# Patient Record
Sex: FEMALE | Race: WHITE | NOT HISPANIC OR LATINO | Employment: OTHER | URBAN - METROPOLITAN AREA
[De-identification: names, ages, dates, MRNs, and addresses within clinical notes are randomized per-mention and may not be internally consistent; named-entity substitution may affect disease eponyms.]

---

## 2017-01-16 ENCOUNTER — OFFICE VISIT (OUTPATIENT)
Dept: PODIATRY | Facility: CLINIC | Age: 62
End: 2017-01-16
Payer: COMMERCIAL

## 2017-01-16 VITALS
DIASTOLIC BLOOD PRESSURE: 76 MMHG | HEART RATE: 78 BPM | BODY MASS INDEX: 38.86 KG/M2 | WEIGHT: 211.19 LBS | HEIGHT: 62 IN | SYSTOLIC BLOOD PRESSURE: 177 MMHG

## 2017-01-16 DIAGNOSIS — M77.30 RETROCALCANEAL BONE SPUR: ICD-10-CM

## 2017-01-16 DIAGNOSIS — M72.2 PLANTAR FASCIITIS: ICD-10-CM

## 2017-01-16 DIAGNOSIS — M76.61 TENDONITIS, ACHILLES, RIGHT: Primary | ICD-10-CM

## 2017-01-16 PROCEDURE — 3078F DIAST BP <80 MM HG: CPT | Mod: S$GLB,,, | Performed by: PODIATRIST

## 2017-01-16 PROCEDURE — 99213 OFFICE O/P EST LOW 20 MIN: CPT | Mod: S$GLB,,, | Performed by: PODIATRIST

## 2017-01-16 PROCEDURE — 1159F MED LIST DOCD IN RCRD: CPT | Mod: S$GLB,,, | Performed by: PODIATRIST

## 2017-01-16 PROCEDURE — 3077F SYST BP >= 140 MM HG: CPT | Mod: S$GLB,,, | Performed by: PODIATRIST

## 2017-01-16 PROCEDURE — 99999 PR PBB SHADOW E&M-EST. PATIENT-LVL III: CPT | Mod: PBBFAC,,, | Performed by: PODIATRIST

## 2017-01-16 RX ORDER — METHYLPREDNISOLONE 4 MG/1
TABLET ORAL
Qty: 1 PACKAGE | Refills: 0 | Status: SHIPPED | OUTPATIENT
Start: 2017-01-16 | End: 2017-02-06

## 2017-01-16 RX ORDER — DEXAMETHASONE SODIUM PHOSPHATE 4 MG/ML
INJECTION, SOLUTION INTRA-ARTICULAR; INTRALESIONAL; INTRAMUSCULAR; INTRAVENOUS; SOFT TISSUE
Refills: 0 | COMMUNITY
Start: 2016-12-07 | End: 2017-11-06

## 2017-01-16 NOTE — PROGRESS NOTES
"Podiatry Note    CHIEF COMPLAINT   Chief Complaint   Patient presents with    Follow-up     Right achilles tendonitis. Continues with pain to site rated 6/10. Has been doing therapy and wearing heel lifts         HPI  Alysha Rosales is a 62 y.o. female w/ PMH of DM2, CAD, gout, HTN, and other medical problems, who presents today complaining of painful achilles /  posterior aspect of the right heel.  Pt states pain is still present but much improved with use of ionotophoresis which is being performed at [facility] physical therapy. She has only had one session.    Hemoglobin A1C   Date Value Ref Range Status   11/08/2016 6.4 (H) 4.5 - 6.2 % Final     Comment:     According to ADA guidelines, hemoglobin A1C <7.0% represents  optimal control in non-pregnant diabetic patients.  Different  metrics may apply to specific populations.   Standards of Medical Care in Diabetes - 2016.  For the purpose of screening for the presence of diabetes:  <5.7%     Consistent with the absence of diabetes  5.7-6.4%  Consistent with increasing risk for diabetes   (prediabetes)  >or=6.5%  Consistent with diabetes  Currently no consensus exists for use of hemoglobin A1C  for diagnosis of diabetes for children.     07/05/2016 6.2 4.5 - 6.2 % Final   04/05/2016 6.0 4.5 - 6.2 % Final       REVIEW OF SYSTEMS  General: Denies any fever or chills  Chest: Denies shortness of breath, wheezing, coughing, or sputum production  Heart: Denies chest pain, cold extremities, orthopenia, or reduced exercise tolerance  As noted above and per history of current illness above, otherwise negative in the remainder of the 14 systems.     PHYSICAL EXAM  Vitals:    01/16/17 0842   BP: (!) 177/76   Pulse: 78   Weight: 95.8 kg (211 lb 3.2 oz)   Height: 5' 2" (1.575 m)   PainSc:   6   PainLoc: Foot       General: This patient is well-developed, well-nourished and appears stated age, well-oriented to person, place and time, and cooperative and pleasant on today's " visit      LOWER EXTREMITY  Vascular exam:   · Dorsalis pedis and posterior tibial pulses palpable 2/4 bilaterally.   · Capillary refill time immediate to the toes.   · Feet are warm to the touch. Skin temperature warm to warm from proximally to distally   · There are no varicosities, telangiectasias noted to bilateral foot and ankle regions.   · There are no ecchymoses noted to bilateral foot and ankle regions.   · There is no gross lower extremity edema.    Dermatologic exam:   · Skin moist with healthy texture and turgor.  · There are no open ulcerations, lacerations, or fissures to bilateral foot and ankle regions. There are no signs of infection as there is no erythema, no proximal-extending lymphangiitis, no fluctuance, or crepitus noted on palpation to bilateral foot and ankle regions.   · There is no interdigital maceration.   · There are no hyperkeratotic lesions noted to feet. Nails are well-trimmed.   · There are diffuse scales on plantar foot     Neurologic exam:  · Epicritic sensation is intact as the patient is able to sense light touch to bilateral foot and ankle regions.   · Achilles and patellar deep tendon reflexes intact  · Babinski reflex absent    Musculoskeletal/Orthopedic exam:   · TTP posterior aspect heel at insertion of achilles RIGHT and TTP of achilles tendon proper.  Neg prominence noted at insertion, neg defect palpated, Gastroc equinus noted. neg edema, Neg ecchy/eryth.     · Mild TTP plantar calcaneus medial tubercle, RIGHT  · Muscle strength AT/EHL/EDL/PT: 5/5; Achilles/Gastroc/Soleus: 5/5; PB/PL: 5/5 Muscle tone is normal.  · Ankle joint ROM  B/L supple DF/PF, non-crepitus  · STJ ROM supple inv/ev, non crepitus b/l.  · On stance/Gait: able to stand, heel to toe gait. Able to weightbear, ambulate without assistance      IMAGING   Reviewed by me and I agree with radiologist findings, 3 views of foot/ankle, reveal: Right posterior calcaneal enthesophyte    ASSESSMENT  1. Achilles  tendonitis, insertional, right  2. Retrocalcaneal heel spur  3. Plantar fasciitis, right       PLAN  1. Patient was educated about clinical and imaging findings, and verbalizes understanding of above.  2. Treatment plan:   -continue with heel lift and tennis shoe  -rehab exercises provided for heel pain, Rx medrol dosepak  -consider injection if pain fails to resolve  3. RTC in 4-6 wks if no improvement noted    Future Appointments  Date Time Provider Department Center   2/2/2017 9:30 AM MD CAILIN [facility] [address]         Report Electronically Signed By:  Lianne Chew DPM   Podiatric Medicine & Surgery  [facility]  1/16/2017

## 2017-01-19 ENCOUNTER — PATIENT OUTREACH (OUTPATIENT)
Dept: ADMINISTRATIVE | Facility: HOSPITAL | Age: 62
End: 2017-01-19

## 2017-01-19 NOTE — PROGRESS NOTES
[facility] is committed to your overall health.  To help you get the most out of each of your visits, we will review your information to make sure you are up to date on all of your recommended tests and/or procedures.      Margie Bird MD has found that you may be due for   Health Maintenance Due   Topic    Colonoscopy         If you have had any of the above done at another facility, please bring the records or information with you so that your record at [facility] will be complete.    If you are currently taking medication, please bring it with you to your appointment for review.    We will be happy to assist you with scheduling any necessary appointments or you may contact the [facility] appointment desk at [phone] to schedule at your convenience.     Thank you for choosing [facility] for your healthcare needs,      Lacie RICHARDSON LPN Care Coordinator  Care Coordination Department  [facility]

## 2017-01-22 RX ORDER — FUROSEMIDE 20 MG/1
TABLET ORAL
Qty: 30 TABLET | Refills: 5 | Status: SHIPPED | OUTPATIENT
Start: 2017-01-22 | End: 2017-08-16 | Stop reason: SDUPTHER

## 2017-03-21 ENCOUNTER — OFFICE VISIT (OUTPATIENT)
Dept: FAMILY MEDICINE | Facility: CLINIC | Age: 62
End: 2017-03-21
Payer: COMMERCIAL

## 2017-03-21 ENCOUNTER — LAB VISIT (OUTPATIENT)
Dept: LAB | Facility: HOSPITAL | Age: 62
End: 2017-03-21
Attending: FAMILY MEDICINE
Payer: COMMERCIAL

## 2017-03-21 VITALS
DIASTOLIC BLOOD PRESSURE: 72 MMHG | SYSTOLIC BLOOD PRESSURE: 122 MMHG | TEMPERATURE: 97 F | HEART RATE: 74 BPM | HEIGHT: 62 IN | RESPIRATION RATE: 18 BRPM | BODY MASS INDEX: 37.93 KG/M2 | WEIGHT: 206.13 LBS | OXYGEN SATURATION: 98 %

## 2017-03-21 DIAGNOSIS — L30.9 ECZEMA, UNSPECIFIED TYPE: ICD-10-CM

## 2017-03-21 DIAGNOSIS — E11.21 DIABETIC NEPHROPATHY ASSOCIATED WITH TYPE 2 DIABETES MELLITUS: Primary | ICD-10-CM

## 2017-03-21 DIAGNOSIS — E78.5 HYPERLIPIDEMIA, UNSPECIFIED HYPERLIPIDEMIA TYPE: ICD-10-CM

## 2017-03-21 DIAGNOSIS — R80.9 PERSISTENT PROTEINURIA ASSOCIATED WITH TYPE 2 DIABETES MELLITUS: ICD-10-CM

## 2017-03-21 DIAGNOSIS — E11.29 PERSISTENT PROTEINURIA ASSOCIATED WITH TYPE 2 DIABETES MELLITUS: ICD-10-CM

## 2017-03-21 DIAGNOSIS — I25.10 CORONARY ARTERY DISEASE INVOLVING NATIVE CORONARY ARTERY OF NATIVE HEART WITHOUT ANGINA PECTORIS: ICD-10-CM

## 2017-03-21 DIAGNOSIS — E11.21 DIABETIC NEPHROPATHY ASSOCIATED WITH TYPE 2 DIABETES MELLITUS: ICD-10-CM

## 2017-03-21 DIAGNOSIS — I10 ESSENTIAL HYPERTENSION: ICD-10-CM

## 2017-03-21 PROCEDURE — 36415 COLL VENOUS BLD VENIPUNCTURE: CPT | Mod: PO

## 2017-03-21 PROCEDURE — 83036 HEMOGLOBIN GLYCOSYLATED A1C: CPT

## 2017-03-21 PROCEDURE — 3074F SYST BP LT 130 MM HG: CPT | Mod: S$GLB,,, | Performed by: FAMILY MEDICINE

## 2017-03-21 PROCEDURE — 3044F HG A1C LEVEL LT 7.0%: CPT | Mod: S$GLB,,, | Performed by: FAMILY MEDICINE

## 2017-03-21 PROCEDURE — 3066F NEPHROPATHY DOC TX: CPT | Mod: S$GLB,,, | Performed by: FAMILY MEDICINE

## 2017-03-21 PROCEDURE — 3078F DIAST BP <80 MM HG: CPT | Mod: S$GLB,,, | Performed by: FAMILY MEDICINE

## 2017-03-21 PROCEDURE — 1160F RVW MEDS BY RX/DR IN RCRD: CPT | Mod: S$GLB,,, | Performed by: FAMILY MEDICINE

## 2017-03-21 PROCEDURE — 99214 OFFICE O/P EST MOD 30 MIN: CPT | Mod: S$GLB,,, | Performed by: FAMILY MEDICINE

## 2017-03-21 PROCEDURE — 99999 PR PBB SHADOW E&M-EST. PATIENT-LVL III: CPT | Mod: PBBFAC,,, | Performed by: FAMILY MEDICINE

## 2017-03-21 NOTE — MR AVS SNAPSHOT
[facility]  [address] [place] [address]  Phone: [phone]                  Alysha Rosales   3/21/2017 11:30 AM   Office Visit    Description:  Female : 1955   Provider:  Margie Bird MD   Department:  [facility]           Reason for Visit     Follow-up           Diagnoses this Visit        Comments    Diabetic nephropathy associated with type 2 diabetes mellitus    -  Primary            To Do List           Future Appointments        Provider Department Dept Phone    3/21/2017 1:40 PM LABORATORY, [address] [facility] [phone]    2017 7:45 AM Margie Bird MD [facility] [phone]      Goals (5 Years of Data)     None      Follow-Up and Disposition     Return in about 4 months (around 2017) for physical.      [facility]     [facility] On Call Nurse Care Line -  Assistance  Registered nurses in the [facility] Center provide clinical advisement, health education, appointment booking, and other advisory services.  Call for this free service at [phone].             Medications           Message regarding Medications     Verify the changes and/or additions to your medication regime listed below are the same as discussed with your clinician today.  If any of these changes or additions are incorrect, please notify your healthcare provider.             Verify that the below list of medications is an accurate representation of the medications you are currently taking.  If none reported, the list may be blank. If incorrect, please contact your healthcare provider. Carry this list with you in case of emergency.           Current Medications     allopurinol (ZYLOPRIM) 100 MG tablet TAKE ONE-HALF TABLET BY MOUTH ONCE DAILY    aspirin 81 mg Tab Take 1 tablet by mouth.    BLOOD SUGAR DIAGNOSTIC (ONE TOUCH ULTRA TEST MISC)     blood sugar diagnostic (ONE TOUCH ULTRA  "TEST) Strp Use daily as directed    clopidogrel (PLAVIX) 75 mg tablet Take 1 tablet by mouth.    CRESTOR 20 mg tablet TAKE ONE TABLET BY MOUTH ONCE NIGHTLY    desoximetasone (TOPICORT) 0.25 % cream Apply 1 application topically 2 (two) times daily as needed.    dexamethasone (DECADRON) 4 mg/mL injection USE AS DIRECTED INTRAMUSCULARLY BY PHYSICAL THERAPIST    ERGOCALCIFEROL, VITAMIN D2, (VITAMIN D ORAL) Take 1 tablet by mouth once daily. 1000 units daily    furosemide (LASIX) 20 MG tablet TAKE ONE TABLET BY MOUTH ONCE DAILY    glyBURIDE (DIABETA) 5 MG tablet TAKE ONE TABLET BY MOUTH TWICE DAILY    hydrALAZINE (APRESOLINE) 50 MG tablet Take 1 tablet (50 mg total) by mouth 4 (four) times daily.    ibandronate (BONIVA) 150 mg tablet Take 1 tablet (150 mg total) by mouth every 30 days.    JANUVIA 100 mg Tab TAKE ONE TABLET BY MOUTH ONCE DAILY    lisinopril (PRINIVIL,ZESTRIL) 20 MG tablet Take 1 tablet (20 mg total) by mouth once daily.    metformin (GLUCOPHAGE) 500 MG tablet TAKE ONE TABLET BY MOUTH IN THE MORNING AND ONE TABLET AT NOON AND TWO TABLETS IN THE EVENING WITH MEALS    nebivolol (BYSTOLIC) 10 MG Tab Take 1 tablet by mouth.    ONE TOUCH ULTRASOFT LANCETS [facility]            Clinical Reference Information           Your Vitals Were     BP Pulse Temp Resp    122/72 (BP Location: Left arm, Patient Position: Sitting, BP Method: Manual) 74 96.6 °F (35.9 °C) (Tympanic) 18    Height Weight SpO2 BMI    5' 2" (1.575 m) 93.5 kg (206 lb 2.1 oz) 98% 37.7 kg/m2      Blood Pressure          Most Recent Value    BP  122/72      Allergies as of 3/21/2017     Codeine      Immunizations Administered on Date of Encounter - 3/21/2017     None      Orders Placed During Today's Visit     Future Labs/Procedures Expected by Expires    Hemoglobin A1c  3/21/2017 3/21/2018      Instructions    Please correspond with Dr. Bird via My Chart for your results.     Thanks.       Language Assistance Services     ATTENTION: Language assistance " services are available, free of charge. Please call [phone].      ATENCIÓN: Si habla shabbir, tiene a ocampo disposición servicios gratuitos de asistencia lingüística. Llame al [phone].     CHÚ Ý: N?u b?n nói Ti?ng Vi?t, có các d?ch v? h? tr? ngôn ng? mi?n phí dành cho b?n. G?i s? [phone].         [facility] complies with applicable Federal civil rights laws and does not discriminate on the basis of race, color, national origin, age, disability, or sex.

## 2017-03-21 NOTE — PROGRESS NOTES
Subjective:       Patient ID: Alysha Rosales is a 62 y.o. female.    Chief Complaint: Diabetes; Hypertension; and Follow-up    HPI Comments: Ms. Rosales comes in today for 3-month diabetes and hypertension follow-up.  She is not fasting but has taken medication today.  She states she walks and monitors her diet.  She states she performs random glucose checks with levels ranging <140.      She states she feels good today without fever, chills, fatigue, appetite change; shortness of breath, cough, wheezing; chest pain, palpitations, leg swelling; abdominal pain, nausea, vomiting, diarrhea, constipation; polydipsia, polyuria, polyphagia; unusual urinary symptoms; back pain; headaches; anxiety or depression except reports having occasional right foot pain due to Achilles tendinitis.  She states she completed physical therapy which was helpful and states she applies ice, also helpful.  She saw podiatrist Dr. Chew on January 16, 2017 for right Achilles tendinitis, plantar fasciitis with 4-6 week follow-up advised.    She states she is scheduled to see Dr. Kaba, cardiologist, on April 11, 2017 for surveillance of CAD with the following labs (CMP, lipid panel) scheduled for this Thursday.    She saw Dr. Bryant, nephrologist, on November 17, 2016 for proteinuria surveillance with 6-month follow-up advised.      Current Outpatient Prescriptions:  allopurinol (ZYLOPRIM) 100 MG tablet, TAKE ONE-HALF TABLET BY MOUTH ONCE DAILY  aspirin 81 mg Tab, Take 1 tablet by mouth.  BLOOD SUGAR DIAGNOSTIC (ONE TOUCH ULTRA TEST MISC),   blood sugar diagnostic (ONE TOUCH ULTRA TEST) Strp, Use daily as directed  clopidogrel (PLAVIX) 75 mg tablet, Take 1 tablet by mouth.  CRESTOR 20 mg tablet, TAKE ONE TABLET BY MOUTH ONCE NIGHTLY  desoximetasone (TOPICORT) 0.25 % cream, Apply 1 application topically 2 (two) times daily as needed.  dexamethasone (DECADRON) 4 mg/mL injection, USE AS DIRECTED INTRAMUSCULARLY BY PHYSICAL  THERAPIST  ERGOCALCIFEROL, VITAMIN D2, (VITAMIN D ORAL), Take 1 tablet by mouth once daily. 1000 units daily  furosemide (LASIX) 20 MG tablet, TAKE ONE TABLET BY MOUTH ONCE DAILY  glyBURIDE (DIABETA) 5 MG tablet, TAKE ONE TABLET BY MOUTH TWICE DAILY  hydrALAZINE (APRESOLINE) 50 MG tablet, Take 1 tablet (50 mg total) by mouth 4 (four) times daily.  ibandronate (BONIVA) 150 mg tablet, Take 1 tablet (150 mg total) by mouth every 30 days.  JANUVIA 100 mg Tab, TAKE ONE TABLET BY MOUTH ONCE DAILY  lisinopril (PRINIVIL,ZESTRIL) 20 MG tablet, Take 1 tablet (20 mg total) by mouth once daily.  metformin (GLUCOPHAGE) 500 MG tablet, TAKE ONE TABLET BY MOUTH IN THE MORNING AND ONE TABLET AT NOON AND TWO TABLETS IN THE EVENING WITH MEALS  nebivolol (BYSTOLIC) 10 MG Tab, Take 1 tablet by mouth.  ONE TOUCH ULTRASOFT LANCETS [facility],     Labs:                     WBC                      7.11                11/08/2016                 HGB                      12.3                11/08/2016                 HCT                      38.9                11/08/2016                 PLT                      212                 11/08/2016               LDLCALC                  43.4 (L)            04/05/2016                          CHOL                     119 (L)             04/05/2016                 TRIG                     133                 04/05/2016                 HDL                      49                  04/05/2016                 ALT                      17                  04/05/2016                 AST                      23                  04/05/2016                 NA                       144                 11/08/2016                 K                        4.4                 11/08/2016                 CL                       107                 11/08/2016                 CREATININE               0.9                 11/08/2016                 BUN                      23                  11/08/2016                 CO2                       24                  11/08/2016                 TSH                      2.398               07/05/2016                 HGBA1C                   6.4 (H)             11/08/2016                  Review of Systems   Constitutional: Negative for activity change, appetite change, chills, fatigue and fever.        Weight  95.9 kg (211 lb 6.7 oz) at November 8, 2016 visit.   Respiratory: Negative for cough, shortness of breath and wheezing.    Cardiovascular: Negative for chest pain, palpitations and leg swelling.        See history of present illness.   Gastrointestinal: Negative for abdominal pain, constipation, diarrhea, nausea and vomiting.   Endocrine: Negative for polydipsia, polyphagia and polyuria.        See history of present illness.   Genitourinary: Negative for difficulty urinating.        See history of present illness.   Musculoskeletal: Positive for myalgias. Negative for back pain and joint swelling.        See history of present illness.   Neurological: Negative for seizures and headaches.   Hematological:        See history of present illness.       Objective:      Physical Exam   Constitutional: She is oriented to person, place, and time. She appears well-developed and well-nourished. No distress.   Pleasant and obese.   Neck: Normal range of motion. Neck supple. No thyromegaly present.   Cardiovascular: Normal rate, regular rhythm and intact distal pulses.    Murmur heard.  Pulses:       Dorsalis pedis pulses are 3+ on the right side, and 3+ on the left side.        Posterior tibial pulses are 3+ on the right side, and 3+ on the left side.   Chronic and soft.   Pulmonary/Chest: Effort normal and breath sounds normal. No respiratory distress. She has no wheezes.   Abdominal: Soft. Bowel sounds are normal. She exhibits no distension and no mass. There is no tenderness. There is no rebound and no guarding.   Musculoskeletal: Normal range of motion. She exhibits no edema or tenderness.    She is ambulatory without problems.     Feet:   Right Foot:   Protective Sensation: 5 sites tested. 5 sites sensed.   Skin Integrity: Negative for ulcer or skin breakdown.   Left Foot:   Protective Sensation: 5 sites tested. 5 sites sensed.   Skin Integrity: Negative for ulcer or skin breakdown.   Lymphadenopathy:     She has no cervical adenopathy.   Neurological: She is alert and oriented to person, place, and time.   Skin: She is not diaphoretic.   Eczematous changes at sides of both feet noted but much improved.   Psychiatric: She has a normal mood and affect. Her behavior is normal. Judgment and thought content normal.   Vitals reviewed.      Assessment:       1. Diabetic nephropathy associated with type 2 diabetes mellitus    2. Essential hypertension    3. Hyperlipidemia, unspecified hyperlipidemia type    4. Persistent proteinuria associated with type 2 diabetes mellitus    5. Coronary artery disease involving native coronary artery of native heart without angina pectoris    6. Eczema, unspecified type    7. Obesity, Class II, BMI 35-39.9, with comorbidity        Plan:       1.  Labs: A1c.  Patient advised to correspond via My Chart for results.  2.  Continue current medications, follow low sodium, low cholesterol, low carb diet, daily walks.  3.  Keep follow up with specialists.  4  See me in 4 months for fasting annual wellness examination.

## 2017-03-22 LAB
ESTIMATED AVG GLUCOSE: 137 MG/DL
HBA1C MFR BLD HPLC: 6.4 %

## 2017-03-31 RX ORDER — ROSUVASTATIN CALCIUM 20 MG/1
TABLET, FILM COATED ORAL
Qty: 30 TABLET | Refills: 5 | Status: SHIPPED | OUTPATIENT
Start: 2017-03-31 | End: 2018-06-20 | Stop reason: SDUPTHER

## 2017-04-07 DIAGNOSIS — E11.9 TYPE 2 DIABETES MELLITUS WITHOUT COMPLICATION: ICD-10-CM

## 2017-04-13 DIAGNOSIS — E11.9 TYPE 2 DIABETES MELLITUS WITHOUT COMPLICATION: ICD-10-CM

## 2017-06-09 DIAGNOSIS — E11.9 TYPE 2 DIABETES MELLITUS WITHOUT COMPLICATION: ICD-10-CM

## 2017-06-13 RX ORDER — METFORMIN HYDROCHLORIDE 500 MG/1
TABLET ORAL
Qty: 120 TABLET | Refills: 5 | Status: SHIPPED | OUTPATIENT
Start: 2017-06-13 | End: 2018-03-13 | Stop reason: SDUPTHER

## 2017-06-26 ENCOUNTER — PATIENT OUTREACH (OUTPATIENT)
Dept: ADMINISTRATIVE | Facility: HOSPITAL | Age: 62
End: 2017-06-26

## 2017-07-11 ENCOUNTER — OFFICE VISIT (OUTPATIENT)
Dept: FAMILY MEDICINE | Facility: CLINIC | Age: 62
End: 2017-07-11
Payer: COMMERCIAL

## 2017-07-11 ENCOUNTER — LAB VISIT (OUTPATIENT)
Dept: LAB | Facility: HOSPITAL | Age: 62
End: 2017-07-11
Payer: COMMERCIAL

## 2017-07-11 VITALS
BODY MASS INDEX: 38.5 KG/M2 | TEMPERATURE: 97 F | HEART RATE: 78 BPM | RESPIRATION RATE: 18 BRPM | SYSTOLIC BLOOD PRESSURE: 128 MMHG | WEIGHT: 209.19 LBS | OXYGEN SATURATION: 97 % | DIASTOLIC BLOOD PRESSURE: 84 MMHG | HEIGHT: 62 IN

## 2017-07-11 DIAGNOSIS — E11.40 TYPE 2 DIABETES MELLITUS WITH DIABETIC NEUROPATHY, WITHOUT LONG-TERM CURRENT USE OF INSULIN: ICD-10-CM

## 2017-07-11 DIAGNOSIS — M81.0 OSTEOPOROSIS, UNSPECIFIED OSTEOPOROSIS TYPE, UNSPECIFIED PATHOLOGICAL FRACTURE PRESENCE: ICD-10-CM

## 2017-07-11 DIAGNOSIS — I10 ESSENTIAL HYPERTENSION: ICD-10-CM

## 2017-07-11 DIAGNOSIS — Z78.0 POSTMENOPAUSAL: ICD-10-CM

## 2017-07-11 DIAGNOSIS — Z00.00 ANNUAL PHYSICAL EXAM: Primary | ICD-10-CM

## 2017-07-11 DIAGNOSIS — Z12.31 OTHER SCREENING MAMMOGRAM: ICD-10-CM

## 2017-07-11 DIAGNOSIS — M17.12 ARTHRITIS OF KNEE, LEFT: ICD-10-CM

## 2017-07-11 DIAGNOSIS — E55.9 VITAMIN D DEFICIENCY: ICD-10-CM

## 2017-07-11 DIAGNOSIS — I25.10 CORONARY ARTERY DISEASE INVOLVING NATIVE CORONARY ARTERY OF NATIVE HEART WITHOUT ANGINA PECTORIS: ICD-10-CM

## 2017-07-11 DIAGNOSIS — Z00.00 ANNUAL PHYSICAL EXAM: ICD-10-CM

## 2017-07-11 DIAGNOSIS — E11.21 DIABETIC NEPHROPATHY ASSOCIATED WITH TYPE 2 DIABETES MELLITUS: ICD-10-CM

## 2017-07-11 DIAGNOSIS — E79.0 ELEVATED URIC ACID IN BLOOD: ICD-10-CM

## 2017-07-11 DIAGNOSIS — E04.2 MULTIPLE THYROID NODULES: ICD-10-CM

## 2017-07-11 DIAGNOSIS — Z12.11 SCREENING FOR COLON CANCER: ICD-10-CM

## 2017-07-11 DIAGNOSIS — E78.5 HYPERLIPIDEMIA, UNSPECIFIED HYPERLIPIDEMIA TYPE: ICD-10-CM

## 2017-07-11 DIAGNOSIS — L30.9 ECZEMA, UNSPECIFIED TYPE: ICD-10-CM

## 2017-07-11 LAB
25(OH)D3+25(OH)D2 SERPL-MCNC: 44 NG/ML
ALBUMIN SERPL BCP-MCNC: 3.5 G/DL
ALP SERPL-CCNC: 85 U/L
ALT SERPL W/O P-5'-P-CCNC: 18 U/L
ANION GAP SERPL CALC-SCNC: 10 MMOL/L
AST SERPL-CCNC: 18 U/L
BASOPHILS # BLD AUTO: 0.05 K/UL
BASOPHILS NFR BLD: 0.7 %
BILIRUB SERPL-MCNC: 0.2 MG/DL
BUN SERPL-MCNC: 27 MG/DL
CALCIUM SERPL-MCNC: 9.7 MG/DL
CHLORIDE SERPL-SCNC: 108 MMOL/L
CO2 SERPL-SCNC: 23 MMOL/L
CREAT SERPL-MCNC: 0.9 MG/DL
CREAT UR-MCNC: 196 MG/DL
DIFFERENTIAL METHOD: NORMAL
EOSINOPHIL # BLD AUTO: 0.2 K/UL
EOSINOPHIL NFR BLD: 2.2 %
ERYTHROCYTE [DISTWIDTH] IN BLOOD BY AUTOMATED COUNT: 13.9 %
EST. GFR  (AFRICAN AMERICAN): >60 ML/MIN/1.73 M^2
EST. GFR  (NON AFRICAN AMERICAN): >60 ML/MIN/1.73 M^2
GLUCOSE SERPL-MCNC: 173 MG/DL
HCT VFR BLD AUTO: 38.7 %
HGB BLD-MCNC: 12.6 G/DL
LYMPHOCYTES # BLD AUTO: 1.4 K/UL
LYMPHOCYTES NFR BLD: 19.8 %
MCH RBC QN AUTO: 29.2 PG
MCHC RBC AUTO-ENTMCNC: 32.6 %
MCV RBC AUTO: 90 FL
MONOCYTES # BLD AUTO: 0.5 K/UL
MONOCYTES NFR BLD: 6.5 %
NEUTROPHILS # BLD AUTO: 5.1 K/UL
NEUTROPHILS NFR BLD: 70.5 %
PLATELET # BLD AUTO: 191 K/UL
PMV BLD AUTO: 11.3 FL
POTASSIUM SERPL-SCNC: 4.9 MMOL/L
PROT SERPL-MCNC: 7 G/DL
PROT UR-MCNC: 48 MG/DL
PROT/CREAT RATIO, UR: 0.24
RBC # BLD AUTO: 4.31 M/UL
SODIUM SERPL-SCNC: 141 MMOL/L
TSH SERPL DL<=0.005 MIU/L-ACNC: 2.1 UIU/ML
URATE SERPL-MCNC: 6.8 MG/DL
WBC # BLD AUTO: 7.18 K/UL

## 2017-07-11 PROCEDURE — 80053 COMPREHEN METABOLIC PANEL: CPT

## 2017-07-11 PROCEDURE — 84443 ASSAY THYROID STIM HORMONE: CPT

## 2017-07-11 PROCEDURE — 85025 COMPLETE CBC W/AUTO DIFF WBC: CPT

## 2017-07-11 PROCEDURE — 83036 HEMOGLOBIN GLYCOSYLATED A1C: CPT

## 2017-07-11 PROCEDURE — 82570 ASSAY OF URINE CREATININE: CPT

## 2017-07-11 PROCEDURE — 99396 PREV VISIT EST AGE 40-64: CPT | Mod: 25,S$GLB,, | Performed by: FAMILY MEDICINE

## 2017-07-11 PROCEDURE — 82306 VITAMIN D 25 HYDROXY: CPT

## 2017-07-11 PROCEDURE — 36415 COLL VENOUS BLD VENIPUNCTURE: CPT | Mod: PO

## 2017-07-11 PROCEDURE — 84550 ASSAY OF BLOOD/URIC ACID: CPT

## 2017-07-11 PROCEDURE — 99999 PR PBB SHADOW E&M-EST. PATIENT-LVL IV: CPT | Mod: PBBFAC,,, | Performed by: FAMILY MEDICINE

## 2017-07-11 RX ORDER — DESOXIMETASONE 2.5 MG/G
1 CREAM TOPICAL 2 TIMES DAILY PRN
Qty: 15 G | Refills: 1 | Status: SHIPPED | OUTPATIENT
Start: 2017-07-11 | End: 2017-08-28 | Stop reason: SDUPTHER

## 2017-07-11 RX ORDER — IBANDRONATE SODIUM 150 MG/1
150 TABLET, FILM COATED ORAL
Qty: 1 TABLET | Refills: 11 | Status: SHIPPED | OUTPATIENT
Start: 2017-07-11 | End: 2017-08-16 | Stop reason: SDUPTHER

## 2017-07-11 NOTE — PROGRESS NOTES
HISTORY OF PRESENT ILLNESS: Ms. Rosales comes in today fasting and without taking medication without acute problems for annual wellness examination.    END OF LIFE DECISION: She does not have a living will but does not desire life support.    Current Outpatient Prescriptions   Medication Sig    allopurinol (ZYLOPRIM) 100 MG tablet TAKE ONE-HALF TABLET BY MOUTH ONCE DAILY    aspirin 81 mg Tab Take 1 tablet by mouth.    BLOOD SUGAR DIAGNOSTIC (ONE TOUCH ULTRA TEST MISC)     blood sugar diagnostic (ONE TOUCH ULTRA TEST) Strp Use daily as directed    clopidogrel (PLAVIX) 75 mg tablet Take 1 tablet by mouth.    CRESTOR 20 mg tablet TAKE ONE TABLET BY MOUTH ONCE NIGHTLY    desoximetasone (TOPICORT) 0.25 % cream Apply 1 application topically 2 (two) times daily as needed.    dexamethasone (DECADRON) 4 mg/mL injection USE AS DIRECTED INTRAMUSCULARLY BY PHYSICAL THERAPIST    ERGOCALCIFEROL, VITAMIN D2, (VITAMIN D ORAL) Take 1 tablet by mouth once daily. 1000 units daily    furosemide (LASIX) 20 MG tablet TAKE ONE TABLET BY MOUTH ONCE DAILY    glyBURIDE (DIABETA) 5 MG tablet TAKE ONE TABLET BY MOUTH TWICE DAILY    hydrALAZINE (APRESOLINE) 50 MG tablet Take 1 tablet (50 mg total) by mouth 4 (four) times daily.    lisinopril (PRINIVIL,ZESTRIL) 20 MG tablet Take 1 tablet (20 mg total) by mouth once daily.    metformin (GLUCOPHAGE) 500 MG tablet TAKE ONE TABLET BY MOUTH IN THE MORNING AND ONE AT NOON AND TWO IN THE EVENING WITH MEALS    nebivolol (BYSTOLIC) 10 MG Tab Take 1 tablet by mouth.    ONE TOUCH ULTRASOFT LANCETS MISC     ibandronate (BONIVA) 150 mg tablet Take 1 tablet (150 mg total) by mouth every 30 days. (CURRENTLY not taking in some time)    JANUVIA 100 mg Tab TAKE ONE TABLET BY MOUTH ONCE DAILY     SCREENINGS:   Cholesterol (with CMP): March 23, 2017 with cardiologist Dr. Kaba.  FFS/Colonoscopy: March 20, 2009; repeat in 7 years.  Mammogram: May 23, 2016 - cas.  GYN Exam: April 5, 2016 -  okay. Pap smears 2009 - 2013, 2016 - okay.  Dexa Scan: May 21, 2015 - osteoporosis; repeat in 2 - 3 years.  Eye Exam: November 11, 2016 with Dr. Roseline Best. She wears glasses.   Dental Exam: April or May 2017.  She wears denture on top and partial on bottom.   PPD: Negative in the past.  Immunizations: Tdap - February 25, 2009.  Gardasil - N./A.  Zostavax - March 30, 2015.  Pneumovax - November 21, 2008.  Seasonal Flu - November 8, 2016.  Hepatitis B vaccine - August 28, 2013, July 25, 2013, July 29, 2014.    ROS:  GENERAL: Denies fever, chills, fatigue or unusual weight change. Appetite normal. Weight 93.5 kg (206 lb 2.1 oz) at March 21, 2017 visit. Monitors diet. Not much exercise recently except mows her lawn and occasionally walks.  SKIN: Denies rashes, itching, changes in mole, color or texture of skin or easy bruising. Requests refill of cream of feet.  HEAD: Denies headaches or recent head trauma.  EYES: Denies change in vision, pain, diplopia, redness or discharge. Wears glasses.  EARS: Denies ear pain, discharge, vertigo or decreased hearing.  NOSE: Denies loss of smell, epistaxis or rhinitis.  MOUTH & THROAT: Denies hoarseness or change in voice. Denies excessive gum bleeding or mouth sores. Denies sore throat.  NODES: Denies swollen glands.  CHEST: Denies RIDDLE, wheezing, cough, or sputum production.  CARDIOVASCULAR: Denies chest pain, PND, orthopnea or reduced exercise tolerance. Denies palpitations. Saw cardiology Dr. Kaba on April 11, 2017 for surveillance of CAD with follow up scheduled for June 23,2017 which she cancelled and states she will reschedule.    ABDOMEN: Denies diarrhea, constipation, nausea, vomiting, abdominal pain, or blood in stool.  URINARY: Denies flank pain, dysuria or hematuria. Saw Dr. Bryant, nephrologist, on November 17, 2016 for proteinuria surveillance with 6-month follow-up advised.  GENITOURINARY: Denies flank pain, dysuria, frequency or hematuria. Performs monthly breast  "self exams.  ENDOCRINE: Reports not recently performing glucose checks. Reports has not been taking Boniva in some time. Thyroid ultrasound on May 5, 2016 - Borderline enlarged right thyroid lobe and several small hypoechoic thyroid nodules bilaterally.  No lesions which meet sonographic criteria for FNA.  HEME/LYMPH: Denies bleeding problems.  PERIPHERAL VASCULAR:Denies claudication or cyanosis.  MUSCULOSKELETAL: Reports morning stiffness, pain in low back but improves with ambulation. Reports chronic, occasional left knee pain - arthritis - not today. Reports improving right heel pain, due to achilles tendonitis. Denies edema.  NEUROLOGIC: Denies history of seizures, tremors, paralysis, alteration of gait or coordination. Reports chronic numbness in hands, feet.  PSYCHIATRIC: Denies mood swings, depression, anxiety or homicidal or suicidal thoughts. Denies sleep problems.    PE:   VS: /84 (BP Location: Left arm, Patient Position: Sitting, BP Method: Manual)   Pulse 78   Temp 97.4 °F (36.3 °C) (Tympanic)   Resp 18   Ht 5' 2" (1.575 m)   Wt 94.9 kg (209 lb 3.5 oz)   SpO2 97%   BMI 38.27 kg/m²   APPEARANCE:  Well nourished, well developed female.    APPEARANCE: Well nourished, well developed female, obese and pleasant, alert and oriented in no acute distress.   HEAD: Non tender. Full range of motion.  EYES: PERRL, conjunctiva pink, lids no edema.  EARS: External canal patent, no swelling or redness. TM's shiny and clear.  NOSE: Mucosa and turbinates pink, not swollen. No discharge. Non tender sinuses.  THROAT: No pharyngeal erythema or exudate. No stridor. She wears top dentures, bottom partials.  NECK: Supple, no mass, thyroid not enlarged.  NODES: No cervical, axillary or inguinal lymph node enlargement.  CHEST: Normal respiratory effort. Lungs clear to auscultation.  CARDIOVASCULAR: Normal S1, S2. Chronic soft cardiac murmur noted. PMI not displaced. No carotid bruit. Pedal pulses palpable " bilaterally. No edema. Feet look okay without ulcerations.  ABDOMEN: Bowel sounds present. Not distended. Soft. No tenderness, masses or organomegaly.  BREAST EXAM: Symmetrical, no external lesions, no discharge, no masses palpated.  PELVIC EXAM: No external lesions noted, no discharge, cervix appears normal, bimanual exam showed no uterine abnormalities and no adnexal masses. Urethra and bladder intact. Chronic right sided-vaginal wall protrusion without prolapse noted.  RECTAL EXAM: No external hemorrhoids or anal fissures. Heme-negative stool in the rectal vault.  MUSCULOSKELETAL: No joint deformities or stiffness except tender at right heel. She is ambulatory without problems.  SKIN: No rashes or suspicious lesions, normal color and turgor except eczematous skin changes at bottom of feet.  NEUROLOGIC: Cranial Nerves: II-XII grossly intact. DTR's: Knees, Ankles 2+ and equal bilaterally. Gait & Posture: Normal gait and fine motion.  PSYCHIATRIC: Patient alert, oriented x 3. Mood/Affect normal without acute anxiety or depression noted. Judgment/insight good as she makes appropriate decisions on today's examination.    Protective Sensation (w/ 10 gram monofilament):  Right: Intact  Left: Intact    Visual Inspection:  Normal -  Bilateral except eczematous changes at bottom of feet.    Pedal Pulses:   Right: Present  Left: Present    Posterior tibialis:   Right:Present  Left: Present    Lab Results   Component Value Date    HGBA1C 6.4 (H) 03/21/2017     ASSESSMENT:    ICD-10-CM ICD-9-CM    1. Annual physical exam Z00.00 V70.0 Hemoglobin A1c      TSH      CBC auto differential      Comprehensive metabolic panel      Protein / creatinine ratio, urine      Uric acid      DXA Bone Density Spine And Hip      Vitamin D   2. Essential hypertension I10 401.9    3. Diabetic nephropathy associated with type 2 diabetes mellitus E11.21 250.40      583.81    4. Type 2 diabetes mellitus with diabetic neuropathy, without long-term  current use of insulin E11.40 250.60      357.2    5. Hyperlipidemia, unspecified hyperlipidemia type E78.5 272.4    6. Elevated uric acid in blood E79.0 790.6    7. Multiple thyroid nodules E04.2 241.1 US Soft Tissue Head Neck Thyroid   8. Osteoporosis, unspecified osteoporosis type, unspecified pathological fracture presence M81.0 733.00 DXA Bone Density Spine And Hip   9. Coronary artery disease involving native coronary artery of native heart without angina pectoris I25.10 414.01    10. Arthritis of knee, left M17.12 716.96    11. Eczema, unspecified type L30.9 692.9    12. Vitamin D deficiency E55.9 268.9    13. Obesity, Class II, BMI 35-39.9, with comorbidity E66.9 278.00    14. Postmenopausal Z78.0 V49.81    15. Other screening mammogram Z12.31 V76.12 Mammo Digital Screening Bilat with CAD   16. Screening for colon cancer Z12.11 V76.51 Case request GI: COLONOSCOPY     PLAN:  1. Age-appropriate counseling-appropriate low-sodium, low-cholesterol, low carbohydrate diet and exercise daily as tolerated, monthly breast self exam, annual wellness examination.  2. Patient advised to correspond via My Chart for results.   3. Annual eye and dental examinations encouraged.  4. Continue current medications.  5. Restart Boniva 150 mg monthly, #1, 11 refills.  6. Prescription refill - Topicort 0.25% cream - apply twice daily prn, #15 gram, 1 refill.  7. See me in 4 months for diabetes and hypertension follow up.  8. Keep follow up with cardiology.

## 2017-07-12 LAB
ESTIMATED AVG GLUCOSE: 151 MG/DL
HBA1C MFR BLD HPLC: 6.9 %

## 2017-07-24 RX ORDER — SITAGLIPTIN 100 MG/1
TABLET, FILM COATED ORAL
Qty: 30 TABLET | Refills: 5 | Status: SHIPPED | OUTPATIENT
Start: 2017-07-24 | End: 2018-03-27 | Stop reason: SDUPTHER

## 2017-08-07 ENCOUNTER — HOSPITAL ENCOUNTER (OUTPATIENT)
Dept: RADIOLOGY | Facility: HOSPITAL | Age: 62
Discharge: HOME | End: 2017-08-07
Attending: FAMILY MEDICINE
Payer: COMMERCIAL

## 2017-08-07 VITALS — HEIGHT: 62 IN | WEIGHT: 209 LBS | BODY MASS INDEX: 38.46 KG/M2

## 2017-08-07 DIAGNOSIS — Z12.31 OTHER SCREENING MAMMOGRAM: ICD-10-CM

## 2017-08-07 PROCEDURE — 77067 SCR MAMMO BI INCL CAD: CPT | Mod: TC

## 2017-08-07 PROCEDURE — 77063 BREAST TOMOSYNTHESIS BI: CPT | Mod: 26,,, | Performed by: RADIOLOGY

## 2017-08-07 PROCEDURE — 77067 SCR MAMMO BI INCL CAD: CPT | Mod: 26,,, | Performed by: RADIOLOGY

## 2017-08-09 ENCOUNTER — PATIENT MESSAGE (OUTPATIENT)
Dept: FAMILY MEDICINE | Facility: CLINIC | Age: 62
End: 2017-08-09

## 2017-08-10 ENCOUNTER — HOSPITAL ENCOUNTER (OUTPATIENT)
Dept: RADIOLOGY | Facility: HOSPITAL | Age: 62
Discharge: HOME | End: 2017-08-10
Attending: FAMILY MEDICINE
Payer: COMMERCIAL

## 2017-08-10 ENCOUNTER — PATIENT MESSAGE (OUTPATIENT)
Dept: FAMILY MEDICINE | Facility: CLINIC | Age: 62
End: 2017-08-10

## 2017-08-10 DIAGNOSIS — E04.2 MULTIPLE THYROID NODULES: ICD-10-CM

## 2017-08-10 PROCEDURE — 76536 US EXAM OF HEAD AND NECK: CPT | Mod: 26,,, | Performed by: RADIOLOGY

## 2017-08-10 PROCEDURE — 76536 US EXAM OF HEAD AND NECK: CPT | Mod: TC

## 2017-08-16 RX ORDER — IBANDRONATE SODIUM 150 MG/1
150 TABLET, FILM COATED ORAL
Qty: 1 TABLET | Refills: 11 | Status: SHIPPED | OUTPATIENT
Start: 2017-08-16 | End: 2019-06-17 | Stop reason: SDUPTHER

## 2017-08-16 RX ORDER — FUROSEMIDE 20 MG/1
TABLET ORAL
Qty: 30 TABLET | Refills: 5 | Status: SHIPPED | OUTPATIENT
Start: 2017-08-16 | End: 2018-06-20 | Stop reason: SDUPTHER

## 2017-08-28 RX ORDER — DESOXIMETASONE 2.5 MG/G
CREAM TOPICAL
Qty: 15 G | Refills: 1 | Status: SHIPPED | OUTPATIENT
Start: 2017-08-28 | End: 2017-12-07 | Stop reason: SDUPTHER

## 2017-08-28 RX ORDER — GLYBURIDE 5 MG/1
TABLET ORAL
Qty: 60 TABLET | Refills: 5 | Status: SHIPPED | OUTPATIENT
Start: 2017-08-28 | End: 2018-09-03 | Stop reason: SDUPTHER

## 2017-09-05 RX ORDER — HYDRALAZINE HYDROCHLORIDE 50 MG/1
TABLET, FILM COATED ORAL
Qty: 120 TABLET | Refills: 10 | Status: SHIPPED | OUTPATIENT
Start: 2017-09-05 | End: 2018-10-13 | Stop reason: SDUPTHER

## 2017-09-18 ENCOUNTER — LAB VISIT (OUTPATIENT)
Dept: LAB | Facility: HOSPITAL | Age: 62
End: 2017-09-18
Attending: INTERNAL MEDICINE
Payer: COMMERCIAL

## 2017-09-18 ENCOUNTER — OFFICE VISIT (OUTPATIENT)
Dept: NEPHROLOGY | Facility: CLINIC | Age: 62
End: 2017-09-18
Payer: COMMERCIAL

## 2017-09-18 VITALS
HEART RATE: 74 BPM | DIASTOLIC BLOOD PRESSURE: 62 MMHG | BODY MASS INDEX: 38.17 KG/M2 | HEIGHT: 62 IN | SYSTOLIC BLOOD PRESSURE: 140 MMHG | WEIGHT: 207.44 LBS

## 2017-09-18 DIAGNOSIS — N18.1 CKD (CHRONIC KIDNEY DISEASE) STAGE 1, GFR 90 ML/MIN OR GREATER: ICD-10-CM

## 2017-09-18 DIAGNOSIS — R80.9 PROTEINURIA DUE TO TYPE 2 DIABETES MELLITUS: ICD-10-CM

## 2017-09-18 DIAGNOSIS — I10 ESSENTIAL HYPERTENSION: ICD-10-CM

## 2017-09-18 DIAGNOSIS — I10 ESSENTIAL HYPERTENSION: Primary | ICD-10-CM

## 2017-09-18 DIAGNOSIS — E11.21 DIABETIC NEPHROPATHY ASSOCIATED WITH TYPE 2 DIABETES MELLITUS: Primary | ICD-10-CM

## 2017-09-18 DIAGNOSIS — E11.29 PROTEINURIA DUE TO TYPE 2 DIABETES MELLITUS: ICD-10-CM

## 2017-09-18 LAB
ALBUMIN SERPL BCP-MCNC: 3.6 G/DL
ANION GAP SERPL CALC-SCNC: 11 MMOL/L
BASOPHILS # BLD AUTO: 0.05 K/UL
BASOPHILS NFR BLD: 0.7 %
BUN SERPL-MCNC: 23 MG/DL
CALCIUM SERPL-MCNC: 10.1 MG/DL
CHLORIDE SERPL-SCNC: 107 MMOL/L
CO2 SERPL-SCNC: 21 MMOL/L
CREAT SERPL-MCNC: 1 MG/DL
DIFFERENTIAL METHOD: NORMAL
EOSINOPHIL # BLD AUTO: 0.1 K/UL
EOSINOPHIL NFR BLD: 2 %
ERYTHROCYTE [DISTWIDTH] IN BLOOD BY AUTOMATED COUNT: 13.1 %
EST. GFR  (AFRICAN AMERICAN): >60 ML/MIN/1.73 M^2
EST. GFR  (NON AFRICAN AMERICAN): >60 ML/MIN/1.73 M^2
GLUCOSE SERPL-MCNC: 142 MG/DL
HCT VFR BLD AUTO: 37.4 %
HGB BLD-MCNC: 12.9 G/DL
LYMPHOCYTES # BLD AUTO: 1.4 K/UL
LYMPHOCYTES NFR BLD: 20.1 %
MCH RBC QN AUTO: 30.2 PG
MCHC RBC AUTO-ENTMCNC: 34.5 G/DL
MCV RBC AUTO: 88 FL
MONOCYTES # BLD AUTO: 0.4 K/UL
MONOCYTES NFR BLD: 6.1 %
NEUTROPHILS # BLD AUTO: 5.1 K/UL
NEUTROPHILS NFR BLD: 71.1 %
PHOSPHATE SERPL-MCNC: 3 MG/DL
PLATELET # BLD AUTO: 208 K/UL
PMV BLD AUTO: 10.7 FL
POTASSIUM SERPL-SCNC: 4.7 MMOL/L
RBC # BLD AUTO: 4.27 M/UL
SODIUM SERPL-SCNC: 139 MMOL/L
WBC # BLD AUTO: 7.16 K/UL

## 2017-09-18 PROCEDURE — 80069 RENAL FUNCTION PANEL: CPT

## 2017-09-18 PROCEDURE — 99999 PR PBB SHADOW E&M-EST. PATIENT-LVL III: CPT | Mod: PBBFAC,,, | Performed by: INTERNAL MEDICINE

## 2017-09-18 PROCEDURE — 3077F SYST BP >= 140 MM HG: CPT | Mod: S$GLB,,, | Performed by: INTERNAL MEDICINE

## 2017-09-18 PROCEDURE — 36415 COLL VENOUS BLD VENIPUNCTURE: CPT

## 2017-09-18 PROCEDURE — 3044F HG A1C LEVEL LT 7.0%: CPT | Mod: S$GLB,,, | Performed by: INTERNAL MEDICINE

## 2017-09-18 PROCEDURE — 99214 OFFICE O/P EST MOD 30 MIN: CPT | Mod: S$GLB,,, | Performed by: INTERNAL MEDICINE

## 2017-09-18 PROCEDURE — 85025 COMPLETE CBC W/AUTO DIFF WBC: CPT

## 2017-09-18 PROCEDURE — 3066F NEPHROPATHY DOC TX: CPT | Mod: S$GLB,,, | Performed by: INTERNAL MEDICINE

## 2017-09-18 PROCEDURE — 3008F BODY MASS INDEX DOCD: CPT | Mod: S$GLB,,, | Performed by: INTERNAL MEDICINE

## 2017-09-18 PROCEDURE — 3078F DIAST BP <80 MM HG: CPT | Mod: S$GLB,,, | Performed by: INTERNAL MEDICINE

## 2017-09-18 NOTE — PROGRESS NOTES
NEPHROLOGY CLINIC FOLLOWUP NOTE    REASON FOR FOLLOWUP AND CHIEF COMPLAINT:  History of proteinuria.    CARDIOLOGIST:  Stevo Kaba M.D., in [place].    HISTORY OF PRESENT ILLNESS:  Ms. Rosales is a 62-year-old  female with   a history of diabetes mellitus, hypertension, obesity, gout and coronary artery   disease with a past history of MI in 2004, who presents for followup.  The   patient was last seen by me over six months ago.  She has no acute complaints   today.  No chest pain, no shortness of breath.  She was initially referred to us   for proteinuria, which was about 2.7 g.  The patient was initiated on an ACE   inhibitor therapy and Dr. Kaba on repeat followup with the patient increased   the dose of that medication to 20 mg p.o. daily.  She has no acute complaints   today.  She is taking good care of herself, takes all her medications.  She pays   close attention to diet and lifestyle.    PAST MEDICAL HISTORY:  1.  Diabetes mellitus for greater than 10 years.  2.  Proteinuria, suspected related to diabetic nephropathy.  No prior kidney   biopsies.  3.  Hypertension.  4.  Hyperlipidemia.  5.  Gout.  6.  Obesity.  7.  Osteoporosis.  8.  Coronary artery disease with past history of MI in 2004.  9.  Mild renal insufficiency, CKD stage I.    PAST SURGICAL HISTORY:  Reviewed and unchanged.  Stent placement in coronary   vessels in 2004 and 2008, Dr. Stevo Kaba is cardiologist in [place] and   past history of knee surgery.    FAMILY HISTORY:  Reviewed and unchanged.  Mother with diabetes mellitus.    ALLERGIES:  Reviewed, to codeine.    SOCIAL HISTORY:  Quit smoking in 2004, used to smoke one pack of cigarettes a   day.  No alcohol.    DIETARY HISTORY:  Reviewed.  The patient is taking good care of herself, eating   a healthier diet.    MEDICATIONS:  Reviewed, Bystolic 10 mg p.o. daily, lisinopril 20 mg daily,   hydralazine 40 mg q.i.d., Lasix 20 mg daily, ergocalciferol once a day,    allopurinol 100 mg daily, aspirin 81 mg daily, Plavix 75 mg daily, Januvia 100   mg daily and Boniva every 30 days.    REVIEW OF SYSTEMS:  No recent hospitalizations.  GENERAL:  Negative.  HEAD, EYES, EARS, NOSE AND THROAT:  Negative.  CARDIAC:  Negative.  PULMONARY:  Negative.  GASTROINTESTINAL:  Negative.  GENITOURINARY:  Negative.  PSYCHOLOGICAL:  Negative.  NEUROLOGICAL:  Negative.  ENDOCRINE:  Negative.  HEMATOLOGIC AND ONCOLOGIC:  Negative.  INFECTIOUS DISEASE:  Negative.  The rest of the review of systems negative.    PHYSICAL EXAMINATION:  VITAL SIGNS:  Blood pressure is 140/62, pulse 74, weight is 94.1 pounds.  GENERAL:  She is cooperative, pleasant, in no acute distress.  Her speech and   thought process appropriate, normal.  HEENT:  Mucous membranes moist.  NECK:  No JVD.  HEART:  Regular rate and rhythm, S1 and S2 audible.  CHEST:  Clear to auscultation.  No rales or wheezes.  Breathing symmetric,   unlabored.  ABDOMEN:  Soft, nontender.  EXTREMITIES:  Showed no edema.    LABORATORY DATA:  Reviewed.  Urine protein to creatinine ratio, which used to be   2.7 g has now improved to 0.33 g in the microalbuminuric range.  Creatinine is   1.0, sodium 142, potassium 4.7, chloride 107, calcium 10.1, bicarbonate 21,   albumin 3.6.  White count is 7.1, hemoglobin 12.9, platelets 208.  Urinalysis   was otherwise unremarkable.    ASSESSMENT AND PLAN:  This is a 62-year-old female with proteinuria who presents   for followup.  The impression is as follows:  1.  Renal proteinuria has markedly improved.  The patient has responded   excellently to ACE inhibitor therapy and has not had any issues including   potassium has remained normal.  We will continue the same.  2.  The patient has very mild renal insufficiency, CKD stage I with proteinuria.  3.  Hypertension.  Blood pressures overall well controlled.  No changes today.    Medications reviewed with the patient.  4.  Cardiac, has coronary artery disease, following  with Dr. Kaba, past   history of MI as noted as well as past history of revascularization.  5.  Obesity was noted, discussed with the patient.  Advised continued dietary   restrictions and increase physical activity such as simple walking as tolerated.    Plans and recommendations as discussed above.    Total time spent 25 minutes, more than 50% of the time was spent on counseling   and coordination of care.      AK/IN  dd: 09/18/2017 16:57:29 (CDT)  td: 09/19/2017 19:51:33 (CDT)  Doc ID   #[id number]  Job ID #[id number]    CC: Stevo Kaba M.D.    [id number]

## 2017-10-23 ENCOUNTER — PATIENT OUTREACH (OUTPATIENT)
Dept: ADMINISTRATIVE | Facility: HOSPITAL | Age: 62
End: 2017-10-23

## 2017-10-24 ENCOUNTER — PATIENT MESSAGE (OUTPATIENT)
Dept: ADMINISTRATIVE | Facility: HOSPITAL | Age: 62
End: 2017-10-24

## 2017-11-02 ENCOUNTER — PATIENT MESSAGE (OUTPATIENT)
Dept: FAMILY MEDICINE | Facility: CLINIC | Age: 62
End: 2017-11-02

## 2017-11-06 ENCOUNTER — OFFICE VISIT (OUTPATIENT)
Dept: FAMILY MEDICINE | Facility: CLINIC | Age: 62
End: 2017-11-06
Payer: COMMERCIAL

## 2017-11-06 ENCOUNTER — LAB VISIT (OUTPATIENT)
Dept: LAB | Facility: HOSPITAL | Age: 62
End: 2017-11-06
Payer: COMMERCIAL

## 2017-11-06 VITALS
TEMPERATURE: 96 F | WEIGHT: 207.44 LBS | HEART RATE: 90 BPM | BODY MASS INDEX: 36.75 KG/M2 | DIASTOLIC BLOOD PRESSURE: 80 MMHG | RESPIRATION RATE: 18 BRPM | SYSTOLIC BLOOD PRESSURE: 138 MMHG | OXYGEN SATURATION: 96 % | HEIGHT: 63 IN

## 2017-11-06 DIAGNOSIS — I10 ESSENTIAL HYPERTENSION: Primary | Chronic | ICD-10-CM

## 2017-11-06 DIAGNOSIS — E55.9 VITAMIN D DEFICIENCY: ICD-10-CM

## 2017-11-06 DIAGNOSIS — E11.21 DIABETIC NEPHROPATHY ASSOCIATED WITH TYPE 2 DIABETES MELLITUS: ICD-10-CM

## 2017-11-06 DIAGNOSIS — M81.0 OSTEOPOROSIS, UNSPECIFIED OSTEOPOROSIS TYPE, UNSPECIFIED PATHOLOGICAL FRACTURE PRESENCE: Chronic | ICD-10-CM

## 2017-11-06 DIAGNOSIS — E66.9 OBESITY (BMI 30-39.9): ICD-10-CM

## 2017-11-06 DIAGNOSIS — E78.5 HYPERLIPIDEMIA, UNSPECIFIED HYPERLIPIDEMIA TYPE: Chronic | ICD-10-CM

## 2017-11-06 DIAGNOSIS — I25.10 CORONARY ARTERY DISEASE INVOLVING NATIVE CORONARY ARTERY OF NATIVE HEART WITHOUT ANGINA PECTORIS: Chronic | ICD-10-CM

## 2017-11-06 DIAGNOSIS — R80.9 PERSISTENT PROTEINURIA ASSOCIATED WITH TYPE 2 DIABETES MELLITUS: ICD-10-CM

## 2017-11-06 DIAGNOSIS — E04.2 MULTIPLE THYROID NODULES: ICD-10-CM

## 2017-11-06 DIAGNOSIS — E11.29 PERSISTENT PROTEINURIA ASSOCIATED WITH TYPE 2 DIABETES MELLITUS: ICD-10-CM

## 2017-11-06 PROCEDURE — 99999 PR PBB SHADOW E&M-EST. PATIENT-LVL V: CPT | Mod: PBBFAC,,, | Performed by: FAMILY MEDICINE

## 2017-11-06 PROCEDURE — 36415 COLL VENOUS BLD VENIPUNCTURE: CPT | Mod: PO

## 2017-11-06 PROCEDURE — 90686 IIV4 VACC NO PRSV 0.5 ML IM: CPT | Mod: S$GLB,,, | Performed by: FAMILY MEDICINE

## 2017-11-06 PROCEDURE — 90471 IMMUNIZATION ADMIN: CPT | Mod: S$GLB,,, | Performed by: FAMILY MEDICINE

## 2017-11-06 PROCEDURE — 99214 OFFICE O/P EST MOD 30 MIN: CPT | Mod: 25,S$GLB,, | Performed by: FAMILY MEDICINE

## 2017-11-06 PROCEDURE — 83036 HEMOGLOBIN GLYCOSYLATED A1C: CPT

## 2017-11-06 RX ORDER — DEXTROSE 4 G
TABLET,CHEWABLE ORAL
Qty: 1 EACH | Refills: 0 | Status: SHIPPED | OUTPATIENT
Start: 2017-11-06 | End: 2020-09-22 | Stop reason: SDUPTHER

## 2017-11-06 NOTE — PROGRESS NOTES
Subjective:       Patient ID: Alysha Rosales is a 62 y.o. female.    Chief Complaint: Diabetes; Hypertension; and Follow-up    Ms. Rosales comes in today for 4-month diabetes and hypertension follow-up.  She is fasting and has taken medication today.  She states she has been walking more and tries to monitor her diet some times.  She states she performs home glucose checks 2-3 times per week and states levels are okay.  She requests glucometer with supplies for testing.    She reports having chronic back pain with first awakening but states pain resolves across the day as she moves around.  Otherwise, she denies having fever, chills, fatigue, appetite change; shortness of breath, cough, wheezing; chest pain, palpitations, leg swelling; abdominal pain, nausea, vomiting, diarrhea, constipation; unusual urinary symptoms; polydipsia, polyuria, polyphagia; headaches; anxiety, depression, homicidal or suicidal thoughts.    She states she saw cardiology Dr. Kaba on April 11, 2017 for surveillance of CAD with follow up scheduled for June 2017 and September 2017 - both appointments she cancelled and states she will reschedule.  She saw Dr. Bryant, nephrologist, on September 19, 2017 for CKD stage 1, diabetes nephropathy, proteinuria surveillance with 9-month follow-up advised.    Current Outpatient Prescriptions:  allopurinol (ZYLOPRIM) 100 MG tablet, TAKE ONE-HALF TABLET BY MOUTH ONCE DAILY  aspirin 81 mg Tab, Take 1 tablet by mouth.  blood sugar diagnostic Strp, 1 strip by Misc.(Non-Drug; Combo Route) route 2 (two) times daily.  clopidogrel (PLAVIX) 75 mg tablet, Take 1 tablet by mouth.  CRESTOR 20 mg tablet, TAKE ONE TABLET BY MOUTH ONCE NIGHTLY  desoximetasone (TOPICORT) 0.25 % cream, APPLY  CREAM EXTERNALLY TWICE DAILY AS NEEDED  ERGOCALCIFEROL, VITAMIN D2, (VITAMIN D ORAL), Take 1 tablet by mouth once daily. 1000 units daily  furosemide (LASIX) 20 MG tablet, TAKE ONE TABLET BY MOUTH ONCE DAILY  glyBURIDE  (DIABETA) 5 MG tablet, TAKE ONE TABLET BY MOUTH TWICE DAILY  hydrALAZINE (APRESOLINE) 50 MG tablet, TAKE ONE TABLET BY MOUTH 4 TIMES DAILY  ibandronate (BONIVA) 150 mg tablet, Take 1 tablet (150 mg total) by mouth every 30 days.  JANUVIA 100 mg Tab, TAKE ONE TABLET BY MOUTH ONCE DAILY  lisinopril (PRINIVIL,ZESTRIL) 20 MG tablet, Take 1 tablet (20 mg total) by mouth once daily.  metformin (GLUCOPHAGE) 500 MG tablet, TAKE ONE TABLET BY MOUTH IN THE MORNING AND ONE AT NOON AND TWO IN THE EVENING WITH MEALS  nebivolol (BYSTOLIC) 10 MG Tab, Take 1 tablet by mouth.  ONE TOUCH ULTRASOFT LANCETS MISC,             Diabetes   She has type 2 diabetes mellitus. No MedicAlert identification noted. The initial diagnosis of diabetes was made 15 years ago. Pertinent negatives for hypoglycemia include no dizziness, headaches, hunger, mood changes, nervousness/anxiousness, sleepiness or sweats. Pertinent negatives for diabetes include no blurred vision, no chest pain, no fatigue, no foot paresthesias, no foot ulcerations, no polydipsia, no polyphagia, no polyuria, no visual change and no weight loss. Pertinent negatives for hypoglycemia complications include no blackouts, no hospitalization, no nocturnal hypoglycemia, no required assistance and no required glucagon injection. Symptoms are stable. Pertinent negatives for diabetic complications include no autonomic neuropathy, CVA, heart disease, impotence, nephropathy, peripheral neuropathy, PVD or retinopathy. Risk factors for coronary artery disease include obesity, post-menopausal and tobacco exposure. Current diabetic treatment includes oral agent (triple therapy). She is compliant with treatment most of the time. Her weight is fluctuating minimally. She is following a generally healthy diet. When asked about meal planning, she reported none. She has not had a previous visit with a dietitian. She participates in exercise intermittently. She monitors blood glucose at home 1-2 x per  week. She monitors urine at home <1 x per month. There is no compliance with monitoring of blood glucose. There is no change in her home blood glucose trend. She sees a podiatrist.Eye exam is current.   Hypertension   Pertinent negatives include no blurred vision, chest pain, headaches, palpitations, shortness of breath or sweats. There is no history of CVA, PVD or retinopathy.     Review of Systems   Constitutional: Positive for activity change. Negative for appetite change, chills, fatigue, fever and weight loss.        Weight   94.9 kg (209 lb 3.5 oz) at July 11, 2017 visit.   Eyes: Negative for blurred vision.   Respiratory: Negative for cough, shortness of breath and wheezing.    Cardiovascular: Negative for chest pain, palpitations and leg swelling.        See history of present illness.   Gastrointestinal: Negative for abdominal pain, constipation, diarrhea, nausea and vomiting.   Endocrine: Negative for polydipsia, polyphagia and polyuria.        See history of present illness.   Genitourinary: Negative for difficulty urinating and impotence.        See history of present illness.   Musculoskeletal: Positive for back pain. Negative for joint swelling and myalgias.        See history of present illness.   Neurological: Negative for dizziness and headaches.   Hematological:        See history of present illness.   Psychiatric/Behavioral: Negative for dysphoric mood. The patient is not nervous/anxious.         Negative for homicidal ideas.       Objective:      Physical Exam   Constitutional: She is oriented to person, place, and time. She appears well-developed and well-nourished. No distress.   Pleasant and obese.   Neck: Normal range of motion. Neck supple. No thyromegaly present.   Cardiovascular: Normal rate, regular rhythm and intact distal pulses.    Murmur heard.  Pulses:       Dorsalis pedis pulses are 3+ on the right side, and 3+ on the left side.        Posterior tibial pulses are 3+ on the right  side, and 3+ on the left side.   Chronic and soft.   Pulmonary/Chest: Effort normal and breath sounds normal. No respiratory distress. She has no wheezes.   Abdominal: Soft. Bowel sounds are normal. She exhibits no distension and no mass. There is no tenderness. There is no rebound and no guarding.   Musculoskeletal: Normal range of motion. She exhibits tenderness. She exhibits no edema.   She is ambulatory without problems.     Feet:   Right Foot:   Protective Sensation: 5 sites tested. 5 sites sensed.   Skin Integrity: Positive for dry skin. Negative for ulcer or skin breakdown.   Left Foot:   Protective Sensation: 5 sites tested. 5 sites sensed.   Skin Integrity: Positive for dry skin. Negative for ulcer or skin breakdown.   Lymphadenopathy:     She has no cervical adenopathy.   Neurological: She is alert and oriented to person, place, and time.   Skin: She is not diaphoretic.   Eczematous changes at sides of both feet noted.   Psychiatric: She has a normal mood and affect. Her behavior is normal. Judgment and thought content normal.   Vitals reviewed.      Assessment:       1. Essential hypertension    2. Diabetic nephropathy associated with type 2 diabetes mellitus    3. Hyperlipidemia, unspecified hyperlipidemia type    4. Coronary artery disease involving native coronary artery of native heart without angina pectoris    5. Persistent proteinuria associated with type 2 diabetes mellitus    6. Multiple thyroid nodules    7. Vitamin D deficiency    8. Osteoporosis, unspecified osteoporosis type, unspecified pathological fracture presence    9. Obesity (BMI 30-39.9)        Plan:       1.  Labs:  A1c.  Patient advised to correspond via My Chart for results.  2.  Continue current medications, follow low sodium, low cholesterol, low carb diet, daily walks.  3.  Keep follow up with specialists.  4.  Flu shot today.  5.  Prescription for Glucometer with supplies given today.  6.  See me in 4 months for diabetes and  hypertension follow up.

## 2017-11-07 LAB
ESTIMATED AVG GLUCOSE: 140 MG/DL
HBA1C MFR BLD HPLC: 6.5 %

## 2017-12-07 RX ORDER — DESOXIMETASONE 2.5 MG/G
CREAM TOPICAL
Qty: 15 G | Refills: 1 | Status: SHIPPED | OUTPATIENT
Start: 2017-12-07 | End: 2018-09-25 | Stop reason: SDUPTHER

## 2017-12-08 RX ORDER — LISINOPRIL 20 MG/1
TABLET ORAL
Qty: 30 TABLET | Refills: 11 | Status: SHIPPED | OUTPATIENT
Start: 2017-12-08 | End: 2018-12-13 | Stop reason: SDUPTHER

## 2018-03-13 RX ORDER — METFORMIN HYDROCHLORIDE 500 MG/1
TABLET ORAL
Qty: 120 TABLET | Refills: 5 | Status: SHIPPED | OUTPATIENT
Start: 2018-03-13 | End: 2018-12-13 | Stop reason: SDUPTHER

## 2018-03-13 RX ORDER — ALLOPURINOL 100 MG/1
TABLET ORAL
Qty: 30 TABLET | Refills: 5 | Status: SHIPPED | OUTPATIENT
Start: 2018-03-13 | End: 2018-09-25 | Stop reason: SDUPTHER

## 2018-03-27 RX ORDER — SITAGLIPTIN 100 MG/1
TABLET, FILM COATED ORAL
Qty: 30 TABLET | Refills: 5 | Status: SHIPPED | OUTPATIENT
Start: 2018-03-27 | End: 2018-08-27 | Stop reason: SDUPTHER

## 2018-04-03 ENCOUNTER — PATIENT OUTREACH (OUTPATIENT)
Dept: ADMINISTRATIVE | Facility: HOSPITAL | Age: 63
End: 2018-04-03

## 2018-04-17 ENCOUNTER — TELEPHONE (OUTPATIENT)
Dept: FAMILY MEDICINE | Facility: CLINIC | Age: 63
End: 2018-04-17

## 2018-04-17 NOTE — TELEPHONE ENCOUNTER
----- Message from Margie Bird MD sent at 4/17/2018 11:28 AM CDT -----  Please call pt to reschedule. She missed appt today. Thanks.

## 2018-04-20 DIAGNOSIS — E11.9 TYPE 2 DIABETES MELLITUS WITHOUT COMPLICATION: ICD-10-CM

## 2018-05-28 ENCOUNTER — TELEPHONE (OUTPATIENT)
Dept: OPHTHALMOLOGY | Facility: CLINIC | Age: 63
End: 2018-05-28

## 2018-05-28 NOTE — TELEPHONE ENCOUNTER
I called and spoke to the patient. The patient did not want to schedule an appointment to see the doctor. She will call us back to schedule an appointment to see Dr. DYLAN Best.

## 2018-06-01 DIAGNOSIS — E11.9 TYPE 2 DIABETES MELLITUS WITHOUT COMPLICATION: ICD-10-CM

## 2018-06-18 RX ORDER — ROSUVASTATIN CALCIUM 20 MG/1
TABLET, COATED ORAL
Qty: 30 TABLET | Refills: 5 | Status: CANCELLED | OUTPATIENT
Start: 2018-06-18

## 2018-06-18 RX ORDER — FUROSEMIDE 20 MG/1
TABLET ORAL
Qty: 30 TABLET | Refills: 5 | Status: CANCELLED | OUTPATIENT
Start: 2018-06-18

## 2018-06-20 RX ORDER — FUROSEMIDE 20 MG/1
20 TABLET ORAL DAILY
Qty: 30 TABLET | Refills: 0 | Status: SHIPPED | OUTPATIENT
Start: 2018-06-20 | End: 2018-07-16 | Stop reason: SDUPTHER

## 2018-06-20 RX ORDER — ROSUVASTATIN CALCIUM 20 MG/1
TABLET, COATED ORAL
Qty: 30 TABLET | Refills: 0 | Status: SHIPPED | OUTPATIENT
Start: 2018-06-20 | End: 2018-09-03 | Stop reason: SDUPTHER

## 2018-07-16 RX ORDER — FUROSEMIDE 20 MG/1
TABLET ORAL
Qty: 30 TABLET | Refills: 0 | Status: SHIPPED | OUTPATIENT
Start: 2018-07-16 | End: 2018-10-15 | Stop reason: SDUPTHER

## 2018-08-26 ENCOUNTER — PATIENT MESSAGE (OUTPATIENT)
Dept: FAMILY MEDICINE | Facility: CLINIC | Age: 63
End: 2018-08-26

## 2018-08-27 RX ORDER — SITAGLIPTIN 100 MG/1
100 TABLET, FILM COATED ORAL DAILY
Qty: 30 TABLET | Refills: 2 | Status: SHIPPED | OUTPATIENT
Start: 2018-08-27 | End: 2019-03-25 | Stop reason: SDUPTHER

## 2018-09-03 RX ORDER — ROSUVASTATIN CALCIUM 20 MG/1
TABLET, COATED ORAL
Qty: 30 TABLET | Refills: 0 | Status: SHIPPED | OUTPATIENT
Start: 2018-09-03 | End: 2018-10-15 | Stop reason: SDUPTHER

## 2018-09-03 RX ORDER — GLYBURIDE 5 MG/1
TABLET ORAL
Qty: 60 TABLET | Refills: 2 | Status: SHIPPED | OUTPATIENT
Start: 2018-09-03 | End: 2018-11-15 | Stop reason: SDUPTHER

## 2018-09-21 ENCOUNTER — OFFICE VISIT (OUTPATIENT)
Dept: FAMILY MEDICINE | Facility: CLINIC | Age: 63
End: 2018-09-21
Payer: COMMERCIAL

## 2018-09-21 ENCOUNTER — LAB VISIT (OUTPATIENT)
Dept: LAB | Facility: HOSPITAL | Age: 63
End: 2018-09-21
Attending: FAMILY MEDICINE
Payer: COMMERCIAL

## 2018-09-21 ENCOUNTER — DOCUMENTATION ONLY (OUTPATIENT)
Dept: FAMILY MEDICINE | Facility: CLINIC | Age: 63
End: 2018-09-21

## 2018-09-21 VITALS
HEART RATE: 85 BPM | DIASTOLIC BLOOD PRESSURE: 62 MMHG | SYSTOLIC BLOOD PRESSURE: 132 MMHG | WEIGHT: 205.5 LBS | TEMPERATURE: 98 F | HEIGHT: 63 IN | BODY MASS INDEX: 36.41 KG/M2

## 2018-09-21 DIAGNOSIS — I25.10 CORONARY ARTERY DISEASE INVOLVING NATIVE CORONARY ARTERY OF NATIVE HEART WITHOUT ANGINA PECTORIS: Chronic | ICD-10-CM

## 2018-09-21 DIAGNOSIS — E11.21 DIABETIC NEPHROPATHY ASSOCIATED WITH TYPE 2 DIABETES MELLITUS: ICD-10-CM

## 2018-09-21 DIAGNOSIS — H91.90 HEARING PROBLEM, UNSPECIFIED LATERALITY: ICD-10-CM

## 2018-09-21 DIAGNOSIS — M81.0 OSTEOPOROSIS, UNSPECIFIED OSTEOPOROSIS TYPE, UNSPECIFIED PATHOLOGICAL FRACTURE PRESENCE: Chronic | ICD-10-CM

## 2018-09-21 DIAGNOSIS — I10 ESSENTIAL HYPERTENSION: Chronic | ICD-10-CM

## 2018-09-21 DIAGNOSIS — E11.40 TYPE 2 DIABETES MELLITUS WITH DIABETIC NEUROPATHY, WITHOUT LONG-TERM CURRENT USE OF INSULIN: ICD-10-CM

## 2018-09-21 DIAGNOSIS — E78.5 HYPERLIPIDEMIA, UNSPECIFIED HYPERLIPIDEMIA TYPE: Chronic | ICD-10-CM

## 2018-09-21 DIAGNOSIS — Z00.00 ANNUAL PHYSICAL EXAM: Primary | ICD-10-CM

## 2018-09-21 DIAGNOSIS — E66.9 OBESITY (BMI 30-39.9): ICD-10-CM

## 2018-09-21 DIAGNOSIS — E04.2 MULTIPLE THYROID NODULES: ICD-10-CM

## 2018-09-21 DIAGNOSIS — Z78.0 POSTMENOPAUSAL: ICD-10-CM

## 2018-09-21 DIAGNOSIS — Z00.00 ANNUAL PHYSICAL EXAM: ICD-10-CM

## 2018-09-21 DIAGNOSIS — Z12.31 VISIT FOR SCREENING MAMMOGRAM: ICD-10-CM

## 2018-09-21 DIAGNOSIS — Z12.11 COLON CANCER SCREENING: ICD-10-CM

## 2018-09-21 DIAGNOSIS — E55.9 VITAMIN D DEFICIENCY: ICD-10-CM

## 2018-09-21 DIAGNOSIS — Z23 NEED FOR INFLUENZA VACCINATION: ICD-10-CM

## 2018-09-21 DIAGNOSIS — E79.0 ELEVATED URIC ACID IN BLOOD: ICD-10-CM

## 2018-09-21 DIAGNOSIS — M17.12 ARTHRITIS OF KNEE, LEFT: ICD-10-CM

## 2018-09-21 LAB
25(OH)D3+25(OH)D2 SERPL-MCNC: 33 NG/ML
ALBUMIN SERPL BCP-MCNC: 4 G/DL
ALP SERPL-CCNC: 72 U/L
ALT SERPL W/O P-5'-P-CCNC: 15 U/L
ANION GAP SERPL CALC-SCNC: 12 MMOL/L
AST SERPL-CCNC: 15 U/L
BASOPHILS # BLD AUTO: 0.06 K/UL
BASOPHILS NFR BLD: 0.8 %
BILIRUB SERPL-MCNC: 0.4 MG/DL
BUN SERPL-MCNC: 26 MG/DL
CALCIUM SERPL-MCNC: 10.5 MG/DL
CHLORIDE SERPL-SCNC: 105 MMOL/L
CHOLEST SERPL-MCNC: 115 MG/DL
CHOLEST/HDLC SERPL: 2.8 {RATIO}
CO2 SERPL-SCNC: 23 MMOL/L
CREAT SERPL-MCNC: 1 MG/DL
CREAT UR-MCNC: 25 MG/DL
DIFFERENTIAL METHOD: ABNORMAL
EOSINOPHIL # BLD AUTO: 0.2 K/UL
EOSINOPHIL NFR BLD: 2.3 %
ERYTHROCYTE [DISTWIDTH] IN BLOOD BY AUTOMATED COUNT: 13.2 %
EST. GFR  (AFRICAN AMERICAN): >60 ML/MIN/1.73 M^2
EST. GFR  (NON AFRICAN AMERICAN): >60 ML/MIN/1.73 M^2
ESTIMATED AVG GLUCOSE: 146 MG/DL
GLUCOSE SERPL-MCNC: 138 MG/DL
HBA1C MFR BLD HPLC: 6.7 %
HCT VFR BLD AUTO: 37.9 %
HDLC SERPL-MCNC: 41 MG/DL
HDLC SERPL: 35.7 %
HGB BLD-MCNC: 12.4 G/DL
IMM GRANULOCYTES # BLD AUTO: 0.02 K/UL
IMM GRANULOCYTES NFR BLD AUTO: 0.3 %
LDLC SERPL CALC-MCNC: 28.8 MG/DL
LYMPHOCYTES # BLD AUTO: 1.3 K/UL
LYMPHOCYTES NFR BLD: 16.7 %
MCH RBC QN AUTO: 29.9 PG
MCHC RBC AUTO-ENTMCNC: 32.7 G/DL
MCV RBC AUTO: 91 FL
MONOCYTES # BLD AUTO: 0.6 K/UL
MONOCYTES NFR BLD: 7.9 %
NEUTROPHILS # BLD AUTO: 5.8 K/UL
NEUTROPHILS NFR BLD: 72 %
NONHDLC SERPL-MCNC: 74 MG/DL
NRBC BLD-RTO: 0 /100 WBC
PLATELET # BLD AUTO: 213 K/UL
PMV BLD AUTO: 11.8 FL
POTASSIUM SERPL-SCNC: 4.2 MMOL/L
PROT SERPL-MCNC: 7.5 G/DL
PROT UR-MCNC: 7 MG/DL
PROT/CREAT UR: 0.28 MG/G{CREAT}
RBC # BLD AUTO: 4.15 M/UL
SODIUM SERPL-SCNC: 140 MMOL/L
TRIGL SERPL-MCNC: 226 MG/DL
TSH SERPL DL<=0.005 MIU/L-ACNC: 3.29 UIU/ML
URATE SERPL-MCNC: 8.4 MG/DL
WBC # BLD AUTO: 7.97 K/UL

## 2018-09-21 PROCEDURE — 84443 ASSAY THYROID STIM HORMONE: CPT

## 2018-09-21 PROCEDURE — 3044F HG A1C LEVEL LT 7.0%: CPT | Mod: CPTII,S$GLB,, | Performed by: FAMILY MEDICINE

## 2018-09-21 PROCEDURE — 3075F SYST BP GE 130 - 139MM HG: CPT | Mod: CPTII,S$GLB,, | Performed by: FAMILY MEDICINE

## 2018-09-21 PROCEDURE — 84550 ASSAY OF BLOOD/URIC ACID: CPT

## 2018-09-21 PROCEDURE — 82306 VITAMIN D 25 HYDROXY: CPT

## 2018-09-21 PROCEDURE — 36415 COLL VENOUS BLD VENIPUNCTURE: CPT | Mod: PO

## 2018-09-21 PROCEDURE — 90686 IIV4 VACC NO PRSV 0.5 ML IM: CPT | Mod: S$GLB,,, | Performed by: FAMILY MEDICINE

## 2018-09-21 PROCEDURE — 99999 PR PBB SHADOW E&M-EST. PATIENT-LVL IV: CPT | Mod: PBBFAC,,, | Performed by: FAMILY MEDICINE

## 2018-09-21 PROCEDURE — 85025 COMPLETE CBC W/AUTO DIFF WBC: CPT

## 2018-09-21 PROCEDURE — 3078F DIAST BP <80 MM HG: CPT | Mod: CPTII,S$GLB,, | Performed by: FAMILY MEDICINE

## 2018-09-21 PROCEDURE — 83036 HEMOGLOBIN GLYCOSYLATED A1C: CPT

## 2018-09-21 PROCEDURE — 80053 COMPREHEN METABOLIC PANEL: CPT

## 2018-09-21 PROCEDURE — 82570 ASSAY OF URINE CREATININE: CPT

## 2018-09-21 PROCEDURE — 80061 LIPID PANEL: CPT

## 2018-09-21 PROCEDURE — 99396 PREV VISIT EST AGE 40-64: CPT | Mod: 25,S$GLB,, | Performed by: FAMILY MEDICINE

## 2018-09-21 PROCEDURE — 90471 IMMUNIZATION ADMIN: CPT | Mod: S$GLB,,, | Performed by: FAMILY MEDICINE

## 2018-09-21 NOTE — PROGRESS NOTES
HISTORY OF PRESENT ILLNESS: Ms. Rosales comes in today fasting and with taking medication without acute problems for annual wellness examination.    END OF LIFE DECISION: She does not have a living will but does not desire life support.    Current Outpatient Medications   Medication Sig    allopurinol (ZYLOPRIM) 100 MG tablet TAKE ONE-HALF TABLET BY MOUTH ONCE DAILY    aspirin 81 mg Tab Take 1 tablet by mouth.    blood sugar diagnostic Strp 1 strip by Misc.(Non-Drug; Combo Route) route 2 (two) times daily.    blood sugar diagnostic Strp Use daily with FREESTYLE GLUCOMETER    blood-glucose meter Misc Use FREESTYLE meter for dx: diabetes    clopidogrel (PLAVIX) 75 mg tablet Take 1 tablet by mouth.    desoximetasone (TOPICORT) 0.25 % cream APPLY  CREAM EXTERNALLY TWICE DAILY AS NEEDED    ERGOCALCIFEROL, VITAMIN D2, (VITAMIN D ORAL) Take 1 tablet by mouth once daily. 1000 units daily    furosemide (LASIX) 20 MG tablet TAKE 1 TABLET BY MOUTH ONCE DAILY    glyBURIDE (DIABETA) 5 MG tablet TAKE ONE TABLET BY MOUTH TWICE DAILY    hydrALAZINE (APRESOLINE) 50 MG tablet TAKE ONE TABLET BY MOUTH 4 TIMES DAILY    JANUVIA 100 mg Tab Take 1 tablet (100 mg total) by mouth once daily.    lisinopril (PRINIVIL,ZESTRIL) 20 MG tablet TAKE ONE TABLET BY MOUTH ONCE DAILY    metFORMIN (GLUCOPHAGE) 500 MG tablet TAKE ONE TABLET BY MOUTH IN THE MORNING AND ONE AT NOON AND TWO IN THE EVENING WITH MEALS    nebivolol (BYSTOLIC) 10 MG Tab Take 1 tablet by mouth.    rosuvastatin (CRESTOR) 20 MG tablet TAKE 1 TABLET BY MOUTH NIGHTLY    ibandronate (BONIVA) 150 mg tablet Take 1 tablet (150 mg total) by mouth every 30 days. (NOT TAKEN IN 4 MONTHS)       SCREENINGS:   Cholesterol (with CMP): March 23, 2017 with cardiologist Dr. Kaba.  FFS/Colonoscopy: March 20, 2009; repeat in 7 years. She desires FIT-KIT.  Mammogram: August 7, 2017 - okay.  GYN Exam: July 11, 2017 - okay. Pap smears 2009 - 2013, 2016 - okay. Pap smear due  2019.  Dexa Scan: August 7, 2017 - osteoporosis; repeat in 2 years.  Eye Exam: November 11, 2016 with Dr. Roseline Best. She wears glasses. She states she will schedule appointment.  Dental Exam: April or May 2017.  She wears denture on top and partial on bottom.   PPD: Negative in the past.  Immunizations: Tdap - February 25, 2009.  Gardasil - N./A.  Zostavax - March 30, 2015.  Pneumovax - November 21, 2008.  Seasonal Flu - November 6, 2017. She declines.  Hepatitis B vaccine - August 28, 2013, July 25, 2013, July 29, 2014.    ROS:  GENERAL: Denies fever, chills, fatigue or unusual weight change. Appetite normal. Weight 94.1 kg (207 lb 7.3 oz) at November 6, 2017 visit. Monitors diet some times. Exercises by mowing her lawn or gardening 3 - 4 times per week.  SKIN: Denies rashes, itching, changes in mole, color or texture of skin or easy bruising.   HEAD: Denies headaches or recent head trauma.  EYES: Denies change in vision, pain, diplopia, redness or discharge. Wears glasses.  EARS: Denies ear pain, discharge, vertigo except decreased hearing and requests hearing test again.  NOSE: Denies loss of smell, epistaxis or rhinitis.  MOUTH & THROAT: Denies hoarseness or change in voice. Denies excessive gum bleeding or mouth sores. Denies sore throat.  NODES: Denies swollen glands.  CHEST: Denies RIDDLE, wheezing, cough, or sputum production.  CARDIOVASCULAR: Denies chest pain, PND, orthopnea or reduced exercise tolerance. Denies palpitations. Saw cardiology Dr. 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     ABDOMEN: Denies diarrhea, constipation, nausea, vomiting, abdominal pain, or blood in stool.  URINARY: Denies flank pain, dysuria or hematuria. Saw Dr. Bryant, nephrologist, on September 19, 2017 for CKD stage 1, diabetes nephropathy, proteinuria  "surveillance with 9-month follow-up advised.  GENITOURINARY: Denies flank pain, dysuria, frequency or hematuria. Performs monthly breast self exams.  ENDOCRINE: Reports randomly performs glucose checks - 149, 189. Reports has not been taking Boniva in appx. 4 monnths. Thyroid ultrasound on August 10, 2017 - Tiny less than 5 mm nodules scattered throughout both lobes of the thyroid gland.  The appearance is essentially stable when compared to prior exam. Repeat thyroid ultrasound every 1 - 2 years.  HEME/LYMPH: Denies bleeding problems.  PERIPHERAL VASCULAR:Denies claudication or cyanosis.  MUSCULOSKELETAL: Reports morning stiffness, pain in low back, left knee but improves with ambulation and rarely takes Aleve with help. Denies edema.  NEUROLOGIC: Denies history of seizures, tremors, paralysis, alteration of gait or coordination. Reports chronic numbness in hands, feet.  PSYCHIATRIC: Denies mood swings, depression, anxiety or homicidal or suicidal thoughts. Denies sleep problems.    PE:   VS: /62   Pulse 85   Temp 98.1 °F (36.7 °C)   Ht 5' 2.5" (1.588 m)   Wt 93.2 kg (205 lb 7.5 oz)   BMI 36.98 kg/m²   APPEARANCE: Well nourished, well developed female, obese and pleasant, alert and oriented in no acute distress.   HEAD: Non tender. Full range of motion.  EYES: PERRL, conjunctiva pink, lids no edema.  EARS: External canal patent, no swelling or redness. TM's shiny and clear.  NOSE: Mucosa and turbinates pink, not swollen. No discharge. Non tender sinuses.  THROAT: No pharyngeal erythema or exudate. No stridor. She wears top dentures, bottom partials.  NECK: Supple, no mass, thyroid not enlarged.  NODES: No cervical, axillary or inguinal lymph node enlargement.  CHEST: Normal respiratory effort. Lungs clear to auscultation.  CARDIOVASCULAR: Normal S1, S2. Chronic soft cardiac murmur noted. PMI not displaced. No carotid bruit. Pedal pulses palpable bilaterally. No edema. Feet look okay without " ulcerations.  ABDOMEN: Bowel sounds present. Not distended. Soft. No tenderness, masses or organomegaly.  BREAST EXAM: Symmetrical, no external lesions, no discharge, no masses palpated.  PELVIC EXAM: No external lesions noted, no discharge, cervix appears normal, bimanual exam showed no uterine abnormalities and no adnexal masses. Urethra and bladder intact. Chronic right sided-vaginal wall protrusion without prolapse noted.  RECTAL EXAM: No external hemorrhoids or anal fissures. Heme-negative stool in the rectal vault.  MUSCULOSKELETAL: No joint deformities or stiffness except tender at right heel. She is ambulatory without problems.  SKIN: No rashes or suspicious lesions, normal color and turgor except eczematous skin changes at bottom of feet.  NEUROLOGIC: Cranial Nerves: II-XII grossly intact. DTR's: Knees, Ankles 2+ and equal bilaterally. Gait & Posture: Normal gait and fine motion.  PSYCHIATRIC: Patient alert, oriented x 3. Mood/Affect normal without acute anxiety or depression noted. Judgment/insight good as she makes appropriate decisions on today's examination.    Protective Sensation (w/ 10 gram monofilament):  Right: Intact  Left: Intact    Visual Inspection:  Normal -  Bilateral except eczematous changes at bottom of feet.    Pedal Pulses:   Right: Present  Left: Present    Posterior tibialis:   Right:Present  Left: Present    Lab Results   Component Value Date    HGBA1C 6.5 (H) 11/06/2017     ASSESSMENT:    ICD-10-CM ICD-9-CM    1. Annual physical exam Z00.00 V70.0 Comprehensive metabolic panel      Protein / creatinine ratio, urine      Lipid panel      CBC auto differential      TSH      Hemoglobin A1c      Uric acid      Vitamin D   2. Essential hypertension I10 401.9    3. Hyperlipidemia, unspecified hyperlipidemia type E78.5 272.4    4. Diabetic nephropathy associated with type 2 diabetes mellitus E11.21 250.40      583.81    5. Type 2 diabetes mellitus with diabetic neuropathy, without long-term  current use of insulin E11.40 250.60      357.2    6. Coronary artery disease involving native coronary artery of native heart without angina pectoris I25.10 414.01    7. Multiple thyroid nodules E04.2 241.1    8. Vitamin D deficiency E55.9 268.9    9. Osteoporosis, unspecified osteoporosis type, unspecified pathological fracture presence M81.0 733.00    10. Elevated uric acid in blood E79.0 790.6    11. Arthritis of knee, left M17.12 716.96    12. Hearing problem, unspecified laterality H91.90 V41.2 Comprehensive audiogram   13. Obesity (BMI 30-39.9) E66.9 278.00    14. Postmenopausal Z78.0 V49.81    15. Colon cancer screening Z12.11 V76.51 Fecal Immunochemical Test (iFOBT)   16. Visit for screening mammogram Z12.31 V76.12 Mammo Digital Screening Bilat with CAD   17. Need for influenza vaccination Z23 V04.81 Influenza - Quadrivalent (3 years & older) (PF)     PLAN:  1. Age-appropriate counseling-appropriate low-sodium, low-cholesterol, low carbohydrate diet and exercise daily, monthly breast self exam, annual wellness examination.   2. Patient advised to call for results.  3. Continue current medications.  4. Keep follow up with specialists (patient stated she will make her own appointments).              5. Annual eye and dental examinations encouraged.  6. Follow-up in about 6 months (around 3/21/2019) for diabetes and hypertension follow up.

## 2018-09-25 DIAGNOSIS — E79.0 ELEVATED URIC ACID IN BLOOD: Primary | ICD-10-CM

## 2018-09-25 RX ORDER — ALLOPURINOL 100 MG/1
100 TABLET ORAL DAILY
Qty: 30 TABLET | Refills: 5 | Status: SHIPPED | OUTPATIENT
Start: 2018-09-25 | End: 2019-04-26 | Stop reason: SDUPTHER

## 2018-09-25 RX ORDER — DESOXIMETASONE 2.5 MG/G
CREAM TOPICAL
Qty: 15 G | Refills: 1 | Status: SHIPPED | OUTPATIENT
Start: 2018-09-25 | End: 2018-12-05 | Stop reason: SDUPTHER

## 2018-09-26 ENCOUNTER — PATIENT OUTREACH (OUTPATIENT)
Dept: ADMINISTRATIVE | Facility: HOSPITAL | Age: 63
End: 2018-09-26

## 2018-10-15 RX ORDER — ROSUVASTATIN CALCIUM 20 MG/1
TABLET, COATED ORAL
Qty: 30 TABLET | Refills: 5 | Status: SHIPPED | OUTPATIENT
Start: 2018-10-15 | End: 2019-04-26 | Stop reason: SDUPTHER

## 2018-10-15 RX ORDER — FUROSEMIDE 20 MG/1
20 TABLET ORAL DAILY
Qty: 30 TABLET | Refills: 5 | Status: SHIPPED | OUTPATIENT
Start: 2018-10-15 | End: 2019-02-25 | Stop reason: SDUPTHER

## 2018-10-16 RX ORDER — HYDRALAZINE HYDROCHLORIDE 50 MG/1
TABLET, FILM COATED ORAL
Qty: 120 TABLET | Refills: 10 | Status: SHIPPED | OUTPATIENT
Start: 2018-10-16 | End: 2019-11-29 | Stop reason: SDUPTHER

## 2018-11-15 RX ORDER — GLYBURIDE 5 MG/1
5 TABLET ORAL 2 TIMES DAILY
Qty: 180 TABLET | Refills: 1 | Status: SHIPPED | OUTPATIENT
Start: 2018-11-15 | End: 2019-08-28 | Stop reason: SDUPTHER

## 2018-11-27 ENCOUNTER — PATIENT OUTREACH (OUTPATIENT)
Dept: ADMINISTRATIVE | Facility: HOSPITAL | Age: 63
End: 2018-11-27

## 2018-11-27 NOTE — PROGRESS NOTES
Patient has not returned FITKIT at this time. Portal outreach sent to patient to remind them to return FITKIT.      Left message requesting a call back    Patient states she will be bringing fit kit on Friday.

## 2018-12-05 RX ORDER — DESOXIMETASONE 2.5 MG/G
CREAM TOPICAL
Qty: 15 G | Refills: 1 | Status: SHIPPED | OUTPATIENT
Start: 2018-12-05 | End: 2019-08-13 | Stop reason: SDUPTHER

## 2018-12-13 RX ORDER — LISINOPRIL 20 MG/1
TABLET ORAL
Qty: 30 TABLET | Refills: 11 | Status: SHIPPED | OUTPATIENT
Start: 2018-12-13 | End: 2019-02-25 | Stop reason: SDUPTHER

## 2018-12-13 RX ORDER — METFORMIN HYDROCHLORIDE 500 MG/1
TABLET ORAL
Qty: 120 TABLET | Refills: 5 | Status: SHIPPED | OUTPATIENT
Start: 2018-12-13 | End: 2019-08-13 | Stop reason: SDUPTHER

## 2018-12-21 ENCOUNTER — APPOINTMENT (OUTPATIENT)
Dept: LAB | Facility: HOSPITAL | Age: 63
End: 2018-12-21
Attending: FAMILY MEDICINE
Payer: COMMERCIAL

## 2019-02-25 RX ORDER — LISINOPRIL 20 MG/1
20 TABLET ORAL DAILY
Qty: 30 TABLET | Refills: 11 | Status: SHIPPED | OUTPATIENT
Start: 2019-02-25 | End: 2020-05-08

## 2019-02-26 RX ORDER — FUROSEMIDE 20 MG/1
20 TABLET ORAL DAILY
Qty: 30 TABLET | Refills: 5 | Status: SHIPPED | OUTPATIENT
Start: 2019-02-26 | End: 2019-08-13 | Stop reason: SDUPTHER

## 2019-03-25 RX ORDER — SITAGLIPTIN 100 MG/1
TABLET, FILM COATED ORAL
Qty: 30 TABLET | Refills: 2 | Status: SHIPPED | OUTPATIENT
Start: 2019-03-25 | End: 2019-07-01 | Stop reason: SDUPTHER

## 2019-03-29 DIAGNOSIS — E11.9 TYPE 2 DIABETES MELLITUS WITHOUT COMPLICATION: ICD-10-CM

## 2019-04-26 DIAGNOSIS — E79.0 ELEVATED URIC ACID IN BLOOD: ICD-10-CM

## 2019-04-26 RX ORDER — ALLOPURINOL 100 MG/1
100 TABLET ORAL DAILY
Qty: 30 TABLET | Refills: 5 | Status: SHIPPED | OUTPATIENT
Start: 2019-04-26 | End: 2019-10-14 | Stop reason: SDUPTHER

## 2019-04-26 RX ORDER — ROSUVASTATIN CALCIUM 20 MG/1
TABLET, COATED ORAL
Qty: 30 TABLET | Refills: 5 | Status: SHIPPED | OUTPATIENT
Start: 2019-04-26 | End: 2019-12-09 | Stop reason: SDUPTHER

## 2019-06-03 ENCOUNTER — PATIENT OUTREACH (OUTPATIENT)
Dept: ADMINISTRATIVE | Facility: HOSPITAL | Age: 64
End: 2019-06-03

## 2019-06-17 ENCOUNTER — OFFICE VISIT (OUTPATIENT)
Dept: FAMILY MEDICINE | Facility: CLINIC | Age: 64
End: 2019-06-17
Payer: COMMERCIAL

## 2019-06-17 ENCOUNTER — LAB VISIT (OUTPATIENT)
Dept: LAB | Facility: HOSPITAL | Age: 64
End: 2019-06-17
Attending: FAMILY MEDICINE
Payer: COMMERCIAL

## 2019-06-17 VITALS
BODY MASS INDEX: 39.52 KG/M2 | TEMPERATURE: 98 F | SYSTOLIC BLOOD PRESSURE: 110 MMHG | DIASTOLIC BLOOD PRESSURE: 58 MMHG | HEART RATE: 79 BPM | WEIGHT: 214.75 LBS | HEIGHT: 62 IN | OXYGEN SATURATION: 96 %

## 2019-06-17 DIAGNOSIS — E11.21 DIABETIC NEPHROPATHY WITH PROTEINURIA: ICD-10-CM

## 2019-06-17 DIAGNOSIS — E66.9 OBESITY (BMI 30-39.9): ICD-10-CM

## 2019-06-17 DIAGNOSIS — E78.5 HYPERLIPIDEMIA, UNSPECIFIED HYPERLIPIDEMIA TYPE: Chronic | ICD-10-CM

## 2019-06-17 DIAGNOSIS — I25.10 CORONARY ARTERY DISEASE INVOLVING NATIVE CORONARY ARTERY OF NATIVE HEART WITHOUT ANGINA PECTORIS: Chronic | ICD-10-CM

## 2019-06-17 DIAGNOSIS — E55.9 VITAMIN D DEFICIENCY: ICD-10-CM

## 2019-06-17 DIAGNOSIS — E04.2 MULTIPLE THYROID NODULES: ICD-10-CM

## 2019-06-17 DIAGNOSIS — I10 ESSENTIAL HYPERTENSION: Chronic | ICD-10-CM

## 2019-06-17 DIAGNOSIS — E79.0 ELEVATED URIC ACID IN BLOOD: ICD-10-CM

## 2019-06-17 DIAGNOSIS — E11.40 TYPE 2 DIABETES MELLITUS WITH DIABETIC NEUROPATHY, WITHOUT LONG-TERM CURRENT USE OF INSULIN: ICD-10-CM

## 2019-06-17 DIAGNOSIS — M81.0 OSTEOPOROSIS, UNSPECIFIED OSTEOPOROSIS TYPE, UNSPECIFIED PATHOLOGICAL FRACTURE PRESENCE: Chronic | ICD-10-CM

## 2019-06-17 LAB
ALBUMIN SERPL BCP-MCNC: 3.6 G/DL (ref 3.5–5.2)
ALP SERPL-CCNC: 84 U/L (ref 55–135)
ALT SERPL W/O P-5'-P-CCNC: 14 U/L (ref 10–44)
ANION GAP SERPL CALC-SCNC: 13 MMOL/L (ref 8–16)
AST SERPL-CCNC: 14 U/L (ref 10–40)
BILIRUB SERPL-MCNC: 0.2 MG/DL (ref 0.1–1)
BUN SERPL-MCNC: 26 MG/DL (ref 8–23)
CALCIUM SERPL-MCNC: 10 MG/DL (ref 8.7–10.5)
CHLORIDE SERPL-SCNC: 105 MMOL/L (ref 95–110)
CO2 SERPL-SCNC: 23 MMOL/L (ref 23–29)
CREAT SERPL-MCNC: 0.9 MG/DL (ref 0.5–1.4)
EST. GFR  (AFRICAN AMERICAN): >60 ML/MIN/1.73 M^2
EST. GFR  (NON AFRICAN AMERICAN): >60 ML/MIN/1.73 M^2
ESTIMATED AVG GLUCOSE: 183 MG/DL (ref 68–131)
GLUCOSE SERPL-MCNC: 180 MG/DL (ref 70–110)
HBA1C MFR BLD HPLC: 8 % (ref 4–5.6)
POTASSIUM SERPL-SCNC: 4.6 MMOL/L (ref 3.5–5.1)
PROT SERPL-MCNC: 6.8 G/DL (ref 6–8.4)
SODIUM SERPL-SCNC: 141 MMOL/L (ref 136–145)
URATE SERPL-MCNC: 7 MG/DL (ref 2.4–5.7)

## 2019-06-17 PROCEDURE — 84550 ASSAY OF BLOOD/URIC ACID: CPT

## 2019-06-17 PROCEDURE — 36415 COLL VENOUS BLD VENIPUNCTURE: CPT | Mod: PO

## 2019-06-17 PROCEDURE — 3008F PR BODY MASS INDEX (BMI) DOCUMENTED: ICD-10-PCS | Mod: CPTII,S$GLB,, | Performed by: FAMILY MEDICINE

## 2019-06-17 PROCEDURE — 99999 PR PBB SHADOW E&M-EST. PATIENT-LVL III: CPT | Mod: PBBFAC,,, | Performed by: FAMILY MEDICINE

## 2019-06-17 PROCEDURE — 3074F PR MOST RECENT SYSTOLIC BLOOD PRESSURE < 130 MM HG: ICD-10-PCS | Mod: CPTII,S$GLB,, | Performed by: FAMILY MEDICINE

## 2019-06-17 PROCEDURE — 3078F DIAST BP <80 MM HG: CPT | Mod: CPTII,S$GLB,, | Performed by: FAMILY MEDICINE

## 2019-06-17 PROCEDURE — 99999 PR PBB SHADOW E&M-EST. PATIENT-LVL III: ICD-10-PCS | Mod: PBBFAC,,, | Performed by: FAMILY MEDICINE

## 2019-06-17 PROCEDURE — 3078F PR MOST RECENT DIASTOLIC BLOOD PRESSURE < 80 MM HG: ICD-10-PCS | Mod: CPTII,S$GLB,, | Performed by: FAMILY MEDICINE

## 2019-06-17 PROCEDURE — 80053 COMPREHEN METABOLIC PANEL: CPT

## 2019-06-17 PROCEDURE — 3044F PR MOST RECENT HEMOGLOBIN A1C LEVEL <7.0%: ICD-10-PCS | Mod: CPTII,S$GLB,, | Performed by: FAMILY MEDICINE

## 2019-06-17 PROCEDURE — 99214 OFFICE O/P EST MOD 30 MIN: CPT | Mod: S$GLB,,, | Performed by: FAMILY MEDICINE

## 2019-06-17 PROCEDURE — 3008F BODY MASS INDEX DOCD: CPT | Mod: CPTII,S$GLB,, | Performed by: FAMILY MEDICINE

## 2019-06-17 PROCEDURE — 83036 HEMOGLOBIN GLYCOSYLATED A1C: CPT

## 2019-06-17 PROCEDURE — 99214 PR OFFICE/OUTPT VISIT, EST, LEVL IV, 30-39 MIN: ICD-10-PCS | Mod: S$GLB,,, | Performed by: FAMILY MEDICINE

## 2019-06-17 PROCEDURE — 3044F HG A1C LEVEL LT 7.0%: CPT | Mod: CPTII,S$GLB,, | Performed by: FAMILY MEDICINE

## 2019-06-17 PROCEDURE — 3074F SYST BP LT 130 MM HG: CPT | Mod: CPTII,S$GLB,, | Performed by: FAMILY MEDICINE

## 2019-06-17 RX ORDER — IBANDRONATE SODIUM 150 MG/1
150 TABLET, FILM COATED ORAL
Qty: 1 TABLET | Refills: 11 | Status: SHIPPED | OUTPATIENT
Start: 2019-06-17 | End: 2020-06-05 | Stop reason: SDUPTHER

## 2019-06-17 NOTE — PROGRESS NOTES
Alysha Rosales    Chief Complaint   Patient presents with    Hypertension    Diabetes    Follow-up       History of Present Illness:   Ms. Rosales comes in today for 6-month hypertension and diabetes follow-up.  She is fasting and has taken medication today.  She states she has been working in her yard and most times monitors her diet except for this weekend when she worked at [employer].  She states she has not been performing home glucose checks.    She reports having slight feet swelling since working at [employer] this weekend; she states swelling is likely due to eating from concession foods.    She reports no problems with increased Allopurinol flowers to 100 mg daily since September 2019 for treatment of increased uric acid level.     She states she has not been taking Boniva for osteoporosis treatment in appx. 3 to 4 months as she states she forgets to take it.    Otherwise, she states she feels good today. She states she has not been having back pain, chronic issue for her and denies having fever, chills, fatigue, appetite change; shortness of breath, cough, wheezing; chest pain, palpitations, leg swelling; abdominal pain, nausea, vomiting, diarrhea, constipation; unusual urinary symptoms; polydipsia, polyuria, polyphagia; headaches, dizziness; anxiety, depression, homicidal or suicidal thoughts; unusual bruising or bleeding problems.    She states she saw cardiologist Dr. Kaba in November 2018 for surveillance of CAD with follow up scheduled for September 2019. She saw Dr. Bryant, nephrologist, on September 19, 2017 for CKD stage 1, diabetes nephropathy, proteinuria surveillance with 9-month follow-up advised but has not followed up due to other scheduling conflicts.    Labs:                     WBC                      7.97                09/21/2018                 HGB                      12.4                09/21/2018                 HCT                      37.9                09/21/2018                  PLT                      213                 09/21/2018                 CHOL                     115 (L)             09/21/2018                 TRIG                     226 (H)             09/21/2018                 HDL                      41                  09/21/2018                 ALT                      15                  09/21/2018                 AST                      15                  09/21/2018                 NA                       140                 09/21/2018                 K                        4.2                 09/21/2018                 CL                       105                 09/21/2018                 CREATININE               1.0                 09/21/2018                 BUN                      26 (H)              09/21/2018                 CO2                      23                  09/21/2018                 TSH                      3.290               09/21/2018                 HGBA1C                   6.7 (H)             09/21/2018               LDLCALC                  28.8 (L)            09/21/2018          Vit D, 25-Hydroxy           33                  09/21/2018        URICACID                 8.4 (H)             09/21/2018         Prot/Creat Ratio, Ur             0.28           09/21/2018      Current Outpatient Medications   Medication Sig    allopurinol (ZYLOPRIM) 100 MG tablet Take 1 tablet (100 mg total) by mouth once daily.    aspirin 81 mg Tab Take 1 tablet by mouth.    blood sugar diagnostic (FREESTYLE LITE STRIPS) Strp USE ONE STRIP TO CHECK GLUCOSE ONCE DAILY    blood sugar diagnostic Strp 1 strip by Misc.(Non-Drug; Combo Route) route 2 (two) times daily.    blood-glucose meter Misc Use FREESTYLE meter for dx: diabetes    clopidogrel (PLAVIX) 75 mg tablet Take 1 tablet by mouth.    desoximetasone (TOPICORT) 0.25 % cream APPLY  CREAM EXTERNALLY TO AFFECTED AREA TWICE DAILY AS NEEDED    ERGOCALCIFEROL, VITAMIN D2, (VITAMIN D ORAL) Take 1 tablet by  mouth once daily. 1000 units daily    furosemide (LASIX) 20 MG tablet Take 1 tablet (20 mg total) by mouth once daily.    glyBURIDE (DIABETA) 5 MG tablet Take 1 tablet (5 mg total) by mouth 2 (two) times daily.    hydrALAZINE (APRESOLINE) 50 MG tablet TAKE ONE TABLET BY MOUTH 4 TIMES DAILY    JANUVIA 100 mg Tab TAKE 1 TABLET BY MOUTH ONCE DAILY    lisinopril (PRINIVIL,ZESTRIL) 20 MG tablet Take 1 tablet (20 mg total) by mouth once daily.    metFORMIN (GLUCOPHAGE) 500 MG tablet TAKE 1 TABLET BY MOUTH IN THE MORNING AND 1 AT NOON AND 2 IN THE EVENING WITH MEALS    nebivolol (BYSTOLIC) 10 MG Tab Take 1 tablet by mouth.    rosuvastatin (CRESTOR) 20 MG tablet TAKE 1 TABLET BY MOUTH NIGHTLY    ibandronate (BONIVA) 150 mg tablet Take 1 tablet (150 mg total) by mouth every 30 days. (not currently taking)     Review of Systems   Constitutional: Negative for activity change, appetite change, chills, fatigue, fever and unexpected weight change.        Weight   93.2 kg (205 lb 7.5 oz) at September 21, 2018 visit.   Respiratory: Negative for cough, shortness of breath and wheezing.    Cardiovascular: Negative for chest pain, palpitations and leg swelling.        See history of present illness.   Gastrointestinal: Negative for abdominal pain, constipation, diarrhea, nausea and vomiting.   Endocrine: Negative for polydipsia, polyphagia and polyuria.        See history of present illness.   Genitourinary: Negative for difficulty urinating.        See history of present illness.   Musculoskeletal: Positive for joint swelling. Negative for arthralgias and back pain.        See history of present illness.   Neurological: Negative for dizziness and headaches.   Hematological: Does not bruise/bleed easily.        See history of present illness.   Psychiatric/Behavioral: Negative for dysphoric mood, sleep disturbance and suicidal ideas. The patient is not nervous/anxious.         Negative for homicidal ideas.        Objective:  Physical Exam   Constitutional: She is oriented to person, place, and time. She appears well-developed and well-nourished. No distress.   Pleasant and obese.   Neck: Normal range of motion. Neck supple. No thyromegaly present.   Cardiovascular: Normal rate, regular rhythm and intact distal pulses.   Murmur heard.  Pulses:       Dorsalis pedis pulses are 3+ on the right side, and 3+ on the left side.        Posterior tibial pulses are 3+ on the right side, and 3+ on the left side.   Chronic and soft.   Pulmonary/Chest: Effort normal and breath sounds normal. No respiratory distress. She has no wheezes.   Abdominal: Soft. Bowel sounds are normal. She exhibits no distension and no mass. There is no tenderness. There is no rebound and no guarding.   Musculoskeletal: Normal range of motion. She exhibits no edema or tenderness.   She is ambulatory without problems.     Feet:   Right Foot:   Protective Sensation: 5 sites tested. 5 sites sensed.   Skin Integrity: Positive for dry skin. Negative for ulcer or skin breakdown.   Left Foot:   Protective Sensation: 5 sites tested. 5 sites sensed.   Skin Integrity: Positive for dry skin. Negative for ulcer or skin breakdown.   Lymphadenopathy:     She has no cervical adenopathy.   Neurological: She is alert and oriented to person, place, and time.   Skin: She is not diaphoretic.   Improved eczematous changes at sides of both feet noted.   Psychiatric: She has a normal mood and affect. Her behavior is normal. Judgment and thought content normal.   Vitals reviewed.      ASSESSMENT:  1. Essential hypertension    2. Diabetic nephropathy with proteinuria    3. Type 2 diabetes mellitus with diabetic neuropathy, without long-term current use of insulin    4. Hyperlipidemia, unspecified hyperlipidemia type    5. Coronary artery disease involving native coronary artery of native heart without angina pectoris    6. Multiple thyroid nodules    7. Vitamin D deficiency    8.  Osteoporosis, unspecified osteoporosis type, unspecified pathological fracture presence    9. Elevated uric acid in blood    10. Obesity (BMI 30-39.9)        PLAN:  Alysha was seen today for hypertension, diabetes and follow-up.    Diagnoses and all orders for this visit:    Essential hypertension  -     Comprehensive metabolic panel; Future    Diabetic nephropathy with proteinuria  -     Hemoglobin A1c; Future  -     Protein / creatinine ratio, urine    Type 2 diabetes mellitus with diabetic neuropathy, without long-term current use of insulin    Hyperlipidemia, unspecified hyperlipidemia type    Coronary artery disease involving native coronary artery of native heart without angina pectoris    Multiple thyroid nodules    Vitamin D deficiency    Osteoporosis, unspecified osteoporosis type, unspecified pathological fracture presence  -     ibandronate (BONIVA) 150 mg tablet; Take 1 tablet (150 mg total) by mouth every 30 days.    Elevated uric acid in blood  -     Uric acid; Future    Obesity (BMI 30-39.9)       Patient advised to call for results.  Continue current medications (including encouraged patient to take Boniva monthly as prescribed), follow low sodium, low cholesterol, low carb diet, daily walks.  Prescription refill as noted above.  Keep follow up with specialists.  Flu shot this fall.  Patient declines LDCT Lung Screen (history of tobacco use in the past) at this time.  Follow up in about 3 months (around 9/23/2019) for physical.

## 2019-06-19 ENCOUNTER — PATIENT MESSAGE (OUTPATIENT)
Dept: FAMILY MEDICINE | Facility: CLINIC | Age: 64
End: 2019-06-19

## 2019-07-01 RX ORDER — SITAGLIPTIN 100 MG/1
TABLET, FILM COATED ORAL
Qty: 90 TABLET | Refills: 1 | Status: SHIPPED | OUTPATIENT
Start: 2019-07-01 | End: 2020-04-10 | Stop reason: SDUPTHER

## 2019-08-06 ENCOUNTER — PATIENT OUTREACH (OUTPATIENT)
Dept: ADMINISTRATIVE | Facility: HOSPITAL | Age: 64
End: 2019-08-06

## 2019-08-06 DIAGNOSIS — E11.40 TYPE 2 DIABETES MELLITUS WITH DIABETIC NEUROPATHY, WITHOUT LONG-TERM CURRENT USE OF INSULIN: Primary | ICD-10-CM

## 2019-08-13 RX ORDER — METFORMIN HYDROCHLORIDE 500 MG/1
TABLET ORAL
Qty: 120 TABLET | Refills: 5 | Status: SHIPPED | OUTPATIENT
Start: 2019-08-13 | End: 2020-06-05 | Stop reason: SDUPTHER

## 2019-08-13 RX ORDER — DESOXIMETASONE 2.5 MG/G
CREAM TOPICAL
Qty: 15 G | Refills: 1 | Status: SHIPPED | OUTPATIENT
Start: 2019-08-13 | End: 2022-07-28

## 2019-08-13 RX ORDER — FUROSEMIDE 20 MG/1
20 TABLET ORAL DAILY
Qty: 30 TABLET | Refills: 5 | Status: ON HOLD | OUTPATIENT
Start: 2019-08-13 | End: 2021-05-31 | Stop reason: SDUPTHER

## 2019-08-28 RX ORDER — GLYBURIDE 5 MG/1
TABLET ORAL
Qty: 180 TABLET | Refills: 1 | Status: SHIPPED | OUTPATIENT
Start: 2019-08-28 | End: 2020-06-05 | Stop reason: SDUPTHER

## 2019-09-24 ENCOUNTER — PATIENT OUTREACH (OUTPATIENT)
Dept: ADMINISTRATIVE | Facility: HOSPITAL | Age: 64
End: 2019-09-24

## 2019-10-14 DIAGNOSIS — E79.0 ELEVATED URIC ACID IN BLOOD: ICD-10-CM

## 2019-10-14 RX ORDER — ALLOPURINOL 100 MG/1
100 TABLET ORAL DAILY
Qty: 30 TABLET | Refills: 5 | Status: SHIPPED | OUTPATIENT
Start: 2019-10-14 | End: 2020-06-05 | Stop reason: SDUPTHER

## 2019-10-17 DIAGNOSIS — E11.9 TYPE 2 DIABETES MELLITUS WITHOUT COMPLICATION: ICD-10-CM

## 2019-10-23 ENCOUNTER — PATIENT OUTREACH (OUTPATIENT)
Dept: ADMINISTRATIVE | Facility: HOSPITAL | Age: 64
End: 2019-10-23

## 2019-10-23 NOTE — PROGRESS NOTES
Pt scheduled 10/24/2019     Lacie RICHARDSON LPN Care Coordinator  Care Coordination Department  [facility]  [phone]

## 2019-10-24 ENCOUNTER — PATIENT MESSAGE (OUTPATIENT)
Dept: FAMILY MEDICINE | Facility: CLINIC | Age: 64
End: 2019-10-24

## 2019-10-30 ENCOUNTER — PATIENT OUTREACH (OUTPATIENT)
Dept: ADMINISTRATIVE | Facility: HOSPITAL | Age: 64
End: 2019-10-30

## 2019-10-30 NOTE — PROGRESS NOTES
L/M for pt  to scheduled A1c       Lacie RICHARDSON LPN Care Coordinator  Care Coordination Department  [facility]  [phone]

## 2019-11-22 DIAGNOSIS — Z12.39 BREAST CANCER SCREENING: ICD-10-CM

## 2019-11-29 RX ORDER — HYDRALAZINE HYDROCHLORIDE 50 MG/1
50 TABLET, FILM COATED ORAL 4 TIMES DAILY
Qty: 120 TABLET | Refills: 10 | Status: SHIPPED | OUTPATIENT
Start: 2019-11-29 | End: 2021-09-30 | Stop reason: SDUPTHER

## 2019-12-03 ENCOUNTER — PATIENT OUTREACH (OUTPATIENT)
Dept: ADMINISTRATIVE | Facility: HOSPITAL | Age: 64
End: 2019-12-03

## 2019-12-03 NOTE — PROGRESS NOTES
Pt States that her  is in hospice and has taken a turn for the worst as of today.. Pt states that she will call back to schedule test//       Lacie RICHARDSON LPN Care Coordinator  Care Coordination Department  [facility]  [phone]

## 2019-12-10 RX ORDER — ROSUVASTATIN CALCIUM 20 MG/1
TABLET, COATED ORAL
Qty: 30 TABLET | Refills: 0 | Status: SHIPPED | OUTPATIENT
Start: 2019-12-10 | End: 2020-04-10 | Stop reason: SDUPTHER

## 2019-12-17 ENCOUNTER — PATIENT OUTREACH (OUTPATIENT)
Dept: ADMINISTRATIVE | Facility: HOSPITAL | Age: 64
End: 2019-12-17

## 2019-12-17 NOTE — PROGRESS NOTES
Pt  is ill, and will call back to schedule wolf RICHARDSON LPN Care Coordinator  Care Coordination Department  [facility]  [phone]

## 2020-02-05 ENCOUNTER — PATIENT OUTREACH (OUTPATIENT)
Dept: ADMINISTRATIVE | Facility: HOSPITAL | Age: 65
End: 2020-02-05

## 2020-02-05 NOTE — PROGRESS NOTES
Portal Message sent to pt         Lacie RICHARDSON LPN Care Coordinator  Care Coordination Department  [facility]  [phone]

## 2020-04-11 RX ORDER — SITAGLIPTIN 100 MG/1
100 TABLET, FILM COATED ORAL DAILY
Qty: 30 TABLET | Refills: 1 | Status: SHIPPED | OUTPATIENT
Start: 2020-04-11 | End: 2020-06-05 | Stop reason: SDUPTHER

## 2020-04-11 RX ORDER — ROSUVASTATIN CALCIUM 20 MG/1
TABLET, COATED ORAL
Qty: 30 TABLET | Refills: 1 | Status: SHIPPED | OUTPATIENT
Start: 2020-04-11 | End: 2020-06-05 | Stop reason: SDUPTHER

## 2020-05-08 RX ORDER — LISINOPRIL 20 MG/1
TABLET ORAL
Qty: 90 TABLET | Refills: 0 | Status: ON HOLD | OUTPATIENT
Start: 2020-05-08 | End: 2021-05-31 | Stop reason: HOSPADM

## 2020-05-27 DIAGNOSIS — M25.562 ACUTE PAIN OF BOTH KNEES: Primary | ICD-10-CM

## 2020-05-27 DIAGNOSIS — M25.561 ACUTE PAIN OF BOTH KNEES: Primary | ICD-10-CM

## 2020-06-04 ENCOUNTER — LAB VISIT (OUTPATIENT)
Dept: LAB | Facility: HOSPITAL | Age: 65
End: 2020-06-04
Attending: FAMILY MEDICINE
Payer: MEDICARE

## 2020-06-04 ENCOUNTER — OFFICE VISIT (OUTPATIENT)
Dept: FAMILY MEDICINE | Facility: CLINIC | Age: 65
End: 2020-06-04
Payer: MEDICARE

## 2020-06-04 VITALS
WEIGHT: 206 LBS | SYSTOLIC BLOOD PRESSURE: 120 MMHG | HEIGHT: 62 IN | RESPIRATION RATE: 16 BRPM | HEART RATE: 72 BPM | OXYGEN SATURATION: 99 % | TEMPERATURE: 98 F | BODY MASS INDEX: 37.91 KG/M2 | DIASTOLIC BLOOD PRESSURE: 70 MMHG

## 2020-06-04 DIAGNOSIS — E79.0 ELEVATED URIC ACID IN BLOOD: ICD-10-CM

## 2020-06-04 DIAGNOSIS — D64.9 ANEMIA, UNSPECIFIED TYPE: ICD-10-CM

## 2020-06-04 DIAGNOSIS — E11.69 COMBINED HYPERLIPIDEMIA ASSOCIATED WITH TYPE 2 DIABETES MELLITUS: ICD-10-CM

## 2020-06-04 DIAGNOSIS — E66.01 SEVERE OBESITY (BMI 35.0-39.9) WITH COMORBIDITY: ICD-10-CM

## 2020-06-04 DIAGNOSIS — E78.2 COMBINED HYPERLIPIDEMIA ASSOCIATED WITH TYPE 2 DIABETES MELLITUS: ICD-10-CM

## 2020-06-04 DIAGNOSIS — E11.21 DIABETIC NEPHROPATHY WITH PROTEINURIA: ICD-10-CM

## 2020-06-04 DIAGNOSIS — I25.10 CORONARY ARTERY DISEASE INVOLVING NATIVE CORONARY ARTERY OF NATIVE HEART WITHOUT ANGINA PECTORIS: Chronic | ICD-10-CM

## 2020-06-04 DIAGNOSIS — E11.59 HYPERTENSION ASSOCIATED WITH DIABETES: ICD-10-CM

## 2020-06-04 DIAGNOSIS — Z23 NEED FOR PROPHYLACTIC VACCINATION AGAINST STREPTOCOCCUS PNEUMONIAE (PNEUMOCOCCUS): ICD-10-CM

## 2020-06-04 DIAGNOSIS — E55.9 VITAMIN D DEFICIENCY: ICD-10-CM

## 2020-06-04 DIAGNOSIS — Z12.11 COLON CANCER SCREENING: ICD-10-CM

## 2020-06-04 DIAGNOSIS — Z12.31 VISIT FOR SCREENING MAMMOGRAM: ICD-10-CM

## 2020-06-04 DIAGNOSIS — E11.40 TYPE 2 DIABETES MELLITUS WITH DIABETIC NEUROPATHY, WITHOUT LONG-TERM CURRENT USE OF INSULIN: ICD-10-CM

## 2020-06-04 DIAGNOSIS — I15.2 HYPERTENSION ASSOCIATED WITH DIABETES: ICD-10-CM

## 2020-06-04 DIAGNOSIS — E04.2 MULTIPLE THYROID NODULES: ICD-10-CM

## 2020-06-04 DIAGNOSIS — Z78.0 POSTMENOPAUSAL: ICD-10-CM

## 2020-06-04 DIAGNOSIS — M81.0 OSTEOPOROSIS, UNSPECIFIED OSTEOPOROSIS TYPE, UNSPECIFIED PATHOLOGICAL FRACTURE PRESENCE: Chronic | ICD-10-CM

## 2020-06-04 LAB
25(OH)D3+25(OH)D2 SERPL-MCNC: 24 NG/ML (ref 30–96)
ALBUMIN SERPL BCP-MCNC: 3.8 G/DL (ref 3.5–5.2)
ALP SERPL-CCNC: 84 U/L (ref 55–135)
ALT SERPL W/O P-5'-P-CCNC: 17 U/L (ref 10–44)
ANION GAP SERPL CALC-SCNC: 7 MMOL/L (ref 8–16)
AST SERPL-CCNC: 16 U/L (ref 10–40)
BASOPHILS # BLD AUTO: 0.07 K/UL (ref 0–0.2)
BASOPHILS NFR BLD: 1 % (ref 0–1.9)
BILIRUB SERPL-MCNC: 0.2 MG/DL (ref 0.1–1)
BUN SERPL-MCNC: 30 MG/DL (ref 8–23)
CALCIUM SERPL-MCNC: 9.8 MG/DL (ref 8.7–10.5)
CHLORIDE SERPL-SCNC: 106 MMOL/L (ref 95–110)
CHOLEST SERPL-MCNC: 139 MG/DL (ref 120–199)
CHOLEST/HDLC SERPL: 3.3 {RATIO} (ref 2–5)
CO2 SERPL-SCNC: 27 MMOL/L (ref 23–29)
CREAT SERPL-MCNC: 1 MG/DL (ref 0.5–1.4)
CREAT UR-MCNC: 38 MG/DL (ref 15–325)
CTP QC/QA: YES
DIFFERENTIAL METHOD: ABNORMAL
EOSINOPHIL # BLD AUTO: 0.2 K/UL (ref 0–0.5)
EOSINOPHIL NFR BLD: 3 % (ref 0–8)
ERYTHROCYTE [DISTWIDTH] IN BLOOD BY AUTOMATED COUNT: 13.8 % (ref 11.5–14.5)
EST. GFR  (AFRICAN AMERICAN): >60 ML/MIN/1.73 M^2
EST. GFR  (NON AFRICAN AMERICAN): 59.3 ML/MIN/1.73 M^2
FECAL OCCULT BLOOD, POC: NEGATIVE
GLUCOSE SERPL-MCNC: 176 MG/DL (ref 70–110)
HCT VFR BLD AUTO: 34.3 % (ref 37–48.5)
HDLC SERPL-MCNC: 42 MG/DL (ref 40–75)
HDLC SERPL: 30.2 % (ref 20–50)
HGB BLD-MCNC: 10.7 G/DL (ref 12–16)
IMM GRANULOCYTES # BLD AUTO: 0.01 K/UL (ref 0–0.04)
IMM GRANULOCYTES NFR BLD AUTO: 0.1 % (ref 0–0.5)
LDLC SERPL CALC-MCNC: 51.8 MG/DL (ref 63–159)
LYMPHOCYTES # BLD AUTO: 1.4 K/UL (ref 1–4.8)
LYMPHOCYTES NFR BLD: 18.8 % (ref 18–48)
MCH RBC QN AUTO: 28.8 PG (ref 27–31)
MCHC RBC AUTO-ENTMCNC: 31.2 G/DL (ref 32–36)
MCV RBC AUTO: 92 FL (ref 82–98)
MONOCYTES # BLD AUTO: 0.6 K/UL (ref 0.3–1)
MONOCYTES NFR BLD: 7.9 % (ref 4–15)
NEUTROPHILS # BLD AUTO: 5 K/UL (ref 1.8–7.7)
NEUTROPHILS NFR BLD: 69.2 % (ref 38–73)
NONHDLC SERPL-MCNC: 97 MG/DL
NRBC BLD-RTO: 0 /100 WBC
PLATELET # BLD AUTO: 202 K/UL (ref 150–350)
PMV BLD AUTO: 11.9 FL (ref 9.2–12.9)
POTASSIUM SERPL-SCNC: 5 MMOL/L (ref 3.5–5.1)
PROT SERPL-MCNC: 7.2 G/DL (ref 6–8.4)
PROT UR-MCNC: 8 MG/DL (ref 0–15)
PROT/CREAT UR: 0.21 MG/G{CREAT} (ref 0–0.2)
RBC # BLD AUTO: 3.72 M/UL (ref 4–5.4)
SODIUM SERPL-SCNC: 140 MMOL/L (ref 136–145)
TRIGL SERPL-MCNC: 226 MG/DL (ref 30–150)
TSH SERPL DL<=0.005 MIU/L-ACNC: 1.89 UIU/ML (ref 0.4–4)
URATE SERPL-MCNC: 6.8 MG/DL (ref 2.4–5.7)
WBC # BLD AUTO: 7.22 K/UL (ref 3.9–12.7)

## 2020-06-04 PROCEDURE — G0101 CA SCREEN;PELVIC/BREAST EXAM: HCPCS | Mod: 51,PBBFAC,PO | Performed by: FAMILY MEDICINE

## 2020-06-04 PROCEDURE — 80061 LIPID PANEL: CPT

## 2020-06-04 PROCEDURE — 84443 ASSAY THYROID STIM HORMONE: CPT

## 2020-06-04 PROCEDURE — 84156 ASSAY OF PROTEIN URINE: CPT

## 2020-06-04 PROCEDURE — 85025 COMPLETE CBC W/AUTO DIFF WBC: CPT

## 2020-06-04 PROCEDURE — 82306 VITAMIN D 25 HYDROXY: CPT

## 2020-06-04 PROCEDURE — 99214 PR OFFICE/OUTPT VISIT, EST, LEVL IV, 30-39 MIN: ICD-10-PCS | Mod: 25,S$PBB,, | Performed by: FAMILY MEDICINE

## 2020-06-04 PROCEDURE — 99214 OFFICE O/P EST MOD 30 MIN: CPT | Mod: 25,S$PBB,, | Performed by: FAMILY MEDICINE

## 2020-06-04 PROCEDURE — 84550 ASSAY OF BLOOD/URIC ACID: CPT

## 2020-06-04 PROCEDURE — 83036 HEMOGLOBIN GLYCOSYLATED A1C: CPT

## 2020-06-04 PROCEDURE — 99999 PR PBB SHADOW E&M-EST. PATIENT-LVL V: CPT | Mod: PBBFAC,,, | Performed by: FAMILY MEDICINE

## 2020-06-04 PROCEDURE — 99999 PR PBB SHADOW E&M-EST. PATIENT-LVL V: ICD-10-PCS | Mod: PBBFAC,,, | Performed by: FAMILY MEDICINE

## 2020-06-04 PROCEDURE — 36415 COLL VENOUS BLD VENIPUNCTURE: CPT | Mod: PO

## 2020-06-04 PROCEDURE — 82270 OCCULT BLOOD FECES: CPT | Mod: PBBFAC,PO | Performed by: FAMILY MEDICINE

## 2020-06-04 PROCEDURE — 80053 COMPREHEN METABOLIC PANEL: CPT

## 2020-06-04 PROCEDURE — 99215 OFFICE O/P EST HI 40 MIN: CPT | Mod: PBBFAC,PO | Performed by: FAMILY MEDICINE

## 2020-06-04 PROCEDURE — G0009 ADMIN PNEUMOCOCCAL VACCINE: HCPCS | Mod: PBBFAC,PO

## 2020-06-04 NOTE — PROGRESS NOTES
HISTORY OF PRESENT ILLNESS: Ms. Rosales comes in today fasting and with taking medication without acute problems for annual wellness examination.    END OF LIFE DECISION: She does not have a living will but does not desire life support.    Current Outpatient Medications   Medication Sig    allopurinol (ZYLOPRIM) 100 MG tablet Take 1 tablet (100 mg total) by mouth once daily.    aspirin 81 mg Tab Take 1 tablet by mouth.    blood sugar diagnostic (FREESTYLE LITE STRIPS) Strp USE ONE STRIP TO CHECK GLUCOSE ONCE DAILY    blood sugar diagnostic Strp 1 strip by Misc.(Non-Drug; Combo Route) route 2 (two) times daily.    blood-glucose meter Misc Use FREESTYLE meter for dx: diabetes    clopidogrel (PLAVIX) 75 mg tablet Take 1 tablet by mouth.    desoximetasone (TOPICORT) 0.25 % cream APPLY  CREAM EXTERNALLY TO AFFECTED AREA TWICE DAILY AS NEEDED    ERGOCALCIFEROL, VITAMIN D2, (VITAMIN D ORAL) Take 1 tablet by mouth once daily. 1000 units daily    furosemide (LASIX) 20 MG tablet Take 1 tablet (20 mg total) by mouth once daily.    glyBURIDE (DIABETA) 5 MG tablet TAKE 1 TABLET BY MOUTH TWICE DAILY    hydrALAZINE (APRESOLINE) 50 MG tablet Take 1 tablet (50 mg total) by mouth 4 (four) times daily.    ibandronate (BONIVA) 150 mg tablet Take 1 tablet (150 mg total) by mouth every 30 days. (not regularly takes)    JANUVIA 100 mg Tab Take 1 tablet (100 mg total) by mouth once daily.    lisinopriL (PRINIVIL,ZESTRIL) 20 MG tablet Take 1 tablet by mouth once daily    metFORMIN (GLUCOPHAGE) 500 MG tablet TAKE 1 TABLET BY MOUTH IN THE MORNING AND 1 AT NOON AND 2 IN THE EVENING WITH MEALS    nebivolol (BYSTOLIC) 10 MG Tab Take 1 tablet by mouth.    rosuvastatin (CRESTOR) 20 MG tablet TAKE 1 TABLET BY MOUTH NIGHTLY. Please advise pt to contact PCP for physical appt. Thanks.     SCREENINGS:   Cholesterol (with CMP): September 21, 2018.  FFS/Colonoscopy: March 20, 2009; repeat in 7 years. She desires Fit-Kit again. December  21, 2018 FIT-KIT (-).  Mammogram: August 7, 2017 - okay.  GYN Exam: July 11, 2017 - okay. Pap smears 2009 - 2013, 2016 - okay. Pap smear no longer needed.   Dexa Scan: August 7, 2017 - osteoporosis; repeat in 2 years.  Eye Exam: November 11, 2016 with Dr. Roseline Best. She wears glasses. She states scheduled for tomorrow.  Dental Exam: April or May 2017.  She wears denture on top and partial on bottom.   PPD: Negative in the past.  Immunizations: Tdap - February 25, 2009.  Gardasil - N./A.  Zostavax - March 30, 2015.  Shingrix - Never. She desires when available.  Pneumovax - November 21, 2008. Due on/after June 4, 2021.  Prevnar-13 - Never. She desires.   Seasonal Flu - Fall 2019 at [facility] per patient.   Hepatitis B vaccine - August 28, 2013, July 25, 2013, July 29, 2014.    ROS:  GENERAL: Denies fever, chills, fatigue or unusual weight change. Appetite normal. Weight 97.4 kg (214 lb 11.7 oz) at June 17, 2019 visit. Monitors diet. Exercises by mowing her lawn or gardening 3 - 4 times per week.  SKIN: Denies rashes, itching, changes in mole, color or texture of skin or easy bruising.   HEAD: Denies headaches or recent head trauma.  EYES: Denies change in vision, pain, diplopia, redness or discharge. Wears glasses.  EARS: Denies ear pain, discharge, vertigo except decreased hearing and requests hearing test again.  NOSE: Denies loss of smell, epistaxis or rhinitis.  MOUTH & THROAT: Denies hoarseness or change in voice. Denies excessive gum bleeding or mouth sores. Denies sore throat.  NODES: Denies swollen glands.  CHEST: Denies RIDDLE, wheezing, cough, or sputum production.  CARDIOVASCULAR: Denies chest pain, PND, orthopnea or reduced exercise tolerance. Denies palpitations. Saw cardiology Dr. Kaba in September 2019 for surveillance of CAD with follow up scheduled but she cancelled (as she cared for her ) and states she will reschedule.     ABDOMEN: Denies diarrhea, constipation, nausea, vomiting, abdominal  "pain, or blood in stool.  URINARY: Denies flank pain, dysuria or hematuria. Saw Dr. Bryatn, nephrologist, on September 19, 2017 for CKD stage 1, diabetes nephropathy, proteinuria surveillance with 9-month follow-up advised.  GENITOURINARY: Denies flank pain, dysuria, frequency or hematuria. Performs monthly breast self exams.  ENDOCRINE: Reports not performs glucose checks anymore. Thyroid ultrasound on August 10, 2017 - Tiny less than 5 mm nodules scattered throughout both lobes of the thyroid gland.  The appearance is essentially stable when compared to prior exam. Repeat thyroid ultrasound every 1 - 2 years.  HEME/LYMPH: Denies bleeding problems.  PERIPHERAL VASCULAR:Denies claudication or cyanosis.  MUSCULOSKELETAL: Reports morning stiffness, pain in low back, left knee but improves with ambulation and rarely takes Aleve with help. Scheduled to see Dr. Saravia, orthopedist, on tomorrow. Denies edema.  NEUROLOGIC: Denies history of seizures, tremors, paralysis, alteration of gait or coordination. Reports chronic numbness in hands, feet.  PSYCHIATRIC: Denies mood swings, depression, anxiety or homicidal or suicidal thoughts. Denies sleep problems.    PE:   VS: /70   Pulse 72   Temp 98.3 °F (36.8 °C)   Resp 16   Ht 5' 2" (1.575 m)   Wt 93.5 kg (206 lb 0.3 oz)   SpO2 99%   BMI 37.68 kg/m²   APPEARANCE: Well nourished, well developed female, obese and pleasant, alert and oriented in no acute distress.   HEAD: Non tender. Full range of motion.  EYES: PERRL, conjunctiva pink, lids no edema.  EARS: External canal patent, no swelling or redness. TM's shiny and clear.  NOSE: Mucosa and turbinates pink, not swollen. No discharge. Non tender sinuses.  THROAT: No pharyngeal erythema or exudate. No stridor. She wears top dentures, bottom partials.  NECK: Supple, no mass, thyroid not enlarged.  NODES: No cervical, axillary or inguinal lymph node enlargement.  CHEST: Normal respiratory effort. Lungs clear to " "auscultation.  CARDIOVASCULAR: Normal S1, S2. Chronic soft cardiac murmur noted. PMI not displaced. No carotid bruit. Pedal pulses palpable bilaterally. No edema. Feet look okay without ulcerations.  ABDOMEN: Bowel sounds present. Not distended. Soft. No tenderness, masses or organomegaly.  BREAST EXAM: Symmetrical, no external lesions, no discharge, no masses palpated.  PELVIC EXAM: No external lesions noted, no discharge, cervix appears normal, bimanual exam showed no uterine abnormalities and no adnexal masses. Urethra and bladder intact. Chronic right sided-vaginal wall protrusion without prolapse noted.  RECTAL EXAM: No external hemorrhoids or anal fissures. Heme-negative stool in the rectal vault.  MUSCULOSKELETAL: No joint deformities or stiffness except tender at right heel. She is ambulatory without problems.  SKIN: No rashes or suspicious lesions, normal color and turgor except "quiet" eczematous skin changes at bottom of feet (with much improved appearance compared to other visits with me).  NEUROLOGIC: Cranial Nerves: II-XII grossly intact. DTR's: Knees, Ankles 2+ and equal bilaterally. Gait & Posture: Normal gait and fine motion.  PSYCHIATRIC: Patient alert, oriented x 3. Mood/Affect normal without acute anxiety or depression noted. Judgment/insight good as she makes appropriate decisions on today's examination.    Protective Sensation (w/ 10 gram monofilament):  Right: Intact  Left: Intact    Visual Inspection:  Normal -  Bilateral    Pedal Pulses:   Right: Present  Left: Present    Posterior tibialis:   Right:Present  Left: Present     ASSESSMENT:    ICD-10-CM ICD-9-CM    1. Hypertension associated with diabetes  E11.59 250.80 Comprehensive metabolic panel    I10 401.9 Lipid Panel      CBC auto differential      TSH   2. Coronary artery disease involving native coronary artery of native heart without angina pectoris  I25.10 414.01    3. Combined hyperlipidemia associated with type 2 diabetes mellitus  " E11.69 250.80 Comprehensive metabolic panel    E78.2 272.2 Lipid Panel   4. Type 2 diabetes mellitus with diabetic neuropathy, without long-term current use of insulin  E11.40 250.60 Protein / creatinine ratio, urine     357.2 Hemoglobin A1C   5. Diabetic nephropathy with proteinuria  E11.21 250.40 Protein / creatinine ratio, urine     583.81 Hemoglobin A1C   6. Multiple thyroid nodules  E04.2 241.1 US Soft Tissue Head Neck Thyroid   7. Anemia, unspecified type  D64.9 285.9    8. Elevated uric acid in blood  E79.0 790.6 Uric acid   9. Vitamin D deficiency  E55.9 268.9 Vitamin D   10. Osteoporosis, unspecified osteoporosis type, unspecified pathological fracture presence  M81.0 733.00 DXA Bone Density Spine And Hip   11. Severe obesity (BMI 35.0-39.9) with comorbidity  E66.01 278.01    12. Postmenopausal  Z78.0 V49.81 DXA Bone Density Spine And Hip   13. Visit for screening mammogram  Z12.31 V76.12 Mammo Digital Screening Bilat   14. Colon cancer screening  Z12.11 V76.51 POCT Occult Blood Stool      Fecal Immunochemical Test (iFOBT)   15. Need for prophylactic vaccination against Streptococcus pneumoniae (pneumococcus)  Z23 V03.82 Pneumococcal Conjugate Vaccine (13 Valent) (IM)     PLAN:  1. Age-appropriate counseling-appropriate low-sodium, low-cholesterol, low carbohydrate diet and exercise daily, monthly breast self exam, annual wellness examination.   2. Patient advised to call for results.  3. Continue current medications.  4. Keep follow up with specialists (patient stated she will make her own appointments).              5. Annual eye and dental examinations encouraged.              6. Flu shot this fall.              7. Follow up in about 6 months (around 12/4/2020) for diabetes and hypertension follow up.

## 2020-06-05 ENCOUNTER — OFFICE VISIT (OUTPATIENT)
Dept: OPHTHALMOLOGY | Facility: CLINIC | Age: 65
End: 2020-06-05
Payer: MEDICARE

## 2020-06-05 ENCOUNTER — OFFICE VISIT (OUTPATIENT)
Dept: ORTHOPEDICS | Facility: CLINIC | Age: 65
End: 2020-06-05
Payer: MEDICARE

## 2020-06-05 ENCOUNTER — HOSPITAL ENCOUNTER (OUTPATIENT)
Dept: RADIOLOGY | Facility: HOSPITAL | Age: 65
Discharge: HOME | End: 2020-06-05
Attending: ORTHOPAEDIC SURGERY
Payer: MEDICARE

## 2020-06-05 VITALS
DIASTOLIC BLOOD PRESSURE: 79 MMHG | HEIGHT: 62 IN | BODY MASS INDEX: 37.91 KG/M2 | WEIGHT: 206 LBS | HEART RATE: 80 BPM | SYSTOLIC BLOOD PRESSURE: 171 MMHG

## 2020-06-05 DIAGNOSIS — M47.817 ARTHROPATHY OF LUMBOSACRAL FACET JOINT: ICD-10-CM

## 2020-06-05 DIAGNOSIS — M17.11 ARTHRITIS OF KNEE, RIGHT: ICD-10-CM

## 2020-06-05 DIAGNOSIS — M21.062 ACQUIRED GENU VALGUM OF LEFT KNEE: ICD-10-CM

## 2020-06-05 DIAGNOSIS — M25.562 ACUTE PAIN OF BOTH KNEES: ICD-10-CM

## 2020-06-05 DIAGNOSIS — M81.0 OSTEOPOROSIS, UNSPECIFIED OSTEOPOROSIS TYPE, UNSPECIFIED PATHOLOGICAL FRACTURE PRESENCE: ICD-10-CM

## 2020-06-05 DIAGNOSIS — H52.4 BILATERAL PRESBYOPIA: ICD-10-CM

## 2020-06-05 DIAGNOSIS — M51.37 DDD (DEGENERATIVE DISC DISEASE), LUMBOSACRAL: ICD-10-CM

## 2020-06-05 DIAGNOSIS — M17.12 ARTHRITIS OF KNEE, LEFT: Primary | ICD-10-CM

## 2020-06-05 DIAGNOSIS — H25.13 CATARACT, NUCLEAR SCLEROTIC SENILE, BILATERAL: Primary | ICD-10-CM

## 2020-06-05 DIAGNOSIS — Z96.9 RETAINED ORTHOPEDIC HARDWARE: ICD-10-CM

## 2020-06-05 DIAGNOSIS — Z96.1 PSEUDOPHAKIA, RIGHT EYE: ICD-10-CM

## 2020-06-05 DIAGNOSIS — H25.12 NS (NUCLEAR SCLEROSIS), LEFT: ICD-10-CM

## 2020-06-05 DIAGNOSIS — M25.561 ACUTE PAIN OF BOTH KNEES: ICD-10-CM

## 2020-06-05 DIAGNOSIS — H52.13 MYOPIA, BILATERAL: ICD-10-CM

## 2020-06-05 DIAGNOSIS — E11.3299 BDR (BACKGROUND DIABETIC RETINOPATHY): ICD-10-CM

## 2020-06-05 LAB
ESTIMATED AVG GLUCOSE: 192 MG/DL (ref 68–131)
HBA1C MFR BLD HPLC: 8.3 % (ref 4–5.6)

## 2020-06-05 PROCEDURE — 99211 OFF/OP EST MAY X REQ PHY/QHP: CPT | Mod: PBBFAC,25 | Performed by: OPTOMETRIST

## 2020-06-05 PROCEDURE — 73564 X-RAY EXAM KNEE 4 OR MORE: CPT | Mod: 26,50,, | Performed by: RADIOLOGY

## 2020-06-05 PROCEDURE — 20610 LARGE JOINT ASPIRATION/INJECTION: L KNEE: ICD-10-PCS | Mod: S$PBB,LT,, | Performed by: ORTHOPAEDIC SURGERY

## 2020-06-05 PROCEDURE — 20610 DRAIN/INJ JOINT/BURSA W/O US: CPT | Mod: PBBFAC | Performed by: ORTHOPAEDIC SURGERY

## 2020-06-05 PROCEDURE — 92004 COMPRE OPH EXAM NEW PT 1/>: CPT | Mod: S$PBB,,, | Performed by: OPTOMETRIST

## 2020-06-05 PROCEDURE — 99204 PR OFFICE/OUTPT VISIT, NEW, LEVL IV, 45-59 MIN: ICD-10-PCS | Mod: 25,S$PBB,, | Performed by: ORTHOPAEDIC SURGERY

## 2020-06-05 PROCEDURE — 99999 PR PBB SHADOW E&M-EST. PATIENT-LVL III: CPT | Mod: PBBFAC,,, | Performed by: ORTHOPAEDIC SURGERY

## 2020-06-05 PROCEDURE — 73564 X-RAY EXAM KNEE 4 OR MORE: CPT | Mod: TC,50

## 2020-06-05 PROCEDURE — 99999 PR PBB SHADOW E&M-EST. PATIENT-LVL I: ICD-10-PCS | Mod: PBBFAC,,, | Performed by: OPTOMETRIST

## 2020-06-05 PROCEDURE — 92015 PR REFRACTION: ICD-10-PCS | Mod: ,,, | Performed by: OPTOMETRIST

## 2020-06-05 PROCEDURE — 92004 PR EYE EXAM, NEW PATIENT,COMPREHESV: ICD-10-PCS | Mod: S$PBB,,, | Performed by: OPTOMETRIST

## 2020-06-05 PROCEDURE — 99999 PR PBB SHADOW E&M-EST. PATIENT-LVL III: ICD-10-PCS | Mod: PBBFAC,,, | Performed by: ORTHOPAEDIC SURGERY

## 2020-06-05 PROCEDURE — 92015 DETERMINE REFRACTIVE STATE: CPT | Mod: ,,, | Performed by: OPTOMETRIST

## 2020-06-05 PROCEDURE — 99204 OFFICE O/P NEW MOD 45 MIN: CPT | Mod: 25,S$PBB,, | Performed by: ORTHOPAEDIC SURGERY

## 2020-06-05 PROCEDURE — 99999 PR PBB SHADOW E&M-EST. PATIENT-LVL I: CPT | Mod: PBBFAC,,, | Performed by: OPTOMETRIST

## 2020-06-05 PROCEDURE — 73564 XR KNEE ORTHO BILAT WITH FLEXION: ICD-10-PCS | Mod: 26,50,, | Performed by: RADIOLOGY

## 2020-06-05 PROCEDURE — 99213 OFFICE O/P EST LOW 20 MIN: CPT | Mod: PBBFAC,25,27 | Performed by: ORTHOPAEDIC SURGERY

## 2020-06-05 RX ORDER — MELOXICAM 15 MG/1
15 TABLET ORAL DAILY
Qty: 30 TABLET | Refills: 3 | Status: SHIPPED | OUTPATIENT
Start: 2020-06-05 | End: 2020-11-09

## 2020-06-05 RX ORDER — METHYLPREDNISOLONE ACETATE 80 MG/ML
80 INJECTION, SUSPENSION INTRA-ARTICULAR; INTRALESIONAL; INTRAMUSCULAR; SOFT TISSUE
Status: DISCONTINUED | OUTPATIENT
Start: 2020-06-05 | End: 2020-06-05 | Stop reason: HOSPADM

## 2020-06-05 RX ORDER — SITAGLIPTIN 100 MG/1
100 TABLET, FILM COATED ORAL DAILY
Qty: 30 TABLET | Refills: 5 | Status: SHIPPED | OUTPATIENT
Start: 2020-06-05 | End: 2020-10-14 | Stop reason: SDUPTHER

## 2020-06-05 RX ADMIN — METHYLPREDNISOLONE ACETATE 80 MG: 80 INJECTION, SUSPENSION INTRALESIONAL; INTRAMUSCULAR; INTRASYNOVIAL; SOFT TISSUE at 11:06

## 2020-06-05 NOTE — PROGRESS NOTES
HPI     NIDDM exam.  Cloudy vision left eye x 1 year.  Last eye exam 11/11/2016 [facility].  New patient to [facility].  Lab Results       Component                Value               Date                       HGBA1C                   8.3 (H)             06/04/2020             Last edited by Jm Wadsworth, OD on 6/5/2020  8:44 AM. (History)            Assessment /Plan     For exam results, see Encounter Report.    Cataract, nuclear sclerotic senile, bilateral    BDR (background diabetic retinopathy)    Pseudophakia, right eye    NS (nuclear sclerosis), left    Myopia, bilateral    Bilateral presbyopia    Mild BDR OU, elevated A1C, Needs mOCT with Natali visit    Stable IOL OD    Significant cataracts OS, discussed cataract surgery again with Natali    Consult Ophthalmology for cataract evaluation at next available appointment.

## 2020-06-05 NOTE — PATIENT INSTRUCTIONS
Stop Aleve, ibuprofen, Motrin over-the-counter  Will start meloxicam 15 mg once a day for arthritis pain as needed  Take Tylenol 650 mg maximum 3 times a day if needed  Will provide with left knee brace to wear during gait  Will inject the left knee with steroid/Depo-Medrol 80 mg mixed with 5 cc 1% lidocaine 06/04/2020.  It might increase her sugars a little bit watch which we eat the next few days  May ice the knee for burns the next few days from the steroid  Get approval for the Synvisc injection which we will given 4 weeks    Referred [facility] for osteoporosis since she has always forgetting to take the Boniva

## 2020-06-05 NOTE — PROGRESS NOTES
Subjective:     Patient ID: Alysha Rosales is a 65 y.o. female.    Chief Complaint: Pain of the Left Knee and Pain of the Right Knee    HPI:  06/05/2020  [age] very pleasant white female who had injured her left knee in 1978 and had bone grafting and the pin placed in the knee.  Subsequent to that she had developed arthritis and now she says her foot is going out and she is walking on the side of her foot.  Few years back had steroid injection and viscosupplementation with some relief of her pain.  She states that she is hurting in both knees the right less than the left.  She takes Aleve for pills in the morning all together.  She also had been diagnosed with osteoporosis and supposed to take Boniva once every month and she forgets to take it.  She does ambulate without any assistive devices.  Pain is 5/10 with also some low back pain.  Denies any numbness or tingling but states she does have very mild peripheral neuropathy that is not constant.  Occasionally her knees give out and have the feeling of about to fall.  Denies loss of bowel bladder control, fever or chills or shortness of breath or chest pain    Past Medical History:   Diagnosis Date    CAD (coronary artery disease)     Followed by Dr. Stevo Kaba    Diabetes mellitus, type 2     DM (diabetes mellitus)     BS didn't check 06/05/2020    High level of uric acid in blood     Hyperlipidemia     Hyperplastic rectal polyp     on 03/20/2009 colonoscopy    Hypertension     Multinodular goiter     noted on 1/20/2015 CTA carotid scan    Multiple thyroid nodules 05/05/2016    on Thyroid U/S    Osteoarthritis     Left knee    Osteoarthritis of back     Osteoporosis, unspecified     Postmenopausal     No history of abnormal pap smear    Proteinuria     Pustular psoriasis     hands and feet    Subclavian artery stenosis     Left; with the proximal segment at 60-70% per 10/02/2008 carotid CTA    Vitamin D deficiency disease      Past  Surgical History:   Procedure Laterality Date    CATARACT EXTRACTION W/  INTRAOCULAR LENS IMPLANT Right 4/13/16    heart catheterization with stents placed  2004 and 08/18/2009    left knee surgery for bone graft and pin placement       Family History   Problem Relation Age of Onset    Diabetes Mother     Hypertension Mother     Stroke Mother     Hypertension Sister     Cancer Father         Lung cancer (smoker)    No Known Problems Daughter     No Known Problems Son     Heart disease Neg Hx      Social History     Socioeconomic History    Marital status:      Spouse name: Not on file    Number of children: 2    Years of education: Not on file    Highest education level: Not on file   Occupational History    Occupation: Retired   Social Needs    Financial resource strain: Not hard at all    Food insecurity:     Worry: Never true     Inability: Never true    Transportation needs:     Medical: No     Non-medical: No   Tobacco Use    Smoking status: Former Smoker     Packs/day: 1.00     Years: 33.00     Pack years: 33.00     Types: Cigarettes     Last attempt to quit: 7/25/2004     Years since quitting: 15.8    Smokeless tobacco: Never Used   Substance and Sexual Activity    Alcohol use: Yes     Alcohol/week: 0.0 standard drinks     Frequency: Monthly or less     Drinks per session: 1 or 2     Binge frequency: Never     Comment: Rarely    Drug use: No    Sexual activity: Yes     Partners: Male     Birth control/protection: Post-menopausal   Lifestyle    Physical activity:     Days per week: 0 days     Minutes per session: 20 min    Stress: Not at all   Relationships    Social connections:     Talks on phone: Once a week     Gets together: Never     Attends [employer] service: More than 4 times per year     Active member of club or organization: No     Attends meetings of clubs or organizations: Never     Relationship status:    Other Topics Concern    Not on file   Social  History Narrative    She wears seatbelt. She will start working again at [employer] on next week.     Medication List with Changes/Refills   New Medications    MELOXICAM (MOBIC) 15 MG TABLET    Take 1 tablet (15 mg total) by mouth once daily. Take with food   Current Medications    ALLOPURINOL (ZYLOPRIM) 100 MG TABLET    Take 1 tablet (100 mg total) by mouth once daily.    ASPIRIN 81 MG TAB    Take 1 tablet by mouth.    BLOOD SUGAR DIAGNOSTIC (FREESTYLE LITE STRIPS) STRP    USE ONE STRIP TO CHECK GLUCOSE ONCE DAILY    BLOOD SUGAR DIAGNOSTIC STRP    1 strip by Misc.(Non-Drug; Combo Route) route 2 (two) times daily.    BLOOD-GLUCOSE METER MISC    Use FREESTYLE meter for dx: diabetes    CLOPIDOGREL (PLAVIX) 75 MG TABLET    Take 1 tablet by mouth.    DESOXIMETASONE (TOPICORT) 0.25 % CREAM    APPLY  CREAM EXTERNALLY TO AFFECTED AREA TWICE DAILY AS NEEDED    ERGOCALCIFEROL, VITAMIN D2, (VITAMIN D ORAL)    Take 1 tablet by mouth once daily. 1000 units daily    FUROSEMIDE (LASIX) 20 MG TABLET    Take 1 tablet (20 mg total) by mouth once daily.    GLYBURIDE (DIABETA) 5 MG TABLET    TAKE 1 TABLET BY MOUTH TWICE DAILY    HYDRALAZINE (APRESOLINE) 50 MG TABLET    Take 1 tablet (50 mg total) by mouth 4 (four) times daily.    IBANDRONATE (BONIVA) 150 MG TABLET    Take 1 tablet (150 mg total) by mouth every 30 days.    JANUVIA 100 MG TAB    Take 1 tablet (100 mg total) by mouth once daily.    LISINOPRIL (PRINIVIL,ZESTRIL) 20 MG TABLET    Take 1 tablet by mouth once daily    METFORMIN (GLUCOPHAGE) 500 MG TABLET    TAKE 1 TABLET BY MOUTH IN THE MORNING AND 1 AT NOON AND 2 IN THE EVENING WITH MEALS    NEBIVOLOL (BYSTOLIC) 10 MG TAB    Take 1 tablet by mouth.    ROSUVASTATIN (CRESTOR) 20 MG TABLET    TAKE 1 TABLET BY MOUTH NIGHTLY. Please advise pt to contact PCP for physical appt. Thanks.     Review of patient's allergies indicates:   Allergen Reactions    Codeine      Other reaction(s): Chills     Review of Systems   Constitution:  Negative for decreased appetite.   HENT: Negative for tinnitus.    Eyes: Negative for double vision.   Cardiovascular: Negative for chest pain.   Respiratory: Negative for wheezing.    Hematologic/Lymphatic: Negative for bleeding problem.   Skin: Negative for dry skin.   Musculoskeletal: Positive for arthritis, back pain, joint pain and stiffness. Negative for gout and neck pain.   Gastrointestinal: Negative for abdominal pain.   Genitourinary: Negative for bladder incontinence.   Neurological: Negative for numbness, paresthesias and sensory change.   Psychiatric/Behavioral: Negative for altered mental status.       Objective:   Body mass index is 37.68 kg/m².  Vitals:    06/05/20 0931   BP: (!) 171/79   Pulse: 80          General    Constitutional: She is oriented to person, place, and time. She appears well-developed.   HENT:   Head: Atraumatic.   Eyes: EOM are normal.   Cardiovascular: Normal rate.    Pulmonary/Chest: Effort normal.   Neurological: She is alert and oriented to person, place, and time.   Psychiatric: Judgment normal.            Cervical rotation without limitation or tenderness  Bilateral upper extremity with slight weakness to resistive abduction of both shoulders at 4/5.  Biceps/triceps/wrist extension and flexion is 5/5.  Radial and ulnar pulses 2+.  Sensory intact to touch.  Negative Tinel negative Phalen's test  Lumbar with slight deformity seen.  Mild tenderness to palpation between L4 and L5 level.  Forward bending almost to the ankle.  Pelvis is level in the sitting position.  Bilateral hips passive motion without pain in the groin.  Hip flexors, abductors, adductors, quads, hamstrings, ankle extensors and flexors are all 5/5.  Right knee with 8° of valgus alignment.  Lateral joint tenderness and mild tenderness medially.  Some crepitus to compression on the patella.  Slightly lose medial collateral ligament.  Lateral collateral ligaments stable.  Negative anterior drawer.  Negative  Char sign  Left knee with 10-12 degrees of valgus alignment.  You can correct down to 7° roughly.  Lateral joint and anterior joint severe tenderness.  Mild to moderate swelling.  Crepitus to range of motion.  Range of motion 0-125.  Large surgical scar on the lateral aspect of the knee  Calves are soft nontender with mild varicosities  Ankle extension and flexion was 5/5 bilaterally  Left foot during gait inverts and walk on the side of her foot.  PT 1+  Sensation intact to touch  Skin is warm to touch slightly dry no obvious lesions    Relevant imaging results reviewed and interpreted by me, discussed with the patient and / or family today   X-ray 06/05/2020 left knee was complete loss of lateral joint space with large osteophyte and cystic changes.  There is a screw in the subchondral area entering from lateral to medial.  Right knee x-ray with narrowing lateral joint space and small osteophytic changes consistent with right knee arthrosis/moderate.  Both knees are in valgus  X-ray and electronic records of the lumbar spine showing with scoliosis degenerative in nature, degenerative disc disease and facet changes.  Assessment:     Encounter Diagnoses   Name Primary?    Arthritis of knee, left Yes    Acquired genu valgum of left knee     Retained orthopedic hardware     DDD (degenerative disc disease), lumbosacral     Arthropathy of lumbosacral facet joint     Osteoporosis, unspecified osteoporosis type, unspecified pathological fracture presence     Arthritis of knee, right         Plan:   Arthritis of knee, left  -     Large Joint Aspiration/Injection: L knee  -     Prior authorization Order    Acquired genu valgum of left knee  -     Large Joint Aspiration/Injection: L knee    Retained orthopedic hardware    DDD (degenerative disc disease), lumbosacral    Arthropathy of lumbosacral facet joint    Osteoporosis, unspecified osteoporosis type, unspecified pathological fracture presence  -     Ambulatory  referral[facility]consult to [facility]; Future; Expected date: 06/12/2020    Arthritis of knee, right    Other orders  -     meloxicam (MOBIC) 15 MG tablet; Take 1 tablet (15 mg total) by mouth once daily. Take with food  Dispense: 30 tablet; Refill: 3         Patient Instructions   Stop Aleve, ibuprofen, Motrin over-the-counter  Will start meloxicam 15 mg once a day for arthritis pain as needed  Take Tylenol 650 mg maximum 3 times a day if needed  Will provide with left knee brace to wear during gait  Will inject the left knee with steroid/Depo-Medrol 80 mg mixed with 5 cc 1% lidocaine 06/04/2020.  It might increase her sugars a little bit watch which we eat the next few days  May ice the knee for burns the next few days from the steroid  Get approval for the Synvisc injection which we will given 4 weeks    Referred to [facility] for osteoporosis since she has always forgetting to take the Boniva    Patient is definitely a surgical consisted to the left knee by total knee arthroplasty.  Patient wants to avoid surgery if she can at this point      Disclaimer: This note was prepared using a voice recognition system and is likely to have sound alike errors within the text.

## 2020-06-05 NOTE — PROCEDURES
Large Joint Aspiration/Injection: L knee  Date/Time: 6/5/2020 11:00 AM  Performed by: Inocente Saravia MD  Authorized by: Inocente Saravia MD     Consent Done?:  Yes (Verbal)  Site marked: the procedure site was marked    Timeout: prior to procedure the correct patient, procedure, and site was verified      Local anesthesia used?: Yes    Local anesthetic:  Lidocaine 1% without epinephrine    Details:  Needle Size:  22 G  Ultrasonic Guidance for needle placement?: No    Approach:  Anterolateral  Location:  Knee  Site:  L knee  Medications:  80 mg methylPREDNISolone acetate 80 mg/mL  Patient tolerance:  Patient tolerated the procedure well with no immediate complications

## 2020-06-08 ENCOUNTER — TELEPHONE (OUTPATIENT)
Dept: RHEUMATOLOGY | Facility: CLINIC | Age: 65
End: 2020-06-08

## 2020-06-08 NOTE — TELEPHONE ENCOUNTER
Left message to call clinic back regarding info below, need to schedule NP consult with Joan Hill.

## 2020-06-08 NOTE — TELEPHONE ENCOUNTER
----- Message from Joan Hill PA-C sent at 6/5/2020  2:51 PM CDT -----  Regarding: new pt  NP In work [facility] for [facility] has a dexa scheduled for July 7 by pcp, can see me before then but would like to get dexa prior to visit with me if she comes in earlier so would need to move up

## 2020-06-09 NOTE — TELEPHONE ENCOUNTER
Spoke with pt and moved dexa to 6.17.20 at 2.30 pm and scheduled appt Joan Hill PA-C for 6.22.20 at 9.30 am. Pt verbalized understanding

## 2020-06-09 NOTE — TELEPHONE ENCOUNTER
----- Message from Herbie Clifton sent at 6/9/2020  8:48 AM CDT -----  Contact: pt [phone]  Type:  Sooner Apoointment Request    Caller is requesting a sooner appointment.  Caller declined first available appointment listed below.  Caller will not accept being placed on the waitlist and is requesting a message be sent to doctor.  Name of Caller:Alysha Rosales   When is the first available appointment?2020  Symptoms:referral  Would the patient rather a call back or a response via [employer]? Call back  Best Call Back Number:[phone]  Additional Information:

## 2020-06-12 ENCOUNTER — PATIENT MESSAGE (OUTPATIENT)
Dept: FAMILY MEDICINE | Facility: CLINIC | Age: 65
End: 2020-06-12

## 2020-06-14 DIAGNOSIS — E11.21 DIABETIC NEPHROPATHY WITH PROTEINURIA: ICD-10-CM

## 2020-06-14 DIAGNOSIS — E11.40 TYPE 2 DIABETES MELLITUS WITH DIABETIC NEUROPATHY, WITHOUT LONG-TERM CURRENT USE OF INSULIN: Primary | ICD-10-CM

## 2020-06-14 PROBLEM — E66.01 SEVERE OBESITY (BMI 35.0-39.9) WITH COMORBIDITY: Status: ACTIVE | Noted: 2020-06-14

## 2020-06-14 PROBLEM — E78.2 COMBINED HYPERLIPIDEMIA ASSOCIATED WITH TYPE 2 DIABETES MELLITUS: Status: ACTIVE | Noted: 2020-06-14

## 2020-06-14 PROBLEM — E11.69 COMBINED HYPERLIPIDEMIA ASSOCIATED WITH TYPE 2 DIABETES MELLITUS: Status: ACTIVE | Noted: 2020-06-14

## 2020-06-14 PROBLEM — E66.9 OBESITY (BMI 30-39.9): Status: RESOLVED | Noted: 2017-11-06 | Resolved: 2020-06-14

## 2020-06-14 RX ORDER — GLYBURIDE 5 MG/1
10 TABLET ORAL 2 TIMES DAILY WITH MEALS
Qty: 360 TABLET | Refills: 1 | Status: SHIPPED | OUTPATIENT
Start: 2020-06-14 | End: 2020-06-16

## 2020-06-14 NOTE — TELEPHONE ENCOUNTER
Please make sure pt knows of information regarding labs results and f/u appt is appropriately scheduled and pt is given [employer]. Thanks.

## 2020-06-16 DIAGNOSIS — E11.21 DIABETIC NEPHROPATHY WITH PROTEINURIA: Primary | ICD-10-CM

## 2020-06-16 RX ORDER — GLIPIZIDE 10 MG/1
10 TABLET ORAL
Qty: 60 TABLET | Refills: 5 | Status: SHIPPED | OUTPATIENT
Start: 2020-06-16 | End: 2020-10-14 | Stop reason: SDUPTHER

## 2020-06-19 ENCOUNTER — TELEPHONE (OUTPATIENT)
Dept: RHEUMATOLOGY | Facility: CLINIC | Age: 65
End: 2020-06-19

## 2020-06-19 NOTE — TELEPHONE ENCOUNTER
Left message to call clinic back, need to confirm appts on 6.22.20 at [facility] with lashonda mclain   Discharged

## 2020-06-22 ENCOUNTER — OFFICE VISIT (OUTPATIENT)
Dept: RHEUMATOLOGY | Facility: CLINIC | Age: 65
End: 2020-06-22
Payer: MEDICARE

## 2020-06-22 ENCOUNTER — APPOINTMENT (OUTPATIENT)
Dept: RADIOLOGY | Facility: HOSPITAL | Age: 65
End: 2020-06-22
Attending: FAMILY MEDICINE
Payer: MEDICARE

## 2020-06-22 ENCOUNTER — PATIENT OUTREACH (OUTPATIENT)
Dept: ADMINISTRATIVE | Facility: OTHER | Age: 65
End: 2020-06-22

## 2020-06-22 VITALS
HEART RATE: 90 BPM | HEIGHT: 62 IN | DIASTOLIC BLOOD PRESSURE: 73 MMHG | SYSTOLIC BLOOD PRESSURE: 180 MMHG | BODY MASS INDEX: 38.07 KG/M2 | WEIGHT: 206.88 LBS

## 2020-06-22 DIAGNOSIS — M81.0 OSTEOPOROSIS, UNSPECIFIED OSTEOPOROSIS TYPE, UNSPECIFIED PATHOLOGICAL FRACTURE PRESENCE: ICD-10-CM

## 2020-06-22 DIAGNOSIS — Z78.0 POSTMENOPAUSAL: ICD-10-CM

## 2020-06-22 DIAGNOSIS — M81.0 AGE-RELATED OSTEOPOROSIS WITHOUT CURRENT PATHOLOGICAL FRACTURE: Primary | ICD-10-CM

## 2020-06-22 PROCEDURE — 77080 DEXA BONE DENSITY SPINE HIP: ICD-10-PCS | Mod: 26,,, | Performed by: RADIOLOGY

## 2020-06-22 PROCEDURE — 99204 PR OFFICE/OUTPT VISIT, NEW, LEVL IV, 45-59 MIN: ICD-10-PCS | Mod: S$PBB,,, | Performed by: PHYSICIAN ASSISTANT

## 2020-06-22 PROCEDURE — 99215 OFFICE O/P EST HI 40 MIN: CPT | Mod: PBBFAC,25 | Performed by: PHYSICIAN ASSISTANT

## 2020-06-22 PROCEDURE — 99204 OFFICE O/P NEW MOD 45 MIN: CPT | Mod: S$PBB,,, | Performed by: PHYSICIAN ASSISTANT

## 2020-06-22 PROCEDURE — 77080 DXA BONE DENSITY AXIAL: CPT | Mod: 26,,, | Performed by: RADIOLOGY

## 2020-06-22 PROCEDURE — 99999 PR PBB SHADOW E&M-EST. PATIENT-LVL V: ICD-10-PCS | Mod: PBBFAC,,, | Performed by: PHYSICIAN ASSISTANT

## 2020-06-22 PROCEDURE — 77080 DXA BONE DENSITY AXIAL: CPT | Mod: TC

## 2020-06-22 PROCEDURE — 99999 PR PBB SHADOW E&M-EST. PATIENT-LVL V: CPT | Mod: PBBFAC,,, | Performed by: PHYSICIAN ASSISTANT

## 2020-06-22 RX ORDER — HEPARIN 100 UNIT/ML
500 SYRINGE INTRAVENOUS
Status: CANCELLED | OUTPATIENT
Start: 2020-06-22

## 2020-06-22 RX ORDER — ZOLEDRONIC ACID 5 MG/100ML
5 INJECTION, SOLUTION INTRAVENOUS
Status: CANCELLED | OUTPATIENT
Start: 2020-06-22

## 2020-06-22 RX ORDER — ACETAMINOPHEN 325 MG/1
650 TABLET ORAL ONCE
Status: CANCELLED
Start: 2020-06-22

## 2020-06-22 RX ORDER — SODIUM CHLORIDE 0.9 % (FLUSH) 0.9 %
10 SYRINGE (ML) INJECTION
Status: CANCELLED | OUTPATIENT
Start: 2020-06-22

## 2020-06-22 NOTE — PROGRESS NOTES
Chart reviewed.   Immunizations: Triggered Imm Registry     Orders placed: n/a  Upcoming appts to satisfy PROMISE topics: n/a

## 2020-06-22 NOTE — PATIENT INSTRUCTIONS
dexa scan today is actually a little better but we will continue treatment with IV reclast infusion once a year     START VITAMIN D 2,000UNITS     Infusion team will call you to set up first infusion     Repeat dexa scan in a year 6/2021     Hydrate day of and after infusion, tylenol as needed    See back in 12 wks with lab       Zoledronic Acid injection (Paget's Disease, Osteoporosis)  What is this medicine?  ZOLEDRONIC ACID (TENA le dron ik AS id) lowers the amount of calcium loss from bone. It is used to treat Paget's disease and osteoporosis in women.  How should I use this medicine?  This medicine is for infusion into a vein. It is given by a health care professional in a hospital or clinic setting.  Talk to your pediatrician regarding the use of this medicine in children. This medicine is not approved for use in children.  What side effects may I notice from receiving this medicine?  Side effects that you should report to your doctor or health care professional as soon as possible:  · allergic reactions like skin rash, itching or hives, swelling of the face, lips, or tongue  · anxiety, confusion, or depression  · breathing problems  · changes in vision  · eye pain  · feeling faint or lightheaded, falls  · jaw pain, especially after dental work  · mouth sores  · muscle cramps, stiffness, or weakness  · redness, blistering, peeling or loosening of the skin, including inside the mouth  · trouble passing urine or change in the amount of urine  Side effects that usually do not require medical attention (report to your doctor or health care professional if they continue or are bothersome):  · bone, joint, or muscle pain  · constipation  · diarrhea  · fever  · hair loss  · irritation at site where injected  · loss of appetite  · nausea, vomiting  · stomach upset  · trouble sleeping  · trouble swallowing  · weak or tired  What may interact with this medicine?  · certain antibiotics given by injection  · NSAIDs,  medicines for pain and inflammation, like ibuprofen or naproxen  · some diuretics like bumetanide, furosemide  · teriparatide  What if I miss a dose?  It is important not to miss your dose. Call your doctor or health care professional if you are unable to keep an appointment.  Where should I keep my medicine?  This drug is given in a hospital or clinic and will not be stored at home.  What should I tell my health care provider before I take this medicine?  They need to know if you have any of these conditions:  · aspirin-sensitive asthma  · cancer, especially if you are receiving medicines used to treat cancer  · dental disease or wear dentures  · infection  · kidney disease  · low levels of calcium in the blood  · past surgery on the parathyroid gland or intestines  · receiving corticosteroids like dexamethasone or prednisone  · an unusual or allergic reaction to zoledronic acid, other medicines, foods, dyes, or preservatives  · pregnant or trying to get pregnant  · breast-feeding  What should I watch for while using this medicine?  Visit your doctor or health care professional for regular checkups. It may be some time before you see the benefit from this medicine. Do not stop taking your medicine unless your doctor tells you to. Your doctor may order blood tests or other tests to see how you are doing.  Women should inform their doctor if they wish to become pregnant or think they might be pregnant. There is a potential for serious side effects to an unborn child. Talk to your health care professional or pharmacist for more information.  You should make sure that you get enough calcium and vitamin D while you are taking this medicine. Discuss the foods you eat and the vitamins you take with your health care professional.  Some people who take this medicine have severe bone, joint, and/or muscle pain. This medicine may also increase your risk for jaw problems or a broken thigh bone. Tell your doctor right away if  you have severe pain in your jaw, bones, joints, or muscles. Tell your doctor if you have any pain that does not go away or that gets worse.  Tell your dentist and dental surgeon that you are taking this medicine. You should not have major dental surgery while on this medicine. See your dentist to have a dental exam and fix any dental problems before starting this medicine. Take good care of your teeth while on this medicine. Make sure you see your dentist for regular follow-up appointments.  NOTE:This sheet is a summary. It may not cover all possible information. If you have questions about this medicine, talk to your doctor, pharmacist, or health care provider. Copyright© 2017 [employer]

## 2020-06-22 NOTE — LETTER
June 22, 2020      Inocente Saravia MD  [address] [place] [address]           [facility]  [address] [place] [address]  Phone: [phone]  Fax: [phone]          Patient: Alysha Rosales   MR Number: [id number]   YOB: 1955   Date of Visit: 6/22/2020       Dear Dr. Inocente Saravia:    Thank you for referring Alysha Rosales to me for evaluation. Attached you will find relevant portions of my assessment and plan of care.    If you have questions, please do not hesitate to call me. I look forward to following Alysha Rosales along with you.    Sincerely,    Joan Hill PA-C    Enclosure  CC:  No Recipients    If you would like to receive this communication electronically, please contact [email or web address] or [phone] to request more information on [employer] Link access.    For providers and/or their staff who would like to refer a patient to [facility], please contact us through our one-stop-shop provider referral line, [facility] , at [phone].    If you feel you have received this communication in error or would no longer like to receive these types of communications, please e-mail [email or web address]

## 2020-06-22 NOTE — PROGRESS NOTES
Subjective:       Patient ID: Alysha Rosales is a 65 y.o. female.    Chief Complaint: Consult    Mrs. Rosales is a 64 y/o f here for evaluation of her bmd, referred by Dr. Araya.  She has a h/o osteoporosis and was tx with boniva in the past but not consistent with it, in fact rarely takes it.  Prev rx'd fosamax prior to boniva but again had difficulties remembering to take.  Dexa repeated today showing actual some improved bmd in her spine and stable hip scores.     Vit D level recently checked and low at 24. Her pcp instructed her to increase to 2Ku daily she hasn't started yet.  No falls/fxs  She has sig djd in her knees dotty her L and does feel sometimes unsteady but no falls.     H/o heart attack (16 years ago)      Current meds for osteopenia/ osteoporosis: boniva, not consistent  Prior meds for osteopenia/ osteoporosis: fosamax   Prev dexa scan: OP at her spine  Vit D supplement: otc not taking yet   Menopause: post menopausal   Hystrectomy: no  HRT: very brief   Smoker/tobacco: prev smoker quit ab 16 years ago   Etoh: min  Falls: not frequent falls, stumbles at times  Activity level: no formal exercise   Kidney problems : none  Prev fracture : L knee fx, tib plateau  Steroids : no   Family history : no   PPI/gerd: no   Other risk factors:     Dental issues:  has partial bottom and complete top plate, from gum disease   Anticipated dental work :  none   H/o cancer/tx: no         Past Medical History:   Diagnosis Date    CAD (coronary artery disease)     Followed by Dr. Stevo Kaba    Diabetes mellitus, type 2     DM (diabetes mellitus)     BS didn't check 06/05/2020    High level of uric acid in blood     Hyperlipidemia     Hyperplastic rectal polyp     on 03/20/2009 colonoscopy    Hypertension     Multinodular goiter     noted on 1/20/2015 CTA carotid scan    Multiple thyroid nodules 05/05/2016    on Thyroid U/S    Osteoarthritis     Left knee    Osteoarthritis of back      Osteoporosis, unspecified     Postmenopausal     No history of abnormal pap smear    Proteinuria     Pustular psoriasis     hands and feet    Subclavian artery stenosis     Left; with the proximal segment at 60-70% per 10/02/2008 carotid CTA    Vitamin D deficiency disease      Past Surgical History:   Procedure Laterality Date    CATARACT EXTRACTION W/  INTRAOCULAR LENS IMPLANT Right 4/13/16    heart catheterization with stents placed  2004 and 08/18/2009    left knee surgery for bone graft and pin placement       Family History   Problem Relation Age of Onset    Diabetes Mother     Hypertension Mother     Stroke Mother     Hypertension Sister     Cancer Father         Lung cancer (smoker)    No Known Problems Daughter     No Known Problems Son     Heart disease Neg Hx      Social History     Socioeconomic History    Marital status:      Spouse name: Not on file    Number of children: 2    Years of education: Not on file    Highest education level: Not on file   Occupational History    Occupation: Retired   Social Needs    Financial resource strain: Not hard at all    Food insecurity     Worry: Never true     Inability: Never true    Transportation needs     Medical: No     Non-medical: No   Tobacco Use    Smoking status: Former Smoker     Packs/day: 1.00     Years: 33.00     Pack years: 33.00     Types: Cigarettes     Quit date: 7/25/2004     Years since quitting: 15.9    Smokeless tobacco: Never Used   Substance and Sexual Activity    Alcohol use: Yes     Alcohol/week: 0.0 standard drinks     Frequency: Monthly or less     Drinks per session: 1 or 2     Binge frequency: Never     Comment: Rarely    Drug use: No    Sexual activity: Yes     Partners: Male     Birth control/protection: Post-menopausal   Lifestyle    Physical activity     Days per week: 0 days     Minutes per session: 20 min    Stress: Not at all   Relationships    Social connections     Talks on phone: Once a  "week     Gets together: Never     Attends Yazdan[employer] service: More than 4 times per year     Active member of club or organization: No     Attends meetings of clubs or organizations: Never     Relationship status:    Other Topics Concern    Not on file   Social History Narrative    She wears seatbelt. She will start working again at [employer] on next week. She states her   in 2020 at the age of 68 due to lung cancer.     Review of patient's allergies indicates:   Allergen Reactions    Codeine      Other reaction(s): Chills     Review of Systems   Constitutional: Negative for chills, fatigue and fever.   HENT: Negative for mouth sores, rhinorrhea and sore throat.    Eyes: Negative for pain and redness.   Respiratory: Negative for cough and shortness of breath.    Cardiovascular: Negative for chest pain.        Prev MI   Gastrointestinal: Negative for abdominal pain, constipation, diarrhea, nausea and vomiting.   Genitourinary: Negative for dysuria and hematuria.   Musculoskeletal: Positive for arthralgias. Negative for joint swelling and myalgias.   Skin: Negative for rash.   Neurological: Negative for weakness, numbness and headaches.   Psychiatric/Behavioral: The patient is not nervous/anxious.          Objective:   BP (!) 180/73   Pulse 90   Ht 5' 2" (1.575 m)   Wt 93.9 kg (206 lb 14.4 oz)   BMI 37.84 kg/m²   Immunization History   Administered Date(s) Administered    Hepatitis B 2013, 2013    Hepatitis B, Adult 2014, 2015    Hepatitis B, Pediatric/Adolescent 2013, 2013    Influenza - Quadrivalent 2014, 2015    Influenza - Quadrivalent - PF (6 months and older) 2016, 2017, 2018, 2019    Influenza Split 2008, 10/14/2009, 2010, 2012, 12/10/2013    Pneumococcal Conjugate - 13 Valent 2020    Pneumococcal Polysaccharide - 23 Valent 2008    Tdap 2009    Zoster 2015 "            Physical Exam   Constitutional: She is oriented to person, place, and time and well-developed, well-nourished, and in no distress.   HENT:   Head: Normocephalic and atraumatic.   Eyes: Pupils are equal, round, and reactive to light. Right eye exhibits no discharge.   Neck: Normal range of motion.   Cardiovascular: Normal rate, regular rhythm and normal heart sounds.  Exam reveals no friction rub.    Pulmonary/Chest: Effort normal and breath sounds normal. No respiratory distress.   Abdominal: Soft. She exhibits no distension. There is no abdominal tenderness.   Lymphadenopathy:     She has no cervical adenopathy.   Neurological: She is alert and oriented to person, place, and time.   Skin: No rash noted. No erythema.     Psychiatric: Mood normal.   Musculoskeletal: Normal range of motion. No deformity or edema.      Comments: Gait is steady, walks w/o assistance           No results found for this or any previous visit (from the past 336 hour(s)).     No results found for: TBGOLDPLUS   Lab Results   Component Value Date    HEPAIGM Negative 03/27/2013    HEPBIGM Negative 03/27/2013    HEPCAB Negative 03/27/2013    dexa 2017: spine -2.5, neck mean -2.6 declining bone density  dexa 6.22.20: L1 -2.1, LN -2.3, LT -1.6 frax major 11%, hip 2%    Assessment:       1. Age-related osteoporosis without current pathological fracture    2. Osteoporosis, unspecified osteoporosis type, unspecified pathological fracture presence          Impression:   Osteoporosis dex 2017: tx with boniva and fosamax in the past but not consistent, she does have improved bmd on dexa (spine djd prob interfering w actual score)     Low vit D: 24 just checked and increased to 2K daily     Sig L knee djd: seeing ortho for this currently   Plan:       Discussed osteoporosis/low bone density disease state in detail with patient.  Reviewed treatment options along with potential side effects of these treatments.  Dexa scan was reviewed with  patient.  Treatment plan agreed upon together with patient     Would recommend continuing tx and since she is not faithful with oral meds IV reclast is the best option for her.   New drug discussed with patient including SE, drug info printed and given to patient    Start reclst 5mg IV once yearly, likely x 3 doses then holiday     Can repeat dexa in a year to monitor therapy effectiveness    Start vit D 2K daily     No contraindication for bisphosphonate her kidney fn is normal     Check cmp, vit d, pth, mag, phos at her next visit post infusion     See back in 3 mos w labs sooner if needed     Thank you for the consult!

## 2020-06-23 ENCOUNTER — TELEPHONE (OUTPATIENT)
Dept: INFUSION THERAPY | Facility: HOSPITAL | Age: 65
End: 2020-06-23

## 2020-06-23 NOTE — TELEPHONE ENCOUNTER
----- Message from Joan Hill PA-C sent at 6/23/2020  2:23 PM CDT -----  Ok to use her labs from 6.4  ----- Message -----  From: Bernadette Peace MA  Sent: 6/23/2020   2:16 PM CDT  To: Joan Hill PA-C    Patient is scheduled for 7/6 for her Reclast.  Is it still ok to use 6/4 Labs or do we need to get Labs again?  Thank you

## 2020-06-24 ENCOUNTER — TELEPHONE (OUTPATIENT)
Dept: RADIOLOGY | Facility: HOSPITAL | Age: 65
End: 2020-06-24

## 2020-06-25 ENCOUNTER — HOSPITAL ENCOUNTER (OUTPATIENT)
Dept: RADIOLOGY | Facility: HOSPITAL | Age: 65
Discharge: HOME | End: 2020-06-25
Attending: FAMILY MEDICINE
Payer: MEDICARE

## 2020-06-25 VITALS — WEIGHT: 207 LBS | BODY MASS INDEX: 38.09 KG/M2 | HEIGHT: 62 IN

## 2020-06-25 DIAGNOSIS — Z12.31 VISIT FOR SCREENING MAMMOGRAM: ICD-10-CM

## 2020-06-25 DIAGNOSIS — E04.2 MULTIPLE THYROID NODULES: ICD-10-CM

## 2020-06-25 PROCEDURE — 76536 US SOFT TISSUE HEAD NECK THYROID: ICD-10-PCS | Mod: 26,,, | Performed by: RADIOLOGY

## 2020-06-25 PROCEDURE — 77063 MAMMO DIGITAL SCREENING BILAT WITH TOMOSYNTHESIS_CAD: ICD-10-PCS | Mod: 26,,, | Performed by: RADIOLOGY

## 2020-06-25 PROCEDURE — 77067 MAMMO DIGITAL SCREENING BILAT WITH TOMOSYNTHESIS_CAD: ICD-10-PCS | Mod: 26,,, | Performed by: RADIOLOGY

## 2020-06-25 PROCEDURE — 77063 BREAST TOMOSYNTHESIS BI: CPT | Mod: 26,,, | Performed by: RADIOLOGY

## 2020-06-25 PROCEDURE — 77067 SCR MAMMO BI INCL CAD: CPT | Mod: TC

## 2020-06-25 PROCEDURE — 77067 SCR MAMMO BI INCL CAD: CPT | Mod: 26,,, | Performed by: RADIOLOGY

## 2020-06-25 PROCEDURE — 76536 US EXAM OF HEAD AND NECK: CPT | Mod: 26,,, | Performed by: RADIOLOGY

## 2020-06-25 PROCEDURE — 76536 US EXAM OF HEAD AND NECK: CPT | Mod: TC

## 2020-06-29 ENCOUNTER — OFFICE VISIT (OUTPATIENT)
Dept: OPHTHALMOLOGY | Facility: CLINIC | Age: 65
End: 2020-06-29
Payer: MEDICARE

## 2020-06-29 DIAGNOSIS — E11.36 DIABETIC CATARACT OF LEFT EYE: ICD-10-CM

## 2020-06-29 DIAGNOSIS — Z96.1 PSEUDOPHAKIA, RIGHT EYE: ICD-10-CM

## 2020-06-29 DIAGNOSIS — E11.3299 BDR (BACKGROUND DIABETIC RETINOPATHY): ICD-10-CM

## 2020-06-29 DIAGNOSIS — H25.12 NUCLEAR SCLEROSIS OF LEFT EYE: Primary | ICD-10-CM

## 2020-06-29 PROCEDURE — 92136 IOL MASTER - OS - LEFT EYE: ICD-10-PCS | Mod: 26,S$PBB,LT, | Performed by: OPHTHALMOLOGY

## 2020-06-29 PROCEDURE — 92136 OPHTHALMIC BIOMETRY: CPT | Mod: PBBFAC,LT,59 | Performed by: OPHTHALMOLOGY

## 2020-06-29 PROCEDURE — 99999 PR PBB SHADOW E&M-EST. PATIENT-LVL III: CPT | Mod: PBBFAC,,, | Performed by: OPHTHALMOLOGY

## 2020-06-29 PROCEDURE — 99999 PR PBB SHADOW E&M-EST. PATIENT-LVL III: ICD-10-PCS | Mod: PBBFAC,,, | Performed by: OPHTHALMOLOGY

## 2020-06-29 PROCEDURE — 99213 OFFICE O/P EST LOW 20 MIN: CPT | Mod: PBBFAC,25 | Performed by: OPHTHALMOLOGY

## 2020-06-29 PROCEDURE — 92014 COMPRE OPH EXAM EST PT 1/>: CPT | Mod: S$PBB,,, | Performed by: OPHTHALMOLOGY

## 2020-06-29 PROCEDURE — 92014 PR EYE EXAM, EST PATIENT,COMPREHESV: ICD-10-PCS | Mod: S$PBB,,, | Performed by: OPHTHALMOLOGY

## 2020-06-29 RX ORDER — PREDNISOLONE ACETATE 10 MG/ML
1 SUSPENSION/ DROPS OPHTHALMIC 4 TIMES DAILY
Qty: 1 BOTTLE | Refills: 2 | Status: SHIPPED | OUTPATIENT
Start: 2020-06-29 | End: 2020-11-24

## 2020-06-29 RX ORDER — GATIFLOXACIN 5 MG/ML
1 SOLUTION/ DROPS OPHTHALMIC 2 TIMES DAILY
Qty: 1 BOTTLE | Refills: 2 | Status: SHIPPED | OUTPATIENT
Start: 2020-06-29 | End: 2020-11-24

## 2020-06-29 RX ORDER — KETOROLAC TROMETHAMINE 5 MG/ML
1 SOLUTION OPHTHALMIC 4 TIMES DAILY
Qty: 1 BOTTLE | Refills: 2 | Status: SHIPPED | OUTPATIENT
Start: 2020-06-29 | End: 2020-11-24

## 2020-06-29 NOTE — PROGRESS NOTES
SUBJECTIVE  Alysha Rosales is 65 y.o. female  Corrected distance visual acuity was 20/25 -1 in the right eye and 20/100 in the left eye.   Chief Complaint   Patient presents with    Cataract     OS cataract eval per TRF          HPI     Cataract      Additional comments: OS cataract eval per [facility]              Comments     Pt states her left eye vision has been blurry for a while, was unable to   come in to get it checked out. Desires surgery to see better to drive. No   ocular pain or irritation. Vision in the right eye has been ok. Not using   any drops.     FAM[facility]X -DM -MOM AND DAUGHTER  Referred by [facility]    1. DM since before 2009 w/ BDR  2. CATS OS  3. PCIOL OD 4/13/16 +20.5WF(set for near -2.00) CPG          Last edited by Akin Hardy on 6/29/2020  8:20 AM. (History)         Assessment /Plan :  1. Nuclear sclerosis of left eye  Visually Significant Cataract OS:   Patient reports decreased vision consistent with the clinical amount of lenticular opacity,  which reaches the level of visual significance and affects activities of daily living such as  drive. Risks, benefits, and alternatives to cataract surgery were  discussed.  IOL options were discussed, including Premium IOL'S and the associated  side effects and additional patient cost associated with them as well as patient's visual  goals. The pt expressed a desire to proceed with surgery with the potential for some  reasonable degree of visual improvement and was consented.  Risks of loss of vision and eye reviewed as well as possibility of need for spectacle correction after surgery even with premium implants. Verbal and written preop  instructions were provided to the patient.       Pt is interested in traditional monofocal IOL aiming for:     Near OU and understands glasses will be required for distance vision and possibly for different near or intermediate ranges.      Final visual acuity may be limited by astigmatism and diabetes    Pt wishes to  have Phaco/IOL  OS     Requests a Monofocal IOL.    Will aim for -1.50    Other considerations: high risk of vitreous loss, Corneal Precautions, Trypan Blue and Complex Cataract         2. Diabetic cataract of left eye    3. Pseudophakia, right eye  -- Condition stable, no therapeutic change required. Monitoring routinely.     4. DM (diabetes mellitus), type 2, uncontrolled w/ophthalmic complication Stable retinopathy with no evidence of new edema or noeovascularization.  Reviewed diabetic eye precautions including avoiding tobacco use, good glucose control, and importance of regular follow up.      5. BDR (background diabetic retinopathy)

## 2020-07-06 ENCOUNTER — INFUSION (OUTPATIENT)
Dept: INFUSION THERAPY | Facility: HOSPITAL | Age: 65
End: 2020-07-06
Attending: PHYSICIAN ASSISTANT
Payer: MEDICARE

## 2020-07-06 VITALS
TEMPERATURE: 97 F | SYSTOLIC BLOOD PRESSURE: 138 MMHG | BODY MASS INDEX: 37.52 KG/M2 | WEIGHT: 205.13 LBS | RESPIRATION RATE: 17 BRPM | DIASTOLIC BLOOD PRESSURE: 62 MMHG | HEART RATE: 72 BPM | OXYGEN SATURATION: 98 %

## 2020-07-06 DIAGNOSIS — M81.0 AGE-RELATED OSTEOPOROSIS WITHOUT CURRENT PATHOLOGICAL FRACTURE: Primary | ICD-10-CM

## 2020-07-06 PROCEDURE — 25000003 PHARM REV CODE 250: Performed by: PHYSICIAN ASSISTANT

## 2020-07-06 PROCEDURE — 96365 THER/PROPH/DIAG IV INF INIT: CPT

## 2020-07-06 PROCEDURE — 63600175 PHARM REV CODE 636 W HCPCS: Performed by: PHYSICIAN ASSISTANT

## 2020-07-06 RX ORDER — ZOLEDRONIC ACID 5 MG/100ML
5 INJECTION, SOLUTION INTRAVENOUS
Status: COMPLETED | OUTPATIENT
Start: 2020-07-06 | End: 2020-07-06

## 2020-07-06 RX ORDER — ACETAMINOPHEN 325 MG/1
650 TABLET ORAL ONCE
Status: COMPLETED | OUTPATIENT
Start: 2020-07-06 | End: 2020-07-06

## 2020-07-06 RX ORDER — HEPARIN 100 UNIT/ML
500 SYRINGE INTRAVENOUS
Status: CANCELLED | OUTPATIENT
Start: 2020-07-06

## 2020-07-06 RX ORDER — ACETAMINOPHEN 325 MG/1
650 TABLET ORAL ONCE
Status: CANCELLED
Start: 2020-07-06

## 2020-07-06 RX ORDER — ZOLEDRONIC ACID 5 MG/100ML
5 INJECTION, SOLUTION INTRAVENOUS
Status: CANCELLED | OUTPATIENT
Start: 2020-07-06

## 2020-07-06 RX ORDER — SODIUM CHLORIDE 0.9 % (FLUSH) 0.9 %
10 SYRINGE (ML) INJECTION
Status: CANCELLED | OUTPATIENT
Start: 2020-07-06

## 2020-07-06 RX ORDER — SODIUM CHLORIDE 0.9 % (FLUSH) 0.9 %
10 SYRINGE (ML) INJECTION
Status: DISCONTINUED | OUTPATIENT
Start: 2020-07-06 | End: 2020-07-06 | Stop reason: HOSPADM

## 2020-07-06 RX ADMIN — ZOLEDRONIC ACID 5 MG: 0.05 INJECTION, SOLUTION INTRAVENOUS at 11:07

## 2020-07-06 RX ADMIN — ACETAMINOPHEN 650 MG: 325 TABLET ORAL at 11:07

## 2020-07-24 ENCOUNTER — PATIENT OUTREACH (OUTPATIENT)
Dept: ADMINISTRATIVE | Facility: OTHER | Age: 65
End: 2020-07-24

## 2020-07-27 ENCOUNTER — OFFICE VISIT (OUTPATIENT)
Dept: ORTHOPEDICS | Facility: CLINIC | Age: 65
End: 2020-07-27
Payer: MEDICARE

## 2020-07-27 VITALS
HEIGHT: 62 IN | SYSTOLIC BLOOD PRESSURE: 179 MMHG | HEART RATE: 79 BPM | WEIGHT: 205 LBS | BODY MASS INDEX: 37.73 KG/M2 | DIASTOLIC BLOOD PRESSURE: 76 MMHG

## 2020-07-27 DIAGNOSIS — M51.37 DDD (DEGENERATIVE DISC DISEASE), LUMBOSACRAL: ICD-10-CM

## 2020-07-27 DIAGNOSIS — M17.11 ARTHRITIS OF KNEE, RIGHT: ICD-10-CM

## 2020-07-27 DIAGNOSIS — M17.12 ARTHRITIS OF KNEE, LEFT: Primary | ICD-10-CM

## 2020-07-27 DIAGNOSIS — Z96.9 RETAINED ORTHOPEDIC HARDWARE: ICD-10-CM

## 2020-07-27 DIAGNOSIS — M21.061 ACQUIRED VALGUS DEFORMITY KNEE, RIGHT: ICD-10-CM

## 2020-07-27 DIAGNOSIS — M21.062 ACQUIRED GENU VALGUM OF LEFT KNEE: ICD-10-CM

## 2020-07-27 PROCEDURE — 99999 PR PBB SHADOW E&M-EST. PATIENT-LVL IV: ICD-10-PCS | Mod: PBBFAC,,, | Performed by: ORTHOPAEDIC SURGERY

## 2020-07-27 PROCEDURE — 99214 PR OFFICE/OUTPT VISIT, EST, LEVL IV, 30-39 MIN: ICD-10-PCS | Mod: 25,S$PBB,, | Performed by: ORTHOPAEDIC SURGERY

## 2020-07-27 PROCEDURE — 20610 DRAIN/INJ JOINT/BURSA W/O US: CPT | Mod: PBBFAC | Performed by: ORTHOPAEDIC SURGERY

## 2020-07-27 PROCEDURE — 99214 OFFICE O/P EST MOD 30 MIN: CPT | Mod: 25,S$PBB,, | Performed by: ORTHOPAEDIC SURGERY

## 2020-07-27 PROCEDURE — 20610 LARGE JOINT ASPIRATION/INJECTION: L KNEE: ICD-10-PCS | Mod: S$PBB,LT,, | Performed by: ORTHOPAEDIC SURGERY

## 2020-07-27 PROCEDURE — 99214 OFFICE O/P EST MOD 30 MIN: CPT | Mod: PBBFAC,25 | Performed by: ORTHOPAEDIC SURGERY

## 2020-07-27 PROCEDURE — 99999 PR PBB SHADOW E&M-EST. PATIENT-LVL IV: CPT | Mod: PBBFAC,,, | Performed by: ORTHOPAEDIC SURGERY

## 2020-07-27 RX ADMIN — Medication 48 MG: at 10:07

## 2020-07-27 NOTE — PROGRESS NOTES
Subjective:     Patient ID: Alysha Rosales is a 65 y.o. female.    Chief Complaint: Pain of the Left Knee    HPI:  06/05/2020  [age] very pleasant white female who had injured her left knee in 1978 and had bone grafting and the pin placed in the knee.  Subsequent to that she had developed arthritis and now she says her foot is going out and she is walking on the side of her foot.  Few years back had steroid injection and viscosupplementation with some relief of her pain.  She states that she is hurting in both knees the right less than the left.  She takes Aleve for pills in the morning all together.  She also had been diagnosed with osteoporosis and supposed to take Boniva once every month and she forgets to take it.  She does ambulate without any assistive devices.  Pain is 5/10 with also some low back pain.  Denies any numbness or tingling but states she does have very mild peripheral neuropathy that is not constant.  Occasionally her knees give out and have the feeling of about to fall.  Denies loss of bowel bladder control, fever or chills or shortness of breath or chest pain    07/27/2020  Patient stated the [facility] is did the bone density test and now she is receiving 1 a year injection and she is very pleased with that instead of a monthly treatment.  The injection given into the left knee 06/05/2020 helped quite a bit as well as the meloxicam.  She does take Tylenol on as needed basis and wears her brace on as needed basis since she has severe valgus alignment.  She wears the brace at home most of the time when she goes out occasionally.  Pain is improving at 6/10.  She wants to avoid surgical intervention.  She still working sometimes at [employer] on the weekends.  Denies any fever or chills or shortness of breath or difficulty with chewing or swallowing.    Past Medical History:   Diagnosis Date    CAD (coronary artery disease)     Followed by Dr. Stevo Kaba    Diabetes  mellitus, type 2     DM (diabetes mellitus)     BS didn't check 2020    High level of uric acid in blood     Hyperlipidemia     Hyperplastic rectal polyp     on 2009 colonoscopy    Hypertension     Multinodular goiter     noted on 2015 CTA carotid scan    Multiple thyroid nodules 2016    on Thyroid U/S    Osteoarthritis     Left knee    Osteoarthritis of back     Osteoporosis, unspecified     Postmenopausal     No history of abnormal pap smear    Proteinuria     Pustular psoriasis     hands and feet    Subclavian artery stenosis     Left; with the proximal segment at 60-70% per 10/02/2008 carotid CTA    Vitamin D deficiency disease      Past Surgical History:   Procedure Laterality Date    CATARACT EXTRACTION W/  INTRAOCULAR LENS IMPLANT Right 16    heart catheterization with stents placed   and 2009    left knee surgery for bone graft and pin placement       Family History   Problem Relation Age of Onset    Diabetes Mother     Hypertension Mother     Stroke Mother     Hypertension Sister     Cancer Father         Lung cancer (smoker)    No Known Problems Daughter     No Known Problems Son     Heart disease Neg Hx      Social History     Socioeconomic History    Marital status:      Spouse name: Not on file    Number of children: 2    Years of education: Not on file    Highest education level: Not on file   Occupational History    Occupation: Retired   Social Needs    Financial resource strain: Not hard at all    Food insecurity     Worry: Never true     Inability: Never true    Transportation needs     Medical: No     Non-medical: No   Tobacco Use    Smoking status: Former Smoker     Packs/day: 1.00     Years: 33.00     Pack years: 33.00     Types: Cigarettes     Quit date: 2004     Years since quittin.0    Smokeless tobacco: Never Used   Substance and Sexual Activity    Alcohol use: Yes     Alcohol/week: 0.0 standard drinks      Frequency: Monthly or less     Drinks per session: 1 or 2     Binge frequency: Never     Comment: Rarely    Drug use: No    Sexual activity: Yes     Partners: Male     Birth control/protection: Post-menopausal   Lifestyle    Physical activity     Days per week: 0 days     Minutes per session: 20 min    Stress: Not at all   Relationships    Social connections     Talks on phone: Once a week     Gets together: Never     Attends Taoism service: More than 4 times per year     Active member of club or organization: No     Attends meetings of clubs or organizations: Never     Relationship status:    Other Topics Concern    Not on file   Social History Narrative    She wears seatbelt. She will start working again at [employer] on next week. She states her   in 2020 at the age of 68 due to lung cancer.     Medication List with Changes/Refills   Current Medications    ALLOPURINOL (ZYLOPRIM) 100 MG TABLET    Take 1 tablet (100 mg total) by mouth once daily.    ASPIRIN 81 MG TAB    Take 1 tablet by mouth.    BLOOD SUGAR DIAGNOSTIC (FREESTYLE LITE STRIPS) STRP    USE ONE STRIP TO CHECK GLUCOSE ONCE DAILY    BLOOD SUGAR DIAGNOSTIC STRP    1 strip by Misc.(Non-Drug; Combo Route) route 2 (two) times daily.    BLOOD-GLUCOSE METER MISC    Use FREESTYLE meter for dx: diabetes    CLOPIDOGREL (PLAVIX) 75 MG TABLET    Take 1 tablet by mouth.    DESOXIMETASONE (TOPICORT) 0.25 % CREAM    APPLY  CREAM EXTERNALLY TO AFFECTED AREA TWICE DAILY AS NEEDED    ERGOCALCIFEROL, VITAMIN D2, (VITAMIN D ORAL)    Take 1 tablet by mouth once daily. 1000 units daily    FUROSEMIDE (LASIX) 20 MG TABLET    Take 1 tablet (20 mg total) by mouth once daily.    GATIFLOXACIN 0.5 % DROP DROPS    Place 1 drop into the left eye 2 (two) times daily. Eyedrops to start one day before surgery    GLIPIZIDE (GLUCOTROL) 10 MG TABLET    Take 1 tablet (10 mg total) by mouth 2 (two) times daily before meals.    HYDRALAZINE (APRESOLINE)  50 MG TABLET    Take 1 tablet (50 mg total) by mouth 4 (four) times daily.    IBANDRONATE (BONIVA) 150 MG TABLET    Take 1 tablet (150 mg total) by mouth every 30 days.    JANUVIA 100 MG TAB    Take 1 tablet (100 mg total) by mouth once daily.    KETOROLAC 0.5% (ACULAR) 0.5 % DROP    Place 1 drop into the left eye 4 (four) times daily. Eyedrops to start one day before surgery    LISINOPRIL (PRINIVIL,ZESTRIL) 20 MG TABLET    Take 1 tablet by mouth once daily    MELOXICAM (MOBIC) 15 MG TABLET    Take 1 tablet (15 mg total) by mouth once daily. Take with food    METFORMIN (GLUCOPHAGE) 500 MG TABLET    TAKE 1 TABLET BY MOUTH IN THE MORNING AND 1 AT NOON AND 2 IN THE EVENING WITH MEALS    NEBIVOLOL (BYSTOLIC) 10 MG TAB    Take 1 tablet by mouth.    PREDNISOLONE ACETATE (PRED FORTE) 1 % DRPS    Place 1 drop into the left eye 4 (four) times daily. Start one day before surgery    ROSUVASTATIN (CRESTOR) 20 MG TABLET    TAKE 1 TABLET BY MOUTH NIGHTLY.     Review of patient's allergies indicates:   Allergen Reactions    Codeine      Other reaction(s): Chills     Review of Systems   Constitution: Negative for decreased appetite.   HENT: Negative for tinnitus.    Eyes: Negative for double vision.   Cardiovascular: Negative for chest pain.   Respiratory: Negative for wheezing.    Hematologic/Lymphatic: Negative for bleeding problem.   Skin: Negative for dry skin.   Musculoskeletal: Positive for arthritis, back pain, joint pain and stiffness. Negative for gout and neck pain.   Gastrointestinal: Negative for abdominal pain.   Genitourinary: Negative for bladder incontinence.   Neurological: Negative for numbness, paresthesias and sensory change.   Psychiatric/Behavioral: Negative for altered mental status.       Objective:   Body mass index is 37.49 kg/m².  Vitals:    07/27/20 1048   BP: (!) 179/76   Pulse: 79          General    Constitutional: She is oriented to person, place, and time. She appears well-developed.   HENT:    Head: Atraumatic.   Eyes: EOM are normal.   Cardiovascular: Normal rate.    Pulmonary/Chest: Effort normal.   Neurological: She is alert and oriented to person, place, and time.   Psychiatric: Judgment normal.            Cervical rotation without limitation or tenderness  Bilateral upper extremity with slight weakness to resistive abduction of both shoulders at 4/5.  Biceps/triceps/wrist extension and flexion is 5/5.  Radial and ulnar pulses 2+.  Sensory intact to touch.  Negative Tinel negative Phalen's test  Lumbar with slight deformity seen.  Mild tenderness to palpation between L4 and L5 level.  Forward bending almost to the ankle.  Pelvis is level in the sitting position.  Bilateral hips passive motion without pain in the groin.  Hip flexors, abductors, adductors, quads, hamstrings, ankle extensors and flexors are all 5/5.  Right knee with 8° of valgus alignment.  Lateral joint tenderness and mild tenderness medially.  Some crepitus to compression on the patella.  Slightly lose medial collateral ligament.  Lateral collateral ligaments stable.  Negative anterior drawer.  Negative Char sign  Left knee with 10-12 degrees of valgus alignment.  You can correct down to 7° roughly.  Lateral joint and anterior joint severe tenderness.  Mild to moderate swelling.  Crepitus to range of motion.  Range of motion 0-125.  Large surgical scar on the lateral aspect of the knee  Calves are soft nontender with mild varicosities  Ankle extension and flexion was 5/5 bilaterally  Left foot during gait inverts and walk on the side of her foot.  PT 1+  Sensation intact to touch  Skin is warm to touch slightly dry no obvious lesions    Relevant imaging results reviewed and interpreted by me, discussed with the patient and / or family today   X-ray 06/05/2020 left knee was complete loss of lateral joint space with large osteophyte and cystic changes.  There is a screw in the subchondral area entering from lateral to medial.   Right knee x-ray with narrowing lateral joint space and small osteophytic changes consistent with right knee arthrosis/moderate.  Both knees are in valgus  X-ray and electronic records of the lumbar spine showing with scoliosis degenerative in nature, degenerative disc disease and facet changes.  Assessment:     Encounter Diagnoses   Name Primary?    Arthritis of knee, left Yes    Acquired genu valgum of left knee     Retained orthopedic hardware     DDD (degenerative disc disease), lumbosacral     Arthritis of knee, right     Acquired valgus deformity knee, right         Plan:   Arthritis of knee, left  -     Large Joint Aspiration/Injection: L knee    Acquired genu valgum of left knee    Retained orthopedic hardware    DDD (degenerative disc disease), lumbosacral    Arthritis of knee, right    Acquired valgus deformity knee, right         Patient Instructions   Continue with meloxicam on as needed.  Do not take today or 2 more all because your having surgery  May supplement with Tylenol 650 mg maximum 3 times a day  Injected Synvisc-One/rooster comb into the left knee 07/27/2020  Ice to the next several days if it burns  It will take roughly 8 weeks for it to work  Return to clinic in 8 weeks    Patient is definitely a surgical consisted to the left knee by total knee arthroplasty.  Patient wants to avoid surgery if she can at this point  Told her to stop meloxicam since she is having cataracts surgery done on her left eye.  She has to ask her ophthalmologist when she can resume it      Disclaimer: This note was prepared using a voice recognition system and is likely to have sound alike errors within the text.

## 2020-07-27 NOTE — PATIENT INSTRUCTIONS
Continue with meloxicam on as needed.  Do not take today or 2 more all because your having surgery  May supplement with Tylenol 650 mg maximum 3 times a day  Injected Synvisc-One/mainor comb into the left knee 07/27/2020  Ice to the next several days if it burns  It will take roughly 8 weeks for it to work  Return to clinic in 8 weeks

## 2020-07-29 ENCOUNTER — OUTSIDE PLACE OF SERVICE (OUTPATIENT)
Dept: ADMINISTRATIVE | Facility: OTHER | Age: 65
End: 2020-07-29
Payer: MEDICARE

## 2020-07-29 PROCEDURE — 66982 XCAPSL CTRC RMVL CPLX WO ECP: CPT | Mod: LT,,, | Performed by: OPHTHALMOLOGY

## 2020-07-29 PROCEDURE — 66982 PR REMOVAL, CATARACT, W/INSRT INTRAOC LENS, W/O ENDO CYCLO, CMPLX: ICD-10-PCS | Mod: LT,,, | Performed by: OPHTHALMOLOGY

## 2020-07-30 ENCOUNTER — OFFICE VISIT (OUTPATIENT)
Dept: OPHTHALMOLOGY | Facility: CLINIC | Age: 65
End: 2020-07-30
Payer: MEDICARE

## 2020-07-30 DIAGNOSIS — Z98.890 POST-OPERATIVE STATE: Primary | ICD-10-CM

## 2020-07-30 PROCEDURE — 99999 PR PBB SHADOW E&M-EST. PATIENT-LVL III: CPT | Mod: PBBFAC,,, | Performed by: OPHTHALMOLOGY

## 2020-07-30 PROCEDURE — 99999 PR PBB SHADOW E&M-EST. PATIENT-LVL III: ICD-10-PCS | Mod: PBBFAC,,, | Performed by: OPHTHALMOLOGY

## 2020-07-30 PROCEDURE — 99024 PR POST-OP FOLLOW-UP VISIT: ICD-10-PCS | Mod: POP,,, | Performed by: OPHTHALMOLOGY

## 2020-07-30 PROCEDURE — 99213 OFFICE O/P EST LOW 20 MIN: CPT | Mod: PBBFAC | Performed by: OPHTHALMOLOGY

## 2020-07-30 PROCEDURE — 99024 POSTOP FOLLOW-UP VISIT: CPT | Mod: POP,,, | Performed by: OPHTHALMOLOGY

## 2020-07-30 NOTE — PROGRESS NOTES
SUBJECTIVE  Alysha Rosales is 65 y.o. female  Uncorrected distance visual acuity was not recorded in the right eye and 20/60 in the left eye. Uncorrected near visual acuity was not recorded in the right eye and J3 in the left eye.   Chief Complaint   Patient presents with    Post-op Evaluation     S/P Phaco with IOL OS (07/29/30)          HPI     Post-op Evaluation      Additional comments: S/P Phaco with IOL OS (07/29/30)              Comments     Patient feels OS is doing well, denies any pain or irritation and using   drops as directed.  Patient is happy with surgical results.         dec'd 4/20  [facility] -DM -MOM AND DAUGHTER  Referred by TR[facility]    1. DM since before 2009 w/ BDR  2. PC IOL OS 07/29/20   3. PCIOL OD 4/13/16 +20.5WF(set for near -2.00) CPG    OS: Gatifloxician BID, Keto QID, Prednisolone QID          Last edited by Paula Milian, Patient Care Assistant on 7/30/2020  8:04   AM. (History)         Assessment /Plan :  1. Post-operative state Exam:  SLE:  S/C: normal  K : trace k fold  AC: trace cell and flare  Iris: normal  Lens: PCIOL with 2+PCO  IMP:  PO Day 1 S/P Phaco/IOL OS : Doing well.    Plan:  Continue gtts to operative eye:  Pred 1% QID  Ketorolac QID  Zymaxid BID  Reinstructed in importance of absolute compliance with Post-OP instructions including medications, shield at bedtime, and limitation of activities. Follow up appointments in approximately one and six weeks or call immediately for increased pain, redness or vision loss.

## 2020-08-04 ENCOUNTER — OFFICE VISIT (OUTPATIENT)
Dept: OPHTHALMOLOGY | Facility: CLINIC | Age: 65
End: 2020-08-04
Payer: MEDICARE

## 2020-08-04 DIAGNOSIS — Z98.890 POST-OPERATIVE STATE: Primary | ICD-10-CM

## 2020-08-04 PROCEDURE — 99999 PR PBB SHADOW E&M-EST. PATIENT-LVL III: CPT | Mod: PBBFAC,,, | Performed by: OPHTHALMOLOGY

## 2020-08-04 PROCEDURE — 99213 OFFICE O/P EST LOW 20 MIN: CPT | Mod: PBBFAC | Performed by: OPHTHALMOLOGY

## 2020-08-04 PROCEDURE — 99999 PR PBB SHADOW E&M-EST. PATIENT-LVL III: ICD-10-PCS | Mod: PBBFAC,,, | Performed by: OPHTHALMOLOGY

## 2020-08-04 PROCEDURE — 99024 POSTOP FOLLOW-UP VISIT: CPT | Mod: POP,,, | Performed by: OPHTHALMOLOGY

## 2020-08-04 PROCEDURE — 99024 PR POST-OP FOLLOW-UP VISIT: ICD-10-PCS | Mod: POP,,, | Performed by: OPHTHALMOLOGY

## 2020-09-01 ENCOUNTER — OFFICE VISIT (OUTPATIENT)
Dept: OPHTHALMOLOGY | Facility: CLINIC | Age: 65
End: 2020-09-01
Payer: MEDICARE

## 2020-09-01 DIAGNOSIS — Z96.1 PSEUDOPHAKIA OF BOTH EYES: ICD-10-CM

## 2020-09-01 DIAGNOSIS — Z98.890 POST-OPERATIVE STATE: Primary | ICD-10-CM

## 2020-09-01 PROCEDURE — 99024 POSTOP FOLLOW-UP VISIT: CPT | Mod: POP,,, | Performed by: OPTOMETRIST

## 2020-09-01 PROCEDURE — 99999 PR PBB SHADOW E&M-EST. PATIENT-LVL III: ICD-10-PCS | Mod: PBBFAC,,, | Performed by: OPTOMETRIST

## 2020-09-01 PROCEDURE — 92015 PR REFRACTION: ICD-10-PCS | Mod: ,,, | Performed by: OPTOMETRIST

## 2020-09-01 PROCEDURE — 99213 OFFICE O/P EST LOW 20 MIN: CPT | Mod: PBBFAC | Performed by: OPTOMETRIST

## 2020-09-01 PROCEDURE — 99999 PR PBB SHADOW E&M-EST. PATIENT-LVL III: CPT | Mod: PBBFAC,,, | Performed by: OPTOMETRIST

## 2020-09-01 PROCEDURE — 92015 DETERMINE REFRACTIVE STATE: CPT | Mod: ,,, | Performed by: OPTOMETRIST

## 2020-09-01 PROCEDURE — 99024 PR POST-OP FOLLOW-UP VISIT: ICD-10-PCS | Mod: POP,,, | Performed by: OPTOMETRIST

## 2020-09-01 NOTE — PROGRESS NOTES
HPI     Post-op Evaluation      Additional comments: PCIOL OS 7/29/2020              Comments     Last Exam w/ CPG 8/04/2020   Pt states that her left eye is doing well & its a little tender but she   denies any pain. 100% drop compliance.   1. DM since before 2009 w/ BDR  2. PC IOL OS 07/29/20 (+19.0 SN60WF) (aim for -1.50)  3. PCIOL OD 4/13/16 +20.5WF(set for near -2.00) CPG          Last edited by Jm Wadsworth, OD on 9/1/2020  8:50 AM. (History)            Assessment /Plan     For exam results, see Encounter Report.    Post-operative state    Pseudophakia of both eyes      No cell or flare OS    Stable IOL OU.    Dispense Final Rx for glasses.  RTC 6 months DFE  Discussed above and answered questions.

## 2020-09-11 ENCOUNTER — PATIENT OUTREACH (OUTPATIENT)
Dept: ADMINISTRATIVE | Facility: HOSPITAL | Age: 65
End: 2020-09-11

## 2020-09-11 DIAGNOSIS — E78.2 COMBINED HYPERLIPIDEMIA ASSOCIATED WITH TYPE 2 DIABETES MELLITUS: Primary | ICD-10-CM

## 2020-09-11 DIAGNOSIS — E11.69 COMBINED HYPERLIPIDEMIA ASSOCIATED WITH TYPE 2 DIABETES MELLITUS: Primary | ICD-10-CM

## 2020-09-11 NOTE — PROGRESS NOTES
Working Diabetes Report   Pt PCP is Dr. Bird   3 month repeat A1c Ordered         Lacie RICHARDSON LPN Care Coordinator  Care Coordination Department  [facility]  [phone]

## 2020-09-16 ENCOUNTER — PATIENT MESSAGE (OUTPATIENT)
Dept: OPHTHALMOLOGY | Facility: CLINIC | Age: 65
End: 2020-09-16

## 2020-09-18 ENCOUNTER — PATIENT OUTREACH (OUTPATIENT)
Dept: ADMINISTRATIVE | Facility: OTHER | Age: 65
End: 2020-09-18

## 2020-09-18 NOTE — PROGRESS NOTES
Health Maintenance Due   Topic Date Due    HIV Screening  01/05/1970    Shingles Vaccine (2 of 3) 05/25/2015    TETANUS VACCINE  02/25/2019    Influenza Vaccine (1) 08/01/2020    Hemoglobin A1c  09/04/2020     Updates were requested from care everywhere.  Chart was reviewed for overdue Proactive [facility] Encounters ([facility]) topics (CRS, Breast Cancer Screening, Eye exam)  Health Maintenance has been updated.  LINKS immunization registry triggered.  Immunizations were reconciled.

## 2020-09-22 ENCOUNTER — PATIENT MESSAGE (OUTPATIENT)
Dept: FAMILY MEDICINE | Facility: CLINIC | Age: 65
End: 2020-09-22

## 2020-09-23 RX ORDER — DEXTROSE 4 G
TABLET,CHEWABLE ORAL
Qty: 1 EACH | Refills: 0 | Status: SHIPPED | OUTPATIENT
Start: 2020-09-23 | End: 2023-04-18

## 2020-10-01 ENCOUNTER — PATIENT MESSAGE (OUTPATIENT)
Dept: OTHER | Facility: OTHER | Age: 65
End: 2020-10-01

## 2020-10-02 ENCOUNTER — TELEPHONE (OUTPATIENT)
Dept: RHEUMATOLOGY | Facility: CLINIC | Age: 65
End: 2020-10-02

## 2020-10-02 NOTE — PROGRESS NOTES
Subjective:       Patient ID: Alysha Rosales is a 65 y.o. female.    Chief Complaint: Osteoporosis and Pain    Mrs. Rosales is a 66 y/o f w osteoporosis.  She has a h/o osteoporosis and was tx with boniva in the past but not consistent with it, in fact rarely takes it.  Prev rx'd fosamax prior to boniva but again had difficulties remembering to take.  We started reclast to help with compliance, given 7.2020, tolerated well with no difficulty.     She is taking vit D 2K units daily, level below normal.   No falls/fxs  She has sig djd in her knees dotty her L and does feel sometimes unsteady but no falls.     H/o heart attack (16 years ago)    Since last visit she had two broken teeth and a broken partial so she needs her teeth pulled and dentures on the bottom.     Prev smoker but not smoking now.         Past Medical History:   Diagnosis Date    CAD (coronary artery disease)     Followed by Dr. Stevo Kaba    Diabetes mellitus, type 2     DM (diabetes mellitus)     BS didn't check 06/05/2020    High level of uric acid in blood     Hyperlipidemia     Hyperplastic rectal polyp     on 03/20/2009 colonoscopy    Hypertension     Multinodular goiter     noted on 1/20/2015 CTA carotid scan    Multiple thyroid nodules 05/05/2016    on Thyroid U/S    Osteoarthritis     Left knee    Osteoarthritis of back     Osteoporosis, unspecified     Postmenopausal     No history of abnormal pap smear    Proteinuria     Pustular psoriasis     hands and feet    Subclavian artery stenosis     Left; with the proximal segment at 60-70% per 10/02/2008 carotid CTA    Vitamin D deficiency disease      Past Surgical History:   Procedure Laterality Date    CATARACT EXTRACTION W/  INTRAOCULAR LENS IMPLANT Right 4/13/16    heart catheterization with stents placed  2004 and 08/18/2009    left knee surgery for bone graft and pin placement       Family History   Problem Relation Age of Onset    Diabetes Mother      Hypertension Mother     Stroke Mother     Hypertension Sister     Cancer Father         Lung cancer (smoker)    No Known Problems Daughter     No Known Problems Son     Heart disease Neg Hx      Social History     Socioeconomic History    Marital status:      Spouse name: Not on file    Number of children: 2    Years of education: Not on file    Highest education level: Not on file   Occupational History    Occupation: Retired   Social Needs    Financial resource strain: Not hard at all    Food insecurity     Worry: Never true     Inability: Never true    Transportation needs     Medical: No     Non-medical: No   Tobacco Use    Smoking status: Former Smoker     Packs/day: 1.00     Years: 33.00     Pack years: 33.00     Types: Cigarettes     Quit date: 2004     Years since quittin.2    Smokeless tobacco: Never Used   Substance and Sexual Activity    Alcohol use: Yes     Alcohol/week: 0.0 standard drinks     Frequency: Monthly or less     Drinks per session: 1 or 2     Binge frequency: Never     Comment: Rarely    Drug use: No    Sexual activity: Yes     Partners: Male     Birth control/protection: Post-menopausal   Lifestyle    Physical activity     Days per week: 0 days     Minutes per session: 20 min    Stress: Not at all   Relationships    Social connections     Talks on phone: Once a week     Gets together: Never     Attends [employer]ist service: More than 4 times per year     Active member of club or organization: No     Attends meetings of clubs or organizations: Never     Relationship status:    Other Topics Concern    Not on file   Social History Narrative    She wears seatbelt. She will start working again at [employer] on next week. She states her   in 2020 at the age of 68 due to lung cancer.     Review of patient's allergies indicates:   Allergen Reactions    Codeine      Other reaction(s): Chills     Review of Systems   Constitutional:  "Negative for chills, fatigue and fever.   HENT: Negative for mouth sores, rhinorrhea and sore throat.    Eyes: Negative for pain and redness.   Respiratory: Negative for cough and shortness of breath.    Cardiovascular: Negative for chest pain.        Prev MI   Gastrointestinal: Negative for abdominal pain, constipation, diarrhea, nausea and vomiting.   Genitourinary: Negative for dysuria and hematuria.   Musculoskeletal: Positive for arthralgias. Negative for joint swelling and myalgias.   Skin: Negative for rash.   Neurological: Negative for weakness, numbness and headaches.   Psychiatric/Behavioral: The patient is not nervous/anxious.          Objective:   BP (!) 155/67   Pulse 93   Ht 5' 2" (1.575 m)   Wt 93.5 kg (206 lb 0.3 oz)   BMI 37.68 kg/m²   Immunization History   Administered Date(s) Administered    Hepatitis B 07/25/2013, 08/28/2013    Hepatitis B, Adult 07/29/2014, 03/30/2015    Hepatitis B, Pediatric/Adolescent 07/25/2013, 08/28/2013    Influenza - Quadrivalent 12/17/2014, 12/03/2015    Influenza - Quadrivalent - PF *Preferred* (6 months and older) 11/08/2016, 11/06/2017, 09/21/2018, 11/13/2019    Influenza Split 11/21/2008, 10/14/2009, 12/08/2010, 01/05/2012, 12/10/2013    Pneumococcal Conjugate - 13 Valent 06/04/2020    Pneumococcal Polysaccharide - 23 Valent 11/21/2008    Tdap 02/25/2009    Zoster 03/30/2015            Physical Exam   Constitutional: She is oriented to person, place, and time and well-developed, well-nourished, and in no distress.   HENT:   Head: Normocephalic and atraumatic.   Eyes: Pupils are equal, round, and reactive to light. Right eye exhibits no discharge.   Neck: Normal range of motion.   Cardiovascular: Normal rate, regular rhythm and normal heart sounds.  Exam reveals no friction rub.    Pulmonary/Chest: Effort normal and breath sounds normal. No respiratory distress.   Abdominal: Soft. She exhibits no distension. There is no abdominal tenderness. "   Lymphadenopathy:     She has no cervical adenopathy.   Neurological: She is alert and oriented to person, place, and time.   Skin: No rash noted. No erythema.     Psychiatric: Mood normal.   Musculoskeletal: Normal range of motion. No deformity or edema.      Comments: Gait is steady, walks w/o assistance           No results found for this or any previous visit (from the past 336 hour(s)).     No results found for: TBGOLDPLUS   Lab Results   Component Value Date    HEPAIGM Negative 03/27/2013    HEPBIGM Negative 03/27/2013    HEPCAB Negative 03/27/2013    dexa 2017: spine -2.5, neck mean -2.6 declining bone density  dexa 6.22.20: L1 -2.1, LN -2.3, LT -1.6 frax major 11%, hip 2%    Assessment:       1. Age-related osteoporosis without current pathological fracture    2. Arthritis of knee, left    3. Medication monitoring encounter    4. Low vitamin D level          Impression:   Osteoporosis dex 2017: tx with boniva and fosamax in the past but not consistent, she does have improved bmd on dexa (spine djd prob interfering w actual score); reclast #1 given 7.6.20    Low vit D: 24 increased to 2K daily     Sig L knee djd: seeing ortho for this currently     Medication monitoring: no issues w reclast, tolerated well, needs dental work   Plan:       reclast #2 due 7.6.2021, give likely x 3 doses then holiday     Can repeat dexa 6/2021 monitor therapy effectiveness    Cont vit D 2K daily     Discussed ONJ risk is small but present.  Further out from her infusion the better if possible but if dental work is necessary go ahead and get done with knowledge of the risks. Encouraged good oral hygiene, can send dentist form letter about ONJ.     Labs pending today: mag, phos pth, cmp, vit D, will review when resulted      See back in July for cmp, dexa and reclast #2

## 2020-10-05 ENCOUNTER — LAB VISIT (OUTPATIENT)
Dept: LAB | Facility: HOSPITAL | Age: 65
End: 2020-10-05
Attending: FAMILY MEDICINE
Payer: MEDICARE

## 2020-10-05 ENCOUNTER — OFFICE VISIT (OUTPATIENT)
Dept: RHEUMATOLOGY | Facility: CLINIC | Age: 65
End: 2020-10-05
Payer: MEDICARE

## 2020-10-05 VITALS
DIASTOLIC BLOOD PRESSURE: 67 MMHG | BODY MASS INDEX: 37.91 KG/M2 | WEIGHT: 206 LBS | HEIGHT: 62 IN | HEART RATE: 93 BPM | SYSTOLIC BLOOD PRESSURE: 155 MMHG

## 2020-10-05 DIAGNOSIS — M81.0 AGE-RELATED OSTEOPOROSIS WITHOUT CURRENT PATHOLOGICAL FRACTURE: ICD-10-CM

## 2020-10-05 DIAGNOSIS — R79.89 LOW VITAMIN D LEVEL: ICD-10-CM

## 2020-10-05 DIAGNOSIS — M81.0 AGE-RELATED OSTEOPOROSIS WITHOUT CURRENT PATHOLOGICAL FRACTURE: Primary | ICD-10-CM

## 2020-10-05 DIAGNOSIS — M17.12 ARTHRITIS OF KNEE, LEFT: ICD-10-CM

## 2020-10-05 DIAGNOSIS — Z51.81 MEDICATION MONITORING ENCOUNTER: ICD-10-CM

## 2020-10-05 LAB
25(OH)D3+25(OH)D2 SERPL-MCNC: 39 NG/ML (ref 30–96)
ALBUMIN SERPL BCP-MCNC: 3.7 G/DL (ref 3.5–5.2)
ALP SERPL-CCNC: 52 U/L (ref 55–135)
ALT SERPL W/O P-5'-P-CCNC: 13 U/L (ref 10–44)
ANION GAP SERPL CALC-SCNC: 13 MMOL/L (ref 8–16)
AST SERPL-CCNC: 17 U/L (ref 10–40)
BILIRUB SERPL-MCNC: 0.2 MG/DL (ref 0.1–1)
BUN SERPL-MCNC: 29 MG/DL (ref 8–23)
CALCIUM SERPL-MCNC: 9.8 MG/DL (ref 8.7–10.5)
CHLORIDE SERPL-SCNC: 105 MMOL/L (ref 95–110)
CO2 SERPL-SCNC: 22 MMOL/L (ref 23–29)
CREAT SERPL-MCNC: 1.2 MG/DL (ref 0.5–1.4)
EST. GFR  (AFRICAN AMERICAN): 55 ML/MIN/1.73 M^2
EST. GFR  (NON AFRICAN AMERICAN): 48 ML/MIN/1.73 M^2
GLUCOSE SERPL-MCNC: 206 MG/DL (ref 70–110)
MAGNESIUM SERPL-MCNC: 1.3 MG/DL (ref 1.6–2.6)
PHOSPHATE SERPL-MCNC: 4 MG/DL (ref 2.7–4.5)
POTASSIUM SERPL-SCNC: 4.6 MMOL/L (ref 3.5–5.1)
PROT SERPL-MCNC: 7.1 G/DL (ref 6–8.4)
PTH-INTACT SERPL-MCNC: 30.8 PG/ML (ref 9–77)
SODIUM SERPL-SCNC: 140 MMOL/L (ref 136–145)

## 2020-10-05 PROCEDURE — 99999 PR PBB SHADOW E&M-EST. PATIENT-LVL IV: ICD-10-PCS | Mod: PBBFAC,,, | Performed by: PHYSICIAN ASSISTANT

## 2020-10-05 PROCEDURE — 80053 COMPREHEN METABOLIC PANEL: CPT

## 2020-10-05 PROCEDURE — 99214 OFFICE O/P EST MOD 30 MIN: CPT | Mod: S$PBB,,, | Performed by: PHYSICIAN ASSISTANT

## 2020-10-05 PROCEDURE — 99214 PR OFFICE/OUTPT VISIT, EST, LEVL IV, 30-39 MIN: ICD-10-PCS | Mod: S$PBB,,, | Performed by: PHYSICIAN ASSISTANT

## 2020-10-05 PROCEDURE — 83735 ASSAY OF MAGNESIUM: CPT

## 2020-10-05 PROCEDURE — 83970 ASSAY OF PARATHORMONE: CPT

## 2020-10-05 PROCEDURE — 84100 ASSAY OF PHOSPHORUS: CPT

## 2020-10-05 PROCEDURE — 99999 PR PBB SHADOW E&M-EST. PATIENT-LVL IV: CPT | Mod: PBBFAC,,, | Performed by: PHYSICIAN ASSISTANT

## 2020-10-05 PROCEDURE — 99214 OFFICE O/P EST MOD 30 MIN: CPT | Mod: PBBFAC | Performed by: PHYSICIAN ASSISTANT

## 2020-10-05 PROCEDURE — 82306 VITAMIN D 25 HYDROXY: CPT

## 2020-10-05 PROCEDURE — 36415 COLL VENOUS BLD VENIPUNCTURE: CPT

## 2020-10-05 NOTE — PATIENT INSTRUCTIONS
Continue vitamin D     Continue reclast infusions but check dexa scan next July     Will forward labs to you when come back     Dental work: discuss with dentist, risk is low but present for issues with jaw with invasive dental work, good oral hygiene, can send letter if needed about risks.  reclast given 7.6.20, further out the better

## 2020-11-08 ENCOUNTER — OFFICE VISIT (OUTPATIENT)
Dept: URGENT CARE | Facility: CLINIC | Age: 65
End: 2020-11-08
Payer: MEDICARE

## 2020-11-08 VITALS
SYSTOLIC BLOOD PRESSURE: 172 MMHG | OXYGEN SATURATION: 98 % | HEIGHT: 62 IN | BODY MASS INDEX: 37 KG/M2 | TEMPERATURE: 98 F | DIASTOLIC BLOOD PRESSURE: 84 MMHG | WEIGHT: 201.06 LBS | HEART RATE: 80 BPM

## 2020-11-08 DIAGNOSIS — E11.9 TYPE 2 DIABETES MELLITUS WITHOUT COMPLICATION, UNSPECIFIED WHETHER LONG TERM INSULIN USE: ICD-10-CM

## 2020-11-08 DIAGNOSIS — N30.01 ACUTE CYSTITIS WITH HEMATURIA: ICD-10-CM

## 2020-11-08 DIAGNOSIS — R30.0 DYSURIA: Primary | ICD-10-CM

## 2020-11-08 DIAGNOSIS — I10 ESSENTIAL HYPERTENSION: ICD-10-CM

## 2020-11-08 LAB
BACTERIA #/AREA URNS AUTO: ABNORMAL /HPF
BILIRUB SERPL-MCNC: NEGATIVE MG/DL
BILIRUB UR QL STRIP: NEGATIVE
BLOOD URINE, POC: ABNORMAL
CLARITY UR REFRACT.AUTO: ABNORMAL
CLARITY, POC UA: ABNORMAL
COLOR UR AUTO: YELLOW
COLOR, POC UA: YELLOW
GLUCOSE UR QL STRIP: 250
GLUCOSE UR QL STRIP: ABNORMAL
HGB UR QL STRIP: ABNORMAL
HYALINE CASTS UR QL AUTO: 2 /LPF
KETONES UR QL STRIP: NEGATIVE
KETONES UR QL STRIP: NEGATIVE
LEUKOCYTE ESTERASE UR QL STRIP: ABNORMAL
LEUKOCYTE ESTERASE URINE, POC: ABNORMAL
MICROSCOPIC COMMENT: ABNORMAL
NITRITE UR QL STRIP: NEGATIVE
NITRITE, POC UA: POSITIVE
PH UR STRIP: 6 [PH] (ref 5–8)
PH, POC UA: 5
PROT UR QL STRIP: ABNORMAL
PROTEIN, POC: ABNORMAL
RBC #/AREA URNS AUTO: 10 /HPF (ref 0–4)
SP GR UR STRIP: 1.01 (ref 1–1.03)
SPECIFIC GRAVITY, POC UA: 1.01
SQUAMOUS #/AREA URNS AUTO: 4 /HPF
URN SPEC COLLECT METH UR: ABNORMAL
UROBILINOGEN, POC UA: NORMAL
WBC #/AREA URNS AUTO: >100 /HPF (ref 0–5)
WBC CLUMPS UR QL AUTO: ABNORMAL

## 2020-11-08 PROCEDURE — 99215 OFFICE O/P EST HI 40 MIN: CPT | Mod: PBBFAC,PO | Performed by: PHYSICIAN ASSISTANT

## 2020-11-08 PROCEDURE — 87186 SC STD MICRODIL/AGAR DIL: CPT

## 2020-11-08 PROCEDURE — 99999 PR PBB SHADOW E&M-EST. PATIENT-LVL V: ICD-10-PCS | Mod: PBBFAC,,, | Performed by: PHYSICIAN ASSISTANT

## 2020-11-08 PROCEDURE — 87086 URINE CULTURE/COLONY COUNT: CPT

## 2020-11-08 PROCEDURE — 99214 OFFICE O/P EST MOD 30 MIN: CPT | Mod: S$PBB,,, | Performed by: PHYSICIAN ASSISTANT

## 2020-11-08 PROCEDURE — 99999 PR PBB SHADOW E&M-EST. PATIENT-LVL V: CPT | Mod: PBBFAC,,, | Performed by: PHYSICIAN ASSISTANT

## 2020-11-08 PROCEDURE — 99214 PR OFFICE/OUTPT VISIT, EST, LEVL IV, 30-39 MIN: ICD-10-PCS | Mod: S$PBB,,, | Performed by: PHYSICIAN ASSISTANT

## 2020-11-08 PROCEDURE — 81001 URINALYSIS AUTO W/SCOPE: CPT

## 2020-11-08 PROCEDURE — 87077 CULTURE AEROBIC IDENTIFY: CPT

## 2020-11-08 PROCEDURE — 81002 URINALYSIS NONAUTO W/O SCOPE: CPT | Mod: PBBFAC,PO | Performed by: PHYSICIAN ASSISTANT

## 2020-11-08 PROCEDURE — 87088 URINE BACTERIA CULTURE: CPT

## 2020-11-08 RX ORDER — SULFAMETHOXAZOLE AND TRIMETHOPRIM 800; 160 MG/1; MG/1
1 TABLET ORAL 2 TIMES DAILY
Qty: 10 TABLET | Refills: 0 | Status: SHIPPED | OUTPATIENT
Start: 2020-11-08 | End: 2020-11-13

## 2020-11-08 NOTE — PROGRESS NOTES
"Subjective:      Alysha Rosales is a 65 y.o. female who complains of dysuria and suprapubic pressure for 3 days.  Patient also complains of n/a. Patient denies back pain, fever, stomach ache and vaginal discharge.  Patient does not have a history of recurrent UTI.  Patient does not have a history of pyelonephritis.  The following portions of the patient's history were reviewed and updated as appropriate: allergies, current medications, past family history, past medical history, past social history, past surgical history and problem list.    Review of Systems  Review of Systems   Constitutional: Negative for fever.   HENT: Negative for sore throat.    Respiratory: Negative for shortness of breath.    Cardiovascular: Negative for chest pain.   Gastrointestinal: Positive for abdominal pain. Negative for nausea.   Genitourinary: Positive for dysuria. Negative for frequency.   Musculoskeletal: Negative for back pain.   Neurological: Negative for headaches.   All other systems reviewed and are negative.         Objective:   BP (!) 172/84 (BP Location: Right arm, Patient Position: Sitting)   Pulse 80   Temp 97.7 °F (36.5 °C) (Tympanic)   Ht 5' 2" (1.575 m)   Wt 91.2 kg (201 lb 1 oz)   SpO2 98%   BMI 36.77 kg/m²     Physical Exam   Constitutional: She is oriented to person, place, and time and well-developed, well-nourished, and in no distress.   HENT:   Head: Normocephalic.   Right Ear: External ear normal.   Left Ear: External ear normal.   Mouth/Throat: No oropharyngeal exudate.   Neck: Normal range of motion.   Cardiovascular: Normal rate and normal heart sounds.   Pulmonary/Chest: Effort normal and breath sounds normal. No respiratory distress.   Abdominal: Soft. Normal appearance. There is no CVA tenderness.   Neurological: She is alert and oriented to person, place, and time.   Skin: Skin is warm.   Psychiatric: Affect normal.   Vitals reviewed.      Laboratory:   Color, UA Yellow    Spec Grav UA 1.010  "   pH, UA 5    WBC, UA ++    Nitrite, UA positive    Protein ++100    Glucose,     Ketones, UA Negative    Urobilinogen, UA normal    Bilirubin negative    Blood, UA about 50    Clarity, UA Slightly Cloudy             Assessment:     Encounter Diagnoses   Name Primary?    Dysuria Yes    Acute cystitis with hematuria         Plan:       Current Outpatient Medications   Medication Sig    allopurinoL (ZYLOPRIM) 100 MG tablet Take 1 tablet (100 mg total) by mouth once daily.    aspirin 81 mg Tab Take 1 tablet by mouth.    blood sugar diagnostic Strp 1 strip by Misc.(Non-Drug; Combo Route) route 2 (two) times daily.    blood-glucose meter Misc Use FREESTYLE meter for dx: diabetes    clopidogrel (PLAVIX) 75 mg tablet Take 1 tablet by mouth.    desoximetasone (TOPICORT) 0.25 % cream APPLY  CREAM EXTERNALLY TO AFFECTED AREA TWICE DAILY AS NEEDED    ERGOCALCIFEROL, VITAMIN D2, (VITAMIN D ORAL) Take 1 tablet by mouth once daily. 1000 units daily    furosemide (LASIX) 20 MG tablet Take 1 tablet (20 mg total) by mouth once daily.    glipiZIDE (GLUCOTROL) 10 MG tablet Take 1 tablet (10 mg total) by mouth 2 (two) times daily before meals.    hydrALAZINE (APRESOLINE) 50 MG tablet Take 1 tablet (50 mg total) by mouth 4 (four) times daily.    JANUVIA 100 mg Tab Take 1 tablet (100 mg total) by mouth once daily.    lisinopriL (PRINIVIL,ZESTRIL) 20 MG tablet Take 1 tablet by mouth once daily (Patient taking differently: 2 (two) times a day. )    meloxicam (MOBIC) 15 MG tablet Take 1 tablet (15 mg total) by mouth once daily. Take with food    metFORMIN (GLUCOPHAGE) 500 MG tablet TAKE 1 TABLET BY MOUTH IN THE MORNING AND 1 AT NOON AND 2 IN THE EVENING WITH MEALS    nebivolol (BYSTOLIC) 10 MG Tab Take 1 tablet by mouth.    rosuvastatin (CRESTOR) 20 MG tablet TAKE 1 TABLET BY MOUTH NIGHTLY.    blood sugar diagnostic (FREESTYLE LITE STRIPS) Strp USE ONE STRIP TO CHECK GLUCOSE ONCE DAILY    gatifloxacin 0.5 % Drop  drops Place 1 drop into the left eye 2 (two) times daily. Eyedrops to start one day before surgery (Patient not taking: Reported on 9/1/2020)    ibandronate (BONIVA) 150 mg tablet Take 1 tablet (150 mg total) by mouth every 30 days.    ketorolac 0.5% (ACULAR) 0.5 % Drop Place 1 drop into the left eye 4 (four) times daily. Eyedrops to start one day before surgery (Patient not taking: Reported on 9/1/2020)    prednisoLONE acetate (PRED FORTE) 1 % DrpS Place 1 drop into the left eye 4 (four) times daily. Start one day before surgery (Patient not taking: Reported on 9/1/2020)     No current facility-administered medications for this visit.           1. Medications: TMP/SMX  2. Maintain adequate hydration  3. Follow up with Primary Care physician in 3-5 days.  4.  Report to Emergency Department if symptoms worsen or change.

## 2020-11-11 LAB — BACTERIA UR CULT: ABNORMAL

## 2020-11-24 ENCOUNTER — LAB VISIT (OUTPATIENT)
Dept: LAB | Facility: HOSPITAL | Age: 65
End: 2020-11-24
Attending: FAMILY MEDICINE
Payer: MEDICARE

## 2020-11-24 ENCOUNTER — OFFICE VISIT (OUTPATIENT)
Dept: FAMILY MEDICINE | Facility: CLINIC | Age: 65
End: 2020-11-24
Payer: MEDICARE

## 2020-11-24 VITALS
SYSTOLIC BLOOD PRESSURE: 132 MMHG | HEART RATE: 88 BPM | BODY MASS INDEX: 37.42 KG/M2 | WEIGHT: 204.56 LBS | RESPIRATION RATE: 16 BRPM | TEMPERATURE: 98 F | OXYGEN SATURATION: 97 % | DIASTOLIC BLOOD PRESSURE: 62 MMHG

## 2020-11-24 DIAGNOSIS — E11.21 DIABETIC NEPHROPATHY WITH PROTEINURIA: ICD-10-CM

## 2020-11-24 DIAGNOSIS — Z23 NEED FOR INFLUENZA VACCINATION: ICD-10-CM

## 2020-11-24 DIAGNOSIS — E11.40 TYPE 2 DIABETES MELLITUS WITH DIABETIC NEUROPATHY, WITHOUT LONG-TERM CURRENT USE OF INSULIN: ICD-10-CM

## 2020-11-24 DIAGNOSIS — I10 ESSENTIAL HYPERTENSION: Primary | Chronic | ICD-10-CM

## 2020-11-24 DIAGNOSIS — E78.2 COMBINED HYPERLIPIDEMIA ASSOCIATED WITH TYPE 2 DIABETES MELLITUS: ICD-10-CM

## 2020-11-24 DIAGNOSIS — E11.69 COMBINED HYPERLIPIDEMIA ASSOCIATED WITH TYPE 2 DIABETES MELLITUS: ICD-10-CM

## 2020-11-24 DIAGNOSIS — M81.0 AGE-RELATED OSTEOPOROSIS WITHOUT CURRENT PATHOLOGICAL FRACTURE: Chronic | ICD-10-CM

## 2020-11-24 DIAGNOSIS — I25.10 CORONARY ARTERY DISEASE INVOLVING NATIVE CORONARY ARTERY OF NATIVE HEART WITHOUT ANGINA PECTORIS: Chronic | ICD-10-CM

## 2020-11-24 DIAGNOSIS — E55.9 VITAMIN D DEFICIENCY: ICD-10-CM

## 2020-11-24 DIAGNOSIS — E04.2 MULTIPLE THYROID NODULES: ICD-10-CM

## 2020-11-24 DIAGNOSIS — L82.1 SEBORRHEIC KERATOSES: ICD-10-CM

## 2020-11-24 DIAGNOSIS — M17.12 ARTHRITIS OF KNEE, LEFT: ICD-10-CM

## 2020-11-24 DIAGNOSIS — E66.01 SEVERE OBESITY (BMI 35.0-39.9) WITH COMORBIDITY: ICD-10-CM

## 2020-11-24 PROCEDURE — G0008 ADMIN INFLUENZA VIRUS VAC: HCPCS | Mod: PBBFAC,PO

## 2020-11-24 PROCEDURE — 99999 PR PBB SHADOW E&M-EST. PATIENT-LVL IV: CPT | Mod: PBBFAC,,, | Performed by: FAMILY MEDICINE

## 2020-11-24 PROCEDURE — 99214 PR OFFICE/OUTPT VISIT, EST, LEVL IV, 30-39 MIN: ICD-10-PCS | Mod: 25,S$PBB,, | Performed by: FAMILY MEDICINE

## 2020-11-24 PROCEDURE — 90694 VACC AIIV4 NO PRSRV 0.5ML IM: CPT | Mod: PBBFAC,PO

## 2020-11-24 PROCEDURE — 99214 OFFICE O/P EST MOD 30 MIN: CPT | Mod: PBBFAC,PO,25 | Performed by: FAMILY MEDICINE

## 2020-11-24 PROCEDURE — 36415 COLL VENOUS BLD VENIPUNCTURE: CPT | Mod: PO

## 2020-11-24 PROCEDURE — 99999 PR PBB SHADOW E&M-EST. PATIENT-LVL IV: ICD-10-PCS | Mod: PBBFAC,,, | Performed by: FAMILY MEDICINE

## 2020-11-24 PROCEDURE — 83036 HEMOGLOBIN GLYCOSYLATED A1C: CPT

## 2020-11-24 PROCEDURE — 99214 OFFICE O/P EST MOD 30 MIN: CPT | Mod: 25,S$PBB,, | Performed by: FAMILY MEDICINE

## 2020-11-24 NOTE — PROGRESS NOTES
Alysha Rosales    Chief Complaint   Patient presents with    Diabetes    Hypertension    Follow-up       History of Present Illness:   Ms. Rosales comes in today for 6-month diabetes and hypertension follow-up.  She is fasting and has not taken medications today.  She states she has not been monitoring her diet.  She states she walks by working at the [employer].  She states she randomly performs home glucose checks with levels ranging 200's but does not perform home blood pressure checks.    She states she feels okay today.  She denies having fever, chills, fatigue, appetite changes; shortness of breath, cough, wheezing; chest pain, palpitations, leg swelling; abdominal pain, nausea, vomiting, diarrhea, constipation; unusual urinary symptoms; polydipsia, polyphagia, polyuria, hot or cold intolerance; back pain; abnormal bruising; headache; anxiety, depression, homicidal or suicidal thoughts.     She states she saw Dr. Kaba, cardiologist, in September 2020 for surveillance of coronary artery disease, hypertension, hyperlipidemia.      She saw Dr. Saravia, orthopedist, on July 27, 2020 for left knee pain and swelling at which time she states she was given Synvisc injection with help.  She states she also takes Mobic with help.      She saw ERIC Hill with rheumatology on October 5, 2020 for surveillance of osteoporosis currently on Reclast and vitamin-D deficiency with 9-month follow-up with DEXA scan and Reclast therapy advised.        Labs:                   WBC                      7.22                06/04/2020                 HGB                      10.7 (L)            06/04/2020                 HCT                      34.3 (L)            06/04/2020                 PLT                      202                 06/04/2020                 CHOL                     139                 06/04/2020                 TRIG                     226 (H)             06/04/2020                 HDL                       42                  06/04/2020                 ALT                      13                  10/05/2020                 AST                      17                  10/05/2020                 NA                       140                 10/05/2020                 K                        4.6                 10/05/2020                 CL                       105                 10/05/2020                 CREATININE               1.2                 10/05/2020                 BUN                      29 (H)              10/05/2020                 CO2                      22 (L)              10/05/2020                 TSH                      1.891               06/04/2020                 HGBA1C                   8.3 (H)             06/04/2020             LDLCALC                  51.8 (L)            06/04/2020             Vit D, 25-Hydroxy               39          06/04/20200                 EGFRNONAA                48 (A)              10/05/2020            6/25/2020 Thyroid ultrasound -    Impression: 1. Multinodular thyroid gland again noted.  No nodules meeting criteria for biopsy or further follow-up per TI rads criteria.           Current Outpatient Medications   Medication Sig    allopurinoL (ZYLOPRIM) 100 MG tablet Take 1 tablet (100 mg total) by mouth once daily.    aspirin 81 mg Tab Take 1 tablet by mouth.    blood sugar diagnostic Strp 1 strip by Misc.(Non-Drug; Combo Route) route 2 (two) times daily.    blood-glucose meter Misc Use FREESTYLE meter for dx: diabetes    clopidogrel (PLAVIX) 75 mg tablet Take 1 tablet by mouth.    desoximetasone (TOPICORT) 0.25 % cream APPLY  CREAM EXTERNALLY TO AFFECTED AREA TWICE DAILY AS NEEDED    ERGOCALCIFEROL, VITAMIN D2, (VITAMIN D ORAL) Take 1 tablet by mouth 2 (two) times daily. 2000 units daily    furosemide (LASIX) 20 MG tablet Take 1 tablet (20 mg total) by mouth once daily.    glipiZIDE (GLUCOTROL) 10 MG tablet Take 1 tablet (10 mg total) by mouth 2 (two)  times daily before meals.    hydrALAZINE (APRESOLINE) 50 MG tablet Take 1 tablet (50 mg total) by mouth 4 (four) times daily.    JANUVIA 100 mg Tab Take 1 tablet (100 mg total) by mouth once daily.    lisinopriL (PRINIVIL,ZESTRIL) 20 MG tablet Take 1 tablet by mouth once daily (Patient taking differently: 2 (two) times a day. )    meloxicam (MOBIC) 15 MG tablet Take 1 tablet by mouth once daily with food    metFORMIN (GLUCOPHAGE) 500 MG tablet TAKE 1 TABLET BY MOUTH IN THE MORNING AND 1 AT NOON AND 2 IN THE EVENING WITH MEALS    nebivolol (BYSTOLIC) 10 MG Tab Take 1 tablet by mouth.    rosuvastatin (CRESTOR) 20 MG tablet TAKE 1 TABLET BY MOUTH NIGHTLY.       Review of Systems   Constitutional: Positive for activity change. Negative for appetite change, chills, fatigue, fever and unexpected weight change.        Weight   93.5 kg (206 lb 0.3 oz) at June 4, 2020 visit.   Respiratory: Negative for cough, shortness of breath and wheezing.    Cardiovascular: Negative for chest pain, palpitations and leg swelling.        See history of present illness.   Gastrointestinal: Negative for abdominal pain, constipation, diarrhea, nausea and vomiting.   Endocrine: Negative for polydipsia, polyphagia and polyuria.        See history of present illness.   Genitourinary: Negative for difficulty urinating.        See history of present illness.   Musculoskeletal: Positive for arthralgias and joint swelling. Negative for back pain.        See history of present illness.   Neurological: Negative for headaches.   Hematological: Does not bruise/bleed easily.        See history of present illness.   Psychiatric/Behavioral: Negative for dysphoric mood, sleep disturbance and suicidal ideas. The patient is not nervous/anxious.         Negative for homicidal ideas.       Objective:  Physical Exam  Vitals signs reviewed.   Constitutional:       General: She is not in acute distress.     Appearance: Normal appearance. She is  well-developed. She is obese. She is not ill-appearing or diaphoretic.      Comments: Pleasant and obese.   Neck:      Musculoskeletal: Normal range of motion and neck supple.      Thyroid: No thyromegaly.   Cardiovascular:      Rate and Rhythm: Normal rate and regular rhythm.      Pulses:           Dorsalis pedis pulses are 3+ on the right side and 3+ on the left side.        Posterior tibial pulses are 3+ on the right side and 3+ on the left side.      Heart sounds: Murmur present.      Comments: Chronic and soft.  Pulmonary:      Effort: Pulmonary effort is normal. No respiratory distress.      Breath sounds: Normal breath sounds. No wheezing.   Abdominal:      General: Bowel sounds are normal. There is no distension.      Palpations: Abdomen is soft. There is no mass.      Tenderness: There is no abdominal tenderness. There is no guarding or rebound.   Musculoskeletal: Normal range of motion.         General: No swelling or tenderness.      Comments: She is ambulatory without problems.     Feet:      Right foot:      Protective Sensation: 5 sites tested. 5 sites sensed.      Skin integrity: Dry skin present. No ulcer or skin breakdown.      Left foot:      Protective Sensation: 5 sites tested. 5 sites sensed.      Skin integrity: No ulcer, skin breakdown or dry skin.   Lymphadenopathy:      Cervical: No cervical adenopathy.   Skin:     Comments: Improved eczematous changes at sides of both feet noted. Many benign-appearing seborrheic keratoses in face noted.   Neurological:      General: No focal deficit present.      Mental Status: She is alert and oriented to person, place, and time.   Psychiatric:         Mood and Affect: Mood normal.         Behavior: Behavior normal.         Thought Content: Thought content normal.         Judgment: Judgment normal.         ASSESSMENT:  1. Essential hypertension    2. Type 2 diabetes mellitus with diabetic neuropathy, without long-term current use of insulin    3. Diabetic  nephropathy with proteinuria    4. Combined hyperlipidemia associated with type 2 diabetes mellitus    5. Coronary artery disease involving native coronary artery of native heart without angina pectoris    6. Multiple thyroid nodules    7. Age-related osteoporosis without current pathological fracture    8. Vitamin D deficiency    9. Arthritis of knee, left    10. Seborrheic keratoses    11. Severe obesity (BMI 35.0-39.9) with comorbidity    12. Need for influenza vaccination        PLAN:  Alysha was seen today for diabetes, hypertension and follow-up.    Diagnoses and all orders for this visit:    Essential hypertension    Type 2 diabetes mellitus with diabetic neuropathy, without long-term current use of insulin  -     Hemoglobin A1C; Future    Diabetic nephropathy with proteinuria  -     Hemoglobin A1C; Future    Combined hyperlipidemia associated with type 2 diabetes mellitus    Coronary artery disease involving native coronary artery of native heart without angina pectoris    Multiple thyroid nodules    Age-related osteoporosis without current pathological fracture    Vitamin D deficiency    Arthritis of knee, left    Seborrheic keratoses    Severe obesity (BMI 35.0-39.9) with comorbidity    Need for influenza vaccination  -     Influenza - Quadrivalent (Adjuvanted)       Patient advised to call for results.  Continue current medications, follow low sodium, low cholesterol, low carb diet, daily walks.  Keep follow up with specialists.  Reassurance regarding benign-appearing SK's face.  Follow up in about 4 months (around 3/24/2021) for hypertension and diabetes follow up.

## 2020-11-25 LAB
ESTIMATED AVG GLUCOSE: 186 MG/DL (ref 68–131)
HBA1C MFR BLD HPLC: 8.1 % (ref 4–5.6)

## 2020-12-11 ENCOUNTER — PATIENT MESSAGE (OUTPATIENT)
Dept: OTHER | Facility: OTHER | Age: 65
End: 2020-12-11

## 2020-12-21 ENCOUNTER — PATIENT OUTREACH (OUTPATIENT)
Dept: ADMINISTRATIVE | Facility: HOSPITAL | Age: 65
End: 2020-12-21

## 2020-12-21 NOTE — PROGRESS NOTES
HA1c Report: Called to schedule HA1c, last HA1c uncontrolled less than 3 months ago, Pt did not answer, LVM, next HA1c scheduled on day of PCP appt.

## 2021-01-05 ENCOUNTER — PATIENT MESSAGE (OUTPATIENT)
Dept: FAMILY MEDICINE | Facility: CLINIC | Age: 66
End: 2021-01-05

## 2021-01-07 DIAGNOSIS — E11.40 TYPE 2 DIABETES MELLITUS WITH DIABETIC NEUROPATHY, WITHOUT LONG-TERM CURRENT USE OF INSULIN: Primary | ICD-10-CM

## 2021-01-07 RX ORDER — LANCETS
EACH MISCELLANEOUS
Qty: 100 EACH | Refills: 11 | Status: SHIPPED | OUTPATIENT
Start: 2021-01-07 | End: 2023-04-18

## 2021-01-07 RX ORDER — INSULIN PUMP SYRINGE, 3 ML
EACH MISCELLANEOUS
Qty: 1 EACH | Refills: 0 | Status: SHIPPED | OUTPATIENT
Start: 2021-01-07 | End: 2023-04-18

## 2021-01-07 RX ORDER — METFORMIN HYDROCHLORIDE 500 MG/1
TABLET ORAL
Qty: 120 TABLET | Refills: 5 | Status: SHIPPED | OUTPATIENT
Start: 2021-01-07 | End: 2021-08-27

## 2021-01-12 ENCOUNTER — TELEPHONE (OUTPATIENT)
Dept: ORTHOPEDICS | Facility: CLINIC | Age: 66
End: 2021-01-12

## 2021-02-11 ENCOUNTER — PATIENT OUTREACH (OUTPATIENT)
Dept: ADMINISTRATIVE | Facility: OTHER | Age: 66
End: 2021-02-11

## 2021-02-12 ENCOUNTER — OFFICE VISIT (OUTPATIENT)
Dept: ORTHOPEDICS | Facility: CLINIC | Age: 66
End: 2021-02-12
Payer: MEDICARE

## 2021-02-12 VITALS
SYSTOLIC BLOOD PRESSURE: 154 MMHG | HEIGHT: 62 IN | HEART RATE: 77 BPM | BODY MASS INDEX: 37.54 KG/M2 | WEIGHT: 204 LBS | DIASTOLIC BLOOD PRESSURE: 71 MMHG

## 2021-02-12 DIAGNOSIS — Z96.9 RETAINED ORTHOPEDIC HARDWARE: ICD-10-CM

## 2021-02-12 DIAGNOSIS — M21.062 ACQUIRED GENU VALGUM OF LEFT KNEE: ICD-10-CM

## 2021-02-12 DIAGNOSIS — M17.11 ARTHRITIS OF KNEE, RIGHT: ICD-10-CM

## 2021-02-12 DIAGNOSIS — M21.061 ACQUIRED VALGUS DEFORMITY KNEE, RIGHT: ICD-10-CM

## 2021-02-12 DIAGNOSIS — M17.12 ARTHRITIS OF KNEE, LEFT: Primary | ICD-10-CM

## 2021-02-12 DIAGNOSIS — M51.37 DDD (DEGENERATIVE DISC DISEASE), LUMBOSACRAL: ICD-10-CM

## 2021-02-12 PROCEDURE — 99999 PR PBB SHADOW E&M-EST. PATIENT-LVL IV: ICD-10-PCS | Mod: PBBFAC,,, | Performed by: ORTHOPAEDIC SURGERY

## 2021-02-12 PROCEDURE — 99214 PR OFFICE/OUTPT VISIT, EST, LEVL IV, 30-39 MIN: ICD-10-PCS | Mod: S$PBB,,, | Performed by: ORTHOPAEDIC SURGERY

## 2021-02-12 PROCEDURE — 99214 OFFICE O/P EST MOD 30 MIN: CPT | Mod: PBBFAC | Performed by: ORTHOPAEDIC SURGERY

## 2021-02-12 PROCEDURE — 99999 PR PBB SHADOW E&M-EST. PATIENT-LVL IV: CPT | Mod: PBBFAC,,, | Performed by: ORTHOPAEDIC SURGERY

## 2021-02-12 PROCEDURE — 99214 OFFICE O/P EST MOD 30 MIN: CPT | Mod: S$PBB,,, | Performed by: ORTHOPAEDIC SURGERY

## 2021-03-04 ENCOUNTER — PATIENT OUTREACH (OUTPATIENT)
Dept: ADMINISTRATIVE | Facility: HOSPITAL | Age: 66
End: 2021-03-04

## 2021-03-27 ENCOUNTER — PATIENT MESSAGE (OUTPATIENT)
Dept: URGENT CARE | Facility: CLINIC | Age: 66
End: 2021-03-27

## 2021-03-27 ENCOUNTER — OFFICE VISIT (OUTPATIENT)
Dept: URGENT CARE | Facility: CLINIC | Age: 66
End: 2021-03-27
Payer: MEDICARE

## 2021-03-27 ENCOUNTER — HOSPITAL ENCOUNTER (OUTPATIENT)
Dept: RADIOLOGY | Facility: HOSPITAL | Age: 66
Discharge: HOME | End: 2021-03-27
Attending: NURSE PRACTITIONER
Payer: MEDICARE

## 2021-03-27 VITALS
BODY MASS INDEX: 36.23 KG/M2 | RESPIRATION RATE: 20 BRPM | TEMPERATURE: 98 F | WEIGHT: 198.06 LBS | SYSTOLIC BLOOD PRESSURE: 150 MMHG | DIASTOLIC BLOOD PRESSURE: 79 MMHG | HEART RATE: 81 BPM | OXYGEN SATURATION: 97 %

## 2021-03-27 DIAGNOSIS — R06.2 WHEEZING: ICD-10-CM

## 2021-03-27 DIAGNOSIS — J01.00 SUBACUTE MAXILLARY SINUSITIS: Primary | ICD-10-CM

## 2021-03-27 LAB
CTP QC/QA: YES
SARS-COV-2 RDRP RESP QL NAA+PROBE: NEGATIVE

## 2021-03-27 PROCEDURE — 99999 PR PBB SHADOW E&M-EST. PATIENT-LVL V: ICD-10-PCS | Mod: PBBFAC,,, | Performed by: NURSE PRACTITIONER

## 2021-03-27 PROCEDURE — 99215 OFFICE O/P EST HI 40 MIN: CPT | Mod: PBBFAC,25,PO | Performed by: NURSE PRACTITIONER

## 2021-03-27 PROCEDURE — 71046 X-RAY EXAM CHEST 2 VIEWS: CPT | Mod: 26,,, | Performed by: RADIOLOGY

## 2021-03-27 PROCEDURE — 71046 X-RAY EXAM CHEST 2 VIEWS: CPT | Mod: TC,PO

## 2021-03-27 PROCEDURE — 99999 PR PBB SHADOW E&M-EST. PATIENT-LVL V: CPT | Mod: PBBFAC,,, | Performed by: NURSE PRACTITIONER

## 2021-03-27 PROCEDURE — 99215 OFFICE O/P EST HI 40 MIN: CPT | Mod: S$PBB,,, | Performed by: NURSE PRACTITIONER

## 2021-03-27 PROCEDURE — 71046 XR CHEST PA AND LATERAL: ICD-10-PCS | Mod: 26,,, | Performed by: RADIOLOGY

## 2021-03-27 PROCEDURE — U0002 COVID-19 LAB TEST NON-CDC: HCPCS | Mod: PBBFAC,PO | Performed by: NURSE PRACTITIONER

## 2021-03-27 PROCEDURE — 99215 PR OFFICE/OUTPT VISIT, EST, LEVL V, 40-54 MIN: ICD-10-PCS | Mod: S$PBB,,, | Performed by: NURSE PRACTITIONER

## 2021-03-27 RX ORDER — DOXYCYCLINE HYCLATE 100 MG
100 TABLET ORAL 2 TIMES DAILY
Qty: 20 TABLET | Refills: 0 | Status: SHIPPED | OUTPATIENT
Start: 2021-03-27 | End: 2021-04-06

## 2021-05-06 ENCOUNTER — HOSPITAL ENCOUNTER (OUTPATIENT)
Dept: RADIOLOGY | Facility: HOSPITAL | Age: 66
Discharge: HOME | End: 2021-05-06
Attending: FAMILY MEDICINE
Payer: MEDICARE

## 2021-05-06 ENCOUNTER — OFFICE VISIT (OUTPATIENT)
Dept: FAMILY MEDICINE | Facility: CLINIC | Age: 66
End: 2021-05-06
Payer: MEDICARE

## 2021-05-06 VITALS
DIASTOLIC BLOOD PRESSURE: 60 MMHG | BODY MASS INDEX: 37.61 KG/M2 | WEIGHT: 204.38 LBS | HEART RATE: 92 BPM | OXYGEN SATURATION: 98 % | TEMPERATURE: 97 F | HEIGHT: 62 IN | SYSTOLIC BLOOD PRESSURE: 132 MMHG

## 2021-05-06 DIAGNOSIS — H91.93 DECREASED HEARING OF BOTH EARS: ICD-10-CM

## 2021-05-06 DIAGNOSIS — Z12.31 OTHER SCREENING MAMMOGRAM: Primary | ICD-10-CM

## 2021-05-06 DIAGNOSIS — L97.511 DIABETIC ULCER OF TOE OF RIGHT FOOT ASSOCIATED WITH TYPE 2 DIABETES MELLITUS, LIMITED TO BREAKDOWN OF SKIN: ICD-10-CM

## 2021-05-06 DIAGNOSIS — E11.621 DIABETIC ULCER OF TOE OF RIGHT FOOT ASSOCIATED WITH TYPE 2 DIABETES MELLITUS, LIMITED TO BREAKDOWN OF SKIN: ICD-10-CM

## 2021-05-06 DIAGNOSIS — R93.89 ABNORMAL CHEST X-RAY: ICD-10-CM

## 2021-05-06 DIAGNOSIS — E66.01 SEVERE OBESITY (BMI 35.0-39.9) WITH COMORBIDITY: ICD-10-CM

## 2021-05-06 DIAGNOSIS — I15.2 HYPERTENSION ASSOCIATED WITH DIABETES: Primary | ICD-10-CM

## 2021-05-06 DIAGNOSIS — N18.31 STAGE 3A CHRONIC KIDNEY DISEASE: ICD-10-CM

## 2021-05-06 DIAGNOSIS — E11.69 COMBINED HYPERLIPIDEMIA ASSOCIATED WITH TYPE 2 DIABETES MELLITUS: ICD-10-CM

## 2021-05-06 DIAGNOSIS — E78.2 COMBINED HYPERLIPIDEMIA ASSOCIATED WITH TYPE 2 DIABETES MELLITUS: ICD-10-CM

## 2021-05-06 DIAGNOSIS — E11.59 HYPERTENSION ASSOCIATED WITH DIABETES: Primary | ICD-10-CM

## 2021-05-06 DIAGNOSIS — E11.21 DIABETIC NEPHROPATHY WITH PROTEINURIA: ICD-10-CM

## 2021-05-06 DIAGNOSIS — E11.40 TYPE 2 DIABETES MELLITUS WITH DIABETIC NEUROPATHY, WITHOUT LONG-TERM CURRENT USE OF INSULIN: ICD-10-CM

## 2021-05-06 PROCEDURE — 99215 PR OFFICE/OUTPT VISIT, EST, LEVL V, 40-54 MIN: ICD-10-PCS | Mod: S$PBB,,, | Performed by: FAMILY MEDICINE

## 2021-05-06 PROCEDURE — 99999 PR PBB SHADOW E&M-EST. PATIENT-LVL V: CPT | Mod: PBBFAC,,, | Performed by: FAMILY MEDICINE

## 2021-05-06 PROCEDURE — 71046 XR CHEST PA AND LATERAL: ICD-10-PCS | Mod: 26,,, | Performed by: RADIOLOGY

## 2021-05-06 PROCEDURE — 99999 PR PBB SHADOW E&M-EST. PATIENT-LVL V: ICD-10-PCS | Mod: PBBFAC,,, | Performed by: FAMILY MEDICINE

## 2021-05-06 PROCEDURE — 71046 X-RAY EXAM CHEST 2 VIEWS: CPT | Mod: 26,,, | Performed by: RADIOLOGY

## 2021-05-06 PROCEDURE — 99215 OFFICE O/P EST HI 40 MIN: CPT | Mod: S$PBB,,, | Performed by: FAMILY MEDICINE

## 2021-05-06 PROCEDURE — 71046 X-RAY EXAM CHEST 2 VIEWS: CPT | Mod: TC,FY,PO

## 2021-05-06 PROCEDURE — 99215 OFFICE O/P EST HI 40 MIN: CPT | Mod: PBBFAC,25,PO | Performed by: FAMILY MEDICINE

## 2021-05-12 PROBLEM — N18.31 STAGE 3A CHRONIC KIDNEY DISEASE: Status: ACTIVE | Noted: 2021-05-12

## 2021-05-27 ENCOUNTER — HOSPITAL ENCOUNTER (INPATIENT)
Facility: HOSPITAL | Age: 66
LOS: 4 days | Discharge: HOME | DRG: 280 | End: 2021-05-31
Attending: EMERGENCY MEDICINE | Admitting: INTERNAL MEDICINE
Payer: MEDICARE

## 2021-05-27 DIAGNOSIS — J96.01 ACUTE RESPIRATORY FAILURE WITH HYPOXIA: ICD-10-CM

## 2021-05-27 DIAGNOSIS — R06.02 SOB (SHORTNESS OF BREATH): ICD-10-CM

## 2021-05-27 DIAGNOSIS — R07.9 CHEST PAIN: ICD-10-CM

## 2021-05-27 DIAGNOSIS — R79.89 ELEVATED TROPONIN: ICD-10-CM

## 2021-05-27 DIAGNOSIS — U07.1 COVID-19 VIRUS INFECTION: Primary | ICD-10-CM

## 2021-05-27 DIAGNOSIS — R09.02 HYPOXIA: ICD-10-CM

## 2021-05-27 DIAGNOSIS — D50.9 IRON DEFICIENCY ANEMIA, UNSPECIFIED IRON DEFICIENCY ANEMIA TYPE: ICD-10-CM

## 2021-05-27 DIAGNOSIS — I21.4 NSTEMI (NON-ST ELEVATED MYOCARDIAL INFARCTION): ICD-10-CM

## 2021-05-27 PROBLEM — R77.2 ELEVATED AFP: Status: ACTIVE | Noted: 2021-05-27

## 2021-05-27 PROBLEM — R93.89 ABNORMAL CXR: Status: ACTIVE | Noted: 2021-05-27

## 2021-05-27 LAB
ALBUMIN SERPL BCP-MCNC: 3.6 G/DL (ref 3.5–5.2)
ALLENS TEST: ABNORMAL
ALP SERPL-CCNC: 74 U/L (ref 55–135)
ALT SERPL W/O P-5'-P-CCNC: 18 U/L (ref 10–44)
ANION GAP SERPL CALC-SCNC: 16 MMOL/L (ref 8–16)
APTT BLDCRRT: 23.6 SEC (ref 21–32)
AST SERPL-CCNC: 20 U/L (ref 10–40)
BACTERIA #/AREA URNS HPF: NORMAL /HPF
BASOPHILS # BLD AUTO: 0.01 K/UL (ref 0–0.2)
BASOPHILS # BLD AUTO: 0.2 K/UL (ref 0–0.2)
BASOPHILS NFR BLD: 0.1 % (ref 0–1.9)
BASOPHILS NFR BLD: 1.1 % (ref 0–1.9)
BILIRUB SERPL-MCNC: 0.3 MG/DL (ref 0.1–1)
BILIRUB UR QL STRIP: NEGATIVE
BNP SERPL-MCNC: 414 PG/ML (ref 0–99)
BSA FOR ECHO PROCEDURE: 2.02 M2
BUN SERPL-MCNC: 35 MG/DL (ref 8–23)
CALCIUM SERPL-MCNC: 9.8 MG/DL (ref 8.7–10.5)
CHLORIDE SERPL-SCNC: 107 MMOL/L (ref 95–110)
CLARITY UR: CLEAR
CO2 SERPL-SCNC: 17 MMOL/L (ref 23–29)
COLOR UR: YELLOW
CREAT SERPL-MCNC: 1.2 MG/DL (ref 0.5–1.4)
CRP SERPL-MCNC: 3 MG/L (ref 0–8.2)
CTP QC/QA: YES
CV ECHO LV RWT: 0.36 CM
D DIMER PPP IA.FEU-MCNC: 1.82 MG/L FEU
DELSYS: ABNORMAL
DIFFERENTIAL METHOD: ABNORMAL
DIFFERENTIAL METHOD: ABNORMAL
DOP CALC LVOT AREA: 3.1 CM2
DOP CALC LVOT DIAMETER: 2 CM
ECHO LV POSTERIOR WALL: 0.9 CM (ref 0.6–1.1)
EJECTION FRACTION: 40 %
EOSINOPHIL # BLD AUTO: 0 K/UL (ref 0–0.5)
EOSINOPHIL # BLD AUTO: 0.1 K/UL (ref 0–0.5)
EOSINOPHIL NFR BLD: 0 % (ref 0–8)
EOSINOPHIL NFR BLD: 0.5 % (ref 0–8)
EP: 5
ERYTHROCYTE [DISTWIDTH] IN BLOOD BY AUTOMATED COUNT: 15.8 % (ref 11.5–14.5)
ERYTHROCYTE [DISTWIDTH] IN BLOOD BY AUTOMATED COUNT: 16.1 % (ref 11.5–14.5)
ERYTHROCYTE [SEDIMENTATION RATE] IN BLOOD BY WESTERGREN METHOD: 16 MM/H
ERYTHROCYTE [SEDIMENTATION RATE] IN BLOOD BY WESTERGREN METHOD: 17 MM/HR (ref 0–20)
EST. GFR  (AFRICAN AMERICAN): 54 ML/MIN/1.73 M^2
EST. GFR  (NON AFRICAN AMERICAN): 47 ML/MIN/1.73 M^2
FERRITIN SERPL-MCNC: 7 NG/ML (ref 20–300)
FIO2: 100
FRACTIONAL SHORTENING: 34 % (ref 28–44)
GLUCOSE SERPL-MCNC: 505 MG/DL (ref 70–110)
GLUCOSE UR QL STRIP: ABNORMAL
HCO3 UR-SCNC: 22.2 MMOL/L (ref 24–28)
HCT VFR BLD AUTO: 27.1 % (ref 37–48.5)
HCT VFR BLD AUTO: 30.7 % (ref 37–48.5)
HGB BLD-MCNC: 7.5 G/DL (ref 12–16)
HGB BLD-MCNC: 8.2 G/DL (ref 12–16)
HGB UR QL STRIP: NEGATIVE
HYALINE CASTS #/AREA URNS LPF: 0 /LPF
IMM GRANULOCYTES # BLD AUTO: 0.08 K/UL (ref 0–0.04)
IMM GRANULOCYTES # BLD AUTO: 0.14 K/UL (ref 0–0.04)
IMM GRANULOCYTES NFR BLD AUTO: 0.8 % (ref 0–0.5)
IMM GRANULOCYTES NFR BLD AUTO: 0.9 % (ref 0–0.5)
INR PPP: 1 (ref 0.8–1.2)
INTERVENTRICULAR SEPTUM: 0.7 CM (ref 0.6–1.1)
IP: 10
KETONES UR QL STRIP: NEGATIVE
LA MAJOR: 4.59 CM
LA MINOR: 4.43 CM
LA WIDTH: 4.66 CM
LACTATE SERPL-SCNC: 1.3 MMOL/L (ref 0.5–2.2)
LDH SERPL L TO P-CCNC: 175 U/L (ref 110–260)
LEFT ATRIUM SIZE: 3.38 CM
LEFT ATRIUM VOLUME INDEX: 31.1 ML/M2
LEFT ATRIUM VOLUME: 60.36 CM3
LEFT INTERNAL DIMENSION IN SYSTOLE: 3.34 CM (ref 2.1–4)
LEFT VENTRICLE DIASTOLIC VOLUME INDEX: 62.48 ML/M2
LEFT VENTRICLE DIASTOLIC VOLUME: 121.21 ML
LEFT VENTRICLE MASS INDEX: 71 G/M2
LEFT VENTRICLE SYSTOLIC VOLUME INDEX: 23.4 ML/M2
LEFT VENTRICLE SYSTOLIC VOLUME: 45.37 ML
LEFT VENTRICULAR INTERNAL DIMENSION IN DIASTOLE: 5.05 CM (ref 3.5–6)
LEFT VENTRICULAR MASS: 138.12 G
LEUKOCYTE ESTERASE UR QL STRIP: NEGATIVE
LYMPHOCYTES # BLD AUTO: 0.5 K/UL (ref 1–4.8)
LYMPHOCYTES # BLD AUTO: 2.8 K/UL (ref 1–4.8)
LYMPHOCYTES NFR BLD: 15.7 % (ref 18–48)
LYMPHOCYTES NFR BLD: 5.2 % (ref 18–48)
MAGNESIUM SERPL-MCNC: 1.7 MG/DL (ref 1.6–2.6)
MCH RBC QN AUTO: 21.4 PG (ref 27–31)
MCH RBC QN AUTO: 21.7 PG (ref 27–31)
MCHC RBC AUTO-ENTMCNC: 26.7 G/DL (ref 32–36)
MCHC RBC AUTO-ENTMCNC: 27.7 G/DL (ref 32–36)
MCV RBC AUTO: 78 FL (ref 82–98)
MCV RBC AUTO: 80 FL (ref 82–98)
MICROSCOPIC COMMENT: NORMAL
MODE: ABNORMAL
MONOCYTES # BLD AUTO: 0.1 K/UL (ref 0.3–1)
MONOCYTES # BLD AUTO: 0.8 K/UL (ref 0.3–1)
MONOCYTES NFR BLD: 1.1 % (ref 4–15)
MONOCYTES NFR BLD: 4.5 % (ref 4–15)
NEUTROPHILS # BLD AUTO: 14 K/UL (ref 1.8–7.7)
NEUTROPHILS # BLD AUTO: 8.1 K/UL (ref 1.8–7.7)
NEUTROPHILS NFR BLD: 77.4 % (ref 38–73)
NEUTROPHILS NFR BLD: 92.7 % (ref 38–73)
NITRITE UR QL STRIP: POSITIVE
NRBC BLD-RTO: 0 /100 WBC
NRBC BLD-RTO: 0 /100 WBC
PCO2 BLDA: 60.2 MMHG (ref 35–45)
PH SMN: 7.17 [PH] (ref 7.35–7.45)
PH UR STRIP: 6 [PH] (ref 5–8)
PHOSPHATE SERPL-MCNC: 4.4 MG/DL (ref 2.7–4.5)
PISA TR MAX VEL: 3.21 M/S
PLATELET # BLD AUTO: 293 K/UL (ref 150–450)
PLATELET # BLD AUTO: 539 K/UL (ref 150–450)
PLATELET BLD QL SMEAR: ABNORMAL
PMV BLD AUTO: 10.9 FL (ref 9.2–12.9)
PMV BLD AUTO: 9.6 FL (ref 9.2–12.9)
PO2 BLDA: 272 MMHG (ref 80–100)
POC BE: -6 MMOL/L
POC SATURATED O2: 100 % (ref 95–100)
POCT GLUCOSE: 271 MG/DL (ref 70–110)
POCT GLUCOSE: 286 MG/DL (ref 70–110)
POCT GLUCOSE: 296 MG/DL (ref 70–110)
POCT GLUCOSE: 370 MG/DL (ref 70–110)
POTASSIUM SERPL-SCNC: 4.4 MMOL/L (ref 3.5–5.1)
PROCALCITONIN SERPL IA-MCNC: 0.5 NG/ML
PROT SERPL-MCNC: 7.6 G/DL (ref 6–8.4)
PROT UR QL STRIP: ABNORMAL
PROTHROMBIN TIME: 11.3 SEC (ref 9–12.5)
PULM VEIN S/D RATIO: 1.41
PV PEAK D VEL: 0.37 M/S
PV PEAK S VEL: 0.52 M/S
RA MAJOR: 5.08 CM
RA PRESSURE: 3 MMHG
RA WIDTH: 3.88 CM
RBC # BLD AUTO: 3.46 M/UL (ref 4–5.4)
RBC # BLD AUTO: 3.83 M/UL (ref 4–5.4)
RBC #/AREA URNS HPF: 0 /HPF (ref 0–4)
SAMPLE: ABNORMAL
SARS-COV-2 RDRP RESP QL NAA+PROBE: POSITIVE
SITE: ABNORMAL
SODIUM SERPL-SCNC: 140 MMOL/L (ref 136–145)
SP GR UR STRIP: 1.02 (ref 1–1.03)
TDI LATERAL: 0.06 M/S
TDI SEPTAL: 0.06 M/S
TDI: 0.06 M/S
TR MAX PG: 41 MMHG
TRICUSPID ANNULAR PLANE SYSTOLIC EXCURSION: 2.6 CM
TROPONIN I SERPL DL<=0.01 NG/ML-MCNC: 0.11 NG/ML (ref 0–0.03)
TROPONIN I SERPL DL<=0.01 NG/ML-MCNC: 5.16 NG/ML (ref 0–0.03)
TROPONIN I SERPL DL<=0.01 NG/ML-MCNC: 6.33 NG/ML (ref 0–0.03)
TV REST PULMONARY ARTERY PRESSURE: 44 MMHG
URN SPEC COLLECT METH UR: ABNORMAL
UROBILINOGEN UR STRIP-ACNC: NEGATIVE EU/DL
WBC # BLD AUTO: 18.04 K/UL (ref 3.9–12.7)
WBC # BLD AUTO: 8.77 K/UL (ref 3.9–12.7)
WBC #/AREA URNS HPF: 1 /HPF (ref 0–5)
YEAST URNS QL MICRO: NORMAL

## 2021-05-27 PROCEDURE — 84484 ASSAY OF TROPONIN QUANT: CPT | Performed by: NURSE PRACTITIONER

## 2021-05-27 PROCEDURE — 25000003 PHARM REV CODE 250: Performed by: EMERGENCY MEDICINE

## 2021-05-27 PROCEDURE — 21400001 HC TELEMETRY ROOM

## 2021-05-27 PROCEDURE — 85025 COMPLETE CBC W/AUTO DIFF WBC: CPT | Performed by: EMERGENCY MEDICINE

## 2021-05-27 PROCEDURE — 84145 PROCALCITONIN (PCT): CPT | Performed by: NURSE PRACTITIONER

## 2021-05-27 PROCEDURE — 25000003 PHARM REV CODE 250: Performed by: FAMILY MEDICINE

## 2021-05-27 PROCEDURE — 96374 THER/PROPH/DIAG INJ IV PUSH: CPT

## 2021-05-27 PROCEDURE — 94640 AIRWAY INHALATION TREATMENT: CPT

## 2021-05-27 PROCEDURE — 25000003 PHARM REV CODE 250: Performed by: INTERNAL MEDICINE

## 2021-05-27 PROCEDURE — 85730 THROMBOPLASTIN TIME PARTIAL: CPT | Performed by: FAMILY MEDICINE

## 2021-05-27 PROCEDURE — 85651 RBC SED RATE NONAUTOMATED: CPT | Performed by: NURSE PRACTITIONER

## 2021-05-27 PROCEDURE — 94660 CPAP INITIATION&MGMT: CPT

## 2021-05-27 PROCEDURE — 99900035 HC TECH TIME PER 15 MIN (STAT)

## 2021-05-27 PROCEDURE — 93010 EKG 12-LEAD: ICD-10-PCS | Mod: ,,, | Performed by: INTERNAL MEDICINE

## 2021-05-27 PROCEDURE — 80053 COMPREHEN METABOLIC PANEL: CPT | Performed by: EMERGENCY MEDICINE

## 2021-05-27 PROCEDURE — 82306 VITAMIN D 25 HYDROXY: CPT | Performed by: NURSE PRACTITIONER

## 2021-05-27 PROCEDURE — 25000003 PHARM REV CODE 250: Performed by: NURSE PRACTITIONER

## 2021-05-27 PROCEDURE — 83605 ASSAY OF LACTIC ACID: CPT | Performed by: NURSE PRACTITIONER

## 2021-05-27 PROCEDURE — 85610 PROTHROMBIN TIME: CPT | Performed by: FAMILY MEDICINE

## 2021-05-27 PROCEDURE — 63600175 PHARM REV CODE 636 W HCPCS: Performed by: NURSE PRACTITIONER

## 2021-05-27 PROCEDURE — A4216 STERILE WATER/SALINE, 10 ML: HCPCS | Performed by: NURSE PRACTITIONER

## 2021-05-27 PROCEDURE — 93010 ELECTROCARDIOGRAM REPORT: CPT | Mod: ,,, | Performed by: INTERNAL MEDICINE

## 2021-05-27 PROCEDURE — 96372 THER/PROPH/DIAG INJ SC/IM: CPT | Mod: 59

## 2021-05-27 PROCEDURE — 84100 ASSAY OF PHOSPHORUS: CPT | Performed by: EMERGENCY MEDICINE

## 2021-05-27 PROCEDURE — 25000242 PHARM REV CODE 250 ALT 637 W/ HCPCS: Performed by: NURSE PRACTITIONER

## 2021-05-27 PROCEDURE — 83735 ASSAY OF MAGNESIUM: CPT | Performed by: EMERGENCY MEDICINE

## 2021-05-27 PROCEDURE — 27100171 HC OXYGEN HIGH FLOW UP TO 24 HOURS

## 2021-05-27 PROCEDURE — 83880 ASSAY OF NATRIURETIC PEPTIDE: CPT | Performed by: EMERGENCY MEDICINE

## 2021-05-27 PROCEDURE — 86140 C-REACTIVE PROTEIN: CPT | Performed by: NURSE PRACTITIONER

## 2021-05-27 PROCEDURE — 51702 INSERT TEMP BLADDER CATH: CPT

## 2021-05-27 PROCEDURE — 82728 ASSAY OF FERRITIN: CPT | Performed by: NURSE PRACTITIONER

## 2021-05-27 PROCEDURE — 99291 CRITICAL CARE FIRST HOUR: CPT | Mod: 25

## 2021-05-27 PROCEDURE — 63600175 PHARM REV CODE 636 W HCPCS: Performed by: EMERGENCY MEDICINE

## 2021-05-27 PROCEDURE — 82962 GLUCOSE BLOOD TEST: CPT

## 2021-05-27 PROCEDURE — 87040 BLOOD CULTURE FOR BACTERIA: CPT | Mod: 59 | Performed by: NURSE PRACTITIONER

## 2021-05-27 PROCEDURE — 83615 LACTATE (LD) (LDH) ENZYME: CPT | Performed by: NURSE PRACTITIONER

## 2021-05-27 PROCEDURE — 84484 ASSAY OF TROPONIN QUANT: CPT | Mod: 91 | Performed by: EMERGENCY MEDICINE

## 2021-05-27 PROCEDURE — 94761 N-INVAS EAR/PLS OXIMETRY MLT: CPT

## 2021-05-27 PROCEDURE — U0002 COVID-19 LAB TEST NON-CDC: HCPCS | Performed by: EMERGENCY MEDICINE

## 2021-05-27 PROCEDURE — 99223 PR INITIAL HOSPITAL CARE,LEVL III: ICD-10-PCS | Mod: CR,,, | Performed by: INTERNAL MEDICINE

## 2021-05-27 PROCEDURE — 36415 COLL VENOUS BLD VENIPUNCTURE: CPT | Performed by: NURSE PRACTITIONER

## 2021-05-27 PROCEDURE — 63600175 PHARM REV CODE 636 W HCPCS: Performed by: INTERNAL MEDICINE

## 2021-05-27 PROCEDURE — 27000190 HC CPAP FULL FACE MASK W/VALVE

## 2021-05-27 PROCEDURE — 81000 URINALYSIS NONAUTO W/SCOPE: CPT | Performed by: EMERGENCY MEDICINE

## 2021-05-27 PROCEDURE — 85379 FIBRIN DEGRADATION QUANT: CPT | Performed by: NURSE PRACTITIONER

## 2021-05-27 PROCEDURE — 36600 WITHDRAWAL OF ARTERIAL BLOOD: CPT

## 2021-05-27 PROCEDURE — 93005 ELECTROCARDIOGRAM TRACING: CPT

## 2021-05-27 PROCEDURE — 99223 1ST HOSP IP/OBS HIGH 75: CPT | Mod: CR,,, | Performed by: INTERNAL MEDICINE

## 2021-05-27 PROCEDURE — 82803 BLOOD GASES ANY COMBINATION: CPT

## 2021-05-27 PROCEDURE — 85025 COMPLETE CBC W/AUTO DIFF WBC: CPT | Mod: 91 | Performed by: FAMILY MEDICINE

## 2021-05-27 PROCEDURE — 36415 COLL VENOUS BLD VENIPUNCTURE: CPT | Performed by: FAMILY MEDICINE

## 2021-05-27 RX ORDER — ENOXAPARIN SODIUM 100 MG/ML
40 INJECTION SUBCUTANEOUS EVERY 24 HOURS
Status: DISCONTINUED | OUTPATIENT
Start: 2021-05-27 | End: 2021-05-27

## 2021-05-27 RX ORDER — SODIUM CHLORIDE 0.9 % (FLUSH) 0.9 %
10 SYRINGE (ML) INJECTION EVERY 8 HOURS
Status: DISCONTINUED | OUTPATIENT
Start: 2021-05-27 | End: 2021-05-31 | Stop reason: HOSPADM

## 2021-05-27 RX ORDER — ACETAMINOPHEN 325 MG/1
650 TABLET ORAL EVERY 8 HOURS PRN
Status: DISCONTINUED | OUTPATIENT
Start: 2021-05-27 | End: 2021-05-31 | Stop reason: HOSPADM

## 2021-05-27 RX ORDER — TALC
6 POWDER (GRAM) TOPICAL NIGHTLY PRN
Status: DISCONTINUED | OUTPATIENT
Start: 2021-05-27 | End: 2021-05-31 | Stop reason: HOSPADM

## 2021-05-27 RX ORDER — SODIUM CHLORIDE 0.9 % (FLUSH) 0.9 %
10 SYRINGE (ML) INJECTION
Status: DISCONTINUED | OUTPATIENT
Start: 2021-05-27 | End: 2021-05-31 | Stop reason: HOSPADM

## 2021-05-27 RX ORDER — METOPROLOL TARTRATE 25 MG/1
25 TABLET, FILM COATED ORAL 2 TIMES DAILY
Status: DISCONTINUED | OUTPATIENT
Start: 2021-05-27 | End: 2021-05-28

## 2021-05-27 RX ORDER — DEXAMETHASONE SODIUM PHOSPHATE 4 MG/ML
8 INJECTION, SOLUTION INTRA-ARTICULAR; INTRALESIONAL; INTRAMUSCULAR; INTRAVENOUS; SOFT TISSUE
Status: COMPLETED | OUTPATIENT
Start: 2021-05-27 | End: 2021-05-27

## 2021-05-27 RX ORDER — AMOXICILLIN 250 MG
1 CAPSULE ORAL 2 TIMES DAILY PRN
Status: DISCONTINUED | OUTPATIENT
Start: 2021-05-27 | End: 2021-05-31 | Stop reason: HOSPADM

## 2021-05-27 RX ORDER — ATORVASTATIN CALCIUM 40 MG/1
40 TABLET, FILM COATED ORAL NIGHTLY
Status: DISCONTINUED | OUTPATIENT
Start: 2021-05-27 | End: 2021-05-27

## 2021-05-27 RX ORDER — MUPIROCIN 20 MG/G
OINTMENT TOPICAL 2 TIMES DAILY
Status: DISCONTINUED | OUTPATIENT
Start: 2021-05-27 | End: 2021-05-31 | Stop reason: HOSPADM

## 2021-05-27 RX ORDER — IBUPROFEN 200 MG
24 TABLET ORAL
Status: DISCONTINUED | OUTPATIENT
Start: 2021-05-27 | End: 2021-05-31 | Stop reason: HOSPADM

## 2021-05-27 RX ORDER — FUROSEMIDE 10 MG/ML
40 INJECTION INTRAMUSCULAR; INTRAVENOUS DAILY
Status: DISCONTINUED | OUTPATIENT
Start: 2021-05-27 | End: 2021-05-29

## 2021-05-27 RX ORDER — ENOXAPARIN SODIUM 100 MG/ML
1 INJECTION SUBCUTANEOUS
Status: DISCONTINUED | OUTPATIENT
Start: 2021-05-27 | End: 2021-05-27

## 2021-05-27 RX ORDER — INSULIN ASPART 100 [IU]/ML
1-10 INJECTION, SOLUTION INTRAVENOUS; SUBCUTANEOUS
Status: DISCONTINUED | OUTPATIENT
Start: 2021-05-27 | End: 2021-05-31 | Stop reason: HOSPADM

## 2021-05-27 RX ORDER — GLUCAGON 1 MG
1 KIT INJECTION
Status: DISCONTINUED | OUTPATIENT
Start: 2021-05-27 | End: 2021-05-31 | Stop reason: HOSPADM

## 2021-05-27 RX ORDER — ENOXAPARIN SODIUM 100 MG/ML
1 INJECTION SUBCUTANEOUS
Status: DISCONTINUED | OUTPATIENT
Start: 2021-05-28 | End: 2021-05-27

## 2021-05-27 RX ORDER — HEPARIN SODIUM,PORCINE/D5W 25000/250
0-40 INTRAVENOUS SOLUTION INTRAVENOUS CONTINUOUS
Status: DISCONTINUED | OUTPATIENT
Start: 2021-05-27 | End: 2021-05-31

## 2021-05-27 RX ORDER — ASPIRIN 81 MG/1
81 TABLET ORAL DAILY
Status: DISCONTINUED | OUTPATIENT
Start: 2021-05-28 | End: 2021-05-31 | Stop reason: HOSPADM

## 2021-05-27 RX ORDER — ALBUTEROL SULFATE 90 UG/1
2 AEROSOL, METERED RESPIRATORY (INHALATION) EVERY 6 HOURS
Status: DISCONTINUED | OUTPATIENT
Start: 2021-05-27 | End: 2021-05-31 | Stop reason: HOSPADM

## 2021-05-27 RX ORDER — ONDANSETRON 2 MG/ML
8 INJECTION INTRAMUSCULAR; INTRAVENOUS EVERY 8 HOURS PRN
Status: DISCONTINUED | OUTPATIENT
Start: 2021-05-27 | End: 2021-05-31 | Stop reason: HOSPADM

## 2021-05-27 RX ORDER — IBUPROFEN 200 MG
16 TABLET ORAL
Status: DISCONTINUED | OUTPATIENT
Start: 2021-05-27 | End: 2021-05-31 | Stop reason: HOSPADM

## 2021-05-27 RX ORDER — CLOPIDOGREL BISULFATE 75 MG/1
75 TABLET ORAL DAILY
Status: DISCONTINUED | OUTPATIENT
Start: 2021-05-27 | End: 2021-05-31 | Stop reason: HOSPADM

## 2021-05-27 RX ORDER — FUROSEMIDE 10 MG/ML
40 INJECTION INTRAMUSCULAR; INTRAVENOUS ONCE
Status: COMPLETED | OUTPATIENT
Start: 2021-05-27 | End: 2021-05-27

## 2021-05-27 RX ORDER — ROSUVASTATIN CALCIUM 10 MG/1
20 TABLET, COATED ORAL NIGHTLY
Status: DISCONTINUED | OUTPATIENT
Start: 2021-05-27 | End: 2021-05-31 | Stop reason: HOSPADM

## 2021-05-27 RX ORDER — ASCORBIC ACID 500 MG
500 TABLET ORAL 2 TIMES DAILY
Status: DISCONTINUED | OUTPATIENT
Start: 2021-05-27 | End: 2021-05-31 | Stop reason: HOSPADM

## 2021-05-27 RX ADMIN — CLOPIDOGREL 75 MG: 75 TABLET, FILM COATED ORAL at 05:05

## 2021-05-27 RX ADMIN — REMDESIVIR 200 MG: 100 INJECTION, POWDER, LYOPHILIZED, FOR SOLUTION INTRAVENOUS at 12:05

## 2021-05-27 RX ADMIN — Medication 10 ML: at 09:05

## 2021-05-27 RX ADMIN — Medication 10 ML: at 01:05

## 2021-05-27 RX ADMIN — DEXAMETHASONE SODIUM PHOSPHATE 8 MG: 4 INJECTION, SOLUTION INTRA-ARTICULAR; INTRALESIONAL; INTRAMUSCULAR; INTRAVENOUS; SOFT TISSUE at 05:05

## 2021-05-27 RX ADMIN — DEXAMETHASONE 6 MG: 4 TABLET ORAL at 12:05

## 2021-05-27 RX ADMIN — ALBUTEROL SULFATE 2 PUFF: 90 AEROSOL, METERED RESPIRATORY (INHALATION) at 08:05

## 2021-05-27 RX ADMIN — METOPROLOL TARTRATE 25 MG: 25 TABLET, FILM COATED ORAL at 05:05

## 2021-05-27 RX ADMIN — FUROSEMIDE 40 MG: 10 INJECTION, SOLUTION INTRAMUSCULAR; INTRAVENOUS at 05:05

## 2021-05-27 RX ADMIN — THERA TABS 1 TABLET: TAB at 12:05

## 2021-05-27 RX ADMIN — HEPARIN SODIUM 12 UNITS/KG/HR: 10000 INJECTION, SOLUTION INTRAVENOUS at 06:05

## 2021-05-27 RX ADMIN — ALBUTEROL SULFATE 2 PUFF: 90 AEROSOL, METERED RESPIRATORY (INHALATION) at 01:05

## 2021-05-27 RX ADMIN — OXYCODONE HYDROCHLORIDE AND ACETAMINOPHEN 500 MG: 500 TABLET ORAL at 09:05

## 2021-05-27 RX ADMIN — OXYCODONE HYDROCHLORIDE AND ACETAMINOPHEN 500 MG: 500 TABLET ORAL at 12:05

## 2021-05-27 RX ADMIN — NITROGLYCERIN 1 INCH: 20 OINTMENT TOPICAL at 05:05

## 2021-05-27 RX ADMIN — ENOXAPARIN SODIUM 40 MG: 40 INJECTION SUBCUTANEOUS at 04:05

## 2021-05-27 RX ADMIN — INSULIN ASPART 6 UNITS: 100 INJECTION, SOLUTION INTRAVENOUS; SUBCUTANEOUS at 05:05

## 2021-05-27 RX ADMIN — FUROSEMIDE 40 MG: 10 INJECTION, SOLUTION INTRAMUSCULAR; INTRAVENOUS at 12:05

## 2021-05-27 RX ADMIN — ROSUVASTATIN 20 MG: 10 TABLET, FILM COATED ORAL at 05:05

## 2021-05-27 RX ADMIN — INSULIN HUMAN 10 UNITS: 100 INJECTION, SOLUTION PARENTERAL at 08:05

## 2021-05-27 RX ADMIN — MUPIROCIN: 20 OINTMENT TOPICAL at 09:05

## 2021-05-27 RX ADMIN — INSULIN ASPART 3 UNITS: 100 INJECTION, SOLUTION INTRAVENOUS; SUBCUTANEOUS at 09:05

## 2021-05-28 LAB
25(OH)D3+25(OH)D2 SERPL-MCNC: 21 NG/ML (ref 30–96)
ABO + RH BLD: NORMAL
ALBUMIN SERPL BCP-MCNC: 3.4 G/DL (ref 3.5–5.2)
ALP SERPL-CCNC: 58 U/L (ref 55–135)
ALT SERPL W/O P-5'-P-CCNC: 17 U/L (ref 10–44)
ANION GAP SERPL CALC-SCNC: 10 MMOL/L (ref 8–16)
APTT BLDCRRT: 25.3 SEC (ref 21–32)
APTT BLDCRRT: 37.3 SEC (ref 21–32)
APTT BLDCRRT: 40.2 SEC (ref 21–32)
APTT BLDCRRT: 45.6 SEC (ref 21–32)
AST SERPL-CCNC: 28 U/L (ref 10–40)
BASOPHILS # BLD AUTO: 0.02 K/UL (ref 0–0.2)
BASOPHILS # BLD AUTO: 0.02 K/UL (ref 0–0.2)
BASOPHILS NFR BLD: 0.2 % (ref 0–1.9)
BASOPHILS NFR BLD: 0.2 % (ref 0–1.9)
BILIRUB SERPL-MCNC: 0.3 MG/DL (ref 0.1–1)
BLD GP AB SCN CELLS X3 SERPL QL: NORMAL
BLD PROD TYP BPU: NORMAL
BLOOD UNIT EXPIRATION DATE: NORMAL
BLOOD UNIT TYPE CODE: 6200
BLOOD UNIT TYPE: NORMAL
BUN SERPL-MCNC: 36 MG/DL (ref 8–23)
CALCIUM SERPL-MCNC: 9.4 MG/DL (ref 8.7–10.5)
CHLORIDE SERPL-SCNC: 106 MMOL/L (ref 95–110)
CO2 SERPL-SCNC: 25 MMOL/L (ref 23–29)
CODING SYSTEM: NORMAL
CREAT SERPL-MCNC: 1 MG/DL (ref 0.5–1.4)
DIFFERENTIAL METHOD: ABNORMAL
DIFFERENTIAL METHOD: ABNORMAL
DISPENSE STATUS: NORMAL
EOSINOPHIL # BLD AUTO: 0 K/UL (ref 0–0.5)
EOSINOPHIL # BLD AUTO: 0 K/UL (ref 0–0.5)
EOSINOPHIL NFR BLD: 0 % (ref 0–8)
EOSINOPHIL NFR BLD: 0 % (ref 0–8)
ERYTHROCYTE [DISTWIDTH] IN BLOOD BY AUTOMATED COUNT: 15.9 % (ref 11.5–14.5)
ERYTHROCYTE [DISTWIDTH] IN BLOOD BY AUTOMATED COUNT: 15.9 % (ref 11.5–14.5)
EST. GFR  (AFRICAN AMERICAN): >60 ML/MIN/1.73 M^2
EST. GFR  (NON AFRICAN AMERICAN): 59 ML/MIN/1.73 M^2
GLUCOSE SERPL-MCNC: 199 MG/DL (ref 70–110)
HCT VFR BLD AUTO: 26.1 % (ref 37–48.5)
HCT VFR BLD AUTO: 26.1 % (ref 37–48.5)
HGB BLD-MCNC: 7.1 G/DL (ref 12–16)
HGB BLD-MCNC: 7.1 G/DL (ref 12–16)
IMM GRANULOCYTES # BLD AUTO: 0.06 K/UL (ref 0–0.04)
IMM GRANULOCYTES # BLD AUTO: 0.06 K/UL (ref 0–0.04)
IMM GRANULOCYTES NFR BLD AUTO: 0.6 % (ref 0–0.5)
IMM GRANULOCYTES NFR BLD AUTO: 0.6 % (ref 0–0.5)
IRON SERPL-MCNC: 18 UG/DL (ref 30–160)
LYMPHOCYTES # BLD AUTO: 0.8 K/UL (ref 1–4.8)
LYMPHOCYTES # BLD AUTO: 0.8 K/UL (ref 1–4.8)
LYMPHOCYTES NFR BLD: 7.6 % (ref 18–48)
LYMPHOCYTES NFR BLD: 7.6 % (ref 18–48)
MAGNESIUM SERPL-MCNC: 1.5 MG/DL (ref 1.6–2.6)
MCH RBC QN AUTO: 21.5 PG (ref 27–31)
MCH RBC QN AUTO: 21.5 PG (ref 27–31)
MCHC RBC AUTO-ENTMCNC: 27.2 G/DL (ref 32–36)
MCHC RBC AUTO-ENTMCNC: 27.2 G/DL (ref 32–36)
MCV RBC AUTO: 79 FL (ref 82–98)
MCV RBC AUTO: 79 FL (ref 82–98)
MONOCYTES # BLD AUTO: 0.8 K/UL (ref 0.3–1)
MONOCYTES # BLD AUTO: 0.8 K/UL (ref 0.3–1)
MONOCYTES NFR BLD: 7.6 % (ref 4–15)
MONOCYTES NFR BLD: 7.6 % (ref 4–15)
NEUTROPHILS # BLD AUTO: 9.2 K/UL (ref 1.8–7.7)
NEUTROPHILS # BLD AUTO: 9.2 K/UL (ref 1.8–7.7)
NEUTROPHILS NFR BLD: 84 % (ref 38–73)
NEUTROPHILS NFR BLD: 84 % (ref 38–73)
NRBC BLD-RTO: 0 /100 WBC
NRBC BLD-RTO: 0 /100 WBC
NUM UNITS TRANS PACKED RBC: NORMAL
PHOSPHATE SERPL-MCNC: 3.1 MG/DL (ref 2.7–4.5)
PLATELET # BLD AUTO: 240 K/UL (ref 150–450)
PLATELET # BLD AUTO: 240 K/UL (ref 150–450)
PMV BLD AUTO: 11.1 FL (ref 9.2–12.9)
PMV BLD AUTO: 11.1 FL (ref 9.2–12.9)
POCT GLUCOSE: 236 MG/DL (ref 70–110)
POCT GLUCOSE: 274 MG/DL (ref 70–110)
POCT GLUCOSE: 363 MG/DL (ref 70–110)
POCT GLUCOSE: 405 MG/DL (ref 70–110)
POTASSIUM SERPL-SCNC: 4.5 MMOL/L (ref 3.5–5.1)
PROT SERPL-MCNC: 6.7 G/DL (ref 6–8.4)
RBC # BLD AUTO: 3.3 M/UL (ref 4–5.4)
RBC # BLD AUTO: 3.3 M/UL (ref 4–5.4)
SATURATED IRON: 3 % (ref 20–50)
SODIUM SERPL-SCNC: 141 MMOL/L (ref 136–145)
TOTAL IRON BINDING CAPACITY: 518 UG/DL (ref 250–450)
TRANSFERRIN SERPL-MCNC: 350 MG/DL (ref 200–375)
TROPONIN I SERPL DL<=0.01 NG/ML-MCNC: 2.94 NG/ML (ref 0–0.03)
WBC # BLD AUTO: 10.89 K/UL (ref 3.9–12.7)
WBC # BLD AUTO: 10.89 K/UL (ref 3.9–12.7)

## 2021-05-28 PROCEDURE — 86900 BLOOD TYPING SEROLOGIC ABO: CPT | Performed by: NURSE PRACTITIONER

## 2021-05-28 PROCEDURE — 85025 COMPLETE CBC W/AUTO DIFF WBC: CPT | Performed by: NURSE PRACTITIONER

## 2021-05-28 PROCEDURE — 83540 ASSAY OF IRON: CPT | Performed by: NURSE PRACTITIONER

## 2021-05-28 PROCEDURE — 25000003 PHARM REV CODE 250: Performed by: INTERNAL MEDICINE

## 2021-05-28 PROCEDURE — 21400001 HC TELEMETRY ROOM

## 2021-05-28 PROCEDURE — A4216 STERILE WATER/SALINE, 10 ML: HCPCS | Performed by: NURSE PRACTITIONER

## 2021-05-28 PROCEDURE — 36415 COLL VENOUS BLD VENIPUNCTURE: CPT | Performed by: INTERNAL MEDICINE

## 2021-05-28 PROCEDURE — 25000003 PHARM REV CODE 250: Performed by: NURSE PRACTITIONER

## 2021-05-28 PROCEDURE — 94640 AIRWAY INHALATION TREATMENT: CPT

## 2021-05-28 PROCEDURE — 85730 THROMBOPLASTIN TIME PARTIAL: CPT | Mod: 91 | Performed by: HOSPITALIST

## 2021-05-28 PROCEDURE — 63600175 PHARM REV CODE 636 W HCPCS: Performed by: INTERNAL MEDICINE

## 2021-05-28 PROCEDURE — 99900035 HC TECH TIME PER 15 MIN (STAT)

## 2021-05-28 PROCEDURE — P9016 RBC LEUKOCYTES REDUCED: HCPCS | Performed by: NURSE PRACTITIONER

## 2021-05-28 PROCEDURE — 36415 COLL VENOUS BLD VENIPUNCTURE: CPT | Performed by: HOSPITALIST

## 2021-05-28 PROCEDURE — 85730 THROMBOPLASTIN TIME PARTIAL: CPT | Mod: 91 | Performed by: INTERNAL MEDICINE

## 2021-05-28 PROCEDURE — 83735 ASSAY OF MAGNESIUM: CPT | Performed by: NURSE PRACTITIONER

## 2021-05-28 PROCEDURE — 36415 COLL VENOUS BLD VENIPUNCTURE: CPT | Performed by: FAMILY MEDICINE

## 2021-05-28 PROCEDURE — 86920 COMPATIBILITY TEST SPIN: CPT | Performed by: NURSE PRACTITIONER

## 2021-05-28 PROCEDURE — 63600175 PHARM REV CODE 636 W HCPCS: Performed by: FAMILY MEDICINE

## 2021-05-28 PROCEDURE — 99233 SBSQ HOSP IP/OBS HIGH 50: CPT | Mod: CR,,, | Performed by: INTERNAL MEDICINE

## 2021-05-28 PROCEDURE — 85730 THROMBOPLASTIN TIME PARTIAL: CPT | Performed by: FAMILY MEDICINE

## 2021-05-28 PROCEDURE — 96372 THER/PROPH/DIAG INJ SC/IM: CPT

## 2021-05-28 PROCEDURE — 99233 PR SUBSEQUENT HOSPITAL CARE,LEVL III: ICD-10-PCS | Mod: CR,,, | Performed by: INTERNAL MEDICINE

## 2021-05-28 PROCEDURE — 27000221 HC OXYGEN, UP TO 24 HOURS

## 2021-05-28 PROCEDURE — 84100 ASSAY OF PHOSPHORUS: CPT | Performed by: NURSE PRACTITIONER

## 2021-05-28 PROCEDURE — 80053 COMPREHEN METABOLIC PANEL: CPT | Performed by: NURSE PRACTITIONER

## 2021-05-28 PROCEDURE — 84484 ASSAY OF TROPONIN QUANT: CPT | Performed by: NURSE PRACTITIONER

## 2021-05-28 PROCEDURE — 94761 N-INVAS EAR/PLS OXIMETRY MLT: CPT

## 2021-05-28 RX ORDER — METOPROLOL SUCCINATE 25 MG/1
25 TABLET, EXTENDED RELEASE ORAL DAILY
Status: DISCONTINUED | OUTPATIENT
Start: 2021-05-29 | End: 2021-05-31 | Stop reason: HOSPADM

## 2021-05-28 RX ORDER — HYDROCODONE BITARTRATE AND ACETAMINOPHEN 500; 5 MG/1; MG/1
TABLET ORAL
Status: DISCONTINUED | OUTPATIENT
Start: 2021-05-28 | End: 2021-05-29

## 2021-05-28 RX ORDER — METOPROLOL SUCCINATE 25 MG/1
25 TABLET, EXTENDED RELEASE ORAL DAILY
Status: DISCONTINUED | OUTPATIENT
Start: 2021-05-28 | End: 2021-05-28

## 2021-05-28 RX ORDER — LOSARTAN POTASSIUM 25 MG/1
25 TABLET ORAL DAILY
Status: DISCONTINUED | OUTPATIENT
Start: 2021-05-28 | End: 2021-05-31 | Stop reason: HOSPADM

## 2021-05-28 RX ADMIN — FUROSEMIDE 40 MG: 10 INJECTION, SOLUTION INTRAMUSCULAR; INTRAVENOUS at 08:05

## 2021-05-28 RX ADMIN — INSULIN ASPART 10 UNITS: 100 INJECTION, SOLUTION INTRAVENOUS; SUBCUTANEOUS at 05:05

## 2021-05-28 RX ADMIN — ASPIRIN 81 MG: 81 TABLET, COATED ORAL at 08:05

## 2021-05-28 RX ADMIN — HEPARIN SODIUM 17 UNITS/KG/HR: 10000 INJECTION, SOLUTION INTRAVENOUS at 11:05

## 2021-05-28 RX ADMIN — REMDESIVIR 100 MG: 100 INJECTION, POWDER, LYOPHILIZED, FOR SOLUTION INTRAVENOUS at 03:05

## 2021-05-28 RX ADMIN — CLOPIDOGREL 75 MG: 75 TABLET, FILM COATED ORAL at 08:05

## 2021-05-28 RX ADMIN — ALBUTEROL SULFATE 2 PUFF: 90 AEROSOL, METERED RESPIRATORY (INHALATION) at 01:05

## 2021-05-28 RX ADMIN — THERA TABS 1 TABLET: TAB at 08:05

## 2021-05-28 RX ADMIN — LOSARTAN POTASSIUM 25 MG: 25 TABLET, FILM COATED ORAL at 03:05

## 2021-05-28 RX ADMIN — MUPIROCIN: 20 OINTMENT TOPICAL at 08:05

## 2021-05-28 RX ADMIN — ALBUTEROL SULFATE 2 PUFF: 90 AEROSOL, METERED RESPIRATORY (INHALATION) at 07:05

## 2021-05-28 RX ADMIN — OXYCODONE HYDROCHLORIDE AND ACETAMINOPHEN 500 MG: 500 TABLET ORAL at 08:05

## 2021-05-28 RX ADMIN — DEXAMETHASONE 6 MG: 4 TABLET ORAL at 08:05

## 2021-05-28 RX ADMIN — METOPROLOL TARTRATE 25 MG: 25 TABLET, FILM COATED ORAL at 08:05

## 2021-05-28 RX ADMIN — Medication 10 ML: at 09:05

## 2021-05-28 RX ADMIN — INSULIN ASPART 5 UNITS: 100 INJECTION, SOLUTION INTRAVENOUS; SUBCUTANEOUS at 08:05

## 2021-05-28 RX ADMIN — INSULIN ASPART 4 UNITS: 100 INJECTION, SOLUTION INTRAVENOUS; SUBCUTANEOUS at 10:05

## 2021-05-28 RX ADMIN — Medication 10 ML: at 02:05

## 2021-05-28 RX ADMIN — ROSUVASTATIN 20 MG: 10 TABLET, FILM COATED ORAL at 08:05

## 2021-05-28 RX ADMIN — INSULIN ASPART 6 UNITS: 100 INJECTION, SOLUTION INTRAVENOUS; SUBCUTANEOUS at 05:05

## 2021-05-28 RX ADMIN — Medication 10 ML: at 05:05

## 2021-05-28 RX ADMIN — ALBUTEROL SULFATE 2 PUFF: 90 AEROSOL, METERED RESPIRATORY (INHALATION) at 12:05

## 2021-05-28 RX ADMIN — HEPARIN SODIUM 15 UNITS/KG/HR: 10000 INJECTION, SOLUTION INTRAVENOUS at 02:05

## 2021-05-28 RX ADMIN — HEPARIN SODIUM 15 UNITS/KG/HR: 10000 INJECTION, SOLUTION INTRAVENOUS at 06:05

## 2021-05-29 LAB
ALBUMIN SERPL BCP-MCNC: 3.1 G/DL (ref 3.5–5.2)
ALP SERPL-CCNC: 49 U/L (ref 55–135)
ALT SERPL W/O P-5'-P-CCNC: 16 U/L (ref 10–44)
ANION GAP SERPL CALC-SCNC: 9 MMOL/L (ref 8–16)
APTT BLDCRRT: 52 SEC (ref 21–32)
APTT BLDCRRT: 52 SEC (ref 21–32)
APTT BLDCRRT: 58.8 SEC (ref 21–32)
AST SERPL-CCNC: 18 U/L (ref 10–40)
BASOPHILS # BLD AUTO: 0.02 K/UL (ref 0–0.2)
BASOPHILS # BLD AUTO: 0.02 K/UL (ref 0–0.2)
BASOPHILS NFR BLD: 0.2 % (ref 0–1.9)
BASOPHILS NFR BLD: 0.2 % (ref 0–1.9)
BILIRUB SERPL-MCNC: 0.2 MG/DL (ref 0.1–1)
BLD PROD TYP BPU: NORMAL
BLOOD UNIT EXPIRATION DATE: NORMAL
BLOOD UNIT TYPE CODE: 9500
BLOOD UNIT TYPE: NORMAL
BUN SERPL-MCNC: 40 MG/DL (ref 8–23)
CALCIUM SERPL-MCNC: 9 MG/DL (ref 8.7–10.5)
CHLORIDE SERPL-SCNC: 103 MMOL/L (ref 95–110)
CO2 SERPL-SCNC: 27 MMOL/L (ref 23–29)
CODING SYSTEM: NORMAL
CREAT SERPL-MCNC: 0.9 MG/DL (ref 0.5–1.4)
CRP SERPL-MCNC: 4.4 MG/L (ref 0–8.2)
D DIMER PPP IA.FEU-MCNC: 1.19 MG/L FEU
DIFFERENTIAL METHOD: ABNORMAL
DIFFERENTIAL METHOD: ABNORMAL
DISPENSE STATUS: NORMAL
EOSINOPHIL # BLD AUTO: 0 K/UL (ref 0–0.5)
EOSINOPHIL # BLD AUTO: 0 K/UL (ref 0–0.5)
EOSINOPHIL NFR BLD: 0 % (ref 0–8)
EOSINOPHIL NFR BLD: 0 % (ref 0–8)
ERYTHROCYTE [DISTWIDTH] IN BLOOD BY AUTOMATED COUNT: 16.7 % (ref 11.5–14.5)
ERYTHROCYTE [DISTWIDTH] IN BLOOD BY AUTOMATED COUNT: 16.7 % (ref 11.5–14.5)
EST. GFR  (AFRICAN AMERICAN): >60 ML/MIN/1.73 M^2
EST. GFR  (NON AFRICAN AMERICAN): >60 ML/MIN/1.73 M^2
FERRITIN SERPL-MCNC: 12 NG/ML (ref 20–300)
GLUCOSE SERPL-MCNC: 227 MG/DL (ref 70–110)
HCT VFR BLD AUTO: 26.9 % (ref 37–48.5)
HCT VFR BLD AUTO: 26.9 % (ref 37–48.5)
HGB BLD-MCNC: 7.8 G/DL (ref 12–16)
HGB BLD-MCNC: 7.8 G/DL (ref 12–16)
IMM GRANULOCYTES # BLD AUTO: 0.05 K/UL (ref 0–0.04)
IMM GRANULOCYTES # BLD AUTO: 0.05 K/UL (ref 0–0.04)
IMM GRANULOCYTES NFR BLD AUTO: 0.6 % (ref 0–0.5)
IMM GRANULOCYTES NFR BLD AUTO: 0.6 % (ref 0–0.5)
LDH SERPL L TO P-CCNC: 225 U/L (ref 110–260)
LYMPHOCYTES # BLD AUTO: 1.2 K/UL (ref 1–4.8)
LYMPHOCYTES # BLD AUTO: 1.2 K/UL (ref 1–4.8)
LYMPHOCYTES NFR BLD: 14 % (ref 18–48)
LYMPHOCYTES NFR BLD: 14 % (ref 18–48)
MAGNESIUM SERPL-MCNC: 1.5 MG/DL (ref 1.6–2.6)
MCH RBC QN AUTO: 22.9 PG (ref 27–31)
MCH RBC QN AUTO: 22.9 PG (ref 27–31)
MCHC RBC AUTO-ENTMCNC: 29 G/DL (ref 32–36)
MCHC RBC AUTO-ENTMCNC: 29 G/DL (ref 32–36)
MCV RBC AUTO: 79 FL (ref 82–98)
MCV RBC AUTO: 79 FL (ref 82–98)
MONOCYTES # BLD AUTO: 0.7 K/UL (ref 0.3–1)
MONOCYTES # BLD AUTO: 0.7 K/UL (ref 0.3–1)
MONOCYTES NFR BLD: 8.4 % (ref 4–15)
MONOCYTES NFR BLD: 8.4 % (ref 4–15)
NEUTROPHILS # BLD AUTO: 6.6 K/UL (ref 1.8–7.7)
NEUTROPHILS # BLD AUTO: 6.6 K/UL (ref 1.8–7.7)
NEUTROPHILS NFR BLD: 76.8 % (ref 38–73)
NEUTROPHILS NFR BLD: 76.8 % (ref 38–73)
NRBC BLD-RTO: 0 /100 WBC
NRBC BLD-RTO: 0 /100 WBC
NUM UNITS TRANS PACKED RBC: NORMAL
PHOSPHATE SERPL-MCNC: 2.9 MG/DL (ref 2.7–4.5)
PLATELET # BLD AUTO: 237 K/UL (ref 150–450)
PLATELET # BLD AUTO: 237 K/UL (ref 150–450)
PMV BLD AUTO: 9.9 FL (ref 9.2–12.9)
PMV BLD AUTO: 9.9 FL (ref 9.2–12.9)
POCT GLUCOSE: 229 MG/DL (ref 70–110)
POCT GLUCOSE: 303 MG/DL (ref 70–110)
POCT GLUCOSE: 319 MG/DL (ref 70–110)
POCT GLUCOSE: 390 MG/DL (ref 70–110)
POCT GLUCOSE: 462 MG/DL (ref 70–110)
POTASSIUM SERPL-SCNC: 4.3 MMOL/L (ref 3.5–5.1)
PROT SERPL-MCNC: 6 G/DL (ref 6–8.4)
RBC # BLD AUTO: 3.41 M/UL (ref 4–5.4)
RBC # BLD AUTO: 3.41 M/UL (ref 4–5.4)
SODIUM SERPL-SCNC: 139 MMOL/L (ref 136–145)
WBC # BLD AUTO: 8.57 K/UL (ref 3.9–12.7)
WBC # BLD AUTO: 8.57 K/UL (ref 3.9–12.7)

## 2021-05-29 PROCEDURE — 63600175 PHARM REV CODE 636 W HCPCS: Performed by: HOSPITALIST

## 2021-05-29 PROCEDURE — 25000003 PHARM REV CODE 250: Performed by: NURSE PRACTITIONER

## 2021-05-29 PROCEDURE — 27000221 HC OXYGEN, UP TO 24 HOURS

## 2021-05-29 PROCEDURE — 63600175 PHARM REV CODE 636 W HCPCS: Performed by: NURSE PRACTITIONER

## 2021-05-29 PROCEDURE — C9399 UNCLASSIFIED DRUGS OR BIOLOG: HCPCS | Performed by: NURSE PRACTITIONER

## 2021-05-29 PROCEDURE — 85379 FIBRIN DEGRADATION QUANT: CPT | Performed by: NURSE PRACTITIONER

## 2021-05-29 PROCEDURE — P9016 RBC LEUKOCYTES REDUCED: HCPCS | Performed by: NURSE PRACTITIONER

## 2021-05-29 PROCEDURE — 99233 PR SUBSEQUENT HOSPITAL CARE,LEVL III: ICD-10-PCS | Mod: CR,,, | Performed by: INTERNAL MEDICINE

## 2021-05-29 PROCEDURE — 94640 AIRWAY INHALATION TREATMENT: CPT

## 2021-05-29 PROCEDURE — 36415 COLL VENOUS BLD VENIPUNCTURE: CPT | Performed by: INTERNAL MEDICINE

## 2021-05-29 PROCEDURE — 85025 COMPLETE CBC W/AUTO DIFF WBC: CPT | Performed by: NURSE PRACTITIONER

## 2021-05-29 PROCEDURE — 99233 SBSQ HOSP IP/OBS HIGH 50: CPT | Mod: CR,,, | Performed by: INTERNAL MEDICINE

## 2021-05-29 PROCEDURE — 85730 THROMBOPLASTIN TIME PARTIAL: CPT | Performed by: INTERNAL MEDICINE

## 2021-05-29 PROCEDURE — 85730 THROMBOPLASTIN TIME PARTIAL: CPT | Mod: 91 | Performed by: HOSPITALIST

## 2021-05-29 PROCEDURE — 36415 COLL VENOUS BLD VENIPUNCTURE: CPT | Performed by: NURSE PRACTITIONER

## 2021-05-29 PROCEDURE — 21400001 HC TELEMETRY ROOM

## 2021-05-29 PROCEDURE — 36430 TRANSFUSION BLD/BLD COMPNT: CPT

## 2021-05-29 PROCEDURE — 84100 ASSAY OF PHOSPHORUS: CPT | Performed by: NURSE PRACTITIONER

## 2021-05-29 PROCEDURE — 25000003 PHARM REV CODE 250: Performed by: INTERNAL MEDICINE

## 2021-05-29 PROCEDURE — 82728 ASSAY OF FERRITIN: CPT | Performed by: NURSE PRACTITIONER

## 2021-05-29 PROCEDURE — 63600175 PHARM REV CODE 636 W HCPCS: Performed by: INTERNAL MEDICINE

## 2021-05-29 PROCEDURE — A4216 STERILE WATER/SALINE, 10 ML: HCPCS | Performed by: NURSE PRACTITIONER

## 2021-05-29 PROCEDURE — 94761 N-INVAS EAR/PLS OXIMETRY MLT: CPT

## 2021-05-29 PROCEDURE — 63600175 PHARM REV CODE 636 W HCPCS: Performed by: FAMILY MEDICINE

## 2021-05-29 PROCEDURE — 86140 C-REACTIVE PROTEIN: CPT | Performed by: NURSE PRACTITIONER

## 2021-05-29 PROCEDURE — 80053 COMPREHEN METABOLIC PANEL: CPT | Performed by: NURSE PRACTITIONER

## 2021-05-29 PROCEDURE — 83615 LACTATE (LD) (LDH) ENZYME: CPT | Performed by: NURSE PRACTITIONER

## 2021-05-29 PROCEDURE — 83735 ASSAY OF MAGNESIUM: CPT | Performed by: NURSE PRACTITIONER

## 2021-05-29 RX ORDER — FUROSEMIDE 40 MG/1
40 TABLET ORAL DAILY
Status: DISCONTINUED | OUTPATIENT
Start: 2021-05-29 | End: 2021-05-30

## 2021-05-29 RX ORDER — CHOLECALCIFEROL (VITAMIN D3) 25 MCG
1000 TABLET ORAL DAILY
Status: DISCONTINUED | OUTPATIENT
Start: 2021-05-29 | End: 2021-05-31 | Stop reason: HOSPADM

## 2021-05-29 RX ORDER — INSULIN ASPART 100 [IU]/ML
10 INJECTION, SOLUTION INTRAVENOUS; SUBCUTANEOUS ONCE
Status: COMPLETED | OUTPATIENT
Start: 2021-05-29 | End: 2021-05-29

## 2021-05-29 RX ORDER — HYDROCODONE BITARTRATE AND ACETAMINOPHEN 500; 5 MG/1; MG/1
TABLET ORAL
Status: DISCONTINUED | OUTPATIENT
Start: 2021-05-29 | End: 2021-05-31 | Stop reason: HOSPADM

## 2021-05-29 RX ADMIN — HEPARIN SODIUM 17 UNITS/KG/HR: 10000 INJECTION, SOLUTION INTRAVENOUS at 03:05

## 2021-05-29 RX ADMIN — Medication 1000 UNITS: at 09:05

## 2021-05-29 RX ADMIN — OXYCODONE HYDROCHLORIDE AND ACETAMINOPHEN 500 MG: 500 TABLET ORAL at 09:05

## 2021-05-29 RX ADMIN — REMDESIVIR 100 MG: 100 INJECTION, POWDER, LYOPHILIZED, FOR SOLUTION INTRAVENOUS at 03:05

## 2021-05-29 RX ADMIN — ALBUTEROL SULFATE 2 PUFF: 90 AEROSOL, METERED RESPIRATORY (INHALATION) at 07:05

## 2021-05-29 RX ADMIN — INSULIN ASPART 4 UNITS: 100 INJECTION, SOLUTION INTRAVENOUS; SUBCUTANEOUS at 05:05

## 2021-05-29 RX ADMIN — FUROSEMIDE 40 MG: 40 TABLET ORAL at 09:05

## 2021-05-29 RX ADMIN — INSULIN ASPART 8 UNITS: 100 INJECTION, SOLUTION INTRAVENOUS; SUBCUTANEOUS at 10:05

## 2021-05-29 RX ADMIN — IRON SUCROSE 200 MG: 20 INJECTION, SOLUTION INTRAVENOUS at 09:05

## 2021-05-29 RX ADMIN — Medication 10 ML: at 08:05

## 2021-05-29 RX ADMIN — ASPIRIN 81 MG: 81 TABLET, COATED ORAL at 09:05

## 2021-05-29 RX ADMIN — MUPIROCIN: 20 OINTMENT TOPICAL at 09:05

## 2021-05-29 RX ADMIN — METOPROLOL SUCCINATE 25 MG: 25 TABLET, EXTENDED RELEASE ORAL at 09:05

## 2021-05-29 RX ADMIN — CLOPIDOGREL 75 MG: 75 TABLET, FILM COATED ORAL at 09:05

## 2021-05-29 RX ADMIN — MUPIROCIN: 20 OINTMENT TOPICAL at 08:05

## 2021-05-29 RX ADMIN — DEXAMETHASONE 6 MG: 4 TABLET ORAL at 09:05

## 2021-05-29 RX ADMIN — Medication 10 ML: at 01:05

## 2021-05-29 RX ADMIN — INSULIN ASPART 5 UNITS: 100 INJECTION, SOLUTION INTRAVENOUS; SUBCUTANEOUS at 08:05

## 2021-05-29 RX ADMIN — INSULIN DETEMIR 10 UNITS: 100 INJECTION, SOLUTION SUBCUTANEOUS at 09:05

## 2021-05-29 RX ADMIN — INSULIN ASPART 10 UNITS: 100 INJECTION, SOLUTION INTRAVENOUS; SUBCUTANEOUS at 03:05

## 2021-05-29 RX ADMIN — INSULIN ASPART 10 UNITS: 100 INJECTION, SOLUTION INTRAVENOUS; SUBCUTANEOUS at 09:05

## 2021-05-29 RX ADMIN — Medication 10 ML: at 05:05

## 2021-05-29 RX ADMIN — ROSUVASTATIN 20 MG: 10 TABLET, FILM COATED ORAL at 08:05

## 2021-05-29 RX ADMIN — ALBUTEROL SULFATE 2 PUFF: 90 AEROSOL, METERED RESPIRATORY (INHALATION) at 01:05

## 2021-05-29 RX ADMIN — THERA TABS 1 TABLET: TAB at 09:05

## 2021-05-29 RX ADMIN — OXYCODONE HYDROCHLORIDE AND ACETAMINOPHEN 500 MG: 500 TABLET ORAL at 08:05

## 2021-05-29 RX ADMIN — LOSARTAN POTASSIUM 25 MG: 25 TABLET, FILM COATED ORAL at 09:05

## 2021-05-30 LAB
ALBUMIN SERPL BCP-MCNC: 3.4 G/DL (ref 3.5–5.2)
ALP SERPL-CCNC: 53 U/L (ref 55–135)
ALT SERPL W/O P-5'-P-CCNC: 17 U/L (ref 10–44)
ANION GAP SERPL CALC-SCNC: 9 MMOL/L (ref 8–16)
APTT BLDCRRT: 53 SEC (ref 21–32)
AST SERPL-CCNC: 17 U/L (ref 10–40)
BASOPHILS # BLD AUTO: 0.01 K/UL (ref 0–0.2)
BASOPHILS # BLD AUTO: 0.01 K/UL (ref 0–0.2)
BASOPHILS NFR BLD: 0.1 % (ref 0–1.9)
BASOPHILS NFR BLD: 0.1 % (ref 0–1.9)
BILIRUB SERPL-MCNC: 0.4 MG/DL (ref 0.1–1)
BNP SERPL-MCNC: 975 PG/ML (ref 0–99)
BUN SERPL-MCNC: 30 MG/DL (ref 8–23)
CALCIUM SERPL-MCNC: 9.5 MG/DL (ref 8.7–10.5)
CHLORIDE SERPL-SCNC: 103 MMOL/L (ref 95–110)
CO2 SERPL-SCNC: 28 MMOL/L (ref 23–29)
CREAT SERPL-MCNC: 0.8 MG/DL (ref 0.5–1.4)
DIFFERENTIAL METHOD: ABNORMAL
DIFFERENTIAL METHOD: ABNORMAL
EOSINOPHIL # BLD AUTO: 0 K/UL (ref 0–0.5)
EOSINOPHIL # BLD AUTO: 0 K/UL (ref 0–0.5)
EOSINOPHIL NFR BLD: 0 % (ref 0–8)
EOSINOPHIL NFR BLD: 0 % (ref 0–8)
ERYTHROCYTE [DISTWIDTH] IN BLOOD BY AUTOMATED COUNT: 16.8 % (ref 11.5–14.5)
ERYTHROCYTE [DISTWIDTH] IN BLOOD BY AUTOMATED COUNT: 16.8 % (ref 11.5–14.5)
EST. GFR  (AFRICAN AMERICAN): >60 ML/MIN/1.73 M^2
EST. GFR  (NON AFRICAN AMERICAN): >60 ML/MIN/1.73 M^2
GLUCOSE SERPL-MCNC: 154 MG/DL (ref 70–110)
HCT VFR BLD AUTO: 32.3 % (ref 37–48.5)
HCT VFR BLD AUTO: 32.3 % (ref 37–48.5)
HGB BLD-MCNC: 9.9 G/DL (ref 12–16)
HGB BLD-MCNC: 9.9 G/DL (ref 12–16)
IMM GRANULOCYTES # BLD AUTO: 0.05 K/UL (ref 0–0.04)
IMM GRANULOCYTES # BLD AUTO: 0.05 K/UL (ref 0–0.04)
IMM GRANULOCYTES NFR BLD AUTO: 0.5 % (ref 0–0.5)
IMM GRANULOCYTES NFR BLD AUTO: 0.5 % (ref 0–0.5)
LYMPHOCYTES # BLD AUTO: 1.5 K/UL (ref 1–4.8)
LYMPHOCYTES # BLD AUTO: 1.5 K/UL (ref 1–4.8)
LYMPHOCYTES NFR BLD: 15.3 % (ref 18–48)
LYMPHOCYTES NFR BLD: 15.3 % (ref 18–48)
MAGNESIUM SERPL-MCNC: 1.4 MG/DL (ref 1.6–2.6)
MCH RBC QN AUTO: 23.7 PG (ref 27–31)
MCH RBC QN AUTO: 23.7 PG (ref 27–31)
MCHC RBC AUTO-ENTMCNC: 30.7 G/DL (ref 32–36)
MCHC RBC AUTO-ENTMCNC: 30.7 G/DL (ref 32–36)
MCV RBC AUTO: 77 FL (ref 82–98)
MCV RBC AUTO: 77 FL (ref 82–98)
MONOCYTES # BLD AUTO: 1 K/UL (ref 0.3–1)
MONOCYTES # BLD AUTO: 1 K/UL (ref 0.3–1)
MONOCYTES NFR BLD: 10.4 % (ref 4–15)
MONOCYTES NFR BLD: 10.4 % (ref 4–15)
NEUTROPHILS # BLD AUTO: 7.2 K/UL (ref 1.8–7.7)
NEUTROPHILS # BLD AUTO: 7.2 K/UL (ref 1.8–7.7)
NEUTROPHILS NFR BLD: 73.7 % (ref 38–73)
NEUTROPHILS NFR BLD: 73.7 % (ref 38–73)
NRBC BLD-RTO: 0 /100 WBC
NRBC BLD-RTO: 0 /100 WBC
PHOSPHATE SERPL-MCNC: 2.7 MG/DL (ref 2.7–4.5)
PLATELET # BLD AUTO: 293 K/UL (ref 150–450)
PLATELET # BLD AUTO: 293 K/UL (ref 150–450)
PMV BLD AUTO: 10.1 FL (ref 9.2–12.9)
PMV BLD AUTO: 10.1 FL (ref 9.2–12.9)
POCT GLUCOSE: 203 MG/DL (ref 70–110)
POCT GLUCOSE: 378 MG/DL (ref 70–110)
POCT GLUCOSE: 379 MG/DL (ref 70–110)
POCT GLUCOSE: >500 MG/DL (ref 70–110)
POTASSIUM SERPL-SCNC: 3.7 MMOL/L (ref 3.5–5.1)
PROT SERPL-MCNC: 6.7 G/DL (ref 6–8.4)
RBC # BLD AUTO: 4.18 M/UL (ref 4–5.4)
RBC # BLD AUTO: 4.18 M/UL (ref 4–5.4)
SODIUM SERPL-SCNC: 140 MMOL/L (ref 136–145)
WBC # BLD AUTO: 9.74 K/UL (ref 3.9–12.7)
WBC # BLD AUTO: 9.74 K/UL (ref 3.9–12.7)

## 2021-05-30 PROCEDURE — 63600175 PHARM REV CODE 636 W HCPCS: Performed by: FAMILY MEDICINE

## 2021-05-30 PROCEDURE — 84100 ASSAY OF PHOSPHORUS: CPT | Performed by: NURSE PRACTITIONER

## 2021-05-30 PROCEDURE — 36415 COLL VENOUS BLD VENIPUNCTURE: CPT | Performed by: INTERNAL MEDICINE

## 2021-05-30 PROCEDURE — 94640 AIRWAY INHALATION TREATMENT: CPT

## 2021-05-30 PROCEDURE — 63600175 PHARM REV CODE 636 W HCPCS: Performed by: NURSE PRACTITIONER

## 2021-05-30 PROCEDURE — 21400001 HC TELEMETRY ROOM

## 2021-05-30 PROCEDURE — 85730 THROMBOPLASTIN TIME PARTIAL: CPT | Performed by: HOSPITALIST

## 2021-05-30 PROCEDURE — A4216 STERILE WATER/SALINE, 10 ML: HCPCS | Performed by: NURSE PRACTITIONER

## 2021-05-30 PROCEDURE — 99233 PR SUBSEQUENT HOSPITAL CARE,LEVL III: ICD-10-PCS | Mod: CR,,, | Performed by: INTERNAL MEDICINE

## 2021-05-30 PROCEDURE — 94761 N-INVAS EAR/PLS OXIMETRY MLT: CPT

## 2021-05-30 PROCEDURE — 80053 COMPREHEN METABOLIC PANEL: CPT | Performed by: NURSE PRACTITIONER

## 2021-05-30 PROCEDURE — 36415 COLL VENOUS BLD VENIPUNCTURE: CPT | Performed by: HOSPITALIST

## 2021-05-30 PROCEDURE — 25000003 PHARM REV CODE 250: Performed by: INTERNAL MEDICINE

## 2021-05-30 PROCEDURE — 25000003 PHARM REV CODE 250: Performed by: NURSE PRACTITIONER

## 2021-05-30 PROCEDURE — 25000003 PHARM REV CODE 250: Performed by: FAMILY MEDICINE

## 2021-05-30 PROCEDURE — 63600175 PHARM REV CODE 636 W HCPCS: Performed by: INTERNAL MEDICINE

## 2021-05-30 PROCEDURE — 83880 ASSAY OF NATRIURETIC PEPTIDE: CPT | Performed by: INTERNAL MEDICINE

## 2021-05-30 PROCEDURE — 27000221 HC OXYGEN, UP TO 24 HOURS

## 2021-05-30 PROCEDURE — 99233 SBSQ HOSP IP/OBS HIGH 50: CPT | Mod: CR,,, | Performed by: INTERNAL MEDICINE

## 2021-05-30 PROCEDURE — 85025 COMPLETE CBC W/AUTO DIFF WBC: CPT | Performed by: NURSE PRACTITIONER

## 2021-05-30 PROCEDURE — 83735 ASSAY OF MAGNESIUM: CPT | Performed by: NURSE PRACTITIONER

## 2021-05-30 RX ORDER — INSULIN ASPART 100 [IU]/ML
5 INJECTION, SOLUTION INTRAVENOUS; SUBCUTANEOUS ONCE
Status: COMPLETED | OUTPATIENT
Start: 2021-05-30 | End: 2021-05-30

## 2021-05-30 RX ORDER — FUROSEMIDE 10 MG/ML
40 INJECTION INTRAMUSCULAR; INTRAVENOUS DAILY
Status: DISCONTINUED | OUTPATIENT
Start: 2021-05-30 | End: 2021-05-31 | Stop reason: HOSPADM

## 2021-05-30 RX ADMIN — ASPIRIN 81 MG: 81 TABLET, COATED ORAL at 09:05

## 2021-05-30 RX ADMIN — MUPIROCIN: 20 OINTMENT TOPICAL at 10:05

## 2021-05-30 RX ADMIN — Medication 10 ML: at 10:05

## 2021-05-30 RX ADMIN — INSULIN ASPART 10 UNITS: 100 INJECTION, SOLUTION INTRAVENOUS; SUBCUTANEOUS at 05:05

## 2021-05-30 RX ADMIN — OXYCODONE HYDROCHLORIDE AND ACETAMINOPHEN 500 MG: 500 TABLET ORAL at 09:05

## 2021-05-30 RX ADMIN — METOPROLOL SUCCINATE 25 MG: 25 TABLET, EXTENDED RELEASE ORAL at 09:05

## 2021-05-30 RX ADMIN — REMDESIVIR 100 MG: 100 INJECTION, POWDER, LYOPHILIZED, FOR SOLUTION INTRAVENOUS at 02:05

## 2021-05-30 RX ADMIN — DEXAMETHASONE 6 MG: 4 TABLET ORAL at 09:05

## 2021-05-30 RX ADMIN — ALBUTEROL SULFATE 2 PUFF: 90 AEROSOL, METERED RESPIRATORY (INHALATION) at 01:05

## 2021-05-30 RX ADMIN — ROSUVASTATIN 20 MG: 10 TABLET, FILM COATED ORAL at 10:05

## 2021-05-30 RX ADMIN — Medication 1000 UNITS: at 09:05

## 2021-05-30 RX ADMIN — ALBUTEROL SULFATE 2 PUFF: 90 AEROSOL, METERED RESPIRATORY (INHALATION) at 07:05

## 2021-05-30 RX ADMIN — Medication 10 ML: at 05:05

## 2021-05-30 RX ADMIN — HEPARIN SODIUM 17 UNITS/KG/HR: 10000 INJECTION, SOLUTION INTRAVENOUS at 02:05

## 2021-05-30 RX ADMIN — FUROSEMIDE 40 MG: 10 INJECTION, SOLUTION INTRAMUSCULAR; INTRAVENOUS at 09:05

## 2021-05-30 RX ADMIN — OXYCODONE HYDROCHLORIDE AND ACETAMINOPHEN 500 MG: 500 TABLET ORAL at 10:05

## 2021-05-30 RX ADMIN — INSULIN ASPART 4 UNITS: 100 INJECTION, SOLUTION INTRAVENOUS; SUBCUTANEOUS at 05:05

## 2021-05-30 RX ADMIN — INSULIN ASPART 5 UNITS: 100 INJECTION, SOLUTION INTRAVENOUS; SUBCUTANEOUS at 01:05

## 2021-05-30 RX ADMIN — INSULIN ASPART 5 UNITS: 100 INJECTION, SOLUTION INTRAVENOUS; SUBCUTANEOUS at 10:05

## 2021-05-30 RX ADMIN — ALBUTEROL SULFATE 2 PUFF: 90 AEROSOL, METERED RESPIRATORY (INHALATION) at 12:05

## 2021-05-30 RX ADMIN — Medication 10 ML: at 02:05

## 2021-05-30 RX ADMIN — MUPIROCIN: 20 OINTMENT TOPICAL at 09:05

## 2021-05-30 RX ADMIN — INSULIN ASPART 10 UNITS: 100 INJECTION, SOLUTION INTRAVENOUS; SUBCUTANEOUS at 11:05

## 2021-05-30 RX ADMIN — THERA TABS 1 TABLET: TAB at 09:05

## 2021-05-30 RX ADMIN — CLOPIDOGREL 75 MG: 75 TABLET, FILM COATED ORAL at 09:05

## 2021-05-30 RX ADMIN — LOSARTAN POTASSIUM 25 MG: 25 TABLET, FILM COATED ORAL at 09:05

## 2021-05-31 ENCOUNTER — TELEPHONE (OUTPATIENT)
Dept: HEMATOLOGY/ONCOLOGY | Facility: CLINIC | Age: 66
End: 2021-05-31

## 2021-05-31 ENCOUNTER — PATIENT MESSAGE (OUTPATIENT)
Dept: HEMATOLOGY/ONCOLOGY | Facility: CLINIC | Age: 66
End: 2021-05-31

## 2021-05-31 VITALS
OXYGEN SATURATION: 96 % | DIASTOLIC BLOOD PRESSURE: 61 MMHG | HEIGHT: 62 IN | HEART RATE: 75 BPM | WEIGHT: 183.88 LBS | RESPIRATION RATE: 20 BRPM | TEMPERATURE: 98 F | SYSTOLIC BLOOD PRESSURE: 141 MMHG | BODY MASS INDEX: 33.84 KG/M2

## 2021-05-31 DIAGNOSIS — U07.1 COVID-19 VIRUS DETECTED: ICD-10-CM

## 2021-05-31 LAB
ALBUMIN SERPL BCP-MCNC: 3.3 G/DL (ref 3.5–5.2)
ALP SERPL-CCNC: 52 U/L (ref 55–135)
ALT SERPL W/O P-5'-P-CCNC: 20 U/L (ref 10–44)
ANION GAP SERPL CALC-SCNC: 11 MMOL/L (ref 8–16)
APTT BLDCRRT: 52.1 SEC (ref 21–32)
AST SERPL-CCNC: 18 U/L (ref 10–40)
BASOPHILS # BLD AUTO: 0.02 K/UL (ref 0–0.2)
BASOPHILS # BLD AUTO: 0.02 K/UL (ref 0–0.2)
BASOPHILS NFR BLD: 0.2 % (ref 0–1.9)
BASOPHILS NFR BLD: 0.2 % (ref 0–1.9)
BILIRUB SERPL-MCNC: 0.4 MG/DL (ref 0.1–1)
BUN SERPL-MCNC: 31 MG/DL (ref 8–23)
CALCIUM SERPL-MCNC: 9.2 MG/DL (ref 8.7–10.5)
CHLORIDE SERPL-SCNC: 99 MMOL/L (ref 95–110)
CO2 SERPL-SCNC: 28 MMOL/L (ref 23–29)
CREAT SERPL-MCNC: 0.9 MG/DL (ref 0.5–1.4)
CRP SERPL-MCNC: 2.3 MG/L (ref 0–8.2)
D DIMER PPP IA.FEU-MCNC: 1.6 MG/L FEU
DIFFERENTIAL METHOD: ABNORMAL
DIFFERENTIAL METHOD: ABNORMAL
EOSINOPHIL # BLD AUTO: 0 K/UL (ref 0–0.5)
EOSINOPHIL # BLD AUTO: 0 K/UL (ref 0–0.5)
EOSINOPHIL NFR BLD: 0.2 % (ref 0–8)
EOSINOPHIL NFR BLD: 0.2 % (ref 0–8)
ERYTHROCYTE [DISTWIDTH] IN BLOOD BY AUTOMATED COUNT: 17.1 % (ref 11.5–14.5)
ERYTHROCYTE [DISTWIDTH] IN BLOOD BY AUTOMATED COUNT: 17.1 % (ref 11.5–14.5)
EST. GFR  (AFRICAN AMERICAN): >60 ML/MIN/1.73 M^2
EST. GFR  (NON AFRICAN AMERICAN): >60 ML/MIN/1.73 M^2
FERRITIN SERPL-MCNC: 208 NG/ML (ref 20–300)
GLUCOSE SERPL-MCNC: 259 MG/DL (ref 70–110)
HCT VFR BLD AUTO: 34.6 % (ref 37–48.5)
HCT VFR BLD AUTO: 34.6 % (ref 37–48.5)
HGB BLD-MCNC: 10.4 G/DL (ref 12–16)
HGB BLD-MCNC: 10.4 G/DL (ref 12–16)
IMM GRANULOCYTES # BLD AUTO: 0.11 K/UL (ref 0–0.04)
IMM GRANULOCYTES # BLD AUTO: 0.11 K/UL (ref 0–0.04)
IMM GRANULOCYTES NFR BLD AUTO: 1 % (ref 0–0.5)
IMM GRANULOCYTES NFR BLD AUTO: 1 % (ref 0–0.5)
LDH SERPL L TO P-CCNC: 248 U/L (ref 110–260)
LYMPHOCYTES # BLD AUTO: 1.9 K/UL (ref 1–4.8)
LYMPHOCYTES # BLD AUTO: 1.9 K/UL (ref 1–4.8)
LYMPHOCYTES NFR BLD: 17.6 % (ref 18–48)
LYMPHOCYTES NFR BLD: 17.6 % (ref 18–48)
MAGNESIUM SERPL-MCNC: 1.3 MG/DL (ref 1.6–2.6)
MCH RBC QN AUTO: 23.4 PG (ref 27–31)
MCH RBC QN AUTO: 23.4 PG (ref 27–31)
MCHC RBC AUTO-ENTMCNC: 30.1 G/DL (ref 32–36)
MCHC RBC AUTO-ENTMCNC: 30.1 G/DL (ref 32–36)
MCV RBC AUTO: 78 FL (ref 82–98)
MCV RBC AUTO: 78 FL (ref 82–98)
MONOCYTES # BLD AUTO: 1 K/UL (ref 0.3–1)
MONOCYTES # BLD AUTO: 1 K/UL (ref 0.3–1)
MONOCYTES NFR BLD: 9.7 % (ref 4–15)
MONOCYTES NFR BLD: 9.7 % (ref 4–15)
NEUTROPHILS # BLD AUTO: 7.5 K/UL (ref 1.8–7.7)
NEUTROPHILS # BLD AUTO: 7.5 K/UL (ref 1.8–7.7)
NEUTROPHILS NFR BLD: 71.3 % (ref 38–73)
NEUTROPHILS NFR BLD: 71.3 % (ref 38–73)
NRBC BLD-RTO: 0 /100 WBC
NRBC BLD-RTO: 0 /100 WBC
PHOSPHATE SERPL-MCNC: 2.5 MG/DL (ref 2.7–4.5)
PLATELET # BLD AUTO: 308 K/UL (ref 150–450)
PLATELET # BLD AUTO: 308 K/UL (ref 150–450)
PMV BLD AUTO: 10.6 FL (ref 9.2–12.9)
PMV BLD AUTO: 10.6 FL (ref 9.2–12.9)
POCT GLUCOSE: 269 MG/DL (ref 70–110)
POTASSIUM SERPL-SCNC: 3.8 MMOL/L (ref 3.5–5.1)
PROT SERPL-MCNC: 6.7 G/DL (ref 6–8.4)
RBC # BLD AUTO: 4.44 M/UL (ref 4–5.4)
RBC # BLD AUTO: 4.44 M/UL (ref 4–5.4)
SODIUM SERPL-SCNC: 138 MMOL/L (ref 136–145)
WBC # BLD AUTO: 10.54 K/UL (ref 3.9–12.7)
WBC # BLD AUTO: 10.54 K/UL (ref 3.9–12.7)

## 2021-05-31 PROCEDURE — 25000003 PHARM REV CODE 250: Performed by: FAMILY MEDICINE

## 2021-05-31 PROCEDURE — 82728 ASSAY OF FERRITIN: CPT | Performed by: NURSE PRACTITIONER

## 2021-05-31 PROCEDURE — 94640 AIRWAY INHALATION TREATMENT: CPT

## 2021-05-31 PROCEDURE — 25000003 PHARM REV CODE 250: Performed by: NURSE PRACTITIONER

## 2021-05-31 PROCEDURE — 86140 C-REACTIVE PROTEIN: CPT | Performed by: NURSE PRACTITIONER

## 2021-05-31 PROCEDURE — 85379 FIBRIN DEGRADATION QUANT: CPT | Performed by: NURSE PRACTITIONER

## 2021-05-31 PROCEDURE — 63600175 PHARM REV CODE 636 W HCPCS: Performed by: FAMILY MEDICINE

## 2021-05-31 PROCEDURE — 85730 THROMBOPLASTIN TIME PARTIAL: CPT | Performed by: HOSPITALIST

## 2021-05-31 PROCEDURE — 25000003 PHARM REV CODE 250: Performed by: INTERNAL MEDICINE

## 2021-05-31 PROCEDURE — 36415 COLL VENOUS BLD VENIPUNCTURE: CPT | Performed by: NURSE PRACTITIONER

## 2021-05-31 PROCEDURE — 84100 ASSAY OF PHOSPHORUS: CPT | Performed by: NURSE PRACTITIONER

## 2021-05-31 PROCEDURE — 36415 COLL VENOUS BLD VENIPUNCTURE: CPT | Performed by: HOSPITALIST

## 2021-05-31 PROCEDURE — 83615 LACTATE (LD) (LDH) ENZYME: CPT | Performed by: NURSE PRACTITIONER

## 2021-05-31 PROCEDURE — A4216 STERILE WATER/SALINE, 10 ML: HCPCS | Performed by: NURSE PRACTITIONER

## 2021-05-31 PROCEDURE — 63600175 PHARM REV CODE 636 W HCPCS: Performed by: INTERNAL MEDICINE

## 2021-05-31 PROCEDURE — 85025 COMPLETE CBC W/AUTO DIFF WBC: CPT | Performed by: NURSE PRACTITIONER

## 2021-05-31 PROCEDURE — 80053 COMPREHEN METABOLIC PANEL: CPT | Performed by: NURSE PRACTITIONER

## 2021-05-31 PROCEDURE — 83735 ASSAY OF MAGNESIUM: CPT | Performed by: NURSE PRACTITIONER

## 2021-05-31 PROCEDURE — 94761 N-INVAS EAR/PLS OXIMETRY MLT: CPT

## 2021-05-31 RX ORDER — LOSARTAN POTASSIUM 25 MG/1
25 TABLET ORAL DAILY
Qty: 30 TABLET | Refills: 1 | Status: SHIPPED | OUTPATIENT
Start: 2021-06-01 | End: 2021-06-29

## 2021-05-31 RX ORDER — FUROSEMIDE 20 MG/1
40 TABLET ORAL DAILY
Qty: 30 TABLET | Refills: 0 | Status: SHIPPED | OUTPATIENT
Start: 2021-05-31 | End: 2021-07-19 | Stop reason: SDUPTHER

## 2021-05-31 RX ORDER — CLONIDINE HYDROCHLORIDE 0.1 MG/1
0.1 TABLET ORAL EVERY 6 HOURS PRN
Status: DISCONTINUED | OUTPATIENT
Start: 2021-05-31 | End: 2021-05-31 | Stop reason: HOSPADM

## 2021-05-31 RX ORDER — METOPROLOL SUCCINATE 25 MG/1
25 TABLET, EXTENDED RELEASE ORAL DAILY
Qty: 30 TABLET | Refills: 1 | Status: SHIPPED | OUTPATIENT
Start: 2021-06-01 | End: 2021-06-29

## 2021-05-31 RX ADMIN — THERA TABS 1 TABLET: TAB at 08:05

## 2021-05-31 RX ADMIN — ASPIRIN 81 MG: 81 TABLET, COATED ORAL at 08:05

## 2021-05-31 RX ADMIN — Medication 1000 UNITS: at 08:05

## 2021-05-31 RX ADMIN — DEXAMETHASONE 6 MG: 4 TABLET ORAL at 08:05

## 2021-05-31 RX ADMIN — FUROSEMIDE 40 MG: 10 INJECTION, SOLUTION INTRAMUSCULAR; INTRAVENOUS at 08:05

## 2021-05-31 RX ADMIN — MUPIROCIN: 20 OINTMENT TOPICAL at 08:05

## 2021-05-31 RX ADMIN — ALBUTEROL SULFATE 2 PUFF: 90 AEROSOL, METERED RESPIRATORY (INHALATION) at 12:05

## 2021-05-31 RX ADMIN — ALBUTEROL SULFATE 2 PUFF: 90 AEROSOL, METERED RESPIRATORY (INHALATION) at 07:05

## 2021-05-31 RX ADMIN — CLONIDINE HYDROCHLORIDE 0.1 MG: 0.1 TABLET ORAL at 06:05

## 2021-05-31 RX ADMIN — METOPROLOL SUCCINATE 25 MG: 25 TABLET, EXTENDED RELEASE ORAL at 08:05

## 2021-05-31 RX ADMIN — INSULIN ASPART 6 UNITS: 100 INJECTION, SOLUTION INTRAVENOUS; SUBCUTANEOUS at 05:05

## 2021-05-31 RX ADMIN — OXYCODONE HYDROCHLORIDE AND ACETAMINOPHEN 500 MG: 500 TABLET ORAL at 08:05

## 2021-05-31 RX ADMIN — CLOPIDOGREL 75 MG: 75 TABLET, FILM COATED ORAL at 08:05

## 2021-05-31 RX ADMIN — LOSARTAN POTASSIUM 25 MG: 25 TABLET, FILM COATED ORAL at 08:05

## 2021-05-31 RX ADMIN — Medication 10 ML: at 06:05

## 2021-06-01 ENCOUNTER — TELEPHONE (OUTPATIENT)
Dept: HEMATOLOGY/ONCOLOGY | Facility: CLINIC | Age: 66
End: 2021-06-01

## 2021-06-01 LAB
BACTERIA BLD CULT: NORMAL
BACTERIA BLD CULT: NORMAL

## 2021-06-02 ENCOUNTER — TELEPHONE (OUTPATIENT)
Dept: PRIMARY CARE CLINIC | Facility: CLINIC | Age: 66
End: 2021-06-02

## 2021-06-02 ENCOUNTER — TELEPHONE (OUTPATIENT)
Dept: CARDIOLOGY | Facility: CLINIC | Age: 66
End: 2021-06-02

## 2021-06-02 ENCOUNTER — PATIENT OUTREACH (OUTPATIENT)
Dept: ADMINISTRATIVE | Facility: OTHER | Age: 66
End: 2021-06-02

## 2021-06-03 ENCOUNTER — PATIENT MESSAGE (OUTPATIENT)
Dept: GASTROENTEROLOGY | Facility: CLINIC | Age: 66
End: 2021-06-03

## 2021-06-04 ENCOUNTER — TELEPHONE (OUTPATIENT)
Dept: CARDIOLOGY | Facility: CLINIC | Age: 66
End: 2021-06-04

## 2021-06-04 ENCOUNTER — OFFICE VISIT (OUTPATIENT)
Dept: CARDIOLOGY | Facility: CLINIC | Age: 66
End: 2021-06-04
Payer: MEDICARE

## 2021-06-04 VITALS
SYSTOLIC BLOOD PRESSURE: 112 MMHG | WEIGHT: 200 LBS | HEART RATE: 81 BPM | BODY MASS INDEX: 36.8 KG/M2 | DIASTOLIC BLOOD PRESSURE: 62 MMHG | HEIGHT: 62 IN

## 2021-06-04 DIAGNOSIS — I10 ESSENTIAL HYPERTENSION: Chronic | ICD-10-CM

## 2021-06-04 DIAGNOSIS — I21.4 NSTEMI (NON-ST ELEVATED MYOCARDIAL INFARCTION): Primary | ICD-10-CM

## 2021-06-04 DIAGNOSIS — E78.5 HYPERLIPIDEMIA, UNSPECIFIED HYPERLIPIDEMIA TYPE: Chronic | ICD-10-CM

## 2021-06-04 DIAGNOSIS — U07.1 COVID-19 VIRUS INFECTION: ICD-10-CM

## 2021-06-04 PROCEDURE — 99215 OFFICE O/P EST HI 40 MIN: CPT | Mod: CR,95,, | Performed by: NURSE PRACTITIONER

## 2021-06-04 PROCEDURE — 99215 PR OFFICE/OUTPT VISIT, EST, LEVL V, 40-54 MIN: ICD-10-PCS | Mod: CR,95,, | Performed by: NURSE PRACTITIONER

## 2021-06-07 ENCOUNTER — PATIENT MESSAGE (OUTPATIENT)
Dept: GASTROENTEROLOGY | Facility: CLINIC | Age: 66
End: 2021-06-07

## 2021-06-07 ENCOUNTER — PATIENT MESSAGE (OUTPATIENT)
Dept: FAMILY MEDICINE | Facility: CLINIC | Age: 66
End: 2021-06-07

## 2021-06-07 ENCOUNTER — PATIENT MESSAGE (OUTPATIENT)
Dept: CARDIOLOGY | Facility: CLINIC | Age: 66
End: 2021-06-07

## 2021-06-07 ENCOUNTER — PATIENT MESSAGE (OUTPATIENT)
Dept: INTERNAL MEDICINE | Facility: CLINIC | Age: 66
End: 2021-06-07

## 2021-06-07 RX ORDER — NYSTATIN 100000 U/G
CREAM TOPICAL 2 TIMES DAILY PRN
Qty: 30 G | Refills: 0 | Status: SHIPPED | OUTPATIENT
Start: 2021-06-07 | End: 2022-07-28

## 2021-06-08 ENCOUNTER — TELEPHONE (OUTPATIENT)
Dept: CARDIOLOGY | Facility: CLINIC | Age: 66
End: 2021-06-08

## 2021-06-08 ENCOUNTER — PATIENT MESSAGE (OUTPATIENT)
Dept: FAMILY MEDICINE | Facility: CLINIC | Age: 66
End: 2021-06-08

## 2021-06-09 DIAGNOSIS — E11.9 TYPE 2 DIABETES MELLITUS WITHOUT COMPLICATION: ICD-10-CM

## 2021-06-11 ENCOUNTER — LAB VISIT (OUTPATIENT)
Dept: LAB | Facility: HOSPITAL | Age: 66
End: 2021-06-11
Attending: NURSE PRACTITIONER
Payer: MEDICARE

## 2021-06-11 DIAGNOSIS — I21.4 NSTEMI (NON-ST ELEVATED MYOCARDIAL INFARCTION): ICD-10-CM

## 2021-06-11 LAB
ANION GAP SERPL CALC-SCNC: 17 MMOL/L (ref 8–16)
APTT BLDCRRT: 22.5 SEC (ref 21–32)
BASOPHILS # BLD AUTO: 0.06 K/UL (ref 0–0.2)
BASOPHILS NFR BLD: 0.8 % (ref 0–1.9)
BUN SERPL-MCNC: 27 MG/DL (ref 8–23)
CALCIUM SERPL-MCNC: 10.3 MG/DL (ref 8.7–10.5)
CHLORIDE SERPL-SCNC: 102 MMOL/L (ref 95–110)
CO2 SERPL-SCNC: 22 MMOL/L (ref 23–29)
CREAT SERPL-MCNC: 1.4 MG/DL (ref 0.5–1.4)
DIFFERENTIAL METHOD: ABNORMAL
EOSINOPHIL # BLD AUTO: 0.1 K/UL (ref 0–0.5)
EOSINOPHIL NFR BLD: 1.8 % (ref 0–8)
ERYTHROCYTE [DISTWIDTH] IN BLOOD BY AUTOMATED COUNT: 19.9 % (ref 11.5–14.5)
EST. GFR  (AFRICAN AMERICAN): 45 ML/MIN/1.73 M^2
EST. GFR  (NON AFRICAN AMERICAN): 39 ML/MIN/1.73 M^2
GLUCOSE SERPL-MCNC: 353 MG/DL (ref 70–110)
HCT VFR BLD AUTO: 35.3 % (ref 37–48.5)
HGB BLD-MCNC: 10.6 G/DL (ref 12–16)
IMM GRANULOCYTES # BLD AUTO: 0.03 K/UL (ref 0–0.04)
IMM GRANULOCYTES NFR BLD AUTO: 0.4 % (ref 0–0.5)
INR PPP: 0.9 (ref 0.8–1.2)
LYMPHOCYTES # BLD AUTO: 1.1 K/UL (ref 1–4.8)
LYMPHOCYTES NFR BLD: 14.7 % (ref 18–48)
MCH RBC QN AUTO: 24.1 PG (ref 27–31)
MCHC RBC AUTO-ENTMCNC: 30 G/DL (ref 32–36)
MCV RBC AUTO: 80 FL (ref 82–98)
MONOCYTES # BLD AUTO: 0.6 K/UL (ref 0.3–1)
MONOCYTES NFR BLD: 8 % (ref 4–15)
NEUTROPHILS # BLD AUTO: 5.3 K/UL (ref 1.8–7.7)
NEUTROPHILS NFR BLD: 74.3 % (ref 38–73)
NRBC BLD-RTO: 0 /100 WBC
PLATELET # BLD AUTO: 230 K/UL (ref 150–450)
PMV BLD AUTO: 10 FL (ref 9.2–12.9)
POTASSIUM SERPL-SCNC: 4.5 MMOL/L (ref 3.5–5.1)
PROTHROMBIN TIME: 9.9 SEC (ref 9–12.5)
RBC # BLD AUTO: 4.4 M/UL (ref 4–5.4)
SODIUM SERPL-SCNC: 141 MMOL/L (ref 136–145)
WBC # BLD AUTO: 7.14 K/UL (ref 3.9–12.7)

## 2021-06-11 PROCEDURE — 85730 THROMBOPLASTIN TIME PARTIAL: CPT | Performed by: NURSE PRACTITIONER

## 2021-06-11 PROCEDURE — 36415 COLL VENOUS BLD VENIPUNCTURE: CPT | Performed by: NURSE PRACTITIONER

## 2021-06-11 PROCEDURE — 85025 COMPLETE CBC W/AUTO DIFF WBC: CPT | Performed by: NURSE PRACTITIONER

## 2021-06-11 PROCEDURE — 85610 PROTHROMBIN TIME: CPT | Performed by: NURSE PRACTITIONER

## 2021-06-11 PROCEDURE — 80048 BASIC METABOLIC PNL TOTAL CA: CPT | Performed by: NURSE PRACTITIONER

## 2021-06-18 ENCOUNTER — HOSPITAL ENCOUNTER (OUTPATIENT)
Facility: HOSPITAL | Age: 66
Discharge: HOME | End: 2021-06-18
Attending: INTERNAL MEDICINE | Admitting: INTERNAL MEDICINE
Payer: MEDICARE

## 2021-06-18 VITALS
HEART RATE: 76 BPM | BODY MASS INDEX: 36.8 KG/M2 | OXYGEN SATURATION: 98 % | WEIGHT: 200 LBS | DIASTOLIC BLOOD PRESSURE: 83 MMHG | HEIGHT: 62 IN | TEMPERATURE: 97 F | SYSTOLIC BLOOD PRESSURE: 128 MMHG | RESPIRATION RATE: 14 BRPM

## 2021-06-18 DIAGNOSIS — Z98.61 POST PTCA: ICD-10-CM

## 2021-06-18 DIAGNOSIS — I21.4 NSTEMI (NON-ST ELEVATED MYOCARDIAL INFARCTION): ICD-10-CM

## 2021-06-18 LAB
ANION GAP SERPL CALC-SCNC: 12 MMOL/L (ref 8–16)
ANION GAP SERPL CALC-SCNC: 13 MMOL/L (ref 8–16)
BASOPHILS # BLD AUTO: 0.07 K/UL (ref 0–0.2)
BASOPHILS NFR BLD: 1.2 % (ref 0–1.9)
BUN SERPL-MCNC: 20 MG/DL (ref 8–23)
BUN SERPL-MCNC: 22 MG/DL (ref 8–23)
CALCIUM SERPL-MCNC: 9.2 MG/DL (ref 8.7–10.5)
CALCIUM SERPL-MCNC: 9.9 MG/DL (ref 8.7–10.5)
CHLORIDE SERPL-SCNC: 106 MMOL/L (ref 95–110)
CHLORIDE SERPL-SCNC: 106 MMOL/L (ref 95–110)
CO2 SERPL-SCNC: 21 MMOL/L (ref 23–29)
CO2 SERPL-SCNC: 22 MMOL/L (ref 23–29)
CREAT SERPL-MCNC: 0.8 MG/DL (ref 0.5–1.4)
CREAT SERPL-MCNC: 1 MG/DL (ref 0.5–1.4)
DIFFERENTIAL METHOD: ABNORMAL
EOSINOPHIL # BLD AUTO: 0.2 K/UL (ref 0–0.5)
EOSINOPHIL NFR BLD: 2.7 % (ref 0–8)
ERYTHROCYTE [DISTWIDTH] IN BLOOD BY AUTOMATED COUNT: 20.4 % (ref 11.5–14.5)
ERYTHROCYTE [DISTWIDTH] IN BLOOD BY AUTOMATED COUNT: 20.7 % (ref 11.5–14.5)
EST. GFR  (AFRICAN AMERICAN): >60 ML/MIN/1.73 M^2
EST. GFR  (AFRICAN AMERICAN): >60 ML/MIN/1.73 M^2
EST. GFR  (NON AFRICAN AMERICAN): 59 ML/MIN/1.73 M^2
EST. GFR  (NON AFRICAN AMERICAN): >60 ML/MIN/1.73 M^2
GLUCOSE SERPL-MCNC: 287 MG/DL (ref 70–110)
GLUCOSE SERPL-MCNC: 335 MG/DL (ref 70–110)
HCT VFR BLD AUTO: 32.1 % (ref 37–48.5)
HCT VFR BLD AUTO: 35.4 % (ref 37–48.5)
HGB BLD-MCNC: 10.8 G/DL (ref 12–16)
HGB BLD-MCNC: 9.7 G/DL (ref 12–16)
IMM GRANULOCYTES # BLD AUTO: 0.02 K/UL (ref 0–0.04)
IMM GRANULOCYTES NFR BLD AUTO: 0.3 % (ref 0–0.5)
LYMPHOCYTES # BLD AUTO: 1.4 K/UL (ref 1–4.8)
LYMPHOCYTES NFR BLD: 24.1 % (ref 18–48)
MCH RBC QN AUTO: 24.8 PG (ref 27–31)
MCH RBC QN AUTO: 24.8 PG (ref 27–31)
MCHC RBC AUTO-ENTMCNC: 30.2 G/DL (ref 32–36)
MCHC RBC AUTO-ENTMCNC: 30.5 G/DL (ref 32–36)
MCV RBC AUTO: 81 FL (ref 82–98)
MCV RBC AUTO: 82 FL (ref 82–98)
MONOCYTES # BLD AUTO: 0.4 K/UL (ref 0.3–1)
MONOCYTES NFR BLD: 6.9 % (ref 4–15)
NEUTROPHILS # BLD AUTO: 3.8 K/UL (ref 1.8–7.7)
NEUTROPHILS NFR BLD: 64.8 % (ref 38–73)
NRBC BLD-RTO: 0 /100 WBC
PLATELET # BLD AUTO: 195 K/UL (ref 150–450)
PLATELET # BLD AUTO: 196 K/UL (ref 150–450)
PMV BLD AUTO: 10.2 FL (ref 9.2–12.9)
PMV BLD AUTO: 11.4 FL (ref 9.2–12.9)
POC ACTIVATED CLOTTING TIME K: 208 SEC (ref 74–137)
POC ACTIVATED CLOTTING TIME K: 235 SEC (ref 74–137)
POC ACTIVATED CLOTTING TIME K: 257 SEC (ref 74–137)
POTASSIUM SERPL-SCNC: 4.6 MMOL/L (ref 3.5–5.1)
POTASSIUM SERPL-SCNC: 4.6 MMOL/L (ref 3.5–5.1)
RBC # BLD AUTO: 3.91 M/UL (ref 4–5.4)
RBC # BLD AUTO: 4.36 M/UL (ref 4–5.4)
SAMPLE: ABNORMAL
SODIUM SERPL-SCNC: 139 MMOL/L (ref 136–145)
SODIUM SERPL-SCNC: 141 MMOL/L (ref 136–145)
WBC # BLD AUTO: 5.9 K/UL (ref 3.9–12.7)
WBC # BLD AUTO: 6.03 K/UL (ref 3.9–12.7)

## 2021-06-18 PROCEDURE — 63600175 PHARM REV CODE 636 W HCPCS: Performed by: INTERNAL MEDICINE

## 2021-06-18 PROCEDURE — 99152 PR MOD CONSCIOUS SEDATION, SAME PHYS, 5+ YRS, FIRST 15 MIN: ICD-10-PCS | Mod: ,,, | Performed by: INTERNAL MEDICINE

## 2021-06-18 PROCEDURE — 93458 PR CATH PLACE/CORON ANGIO, IMG SUPER/INTERP,W LEFT HEART VENTRICULOGRAPHY: ICD-10-PCS | Mod: 26,59,51, | Performed by: INTERNAL MEDICINE

## 2021-06-18 PROCEDURE — 85347 COAGULATION TIME ACTIVATED: CPT | Mod: 91 | Performed by: INTERNAL MEDICINE

## 2021-06-18 PROCEDURE — 85027 COMPLETE CBC AUTOMATED: CPT | Mod: 59 | Performed by: INTERNAL MEDICINE

## 2021-06-18 PROCEDURE — 25000003 PHARM REV CODE 250: Performed by: INTERNAL MEDICINE

## 2021-06-18 PROCEDURE — 93005 ELECTROCARDIOGRAM TRACING: CPT | Mod: 59

## 2021-06-18 PROCEDURE — 80048 BASIC METABOLIC PNL TOTAL CA: CPT | Mod: 91 | Performed by: INTERNAL MEDICINE

## 2021-06-18 PROCEDURE — C1769 GUIDE WIRE: HCPCS | Performed by: INTERNAL MEDICINE

## 2021-06-18 PROCEDURE — 85025 COMPLETE CBC W/AUTO DIFF WBC: CPT | Performed by: INTERNAL MEDICINE

## 2021-06-18 PROCEDURE — C1887 CATHETER, GUIDING: HCPCS | Performed by: INTERNAL MEDICINE

## 2021-06-18 PROCEDURE — 92928 PR STENT: ICD-10-PCS | Mod: LC,,, | Performed by: INTERNAL MEDICINE

## 2021-06-18 PROCEDURE — 25500020 PHARM REV CODE 255: Performed by: INTERNAL MEDICINE

## 2021-06-18 PROCEDURE — C9600 PERC DRUG-EL COR STENT SING: HCPCS | Mod: LC | Performed by: INTERNAL MEDICINE

## 2021-06-18 PROCEDURE — C1725 CATH, TRANSLUMIN NON-LASER: HCPCS | Performed by: INTERNAL MEDICINE

## 2021-06-18 PROCEDURE — 92928 PRQ TCAT PLMT NTRAC ST 1 LES: CPT | Mod: LC,,, | Performed by: INTERNAL MEDICINE

## 2021-06-18 PROCEDURE — 99152 MOD SED SAME PHYS/QHP 5/>YRS: CPT | Performed by: INTERNAL MEDICINE

## 2021-06-18 PROCEDURE — 27201423 OPTIME MED/SURG SUP & DEVICES STERILE SUPPLY: Performed by: INTERNAL MEDICINE

## 2021-06-18 PROCEDURE — C1894 INTRO/SHEATH, NON-LASER: HCPCS | Performed by: INTERNAL MEDICINE

## 2021-06-18 PROCEDURE — 93458 L HRT ARTERY/VENTRICLE ANGIO: CPT | Mod: 59 | Performed by: INTERNAL MEDICINE

## 2021-06-18 PROCEDURE — 93010 ELECTROCARDIOGRAM REPORT: CPT | Mod: 59,,, | Performed by: INTERNAL MEDICINE

## 2021-06-18 PROCEDURE — 93458 L HRT ARTERY/VENTRICLE ANGIO: CPT | Mod: 26,59,51, | Performed by: INTERNAL MEDICINE

## 2021-06-18 PROCEDURE — 93010 EKG 12-LEAD: ICD-10-PCS | Mod: 59,,, | Performed by: INTERNAL MEDICINE

## 2021-06-18 PROCEDURE — 99153 MOD SED SAME PHYS/QHP EA: CPT | Performed by: INTERNAL MEDICINE

## 2021-06-18 PROCEDURE — 99152 MOD SED SAME PHYS/QHP 5/>YRS: CPT | Mod: ,,, | Performed by: INTERNAL MEDICINE

## 2021-06-18 RX ORDER — SODIUM CHLORIDE 9 MG/ML
INJECTION, SOLUTION INTRAVENOUS CONTINUOUS
Status: ACTIVE | OUTPATIENT
Start: 2021-06-18 | End: 2021-06-18

## 2021-06-18 RX ORDER — ACETAMINOPHEN 325 MG/1
650 TABLET ORAL EVERY 4 HOURS PRN
Status: DISCONTINUED | OUTPATIENT
Start: 2021-06-18 | End: 2021-06-18 | Stop reason: HOSPADM

## 2021-06-18 RX ORDER — NAPROXEN SODIUM 220 MG/1
81 TABLET, FILM COATED ORAL ONCE
Status: COMPLETED | OUTPATIENT
Start: 2021-06-18 | End: 2021-06-18

## 2021-06-18 RX ORDER — FENTANYL CITRATE 50 UG/ML
INJECTION, SOLUTION INTRAMUSCULAR; INTRAVENOUS
Status: DISCONTINUED | OUTPATIENT
Start: 2021-06-18 | End: 2021-06-18 | Stop reason: HOSPADM

## 2021-06-18 RX ORDER — NITROGLYCERIN 5 MG/ML
INJECTION, SOLUTION INTRAVENOUS
Status: DISCONTINUED | OUTPATIENT
Start: 2021-06-18 | End: 2021-06-18 | Stop reason: HOSPADM

## 2021-06-18 RX ORDER — HEPARIN SODIUM 1000 [USP'U]/ML
INJECTION INTRAVENOUS; SUBCUTANEOUS
Status: DISCONTINUED | OUTPATIENT
Start: 2021-06-18 | End: 2021-06-18 | Stop reason: HOSPADM

## 2021-06-18 RX ORDER — ONDANSETRON 8 MG/1
8 TABLET, ORALLY DISINTEGRATING ORAL EVERY 8 HOURS PRN
Status: DISCONTINUED | OUTPATIENT
Start: 2021-06-18 | End: 2021-06-18 | Stop reason: HOSPADM

## 2021-06-18 RX ORDER — CLOPIDOGREL 300 MG/1
TABLET, FILM COATED ORAL
Status: DISCONTINUED | OUTPATIENT
Start: 2021-06-18 | End: 2021-06-18 | Stop reason: HOSPADM

## 2021-06-18 RX ORDER — DIPHENHYDRAMINE HCL 50 MG
50 CAPSULE ORAL ONCE
Status: COMPLETED | OUTPATIENT
Start: 2021-06-18 | End: 2021-06-18

## 2021-06-18 RX ORDER — MIDAZOLAM HYDROCHLORIDE 1 MG/ML
INJECTION, SOLUTION INTRAMUSCULAR; INTRAVENOUS
Status: DISCONTINUED | OUTPATIENT
Start: 2021-06-18 | End: 2021-06-18 | Stop reason: HOSPADM

## 2021-06-18 RX ORDER — LIDOCAINE HYDROCHLORIDE 20 MG/ML
INJECTION, SOLUTION EPIDURAL; INFILTRATION; INTRACAUDAL; PERINEURAL
Status: DISCONTINUED | OUTPATIENT
Start: 2021-06-18 | End: 2021-06-18 | Stop reason: HOSPADM

## 2021-06-18 RX ADMIN — ASPIRIN 81 MG CHEWABLE TABLET 81 MG: 81 TABLET CHEWABLE at 07:06

## 2021-06-18 RX ADMIN — SODIUM CHLORIDE: 0.9 INJECTION, SOLUTION INTRAVENOUS at 07:06

## 2021-06-18 RX ADMIN — SODIUM CHLORIDE: 0.9 INJECTION, SOLUTION INTRAVENOUS at 10:06

## 2021-06-18 RX ADMIN — DIPHENHYDRAMINE HYDROCHLORIDE 50 MG: 50 CAPSULE ORAL at 07:06

## 2021-06-21 ENCOUNTER — PATIENT OUTREACH (OUTPATIENT)
Dept: ADMINISTRATIVE | Facility: HOSPITAL | Age: 66
End: 2021-06-21

## 2021-06-21 ENCOUNTER — PATIENT MESSAGE (OUTPATIENT)
Dept: HEMATOLOGY/ONCOLOGY | Facility: CLINIC | Age: 66
End: 2021-06-21

## 2021-06-25 DIAGNOSIS — E11.21 DIABETIC NEPHROPATHY WITH PROTEINURIA: ICD-10-CM

## 2021-06-25 RX ORDER — SITAGLIPTIN 100 MG/1
100 TABLET, FILM COATED ORAL DAILY
Qty: 30 TABLET | Refills: 5 | Status: SHIPPED | OUTPATIENT
Start: 2021-06-25 | End: 2022-01-25 | Stop reason: SDUPTHER

## 2021-06-25 RX ORDER — GLIPIZIDE 10 MG/1
10 TABLET ORAL
Qty: 60 TABLET | Refills: 5 | Status: SHIPPED | OUTPATIENT
Start: 2021-06-25 | End: 2022-02-21 | Stop reason: SDUPTHER

## 2021-06-29 ENCOUNTER — OFFICE VISIT (OUTPATIENT)
Dept: CARDIOLOGY | Facility: CLINIC | Age: 66
End: 2021-06-29
Payer: MEDICARE

## 2021-06-29 VITALS
HEART RATE: 96 BPM | HEIGHT: 62 IN | RESPIRATION RATE: 16 BRPM | WEIGHT: 188.94 LBS | DIASTOLIC BLOOD PRESSURE: 72 MMHG | BODY MASS INDEX: 34.77 KG/M2 | OXYGEN SATURATION: 97 % | SYSTOLIC BLOOD PRESSURE: 158 MMHG

## 2021-06-29 DIAGNOSIS — Z98.61 POST PTCA: ICD-10-CM

## 2021-06-29 DIAGNOSIS — I10 ESSENTIAL HYPERTENSION: ICD-10-CM

## 2021-06-29 DIAGNOSIS — I21.4 NSTEMI (NON-ST ELEVATED MYOCARDIAL INFARCTION): Primary | ICD-10-CM

## 2021-06-29 PROCEDURE — 99214 OFFICE O/P EST MOD 30 MIN: CPT | Mod: S$PBB,,, | Performed by: INTERNAL MEDICINE

## 2021-06-29 PROCEDURE — 99999 PR PBB SHADOW E&M-EST. PATIENT-LVL IV: CPT | Mod: PBBFAC,,, | Performed by: INTERNAL MEDICINE

## 2021-06-29 PROCEDURE — 99214 OFFICE O/P EST MOD 30 MIN: CPT | Mod: PBBFAC | Performed by: INTERNAL MEDICINE

## 2021-06-29 PROCEDURE — 99214 PR OFFICE/OUTPT VISIT, EST, LEVL IV, 30-39 MIN: ICD-10-PCS | Mod: S$PBB,,, | Performed by: INTERNAL MEDICINE

## 2021-06-29 PROCEDURE — 99999 PR PBB SHADOW E&M-EST. PATIENT-LVL IV: ICD-10-PCS | Mod: PBBFAC,,, | Performed by: INTERNAL MEDICINE

## 2021-06-29 RX ORDER — METOPROLOL SUCCINATE 50 MG/1
50 TABLET, EXTENDED RELEASE ORAL DAILY
Qty: 90 TABLET | Refills: 3 | Status: SHIPPED | OUTPATIENT
Start: 2021-06-29 | End: 2022-07-05

## 2021-06-29 RX ORDER — CLOPIDOGREL BISULFATE 75 MG/1
75 TABLET ORAL ONCE
Qty: 90 TABLET | Refills: 3 | Status: SHIPPED | OUTPATIENT
Start: 2021-06-29 | End: 2022-07-25

## 2021-06-29 RX ORDER — LOSARTAN POTASSIUM 50 MG/1
50 TABLET ORAL DAILY
Qty: 90 TABLET | Refills: 3 | Status: SHIPPED | OUTPATIENT
Start: 2021-06-29 | End: 2022-07-05

## 2021-07-01 ENCOUNTER — TELEPHONE (OUTPATIENT)
Dept: RHEUMATOLOGY | Facility: CLINIC | Age: 66
End: 2021-07-01

## 2021-07-01 ENCOUNTER — PATIENT MESSAGE (OUTPATIENT)
Dept: RHEUMATOLOGY | Facility: CLINIC | Age: 66
End: 2021-07-01

## 2021-07-02 ENCOUNTER — PATIENT MESSAGE (OUTPATIENT)
Dept: FAMILY MEDICINE | Facility: CLINIC | Age: 66
End: 2021-07-02

## 2021-07-02 ENCOUNTER — PATIENT MESSAGE (OUTPATIENT)
Dept: CARDIOLOGY | Facility: CLINIC | Age: 66
End: 2021-07-02

## 2021-07-06 ENCOUNTER — TELEPHONE (OUTPATIENT)
Dept: CARDIOLOGY | Facility: CLINIC | Age: 66
End: 2021-07-06

## 2021-07-06 DIAGNOSIS — E79.0 ELEVATED URIC ACID IN BLOOD: ICD-10-CM

## 2021-07-06 RX ORDER — ALLOPURINOL 100 MG/1
100 TABLET ORAL DAILY
Qty: 90 TABLET | Refills: 0 | Status: SHIPPED | OUTPATIENT
Start: 2021-07-06 | End: 2021-07-07

## 2021-07-08 ENCOUNTER — PATIENT MESSAGE (OUTPATIENT)
Dept: ADMINISTRATIVE | Facility: HOSPITAL | Age: 66
End: 2021-07-08

## 2021-07-09 ENCOUNTER — PATIENT MESSAGE (OUTPATIENT)
Dept: RHEUMATOLOGY | Facility: CLINIC | Age: 66
End: 2021-07-09

## 2021-07-12 RX ORDER — FUROSEMIDE 20 MG/1
40 TABLET ORAL DAILY
Qty: 30 TABLET | Refills: 0 | Status: CANCELLED | OUTPATIENT
Start: 2021-07-12

## 2021-07-13 ENCOUNTER — PATIENT OUTREACH (OUTPATIENT)
Dept: ADMINISTRATIVE | Facility: HOSPITAL | Age: 66
End: 2021-07-13

## 2021-07-17 ENCOUNTER — PATIENT MESSAGE (OUTPATIENT)
Dept: CARDIOLOGY | Facility: CLINIC | Age: 66
End: 2021-07-17

## 2021-07-19 RX ORDER — FUROSEMIDE 20 MG/1
40 TABLET ORAL DAILY
Qty: 60 TABLET | Refills: 6 | Status: SHIPPED | OUTPATIENT
Start: 2021-07-19 | End: 2021-07-23 | Stop reason: SDUPTHER

## 2021-07-23 ENCOUNTER — PATIENT MESSAGE (OUTPATIENT)
Dept: CARDIOLOGY | Facility: CLINIC | Age: 66
End: 2021-07-23

## 2021-07-23 RX ORDER — FUROSEMIDE 20 MG/1
40 TABLET ORAL DAILY
Qty: 60 TABLET | Refills: 6 | Status: CANCELLED | OUTPATIENT
Start: 2021-07-23

## 2021-07-23 RX ORDER — FUROSEMIDE 20 MG/1
40 TABLET ORAL DAILY
Qty: 60 TABLET | Refills: 6 | Status: SHIPPED | OUTPATIENT
Start: 2021-07-23 | End: 2022-05-16

## 2021-08-13 ENCOUNTER — IMMUNIZATION (OUTPATIENT)
Dept: PRIMARY CARE CLINIC | Facility: CLINIC | Age: 66
End: 2021-08-13
Payer: MEDICARE

## 2021-08-13 ENCOUNTER — TELEPHONE (OUTPATIENT)
Dept: RHEUMATOLOGY | Facility: CLINIC | Age: 66
End: 2021-08-13

## 2021-08-13 DIAGNOSIS — Z51.81 MEDICATION MONITORING ENCOUNTER: Primary | ICD-10-CM

## 2021-08-13 DIAGNOSIS — M81.0 AGE-RELATED OSTEOPOROSIS WITHOUT CURRENT PATHOLOGICAL FRACTURE: ICD-10-CM

## 2021-08-13 DIAGNOSIS — Z23 NEED FOR VACCINATION: Primary | ICD-10-CM

## 2021-08-13 PROCEDURE — 0031A COVID-19,VECTOR-NR,RS-AD26,PF,0.5 ML DOSE VACCINE (JANSSEN): CPT | Mod: CV19,S$GLB,, | Performed by: FAMILY MEDICINE

## 2021-08-13 PROCEDURE — 91303 COVID-19,VECTOR-NR,RS-AD26,PF,0.5 ML DOSE VACCINE (JANSSEN): CPT | Mod: S$GLB,,, | Performed by: FAMILY MEDICINE

## 2021-08-13 PROCEDURE — 0031A COVID-19,VECTOR-NR,RS-AD26,PF,0.5 ML DOSE VACCINE (JANSSEN): ICD-10-PCS | Mod: CV19,S$GLB,, | Performed by: FAMILY MEDICINE

## 2021-08-13 PROCEDURE — 91303 COVID-19,VECTOR-NR,RS-AD26,PF,0.5 ML DOSE VACCINE (JANSSEN): ICD-10-PCS | Mod: S$GLB,,, | Performed by: FAMILY MEDICINE

## 2021-08-27 RX ORDER — METFORMIN HYDROCHLORIDE 500 MG/1
TABLET ORAL
Qty: 360 TABLET | Refills: 1 | Status: SHIPPED | OUTPATIENT
Start: 2021-08-27 | End: 2022-03-24 | Stop reason: SDUPTHER

## 2021-09-29 DIAGNOSIS — E11.9 TYPE 2 DIABETES MELLITUS WITHOUT COMPLICATION: ICD-10-CM

## 2021-09-30 ENCOUNTER — PATIENT MESSAGE (OUTPATIENT)
Dept: ORTHOPEDICS | Facility: CLINIC | Age: 66
End: 2021-09-30

## 2021-09-30 ENCOUNTER — PATIENT MESSAGE (OUTPATIENT)
Dept: CARDIOLOGY | Facility: CLINIC | Age: 66
End: 2021-09-30

## 2021-09-30 RX ORDER — HYDRALAZINE HYDROCHLORIDE 50 MG/1
50 TABLET, FILM COATED ORAL 4 TIMES DAILY
Qty: 120 TABLET | Refills: 10 | Status: SHIPPED | OUTPATIENT
Start: 2021-09-30 | End: 2022-09-10 | Stop reason: SDUPTHER

## 2021-10-04 ENCOUNTER — PATIENT MESSAGE (OUTPATIENT)
Dept: ADMINISTRATIVE | Facility: HOSPITAL | Age: 66
End: 2021-10-04

## 2021-10-20 NOTE — PROCEDURES
Large Joint Aspiration/Injection: L knee    Date/Time: 7/27/2020 10:40 AM  Performed by: Inocente Saravia MD  Authorized by: Inocente Saravia MD     Consent Done?:  Yes (Verbal)  Site marked: the procedure site was marked    Timeout: prior to procedure the correct patient, procedure, and site was verified      Local anesthesia used?: Yes      Details:  Needle Size:  22 G  Ultrasonic Guidance for needle placement?: No    Approach:  Anterolateral  Location:  Knee  Site:  L knee  Medications:  48 mg hylan g-f 20 48 mg/6 mL  Patient tolerance:  Patient tolerated the procedure well with no immediate complications       What Type Of Note Output Would You Prefer (Optional)?: Standard Output What Is The Reason For Today's Visit?: Full Body Skin Examination What Is The Reason For Today's Visit? (Being Monitored For X): the development of a new lesion How Severe Are Your Spot(S)?: moderate

## 2021-10-22 ENCOUNTER — LAB VISIT (OUTPATIENT)
Dept: LAB | Facility: HOSPITAL | Age: 66
End: 2021-10-22
Attending: FAMILY MEDICINE
Payer: MEDICARE

## 2021-10-22 DIAGNOSIS — E78.2 COMBINED HYPERLIPIDEMIA ASSOCIATED WITH TYPE 2 DIABETES MELLITUS: ICD-10-CM

## 2021-10-22 DIAGNOSIS — E11.69 COMBINED HYPERLIPIDEMIA ASSOCIATED WITH TYPE 2 DIABETES MELLITUS: ICD-10-CM

## 2021-10-22 DIAGNOSIS — E11.9 TYPE 2 DIABETES MELLITUS WITHOUT COMPLICATION: ICD-10-CM

## 2021-10-22 LAB
CHOLEST SERPL-MCNC: 120 MG/DL (ref 120–199)
CHOLEST/HDLC SERPL: 2.9 {RATIO} (ref 2–5)
ESTIMATED AVG GLUCOSE: 212 MG/DL (ref 68–131)
HBA1C MFR BLD: 9 % (ref 4–5.6)
HDLC SERPL-MCNC: 41 MG/DL (ref 40–75)
HDLC SERPL: 34.2 % (ref 20–50)
LDLC SERPL CALC-MCNC: 29.2 MG/DL (ref 63–159)
NONHDLC SERPL-MCNC: 79 MG/DL
TRIGL SERPL-MCNC: 249 MG/DL (ref 30–150)

## 2021-10-22 PROCEDURE — 80061 LIPID PANEL: CPT | Performed by: FAMILY MEDICINE

## 2021-10-22 PROCEDURE — 82570 ASSAY OF URINE CREATININE: CPT | Performed by: FAMILY MEDICINE

## 2021-10-22 PROCEDURE — 83036 HEMOGLOBIN GLYCOSYLATED A1C: CPT | Performed by: FAMILY MEDICINE

## 2021-10-22 PROCEDURE — 36415 COLL VENOUS BLD VENIPUNCTURE: CPT | Performed by: FAMILY MEDICINE

## 2021-10-23 LAB
ALBUMIN/CREAT UR: 380 UG/MG (ref 0–30)
CREAT UR-MCNC: 100 MG/DL (ref 15–325)
MICROALBUMIN UR DL<=1MG/L-MCNC: 380 UG/ML

## 2021-11-03 ENCOUNTER — TELEPHONE (OUTPATIENT)
Dept: ORTHOPEDICS | Facility: CLINIC | Age: 66
End: 2021-11-03
Payer: MEDICARE

## 2021-11-13 ENCOUNTER — PATIENT MESSAGE (OUTPATIENT)
Dept: CARDIOLOGY | Facility: CLINIC | Age: 66
End: 2021-11-13
Payer: MEDICARE

## 2021-11-13 ENCOUNTER — PATIENT MESSAGE (OUTPATIENT)
Dept: ORTHOPEDICS | Facility: CLINIC | Age: 66
End: 2021-11-13
Payer: MEDICARE

## 2021-11-17 DIAGNOSIS — M17.12 PRIMARY OSTEOARTHRITIS OF LEFT KNEE: Primary | ICD-10-CM

## 2021-11-18 ENCOUNTER — TELEPHONE (OUTPATIENT)
Dept: RHEUMATOLOGY | Facility: CLINIC | Age: 66
End: 2021-11-18
Payer: MEDICARE

## 2021-11-18 RX ORDER — SODIUM CHLORIDE 0.9 % (FLUSH) 0.9 %
10 SYRINGE (ML) INJECTION
Status: CANCELLED | OUTPATIENT
Start: 2021-11-18

## 2021-11-18 RX ORDER — ACETAMINOPHEN 325 MG/1
650 TABLET ORAL ONCE
Status: CANCELLED
Start: 2021-11-18 | End: 2021-11-18

## 2021-11-18 RX ORDER — HEPARIN 100 UNIT/ML
500 SYRINGE INTRAVENOUS
Status: CANCELLED | OUTPATIENT
Start: 2021-11-18

## 2021-11-18 RX ORDER — ZOLEDRONIC ACID 5 MG/100ML
5 INJECTION, SOLUTION INTRAVENOUS
Status: CANCELLED | OUTPATIENT
Start: 2021-11-18

## 2021-11-19 ENCOUNTER — LAB VISIT (OUTPATIENT)
Dept: LAB | Facility: HOSPITAL | Age: 66
End: 2021-11-19
Attending: FAMILY MEDICINE
Payer: MEDICARE

## 2021-11-19 DIAGNOSIS — M81.0 AGE-RELATED OSTEOPOROSIS WITHOUT CURRENT PATHOLOGICAL FRACTURE: ICD-10-CM

## 2021-11-19 DIAGNOSIS — Z51.81 MEDICATION MONITORING ENCOUNTER: ICD-10-CM

## 2021-11-19 PROCEDURE — 36415 COLL VENOUS BLD VENIPUNCTURE: CPT | Performed by: PHYSICIAN ASSISTANT

## 2021-11-19 PROCEDURE — 80053 COMPREHEN METABOLIC PANEL: CPT | Performed by: PHYSICIAN ASSISTANT

## 2021-11-19 PROCEDURE — 82306 VITAMIN D 25 HYDROXY: CPT | Performed by: PHYSICIAN ASSISTANT

## 2021-11-20 LAB — 25(OH)D3+25(OH)D2 SERPL-MCNC: 41 NG/ML (ref 30–96)

## 2021-11-22 LAB
ALBUMIN SERPL BCP-MCNC: 4 G/DL (ref 3.5–5.2)
ALP SERPL-CCNC: 66 U/L (ref 55–135)
ALT SERPL W/O P-5'-P-CCNC: 13 U/L (ref 10–44)
ANION GAP SERPL CALC-SCNC: 18 MMOL/L (ref 8–16)
AST SERPL-CCNC: 20 U/L (ref 10–40)
BILIRUB SERPL-MCNC: 0.3 MG/DL (ref 0.1–1)
BUN SERPL-MCNC: 23 MG/DL (ref 8–23)
CALCIUM SERPL-MCNC: 9.9 MG/DL (ref 8.7–10.5)
CHLORIDE SERPL-SCNC: 105 MMOL/L (ref 95–110)
CO2 SERPL-SCNC: 18 MMOL/L (ref 23–29)
CREAT SERPL-MCNC: 0.9 MG/DL (ref 0.5–1.4)
EST. GFR  (AFRICAN AMERICAN): >60 ML/MIN/1.73 M^2
EST. GFR  (NON AFRICAN AMERICAN): >60 ML/MIN/1.73 M^2
GLUCOSE SERPL-MCNC: 173 MG/DL (ref 70–110)
POTASSIUM SERPL-SCNC: 4.4 MMOL/L (ref 3.5–5.1)
PROT SERPL-MCNC: 7.3 G/DL (ref 6–8.4)
SODIUM SERPL-SCNC: 141 MMOL/L (ref 136–145)

## 2021-11-23 ENCOUNTER — PATIENT MESSAGE (OUTPATIENT)
Dept: RHEUMATOLOGY | Facility: CLINIC | Age: 66
End: 2021-11-23
Payer: MEDICARE

## 2021-11-29 ENCOUNTER — INFUSION (OUTPATIENT)
Dept: INFUSION THERAPY | Facility: HOSPITAL | Age: 66
End: 2021-11-29
Attending: RADIOLOGY
Payer: MEDICARE

## 2021-11-29 VITALS
SYSTOLIC BLOOD PRESSURE: 158 MMHG | TEMPERATURE: 98 F | RESPIRATION RATE: 16 BRPM | HEART RATE: 79 BPM | DIASTOLIC BLOOD PRESSURE: 78 MMHG | OXYGEN SATURATION: 98 %

## 2021-11-29 DIAGNOSIS — M81.0 AGE-RELATED OSTEOPOROSIS WITHOUT CURRENT PATHOLOGICAL FRACTURE: Primary | ICD-10-CM

## 2021-11-29 PROCEDURE — 25000003 PHARM REV CODE 250: Performed by: INTERNAL MEDICINE

## 2021-11-29 PROCEDURE — 96365 THER/PROPH/DIAG IV INF INIT: CPT

## 2021-11-29 PROCEDURE — 96374 THER/PROPH/DIAG INJ IV PUSH: CPT

## 2021-11-29 PROCEDURE — 63600175 PHARM REV CODE 636 W HCPCS: Performed by: INTERNAL MEDICINE

## 2021-11-29 RX ORDER — ACETAMINOPHEN 325 MG/1
650 TABLET ORAL ONCE
Status: CANCELLED
Start: 2021-11-29 | End: 2021-11-29

## 2021-11-29 RX ORDER — SODIUM CHLORIDE 0.9 % (FLUSH) 0.9 %
10 SYRINGE (ML) INJECTION
Status: CANCELLED | OUTPATIENT
Start: 2021-11-29

## 2021-11-29 RX ORDER — HEPARIN 100 UNIT/ML
500 SYRINGE INTRAVENOUS
Status: CANCELLED | OUTPATIENT
Start: 2021-11-29

## 2021-11-29 RX ORDER — ACETAMINOPHEN 325 MG/1
650 TABLET ORAL ONCE
Status: COMPLETED | OUTPATIENT
Start: 2021-11-29 | End: 2021-11-29

## 2021-11-29 RX ORDER — ZOLEDRONIC ACID 5 MG/100ML
5 INJECTION, SOLUTION INTRAVENOUS
Status: COMPLETED | OUTPATIENT
Start: 2021-11-29 | End: 2021-11-29

## 2021-11-29 RX ORDER — SODIUM CHLORIDE 0.9 % (FLUSH) 0.9 %
10 SYRINGE (ML) INJECTION
Status: DISCONTINUED | OUTPATIENT
Start: 2021-11-29 | End: 2021-11-29 | Stop reason: HOSPADM

## 2021-11-29 RX ORDER — ZOLEDRONIC ACID 5 MG/100ML
5 INJECTION, SOLUTION INTRAVENOUS
Status: CANCELLED | OUTPATIENT
Start: 2021-11-29

## 2021-11-29 RX ADMIN — ACETAMINOPHEN 650 MG: 325 TABLET ORAL at 12:11

## 2021-11-29 RX ADMIN — ZOLEDRONIC ACID 5 MG: 5 INJECTION, SOLUTION INTRAVENOUS at 12:11

## 2021-12-16 ENCOUNTER — PATIENT OUTREACH (OUTPATIENT)
Dept: ADMINISTRATIVE | Facility: HOSPITAL | Age: 66
End: 2021-12-16
Payer: MEDICARE

## 2021-12-30 ENCOUNTER — PATIENT MESSAGE (OUTPATIENT)
Dept: FAMILY MEDICINE | Facility: CLINIC | Age: 66
End: 2021-12-30
Payer: MEDICARE

## 2021-12-30 ENCOUNTER — OFFICE VISIT (OUTPATIENT)
Dept: CARDIOLOGY | Facility: CLINIC | Age: 66
End: 2021-12-30
Payer: MEDICARE

## 2021-12-30 VITALS
SYSTOLIC BLOOD PRESSURE: 148 MMHG | OXYGEN SATURATION: 97 % | RESPIRATION RATE: 16 BRPM | BODY MASS INDEX: 35.51 KG/M2 | WEIGHT: 193 LBS | DIASTOLIC BLOOD PRESSURE: 72 MMHG | HEART RATE: 90 BPM | HEIGHT: 62 IN

## 2021-12-30 DIAGNOSIS — Z98.61 POST PTCA: ICD-10-CM

## 2021-12-30 DIAGNOSIS — E66.01 SEVERE OBESITY (BMI 35.0-39.9) WITH COMORBIDITY: ICD-10-CM

## 2021-12-30 DIAGNOSIS — I10 ESSENTIAL HYPERTENSION: ICD-10-CM

## 2021-12-30 DIAGNOSIS — N18.31 STAGE 3A CHRONIC KIDNEY DISEASE: ICD-10-CM

## 2021-12-30 DIAGNOSIS — E11.40 TYPE 2 DIABETES MELLITUS WITH DIABETIC NEUROPATHY, WITHOUT LONG-TERM CURRENT USE OF INSULIN: ICD-10-CM

## 2021-12-30 DIAGNOSIS — I21.4 NSTEMI (NON-ST ELEVATED MYOCARDIAL INFARCTION): Primary | ICD-10-CM

## 2021-12-30 DIAGNOSIS — D50.9 IRON DEFICIENCY ANEMIA, UNSPECIFIED IRON DEFICIENCY ANEMIA TYPE: ICD-10-CM

## 2021-12-30 DIAGNOSIS — E78.5 HYPERLIPIDEMIA, UNSPECIFIED HYPERLIPIDEMIA TYPE: ICD-10-CM

## 2021-12-30 PROCEDURE — 99213 PR OFFICE/OUTPT VISIT, EST, LEVL III, 20-29 MIN: ICD-10-PCS | Mod: S$PBB,,, | Performed by: INTERNAL MEDICINE

## 2021-12-30 PROCEDURE — 99213 OFFICE O/P EST LOW 20 MIN: CPT | Mod: S$PBB,,, | Performed by: INTERNAL MEDICINE

## 2021-12-30 PROCEDURE — 99999 PR PBB SHADOW E&M-EST. PATIENT-LVL IV: CPT | Mod: PBBFAC,,, | Performed by: INTERNAL MEDICINE

## 2021-12-30 PROCEDURE — 99999 PR PBB SHADOW E&M-EST. PATIENT-LVL IV: ICD-10-PCS | Mod: PBBFAC,,, | Performed by: INTERNAL MEDICINE

## 2021-12-30 PROCEDURE — 99214 OFFICE O/P EST MOD 30 MIN: CPT | Mod: PBBFAC | Performed by: INTERNAL MEDICINE

## 2021-12-30 RX ORDER — SITAGLIPTIN 100 MG/1
TABLET, FILM COATED ORAL
Qty: 90 TABLET | Refills: 0 | OUTPATIENT
Start: 2021-12-30

## 2021-12-30 NOTE — TELEPHONE ENCOUNTER
Please advise pt I have not seen her in several months. If pt still following w/me, advise pt overdue/needs physical w/refills appt.  Thanks.

## 2021-12-30 NOTE — TELEPHONE ENCOUNTER
Care Due:                  Date            Visit Type   Department     Provider  --------------------------------------------------------------------------------                                             [facility]  Last Visit: 05-      None         MEDICINE       Margie Bird  Next Visit: None Scheduled  None         None Found                                                            Last  Test          Frequency    Reason                     Performed    Due Date  --------------------------------------------------------------------------------    Uric Acid...  12 months..  allopurinoL..............  06- 05-    Powered by [employer]. Reference number: [id number].   12/29/2021 6:30:57 PM CST

## 2022-01-06 ENCOUNTER — PATIENT OUTREACH (OUTPATIENT)
Dept: ADMINISTRATIVE | Facility: OTHER | Age: 67
End: 2022-01-06
Payer: MEDICARE

## 2022-01-06 NOTE — PROGRESS NOTES
Health Maintenance Due   Topic Date Due    Shingles Vaccine (2 of 3) 05/25/2015    TETANUS VACCINE  02/25/2019    Colorectal Cancer Screening  12/21/2019    Mammogram  06/25/2021    Eye Exam  06/29/2021    Influenza Vaccine (1) 09/01/2021    COVID-19 Vaccine (2 - Booster for Micki series) 10/08/2021     Updates were requested from care everywhere.  Chart was reviewed for overdue Proactive [facility] Encounters ([facility]) topics (CRS, Breast Cancer Screening, Eye exam)  Health Maintenance has been updated.  LIN[facility] immunization registry triggered.  Immunizations were reconciled.

## 2022-01-10 ENCOUNTER — OFFICE VISIT (OUTPATIENT)
Dept: ORTHOPEDICS | Facility: CLINIC | Age: 67
End: 2022-01-10
Payer: MEDICARE

## 2022-01-10 VITALS
WEIGHT: 193 LBS | SYSTOLIC BLOOD PRESSURE: 175 MMHG | DIASTOLIC BLOOD PRESSURE: 74 MMHG | HEART RATE: 92 BPM | BODY MASS INDEX: 35.51 KG/M2 | HEIGHT: 62 IN

## 2022-01-10 DIAGNOSIS — M17.12 ARTHRITIS OF KNEE, LEFT: Primary | ICD-10-CM

## 2022-01-10 DIAGNOSIS — M21.062 ACQUIRED GENU VALGUM OF LEFT KNEE: ICD-10-CM

## 2022-01-10 DIAGNOSIS — M51.37 DDD (DEGENERATIVE DISC DISEASE), LUMBOSACRAL: ICD-10-CM

## 2022-01-10 DIAGNOSIS — Z96.9 RETAINED ORTHOPEDIC HARDWARE: ICD-10-CM

## 2022-01-10 DIAGNOSIS — M17.11 ARTHRITIS OF KNEE, RIGHT: ICD-10-CM

## 2022-01-10 DIAGNOSIS — M21.061 ACQUIRED VALGUS DEFORMITY KNEE, RIGHT: ICD-10-CM

## 2022-01-10 PROCEDURE — 99999 PR PBB SHADOW E&M-EST. PATIENT-LVL IV: CPT | Mod: PBBFAC,,, | Performed by: ORTHOPAEDIC SURGERY

## 2022-01-10 PROCEDURE — 99999 PR PBB SHADOW E&M-EST. PATIENT-LVL IV: ICD-10-PCS | Mod: PBBFAC,,, | Performed by: ORTHOPAEDIC SURGERY

## 2022-01-10 PROCEDURE — 20610 DRAIN/INJ JOINT/BURSA W/O US: CPT | Mod: PBBFAC | Performed by: ORTHOPAEDIC SURGERY

## 2022-01-10 PROCEDURE — 20610 LARGE JOINT ASPIRATION/INJECTION: L KNEE: ICD-10-PCS | Mod: S$PBB,LT,, | Performed by: ORTHOPAEDIC SURGERY

## 2022-01-10 PROCEDURE — 99214 OFFICE O/P EST MOD 30 MIN: CPT | Mod: PBBFAC | Performed by: ORTHOPAEDIC SURGERY

## 2022-01-10 PROCEDURE — 99213 PR OFFICE/OUTPT VISIT, EST, LEVL III, 20-29 MIN: ICD-10-PCS | Mod: 25,S$PBB,, | Performed by: ORTHOPAEDIC SURGERY

## 2022-01-10 PROCEDURE — 99213 OFFICE O/P EST LOW 20 MIN: CPT | Mod: 25,S$PBB,, | Performed by: ORTHOPAEDIC SURGERY

## 2022-01-10 RX ADMIN — Medication 48 MG: at 08:01

## 2022-01-10 NOTE — PATIENT INSTRUCTIONS
meloxicam and Aleve are both in the same family and cardiologist down like you to take come.  If you having severe pain you may want a taken twice a week but not more  Other days you may want a take Tylenol 650 mg 3 times a day if needed  Injection of Synvisc-One into the left knee 01/10/2022  Ice the knee next several days    There is a long-acting steroid injection that you could have and will not mess up her sugar as bad as the regular steroid by the name of Zilretta    Return to see us in 3 months

## 2022-01-10 NOTE — PROCEDURES
Large Joint Aspiration/Injection: L knee    Date/Time: 1/10/2022 8:40 AM  Performed by: Inocente Saravia MD  Authorized by: Inocente Saravia MD     Consent Done?:  Yes (Verbal)  Site marked: the procedure site was marked    Timeout: prior to procedure the correct patient, procedure, and site was verified      Local anesthesia used?: Yes    Local anesthetic:  Topical anesthetic    Details:  Needle Size:  22 G  Ultrasonic Guidance for needle placement?: No    Approach:  Anterolateral  Location:  Knee  Site:  L knee  Medications:  48 mg hylan g-f 20 48 mg/6 mL  Patient tolerance:  Patient tolerated the procedure well with no immediate complications

## 2022-01-10 NOTE — PROGRESS NOTES
Subjective:     Patient ID: Alysha Rosales is a 67 y.o. female.    Chief Complaint: Pain of the Left Knee    HPI:  06/05/2020  [age] very pleasant white female who had injured her left knee in 1978 and had bone grafting and the pin placed in the knee.  Subsequent to that she had developed arthritis and now she says her foot is going out and she is walking on the side of her foot.  Few years back had steroid injection and viscosupplementation with some relief of her pain.  She states that she is hurting in both knees the right less than the left.  She takes Aleve for pills in the morning all together.  She also had been diagnosed with osteoporosis and supposed to take Boniva once every month and she forgets to take it.  She does ambulate without any assistive devices.  Pain is 5/10 with also some low back pain.  Denies any numbness or tingling but states she does have very mild peripheral neuropathy that is not constant.  Occasionally her knees give out and have the feeling of about to fall.  Denies loss of bowel bladder control, fever or chills or shortness of breath or chest pain    07/27/2020  Patient stated the [facility] is did the bone density test and now she is receiving 1 a year injection and she is very pleased with that instead of a monthly treatment.  The injection given into the left knee 06/05/2020 helped quite a bit as well as the meloxicam.  She does take Tylenol on as needed basis and wears her brace on as needed basis since she has severe valgus alignment.  She wears the brace at home most of the time when she goes out occasionally.  Pain is improving at 6/10.  She wants to avoid surgical intervention.  She still working sometimes at [employer] on the weekends.  Denies any fever or chills or shortness of breath or difficulty with chewing or swallowing.      02/12/2021  Patient with bilateral knee arthrosis and valgus deformity.  She is doing much better at this point.  She thinks  meloxicam is medical drug.  She does take it 3 to 4 times a week not daily.  Occasionally takes Tylenol.  Also she complains of morning stiffness in her hands but she exercises it and gets better.  She occasionally does wear her braces.  She did receive Synvisc One injection on 07/27/2020 and that seems to still were working.  She is now working the [employer] whole which makes her walk around and keep active.  As far as her spine is concerned and back is not hurting her at as much she stretches out and does well.  Denies any fever or chills or shortness of breath or difficulty with chewing or swallowing loss of bowel bladder control blurry vision double vision or loss of sense of smell or taste    01/10/2021  She already had her cataract surgery.  In June she had a stent placement by Cardiology and Dr. William did not want her to take anti-inflammatories.  She does still take for a leaves maybe 3 times a week.  She does not take meloxicam when she takes the a leaves.  We went over that in details with her and I did tell her since cardiology at least once her 1 year without taking NSAIDs but she hurts sometimes and she needs to take it to function.  She is ambulating without any assistive devices.  I did tell her maybe if she takes it twice a week will be okay but she should concentrate on taking Tylenol instead.  States the left knee is the 1 that hurts the most with pain around 8/10.  She does ambulate without any assistive devices.    We did discuss COVID vaccine she did receive in August 2021 [facility] [employer].  I directed her to [facility] where she got the initial vaccine and that she can go there and requested booster shot  Past Medical History:   Diagnosis Date    CAD (coronary artery disease)     Followed by Dr. Stevo Kaba    Diabetes mellitus, type 2     DM (diabetes mellitus)     BS didn't check 06/05/2020    High level of uric acid in blood     Hyperlipidemia     Hyperplastic rectal polyp      on 2009 colonoscopy    Hypertension     Multinodular goiter     noted on 2015 CTA carotid scan    Multiple thyroid nodules 2016    on Thyroid U/S    Osteoarthritis     Left knee    Osteoarthritis of back     Osteoporosis, unspecified     Postmenopausal     No history of abnormal pap smear    Proteinuria     Pustular psoriasis     hands and feet    Subclavian artery stenosis     Left; with the proximal segment at 60-70% per 10/02/2008 carotid CTA    Vitamin D deficiency disease      Past Surgical History:   Procedure Laterality Date    CATARACT EXTRACTION W/  INTRAOCULAR LENS IMPLANT Right 16    CORONARY ANGIOGRAPHY N/A 2021    Procedure: ANGIOGRAM, CORONARY ARTERY;  Surgeon: Desiree William MD;  Location: [facility] CATH LAB;  Service: Cardiology;  Laterality: N/A;    CORONARY STENT PLACEMENT N/A 2021    Procedure: INSERTION, STENT, CORONARY ARTERY;  Surgeon: Desiree William MD;  Location: [facility] CATH LAB;  Service: Cardiology;  Laterality: N/A;    heart catheterization with stents placed   and 2009    LEFT HEART CATHETERIZATION Left 2021    Procedure: CATHETERIZATION, HEART, LEFT;  Surgeon: Desiree William MD;  Location: [facility] CATH LAB;  Service: Cardiology;  Laterality: Left;  Covid + week of    left knee surgery for bone graft and pin placement       Family History   Problem Relation Age of Onset    Diabetes Mother     Hypertension Mother     Stroke Mother     Hypertension Sister     Cancer Father         Lung cancer (smoker)    No Known Problems Daughter     No Known Problems Son     Heart disease Neg Hx      Social History     Socioeconomic History    Marital status:     Number of children: 2   Occupational History    Occupation: Retired   Tobacco Use    Smoking status: Former Smoker     Packs/day: 1.00     Years: 33.00     Pack years: 33.00     Types: Cigarettes     Quit date: 2004     Years since quittin.4     Smokeless tobacco: Never Used   Substance and Sexual Activity    Alcohol use: Yes     Alcohol/week: 0.0 standard drinks     Comment: Rarely    Drug use: No    Sexual activity: Yes     Partners: Male     Birth control/protection: Post-menopausal   Social History Narrative    She wears seatbelt. She will start working again at [employer] on next week. She states her   in 2020 at the age of 68 due to lung cancer.     Social Determinants of Health     Financial Resource Strain: Low Risk     Difficulty of Paying Living Expenses: Not hard at all   Food Insecurity: No Food Insecurity    Worried About Running Out of Food in the Last Year: Never true    Ran Out of Food in the Last Year: Never true   Transportation Needs: No Transportation Needs    Lack of Transportation (Medical): No    Lack of Transportation (Non-Medical): No   Physical Activity: Insufficiently Active    Days of Exercise per Week: 5 days    Minutes of Exercise per Session: 10 min   Stress: No Stress Concern Present    Feeling of Stress : Only a little   Social Connections: Unknown    Frequency of Communication with Friends and Family: More than three times a week    Frequency of Social Gatherings with Friends and Family: Once a week    Active Member of Clubs or Organizations: No    Attends Club or Organization Meetings: Never    Marital Status:      Medication List with Changes/Refills   Current Medications    ALLOPURINOL (ZYLOPRIM) 100 MG TABLET    Take 1 tablet by mouth once daily    ASPIRIN 81 MG TAB    Take 1 tablet by mouth.    BLOOD SUGAR DIAGNOSTIC STRP    1 strip by Misc.(Non-Drug; Combo Route) route 2 (two) times daily.    BLOOD SUGAR DIAGNOSTIC STRP    To check BG 2 times daily, to use with insurance preferred meter - Freestyle Lite    BLOOD-GLUCOSE METER KIT    To check BG 2 times daily, to use with insurance preferred meter -  Freestyle Lite    BLOOD-GLUCOSE METER MISC    Use FREESTYLE meter for dx:  diabetes    CLOPIDOGREL (PLAVIX) 75 MG TABLET    Take 1 tablet (75 mg total) by mouth once. for 1 dose    DESOXIMETASONE (TOPICORT) 0.25 % CREAM    APPLY  CREAM EXTERNALLY TO AFFECTED AREA TWICE DAILY AS NEEDED    ERGOCALCIFEROL, VITAMIN D2, (VITAMIN D ORAL)    Take 1 tablet by mouth 2 (two) times daily. 2000 units daily    FUROSEMIDE (LASIX) 20 MG TABLET    Take 2 tablets (40 mg total) by mouth once daily.    GLIPIZIDE (GLUCOTROL) 10 MG TABLET    Take 1 tablet (10 mg total) by mouth 2 (two) times daily before meals.    HYDRALAZINE (APRESOLINE) 50 MG TABLET    Take 1 tablet (50 mg total) by mouth 4 (four) times daily.    JANUVIA 100 MG TAB    Take 1 tablet (100 mg total) by mouth once daily.    LANCETS MISC    To check BG 2 times daily, to use with insurance preferred meter - Freestyle Lite    LOSARTAN (COZAAR) 50 MG TABLET    Take 1 tablet (50 mg total) by mouth once daily.    METFORMIN (GLUCOPHAGE) 500 MG TABLET    TAKE 1 TABLET BY MOUTH IN THE MORNING AND 1 TABLET BY MOUTH AT NOON AND 2 TABLETS IN THE EVENING WITH MEALS    METOPROLOL SUCCINATE (TOPROL-XL) 50 MG 24 HR TABLET    Take 1 tablet (50 mg total) by mouth once daily.    NYSTATIN (MYCOSTATIN) CREAM    Apply topically 2 (two) times daily as needed for Dry Skin. For up to 2 weeks    ROSUVASTATIN (CRESTOR) 20 MG TABLET    TAKE 1 TABLET BY MOUTH NIGHTLY.     Review of patient's allergies indicates:   Allergen Reactions    Codeine      Other reaction(s): Chills     Review of Systems   Constitutional: Negative for decreased appetite.   HENT: Negative for tinnitus.    Eyes: Negative for double vision.   Cardiovascular: Negative for chest pain.   Respiratory: Negative for wheezing.    Hematologic/Lymphatic: Negative for bleeding problem.   Skin: Negative for dry skin.   Musculoskeletal: Positive for arthritis, back pain, joint pain and stiffness. Negative for gout and neck pain.   Gastrointestinal: Negative for abdominal pain.   Genitourinary: Negative for  bladder incontinence.   Neurological: Negative for numbness, paresthesias and sensory change.   Psychiatric/Behavioral: Negative for altered mental status.       Objective:   Body mass index is 35.3 kg/m².  Vitals:    01/10/22 0834   BP: (!) 175/74   Pulse: 92          General    Constitutional: She is oriented to person, place, and time. She appears well-developed.   HENT:   Head: Atraumatic.   Eyes: EOM are normal.   Cardiovascular: Normal rate.    Pulmonary/Chest: Effort normal.   Neurological: She is alert and oriented to person, place, and time.   Psychiatric: Judgment normal.            Cervical rotation without limitation or tenderness    Lumbar with slight deformity seen.  Mild tenderness to palpation between L4 and L5 level.  Forward bending almost to the ankle.  Pelvis is level in the sitting position.  Bilateral hips passive motion without pain in the groin.  Hip flexors, abductors, adductors, quads, hamstrings, ankle extensors and flexors are all 5/5.  Right knee with 8° of valgus alignment.  Lateral joint tenderness and mild tenderness medially.  Some crepitus to compression on the patella.  Slightly lose medial collateral ligament.  Lateral collateral ligaments stable.  Negative anterior drawer.  Negative Char sign  Left knee with 10-12 degrees of valgus alignment.  You can correct down to 7° roughly.  Lateral joint and anterior joint severe tenderness.  Mild to moderate swelling.  Crepitus to range of motion.  Range of motion 0-125.  Large surgical scar on the lateral aspect of the knee  Calves are soft nontender with mild varicosities  Ankle extension and flexion was 5/5 bilaterally  Left foot during gait inverts and walk on the side of her foot.  PT 1+  Sensation intact to touch  Skin is warm to touch slightly dry no obvious lesions    Relevant imaging results reviewed and interpreted by me, discussed with the patient and / or family today   X-ray 06/05/2020 left knee was complete loss of  lateral joint space with large osteophyte and cystic changes.  There is a screw in the subchondral area entering from lateral to medial.  Right knee x-ray with narrowing lateral joint space and small osteophytic changes consistent with right knee arthrosis/moderate.  Both knees are in valgus  X-ray and electronic records of the lumbar spine showing with scoliosis degenerative in nature, degenerative disc disease and facet changes.  Assessment:     Encounter Diagnoses   Name Primary?    Arthritis of knee, left Yes    Acquired genu valgum of left knee     Retained orthopedic hardware     DDD (degenerative disc disease), lumbosacral     Arthritis of knee, right     Acquired valgus deformity knee, right         Plan:   Arthritis of knee, left  -     Large Joint Aspiration/Injection: L knee    Acquired genu valgum of left knee    Retained orthopedic hardware    DDD (degenerative disc disease), lumbosacral    Arthritis of knee, right    Acquired valgus deformity knee, right         Patient Instructions   meloxicam and Aleve are both in the same family and cardiologist down like you to take come.  If you having severe pain you may want a taken twice a week but not more  Other days you may want a take Tylenol 650 mg 3 times a day if needed  Injection of Synvisc-One into the left knee 01/10/2022  Ice the knee next several days    There is a long-acting steroid injection that you could have and will not mess up her sugar as bad as the regular steroid by the name of Zilretta    Return to see us in 3 months    Patient is definitely a surgical consisted to the left knee by total knee arthroplasty.  Patient wants to avoid surgery if she can at this point  Told her to stop meloxicam since she is having cataracts surgery done on her left eye.  She has to ask her ophthalmologist when she can resume it      Disclaimer: This note was prepared using a voice recognition system and is likely to have sound alike errors within the  text.

## 2022-01-25 ENCOUNTER — OFFICE VISIT (OUTPATIENT)
Dept: FAMILY MEDICINE | Facility: CLINIC | Age: 67
End: 2022-01-25
Payer: MEDICARE

## 2022-01-25 ENCOUNTER — LAB VISIT (OUTPATIENT)
Dept: LAB | Facility: HOSPITAL | Age: 67
End: 2022-01-25
Attending: FAMILY MEDICINE
Payer: MEDICARE

## 2022-01-25 VITALS
HEIGHT: 62 IN | TEMPERATURE: 98 F | OXYGEN SATURATION: 97 % | HEART RATE: 90 BPM | BODY MASS INDEX: 37.69 KG/M2 | SYSTOLIC BLOOD PRESSURE: 130 MMHG | WEIGHT: 204.81 LBS | DIASTOLIC BLOOD PRESSURE: 60 MMHG

## 2022-01-25 DIAGNOSIS — I15.2 HYPERTENSION ASSOCIATED WITH DIABETES: ICD-10-CM

## 2022-01-25 DIAGNOSIS — Z12.31 VISIT FOR SCREENING MAMMOGRAM: ICD-10-CM

## 2022-01-25 DIAGNOSIS — E79.0 ELEVATED URIC ACID IN BLOOD: ICD-10-CM

## 2022-01-25 DIAGNOSIS — E11.21 DIABETIC NEPHROPATHY WITH PROTEINURIA: ICD-10-CM

## 2022-01-25 DIAGNOSIS — E11.69 COMBINED HYPERLIPIDEMIA ASSOCIATED WITH TYPE 2 DIABETES MELLITUS: ICD-10-CM

## 2022-01-25 DIAGNOSIS — E04.2 MULTINODULAR GOITER: ICD-10-CM

## 2022-01-25 DIAGNOSIS — M17.12 ARTHRITIS OF KNEE, LEFT: ICD-10-CM

## 2022-01-25 DIAGNOSIS — M81.0 AGE-RELATED OSTEOPOROSIS WITHOUT CURRENT PATHOLOGICAL FRACTURE: ICD-10-CM

## 2022-01-25 DIAGNOSIS — I15.2 HYPERTENSION ASSOCIATED WITH DIABETES: Primary | ICD-10-CM

## 2022-01-25 DIAGNOSIS — N18.31 STAGE 3A CHRONIC KIDNEY DISEASE: ICD-10-CM

## 2022-01-25 DIAGNOSIS — E11.40 TYPE 2 DIABETES MELLITUS WITH DIABETIC NEUROPATHY, WITHOUT LONG-TERM CURRENT USE OF INSULIN: ICD-10-CM

## 2022-01-25 DIAGNOSIS — E11.59 HYPERTENSION ASSOCIATED WITH DIABETES: ICD-10-CM

## 2022-01-25 DIAGNOSIS — E11.59 HYPERTENSION ASSOCIATED WITH DIABETES: Primary | ICD-10-CM

## 2022-01-25 DIAGNOSIS — Z23 NEED FOR INFLUENZA VACCINATION: ICD-10-CM

## 2022-01-25 DIAGNOSIS — E66.01 SEVERE OBESITY (BMI 35.0-39.9) WITH COMORBIDITY: ICD-10-CM

## 2022-01-25 DIAGNOSIS — Z78.0 POSTMENOPAUSAL: ICD-10-CM

## 2022-01-25 DIAGNOSIS — Z12.11 COLON CANCER SCREENING: ICD-10-CM

## 2022-01-25 DIAGNOSIS — E78.2 COMBINED HYPERLIPIDEMIA ASSOCIATED WITH TYPE 2 DIABETES MELLITUS: ICD-10-CM

## 2022-01-25 DIAGNOSIS — I25.118 ATHEROSCLEROTIC HEART DISEASE OF NATIVE CORONARY ARTERY WITH OTHER FORMS OF ANGINA PECTORIS: ICD-10-CM

## 2022-01-25 DIAGNOSIS — E55.9 VITAMIN D DEFICIENCY: ICD-10-CM

## 2022-01-25 LAB
ALBUMIN SERPL BCP-MCNC: 3.5 G/DL (ref 3.5–5.2)
ALP SERPL-CCNC: 69 U/L (ref 55–135)
ALT SERPL W/O P-5'-P-CCNC: 20 U/L (ref 10–44)
ANION GAP SERPL CALC-SCNC: 9 MMOL/L (ref 8–16)
AST SERPL-CCNC: 13 U/L (ref 10–40)
BASOPHILS # BLD AUTO: 0.08 K/UL (ref 0–0.2)
BASOPHILS NFR BLD: 1.1 % (ref 0–1.9)
BILIRUB SERPL-MCNC: 0.2 MG/DL (ref 0.1–1)
BUN SERPL-MCNC: 19 MG/DL (ref 8–23)
CALCIUM SERPL-MCNC: 9.6 MG/DL (ref 8.7–10.5)
CHLORIDE SERPL-SCNC: 103 MMOL/L (ref 95–110)
CHOLEST SERPL-MCNC: 133 MG/DL (ref 120–199)
CHOLEST/HDLC SERPL: 3.3 {RATIO} (ref 2–5)
CO2 SERPL-SCNC: 22 MMOL/L (ref 23–29)
CREAT SERPL-MCNC: 0.9 MG/DL (ref 0.5–1.4)
DIFFERENTIAL METHOD: ABNORMAL
EOSINOPHIL # BLD AUTO: 0.2 K/UL (ref 0–0.5)
EOSINOPHIL NFR BLD: 2.2 % (ref 0–8)
ERYTHROCYTE [DISTWIDTH] IN BLOOD BY AUTOMATED COUNT: 14.9 % (ref 11.5–14.5)
EST. GFR  (AFRICAN AMERICAN): >60 ML/MIN/1.73 M^2
EST. GFR  (NON AFRICAN AMERICAN): >60 ML/MIN/1.73 M^2
ESTIMATED AVG GLUCOSE: 243 MG/DL (ref 68–131)
GLUCOSE SERPL-MCNC: 390 MG/DL (ref 70–110)
HBA1C MFR BLD: 10.1 % (ref 4–5.6)
HCT VFR BLD AUTO: 28 % (ref 37–48.5)
HDLC SERPL-MCNC: 40 MG/DL (ref 40–75)
HDLC SERPL: 30.1 % (ref 20–50)
HGB BLD-MCNC: 8.3 G/DL (ref 12–16)
IMM GRANULOCYTES # BLD AUTO: 0.02 K/UL (ref 0–0.04)
IMM GRANULOCYTES NFR BLD AUTO: 0.3 % (ref 0–0.5)
LDLC SERPL CALC-MCNC: 16.2 MG/DL (ref 63–159)
LYMPHOCYTES # BLD AUTO: 1.3 K/UL (ref 1–4.8)
LYMPHOCYTES NFR BLD: 18.3 % (ref 18–48)
MCH RBC QN AUTO: 24.6 PG (ref 27–31)
MCHC RBC AUTO-ENTMCNC: 29.6 G/DL (ref 32–36)
MCV RBC AUTO: 83 FL (ref 82–98)
MONOCYTES # BLD AUTO: 0.5 K/UL (ref 0.3–1)
MONOCYTES NFR BLD: 6.7 % (ref 4–15)
NEUTROPHILS # BLD AUTO: 5.2 K/UL (ref 1.8–7.7)
NEUTROPHILS NFR BLD: 71.4 % (ref 38–73)
NONHDLC SERPL-MCNC: 93 MG/DL
NRBC BLD-RTO: 0 /100 WBC
PLATELET # BLD AUTO: 243 K/UL (ref 150–450)
PMV BLD AUTO: 11.2 FL (ref 9.2–12.9)
POTASSIUM SERPL-SCNC: 3.9 MMOL/L (ref 3.5–5.1)
PROT SERPL-MCNC: 6.9 G/DL (ref 6–8.4)
RBC # BLD AUTO: 3.37 M/UL (ref 4–5.4)
SODIUM SERPL-SCNC: 134 MMOL/L (ref 136–145)
TRIGL SERPL-MCNC: 384 MG/DL (ref 30–150)
TSH SERPL DL<=0.005 MIU/L-ACNC: 2.04 UIU/ML (ref 0.4–4)
URATE SERPL-MCNC: 4.1 MG/DL (ref 2.4–5.7)
WBC # BLD AUTO: 7.28 K/UL (ref 3.9–12.7)

## 2022-01-25 PROCEDURE — 99999 PR PBB SHADOW E&M-EST. PATIENT-LVL V: CPT | Mod: PBBFAC,,, | Performed by: FAMILY MEDICINE

## 2022-01-25 PROCEDURE — 85025 COMPLETE CBC W/AUTO DIFF WBC: CPT | Performed by: FAMILY MEDICINE

## 2022-01-25 PROCEDURE — 99215 OFFICE O/P EST HI 40 MIN: CPT | Mod: 25,S$PBB,, | Performed by: FAMILY MEDICINE

## 2022-01-25 PROCEDURE — 99999 PR PBB SHADOW E&M-EST. PATIENT-LVL V: ICD-10-PCS | Mod: PBBFAC,,, | Performed by: FAMILY MEDICINE

## 2022-01-25 PROCEDURE — 99215 PR OFFICE/OUTPT VISIT, EST, LEVL V, 40-54 MIN: ICD-10-PCS | Mod: 25,S$PBB,, | Performed by: FAMILY MEDICINE

## 2022-01-25 PROCEDURE — 80053 COMPREHEN METABOLIC PANEL: CPT | Performed by: FAMILY MEDICINE

## 2022-01-25 PROCEDURE — 36415 COLL VENOUS BLD VENIPUNCTURE: CPT | Mod: PO | Performed by: FAMILY MEDICINE

## 2022-01-25 PROCEDURE — 80061 LIPID PANEL: CPT | Performed by: FAMILY MEDICINE

## 2022-01-25 PROCEDURE — 83036 HEMOGLOBIN GLYCOSYLATED A1C: CPT | Performed by: FAMILY MEDICINE

## 2022-01-25 PROCEDURE — 84443 ASSAY THYROID STIM HORMONE: CPT | Performed by: FAMILY MEDICINE

## 2022-01-25 PROCEDURE — G0008 ADMIN INFLUENZA VIRUS VAC: HCPCS | Mod: PBBFAC,PO

## 2022-01-25 PROCEDURE — 84550 ASSAY OF BLOOD/URIC ACID: CPT | Performed by: FAMILY MEDICINE

## 2022-01-25 PROCEDURE — 99215 OFFICE O/P EST HI 40 MIN: CPT | Mod: PBBFAC,PO,25 | Performed by: FAMILY MEDICINE

## 2022-01-25 RX ORDER — SITAGLIPTIN 100 MG/1
100 TABLET, FILM COATED ORAL DAILY
Qty: 90 TABLET | Refills: 1 | Status: SHIPPED | OUTPATIENT
Start: 2022-01-25 | End: 2022-07-21

## 2022-01-25 NOTE — PROGRESS NOTES
HISTORY OF PRESENT ILLNESS: Ms. Rosales comes in today not fasting and with taking medication without acute problems for annual wellness examination.    END OF LIFE DECISION: She does not have a living will but does not desire life support.    Current Outpatient Medications   Medication Sig    allopurinoL (ZYLOPRIM) 100 MG tablet Take 1 tablet by mouth once daily    aspirin 81 mg Tab Take 1 tablet by mouth.    blood sugar diagnostic Strp 1 strip by Misc.(Non-Drug; Combo Route) route 2 (two) times daily.    blood sugar diagnostic Strp To check BG 2 times daily, to use with insurance preferred meter - Freestyle Lite    blood-glucose meter kit To check BG 2 times daily, to use with insurance preferred meter -  Freestyle Lite    blood-glucose meter Misc Use FREESTYLE meter for dx: diabetes    clopidogreL (PLAVIX) 75 mg tablet Take 1 tablet (75 mg total) by mouth once. for 1 dose    furosemide (LASIX) 20 MG tablet Take 2 tablets (40 mg total) by mouth once daily. (Patient taking differently: Take 40 mg by mouth once daily. Patient is only taking one daily instead of 2)    glipiZIDE (GLUCOTROL) 10 MG tablet Take 1 tablet (10 mg total) by mouth 2 (two) times daily before meals.    hydrALAZINE (APRESOLINE) 50 MG tablet Take 1 tablet (50 mg total) by mouth 4 (four) times daily.    [facility] To check BG 2 times daily, to use with insurance preferred meter - Freestyle Lite    losartan (COZAAR) 50 MG tablet Take 1 tablet (50 mg total) by mouth once daily.    metFORMIN (GLUCOPHAGE) 500 MG tablet TAKE 1 TABLET BY MOUTH IN THE MORNING AND 1 TABLET BY MOUTH AT NOON AND 2 TABLETS IN THE EVENING WITH MEALS    metoprolol succinate (TOPROL-XL) 50 MG 24 hr tablet Take 1 tablet (50 mg total) by mouth once daily.    nystatin (MYCOSTATIN) cream Apply topically 2 (two) times daily as needed for Dry Skin. For up to 2 weeks    rosuvastatin (CRESTOR) 20 MG tablet TAKE 1 TABLET BY MOUTH NIGHTLY.    desoximetasone (TOPICORT)  0.25 % cream APPLY  CREAM EXTERNALLY TO AFFECTED AREA TWICE DAILY AS NEEDED (Patient not taking: Reported on 1/25/2022)    ERGOCALCIFEROL, VITAMIN D2, (VITAMIN D ORAL) Take 1 tablet by mouth 2 (two) times daily. 2000 units daily    JANUVIA 100 mg Tab Take 1 tablet (100 mg total) by mouth once daily. (Patient not taking: Reported on 1/25/2022) x 1 month as currently out of prescription     SCREENINGS:   Cholesterol (with CMP): October 22, 2021.  FFS/Colonoscopy: March 20, 2009; repeat in 7 years. December 21, 2018 FIT-KIT (-). She desires Fit-Kit again.   Mammogram: June 25, 2020 - okay.  GYN Exam: July 11, 2017 - okay. Pap smears 2009 - 2013, 2016 - okay. Pap smear no longer needed.   Dexa Scan: August 7, 2017 - osteoporosis; repeat in 2 years. November 29, 2021 -  osteopenia.  Eye Exam: September 1, 2020 with Dr. JGINESH Wadsworth. She wears glasses.   Dental Exam: She wears denture on top and bottom.   PPD: Negative in the past.    Immunizations: Tdap - February 25, 2009. Advised insurance-covered benefit only at local pharmacy.  Gardasil - N./A.  Zostavax - March 30, 2015.  Shingrix - Never. Advised insurance-covered benefit only at local pharmacy.  Pneumovax - November 21, 2008.   Prevnar-13 - June 4, 2020.  Seasonal Flu - November 24, 2020. She desires.    Hepatitis B vaccine - August 28, 2013, July 25, 2013, July 29, 2014.  Covid-19 (Micki) vaccine series - August 13, 2021.    ROS:  GENERAL: Denies fever, chills, fatigue or unusual weight change. Appetite normal. Weight 92.7 kg (204 lb 5.9 oz) at May 6, 2021 visit. Monitors diet. Exercises by mowing her lawn or gardening 3 - 4 times per week but moves around 6 times per week at [facility].  SKIN: Denies rashes, itching, changes in mole, color or texture of skin or easy bruising.   HEAD: Denies headaches or recent head trauma.  EYES: Denies change in vision, pain, diplopia, redness or discharge. Wears glasses.  EARS: Denies ear pain, discharge,  vertigo except decreased hearing but states hearing tests performed have been okay.  NOSE: Denies loss of smell, epistaxis or rhinitis.  MOUTH & THROAT: Denies hoarseness or change in voice. Denies excessive gum bleeding or mouth sores. Denies sore throat.  NODES: Denies swollen glands.  CHEST: Denies RIDDLE, wheezing, cough, or sputum production.  CARDIOVASCULAR: Denies chest pain, PND, orthopnea or reduced exercise tolerance. Denies palpitations. Saw cardiology Dr. William on December 30, 2021 for surveillance of CAD, NSTEMI, Post-PTCA, hypertension, hyperlipidemia, ROSEMARY, CKD3a, diabetes, obesity with 6-month follow up advised.    ABDOMEN: Denies diarrhea, constipation, nausea, vomiting, abdominal pain, or blood in stool.  URINARY: Denies flank pain, dysuria or hematuria. Saw Dr. Bryant, nephrologist, on September 19, 2017 for CKD stage 1, diabetes nephropathy, proteinuria surveillance with 9-month follow-up advised.  GENITOURINARY: Denies flank pain, dysuria, frequency or hematuria. Performs monthly breast self exams.  ENDOCRINE: Reports performs glucose checks every morning with levels ranging 300's. Thyroid ultrasound on June 24, 2020 - Multinodular thyroid gland again noted.  No nodules meeting criteria for biopsy or further follow-up per TI rads criteria. Repeat thyroid ultrasound every 1 - 2 years.  HEME/LYMPH: Denies bleeding problems.  PERIPHERAL VASCULAR:Denies claudication or cyanosis.  MUSCULOSKELETAL: Reports morning stiffness, pain in low back, left knee. Rani, orthopedist, on January 10, 2021 for lumbar DDD, left knee OA with Hylan shot given. Denies edema. Saw ERIC Hill with rheumatology on October 5, 2020 for surveillance of osteoporosis currently on Reclast and vitamin-D deficiency with 9-month follow-up with DEXA scan and Reclast therapy advised and given again on November 29, 2021.   NEUROLOGIC: Denies history of seizures, tremors, paralysis, alteration of gait or coordination. Reports chronic  "numbness in hands, feet.  PSYCHIATRIC: Denies mood swings, depression, anxiety or homicidal or suicidal thoughts. Denies sleep problems.     PE:   /60   Pulse 90   Temp 97.6 °F (36.4 °C)   Ht 5' 2" (1.575 m)   Wt 92.9 kg (204 lb 12.9 oz)   SpO2 97%   BMI 37.46 kg/m²   APPEARANCE: Well nourished, well developed female, obese and pleasant, alert and oriented in no acute distress.   HEAD: Non tender. Full range of motion.  EYES: PERRL, conjunctiva pink, lids no edema.  EARS: External canal patent, no swelling or redness. TM's shiny and clear.  NOSE: Mucosa and turbinates pink, not swollen. No discharge. Non tender sinuses.  THROAT: No pharyngeal erythema or exudate. No stridor. She wears top dentures, bottom partials.  NECK: Supple, no mass, thyroid not enlarged.  NODES: No cervical, axillary lymph node enlargement.  CHEST: Normal respiratory effort. Lungs clear to auscultation.  CARDIOVASCULAR: Normal S1, S2. Chronic soft cardiac murmur noted. PMI not displaced. No carotid bruit. Pedal pulses palpable bilaterally. No edema. Feet look okay without ulcerations.  ABDOMEN: Bowel sounds present. Not distended. Soft. No tenderness, masses or organomegaly.  BREAST EXAM: Not examined per patient request.  PELVIC EXAM: Not examined per patient request.  RECTAL EXAM: Not examined per patient request.  MUSCULOSKELETAL: No joint deformities or stiffness except tender at right heel. She is ambulatory without problems.  SKIN: No rashes or suspicious lesions, normal color and turgor except "quiet" eczematous skin changes at bottom of feet (with much improved appearance compared to other visits with me).  NEUROLOGIC: Cranial Nerves: II-XII grossly intact. DTR's: Knees, Ankles 2+ and equal bilaterally. Gait & Posture: Normal gait and fine motion.  PSYCHIATRIC: Patient alert, oriented x 3. Mood/Affect normal without acute anxiety or depression noted. Judgment/insight good as she makes appropriate decisions on today's " examination.    Protective Sensation (w/ 10 gram monofilament):  Right: Intact  Left: Intact    Visual Inspection:  Normal -  Bilateral    Pedal Pulses:   Right: Present  Left: Present    Posterior tibialis:   Right:Present  Left: Present     ASSESSMENT:    ICD-10-CM ICD-9-CM    1. Hypertension associated with diabetes  E11.59 250.80 Comprehensive Metabolic Panel    I15.2 401.9 Lipid Panel      CBC Auto Differential      TSH   2. Combined hyperlipidemia associated with type 2 diabetes mellitus  E11.69 250.80 Comprehensive Metabolic Panel    E78.2 272.2 Lipid Panel   3. Atherosclerotic heart disease of native coronary artery with other forms of angina pectoris  I25.118 414.01      413.9    4. Type 2 diabetes mellitus with diabetic neuropathy, without long-term current use of insulin  E11.40 250.60 Hemoglobin A1C     357.2 Ambulatory referral/consult to Optometry      JANUVIA 100 mg Tab   5. Diabetic nephropathy with proteinuria  E11.21 250.40 Hemoglobin A1C     583.81 Microalbumin/Creatinine Ratio, Urine      JANUVIA 100 mg Tab   6. Stage 3a chronic kidney disease  N18.31 585.3    7. Multinodular goiter  E04.2 241.1 US Soft Tissue Head Neck Thyroid   8. Vitamin D deficiency  E55.9 268.9    9. Age-related osteoporosis without current pathological fracture  M81.0 733.01    10. Elevated uric acid in blood  E79.0 790.6 Uric Acid   11. Arthritis of knee, left  M17.12 716.96    12. Severe obesity (BMI 35.0-39.9) with comorbidity  E66.01 278.01    13. Postmenopausal  Z78.0 V49.81    14. Visit for screening mammogram  Z12.31 V76.12 Mammo Digital Screening Bilat w/ Shad   15. Colon cancer screening  Z12.11 V76.51 Case Request Endoscopy: COLONOSCOPY   16. Need for influenza vaccination  Z23 V04.81 Influenza - Quadrivalent (Adjuvanted)       PLAN:  1. Age-appropriate counseling-appropriate low-sodium, low-cholesterol, low carbohydrate diet and exercise daily, monthly breast self exam, annual wellness examination.   2. Patient  advised to call for results.  3. Continue current medications.  4. Prescription refill as noted above.  5. Annual eye and dental examinations encouraged.  6. Keep follow up with specialists.  7. Follow up in about 6 months (around 7/25/2022) for diabetes and hypertension follow up.     40 minutes of total time spent on the encounter, which includes face to face time and non-face to face time preparing to see the patient (eg, review of tests), Obtaining and/or reviewing separately obtained history, Documenting clinical information in the electronic or other health record, Independently interpreting results (not separately reported) and communicating results to the patient/family/caregiver, or Care coordination (not separately reported).

## 2022-01-28 ENCOUNTER — PATIENT MESSAGE (OUTPATIENT)
Dept: CARDIOLOGY | Facility: CLINIC | Age: 67
End: 2022-01-28
Payer: MEDICARE

## 2022-01-30 DIAGNOSIS — D64.9 ANEMIA, UNSPECIFIED TYPE: Primary | ICD-10-CM

## 2022-01-30 PROBLEM — Z78.0 POSTMENOPAUSAL: Status: ACTIVE | Noted: 2022-01-30

## 2022-01-31 RX ORDER — ROSUVASTATIN CALCIUM 20 MG/1
TABLET, COATED ORAL
Qty: 90 TABLET | Refills: 1 | Status: SHIPPED | OUTPATIENT
Start: 2022-01-31 | End: 2022-09-10 | Stop reason: SDUPTHER

## 2022-02-01 ENCOUNTER — PATIENT MESSAGE (OUTPATIENT)
Dept: FAMILY MEDICINE | Facility: CLINIC | Age: 67
End: 2022-02-01
Payer: MEDICARE

## 2022-02-08 ENCOUNTER — HOSPITAL ENCOUNTER (OUTPATIENT)
Dept: RADIOLOGY | Facility: HOSPITAL | Age: 67
Discharge: HOME | End: 2022-02-08
Attending: FAMILY MEDICINE
Payer: MEDICARE

## 2022-02-08 DIAGNOSIS — E04.2 MULTINODULAR GOITER: ICD-10-CM

## 2022-02-08 PROCEDURE — 76536 US EXAM OF HEAD AND NECK: CPT | Mod: TC

## 2022-02-08 PROCEDURE — 76536 US EXAM OF HEAD AND NECK: CPT | Mod: 26,,, | Performed by: RADIOLOGY

## 2022-02-08 PROCEDURE — 76536 US SOFT TISSUE HEAD NECK THYROID: ICD-10-PCS | Mod: 26,,, | Performed by: RADIOLOGY

## 2022-02-09 ENCOUNTER — PATIENT OUTREACH (OUTPATIENT)
Dept: ADMINISTRATIVE | Facility: OTHER | Age: 67
End: 2022-02-09
Payer: MEDICARE

## 2022-02-10 ENCOUNTER — OFFICE VISIT (OUTPATIENT)
Dept: OPHTHALMOLOGY | Facility: CLINIC | Age: 67
End: 2022-02-10
Payer: MEDICARE

## 2022-02-10 ENCOUNTER — TELEPHONE (OUTPATIENT)
Dept: FAMILY MEDICINE | Facility: CLINIC | Age: 67
End: 2022-02-10
Payer: MEDICARE

## 2022-02-10 DIAGNOSIS — E11.3299 BDR (BACKGROUND DIABETIC RETINOPATHY): ICD-10-CM

## 2022-02-10 DIAGNOSIS — E11.40 TYPE 2 DIABETES MELLITUS WITH DIABETIC NEUROPATHY, WITHOUT LONG-TERM CURRENT USE OF INSULIN: ICD-10-CM

## 2022-02-10 DIAGNOSIS — E11.9 DIABETES MELLITUS TYPE 2 WITHOUT RETINOPATHY: Primary | ICD-10-CM

## 2022-02-10 DIAGNOSIS — Z96.1 PSEUDOPHAKIA OF BOTH EYES: ICD-10-CM

## 2022-02-10 DIAGNOSIS — H52.7 REFRACTIVE ERRORS: ICD-10-CM

## 2022-02-10 PROCEDURE — 99999 PR PBB SHADOW E&M-EST. PATIENT-LVL III: CPT | Mod: PBBFAC,,, | Performed by: OPTOMETRIST

## 2022-02-10 PROCEDURE — 99213 OFFICE O/P EST LOW 20 MIN: CPT | Mod: PBBFAC | Performed by: OPTOMETRIST

## 2022-02-10 PROCEDURE — 92014 COMPRE OPH EXAM EST PT 1/>: CPT | Mod: S$PBB,,, | Performed by: OPTOMETRIST

## 2022-02-10 PROCEDURE — 92014 PR EYE EXAM, EST PATIENT,COMPREHESV: ICD-10-PCS | Mod: S$PBB,,, | Performed by: OPTOMETRIST

## 2022-02-10 PROCEDURE — 99999 PR PBB SHADOW E&M-EST. PATIENT-LVL III: ICD-10-PCS | Mod: PBBFAC,,, | Performed by: OPTOMETRIST

## 2022-02-10 PROCEDURE — 92015 DETERMINE REFRACTIVE STATE: CPT | Mod: ,,, | Performed by: OPTOMETRIST

## 2022-02-10 PROCEDURE — 92015 PR REFRACTION: ICD-10-PCS | Mod: ,,, | Performed by: OPTOMETRIST

## 2022-02-10 NOTE — TELEPHONE ENCOUNTER
----- Message from Margie Bird MD sent at 2/8/2022  5:08 PM CST -----  Advise pt ultrasound shows stable thyroid nodules without need for further work up as this time. Let's repeat ultrasound in 1 to 2 years. Thanks.

## 2022-02-10 NOTE — PROGRESS NOTES
Health Maintenance Due   Topic Date Due    Shingles Vaccine (2 of 3) 05/25/2015    TETANUS VACCINE  02/25/2019    Colorectal Cancer Screening  12/21/2019    Mammogram  06/25/2021    Eye Exam  06/29/2021    COVID-19 Vaccine (2 - Booster for Micki series) 10/08/2021     Updates were requested from care everywhere.  Chart was reviewed for overdue [facility]active [facility] Encounters (PROM[facility]) topics (CRS, Breast Cancer Screening, Eye exam)  Health Maintenance has been updated.  LINKS immunization registry triggered.  Immunizations were reconciled.

## 2022-02-10 NOTE — PROGRESS NOTES
HPI     NIDDM exam.  Blurred vision right eye at distance with glasses.  Last eye exam 06/29/2020 [facility]  Last eye visit 09/01/2020 [facility].  Refereed by DR. Margie Bird.  Lab Results       Component                Value               Date                       HGBA1C                   10.1 (H)            01/25/2022                Last edited by Camilla Vizcarra MA on 2/10/2022  3:39 PM. (History)            Assessment /Plan     For exam results, see Encounter Report.    Diabetes mellitus type 2 without retinopathy    Type 2 diabetes mellitus with diabetic neuropathy, without long-term current use of insulin  -     Ambulatory referral/consult to Optometry    Pseudophakia of both eyes    BDR (background diabetic retinopathy)    Refractive errors      No Background Diabetic Retinopathy    Stable IOL OU.    Mild BDR OU  Repeat DFE and mOCT in 6 months  Dispense Final Rx for glasses.  Discussed above and answered questions.

## 2022-02-10 NOTE — TELEPHONE ENCOUNTER
----- Message from Herbie Clifton sent at 2/9/2022  4:23 PM CST -----  Contact: self  Type:  Patient Returning Call    Who Called:Alysha Rosales   Who Left Message for Patient:nurse  Does the patient know what this is regarding?:not sure  Would the patient rather a call back or a response via [facility]? Call back  Best Call Back Number:[phone]  Additional Information:

## 2022-02-11 ENCOUNTER — TELEPHONE (OUTPATIENT)
Dept: FAMILY MEDICINE | Facility: CLINIC | Age: 67
End: 2022-02-11
Payer: MEDICARE

## 2022-02-11 NOTE — TELEPHONE ENCOUNTER
----- Message from Gio Rodriguez sent at 2/11/2022  8:20 AM CST -----  Contact: [phone]  Type:  Patient Returning Call    Who Called Alysha   Who Left Message for Patient:nurse   Does the patient know what this is regarding?: results   Would the patient rather a call back or a response via [facility]? Call back   Best Call Back Number:[phone]  Additional Information:

## 2022-02-15 ENCOUNTER — HOSPITAL ENCOUNTER (OUTPATIENT)
Dept: RADIOLOGY | Facility: HOSPITAL | Age: 67
Discharge: HOME | End: 2022-02-15
Attending: FAMILY MEDICINE
Payer: MEDICARE

## 2022-02-15 DIAGNOSIS — Z12.31 VISIT FOR SCREENING MAMMOGRAM: ICD-10-CM

## 2022-02-15 PROCEDURE — 77063 MAMMO DIGITAL SCREENING BILAT WITH TOMO: ICD-10-PCS | Mod: 26,,, | Performed by: RADIOLOGY

## 2022-02-15 PROCEDURE — 77063 BREAST TOMOSYNTHESIS BI: CPT | Mod: TC

## 2022-02-15 PROCEDURE — 77063 BREAST TOMOSYNTHESIS BI: CPT | Mod: 26,,, | Performed by: RADIOLOGY

## 2022-02-15 PROCEDURE — 77067 SCR MAMMO BI INCL CAD: CPT | Mod: 26,,, | Performed by: RADIOLOGY

## 2022-02-15 PROCEDURE — 77067 MAMMO DIGITAL SCREENING BILAT WITH TOMO: ICD-10-PCS | Mod: 26,,, | Performed by: RADIOLOGY

## 2022-02-16 ENCOUNTER — PATIENT MESSAGE (OUTPATIENT)
Dept: FAMILY MEDICINE | Facility: CLINIC | Age: 67
End: 2022-02-16
Payer: MEDICARE

## 2022-02-16 ENCOUNTER — TELEPHONE (OUTPATIENT)
Dept: FAMILY MEDICINE | Facility: CLINIC | Age: 67
End: 2022-02-16
Payer: MEDICARE

## 2022-02-16 NOTE — TELEPHONE ENCOUNTER
----- Message from Karyna Metz sent at 2/16/2022  8:20 AM CST -----  .Type:  Patient Returning Call    Who Called:.Alysha Rosales    Who Left Message for Patient:Paula  Does the patient know what this is regarding?:no  Would the patient rather a call back or a response via [facility]? Call back  Best Call Back Number:.[phone]    Additional Information:

## 2022-02-17 ENCOUNTER — PATIENT MESSAGE (OUTPATIENT)
Dept: FAMILY MEDICINE | Facility: CLINIC | Age: 67
End: 2022-02-17
Payer: MEDICARE

## 2022-02-18 DIAGNOSIS — E11.21 DIABETIC NEPHROPATHY WITH PROTEINURIA: ICD-10-CM

## 2022-02-18 RX ORDER — GLIPIZIDE 10 MG/1
10 TABLET ORAL
Qty: 60 TABLET | Refills: 5 | Status: CANCELLED | OUTPATIENT
Start: 2022-02-18 | End: 2023-02-18

## 2022-02-18 NOTE — TELEPHONE ENCOUNTER
No new care gaps identified.  Powered by [employer] Reference number: [id number].   2/18/2022 7:34:26 AM CST

## 2022-02-18 NOTE — TELEPHONE ENCOUNTER
Pt portal message  Pt wanting lab resutls      I really thought she was calling about my bloodwork from 2/8  Ferritin level is 9    I do have some symptoms but have been thinking I'm just getting older  I' m very tired and feel like my muscles are weak  Please advise

## 2022-02-21 ENCOUNTER — PATIENT MESSAGE (OUTPATIENT)
Dept: FAMILY MEDICINE | Facility: CLINIC | Age: 67
End: 2022-02-21
Payer: MEDICARE

## 2022-02-21 DIAGNOSIS — E11.21 DIABETIC NEPHROPATHY WITH PROTEINURIA: ICD-10-CM

## 2022-02-21 RX ORDER — GLIPIZIDE 10 MG/1
10 TABLET ORAL
Qty: 60 TABLET | Refills: 5 | Status: SHIPPED | OUTPATIENT
Start: 2022-02-21 | End: 2022-09-02

## 2022-02-21 NOTE — TELEPHONE ENCOUNTER
No new care gaps identified.  Powered by [employer]. Reference number: [id number].   2/21/2022 6:33:45 AM CST

## 2022-02-22 RX ORDER — GLIPIZIDE 10 MG/1
TABLET ORAL
Qty: 180 TABLET | Refills: 0 | OUTPATIENT
Start: 2022-02-22

## 2022-02-23 ENCOUNTER — TELEPHONE (OUTPATIENT)
Dept: ENDOSCOPY | Facility: HOSPITAL | Age: 67
End: 2022-02-23
Payer: MEDICARE

## 2022-02-23 NOTE — TELEPHONE ENCOUNTER
Please advise if patient may hold Plavix 5 days prior to endoscopy procedure. Patient stated she had stents placed less than 12 months ago. Upon your approval, our team will inform patient of medication clearance prior to procedure.  Procedure is scheduled for TBD.  Thanks for your time.

## 2022-04-05 ENCOUNTER — TELEPHONE (OUTPATIENT)
Dept: ADMINISTRATIVE | Facility: HOSPITAL | Age: 67
End: 2022-04-05
Payer: MEDICARE

## 2022-04-05 DIAGNOSIS — Z12.11 COLON CANCER SCREENING: Primary | ICD-10-CM

## 2022-04-20 ENCOUNTER — PATIENT OUTREACH (OUTPATIENT)
Dept: ADMINISTRATIVE | Facility: OTHER | Age: 67
End: 2022-04-20
Payer: MEDICARE

## 2022-04-20 DIAGNOSIS — E11.9 DIABETES MELLITUS WITHOUT COMPLICATION: Primary | ICD-10-CM

## 2022-04-20 NOTE — PROGRESS NOTES
Health Maintenance Due   Topic Date Due    Shingles Vaccine (2 of 3) 05/25/2015    TETANUS VACCINE  02/25/2019    Colorectal Cancer Screening  12/21/2019    Pneumococcal Vaccines (Age 65+) (2 - PPSV23 or PCV20) 06/04/2021    COVID-19 Vaccine (2 - Booster for Micki series) 10/08/2021    Hemoglobin A1c  04/25/2022     Updates were requested from care everywhere.  Chart was reviewed for overdue [facility] Encounters ([facility]) topics (CRS, Breast Cancer Screening, Eye exam)  Health Maintenance has been updated.  LINKS immunization registry triggered.  Immunizations were reconciled.

## 2022-04-21 ENCOUNTER — PATIENT MESSAGE (OUTPATIENT)
Dept: ORTHOPEDICS | Facility: CLINIC | Age: 67
End: 2022-04-21

## 2022-04-21 ENCOUNTER — OFFICE VISIT (OUTPATIENT)
Dept: ORTHOPEDICS | Facility: CLINIC | Age: 67
End: 2022-04-21
Payer: MEDICARE

## 2022-04-21 VITALS — BODY MASS INDEX: 37.56 KG/M2 | HEIGHT: 62 IN | WEIGHT: 204.13 LBS

## 2022-04-21 DIAGNOSIS — M17.11 ARTHRITIS OF KNEE, RIGHT: ICD-10-CM

## 2022-04-21 DIAGNOSIS — M21.062 ACQUIRED GENU VALGUM OF LEFT KNEE: ICD-10-CM

## 2022-04-21 DIAGNOSIS — M17.12 ARTHRITIS OF KNEE, LEFT: Primary | ICD-10-CM

## 2022-04-21 DIAGNOSIS — M25.519 SHOULDER PAIN, UNSPECIFIED CHRONICITY, UNSPECIFIED LATERALITY: ICD-10-CM

## 2022-04-21 DIAGNOSIS — M51.37 DDD (DEGENERATIVE DISC DISEASE), LUMBOSACRAL: ICD-10-CM

## 2022-04-21 DIAGNOSIS — M19.011 BILATERAL SHOULDER REGION ARTHRITIS: ICD-10-CM

## 2022-04-21 DIAGNOSIS — Z96.9 RETAINED ORTHOPEDIC HARDWARE: ICD-10-CM

## 2022-04-21 DIAGNOSIS — M21.061 ACQUIRED VALGUS DEFORMITY KNEE, RIGHT: ICD-10-CM

## 2022-04-21 DIAGNOSIS — M19.012 BILATERAL SHOULDER REGION ARTHRITIS: ICD-10-CM

## 2022-04-21 PROCEDURE — 99214 OFFICE O/P EST MOD 30 MIN: CPT | Mod: 25,S$PBB,, | Performed by: ORTHOPAEDIC SURGERY

## 2022-04-21 PROCEDURE — 20610 DRAIN/INJ JOINT/BURSA W/O US: CPT | Mod: 50,PBBFAC | Performed by: ORTHOPAEDIC SURGERY

## 2022-04-21 PROCEDURE — 99214 PR OFFICE/OUTPT VISIT, EST, LEVL IV, 30-39 MIN: ICD-10-PCS | Mod: 25,S$PBB,, | Performed by: ORTHOPAEDIC SURGERY

## 2022-04-21 PROCEDURE — 99212 OFFICE O/P EST SF 10 MIN: CPT | Mod: PBBFAC,25 | Performed by: ORTHOPAEDIC SURGERY

## 2022-04-21 PROCEDURE — 20610 LARGE JOINT ASPIRATION/INJECTION: BILATERAL KNEE: ICD-10-PCS | Mod: 50,S$PBB,, | Performed by: ORTHOPAEDIC SURGERY

## 2022-04-21 PROCEDURE — 99999 PR PBB SHADOW E&M-EST. PATIENT-LVL II: CPT | Mod: PBBFAC,,, | Performed by: ORTHOPAEDIC SURGERY

## 2022-04-21 PROCEDURE — 99999 PR PBB SHADOW E&M-EST. PATIENT-LVL II: ICD-10-PCS | Mod: PBBFAC,,, | Performed by: ORTHOPAEDIC SURGERY

## 2022-04-21 RX ORDER — METHYLPREDNISOLONE ACETATE 80 MG/ML
80 INJECTION, SUSPENSION INTRA-ARTICULAR; INTRALESIONAL; INTRAMUSCULAR; SOFT TISSUE
Status: DISCONTINUED | OUTPATIENT
Start: 2022-04-21 | End: 2022-04-21 | Stop reason: HOSPADM

## 2022-04-21 RX ADMIN — METHYLPREDNISOLONE ACETATE 80 MG: 80 INJECTION, SUSPENSION INTRALESIONAL; INTRAMUSCULAR; INTRASYNOVIAL; SOFT TISSUE at 09:04

## 2022-04-21 NOTE — PATIENT INSTRUCTIONS
Bilateral knees with severe arthritis and we reviewed her x-rays from 2020 showing complete loss of joint space on the outside  Your shoulders make a lateral noises with limited motion also consistent with arthritis  You are taking Aleve 2 tablets twice a day and that is working better than the meloxicam we put you on  Today will inject both knees each with 80 mg Depo-Medrol mixed with 5 cc 1% lidocaine 04/21/2022  This will increase her blood sugar level for several days watch what she eat  You may ice the knees the next few days if they swell up or hurt more  You can still take Tylenol 325 mg 2 tablets twice a day if needed also on top of her Aleve  I will see you in 3 months with x-ray of both shoulders and both knees  I will send you to physical therapy to work on her arms and you legs/[facility] in than since you been there before

## 2022-04-21 NOTE — PROGRESS NOTES
Subjective:     Patient ID: Alysha Rosales is a 67 y.o. female.    Chief Complaint: Pain of the Left Knee    HPI:  06/05/2020  [age] very pleasant white female who had injured her left knee in 1978 and had bone grafting and the pin placed in the knee.  Subsequent to that she had developed arthritis and now she says her foot is going out and she is walking on the side of her foot.  Few years back had steroid injection and viscosupplementation with some relief of her pain.  She states that she is hurting in both knees the right less than the left.  She takes Aleve for pills in the morning all together.  She also had been diagnosed with osteoporosis and supposed to take Boniva once every month and she forgets to take it.  She does ambulate without any assistive devices.  Pain is 5/10 with also some low back pain.  Denies any numbness or tingling but states she does have very mild peripheral neuropathy that is not constant.  Occasionally her knees give out and have the feeling of about to fall.  Denies loss of bowel bladder control, fever or chills or shortness of breath or chest pain    07/27/2020  Patient stated the [facility] is did the bone density test and now she is receiving 1 a year injection and she is very pleased with that instead of a monthly treatment.  The injection given into the left knee 06/05/2020 helped quite a bit as well as the meloxicam.  She does take Tylenol on as needed basis and wears her brace on as needed basis since she has severe valgus alignment.  She wears the brace at home most of the time when she goes out occasionally.  Pain is improving at 6/10.  She wants to avoid surgical intervention.  She still working sometimes at [employer] on the weekends.  Denies any fever or chills or shortness of breath or difficulty with chewing or swallowing.      02/12/2021  Patient with bilateral knee arthrosis and valgus deformity.  She is doing much better at this point.  She thinks  meloxicam is medical drug.  She does take it 3 to 4 times a week not daily.  Occasionally takes Tylenol.  Also she complains of morning stiffness in her hands but she exercises it and gets better.  She occasionally does wear her braces.  She did receive Synvisc One injection on 07/27/2020 and that seems to still were working.  She is now working the [employer] whole which makes her walk around and keep active.  As far as her spine is concerned and back is not hurting her at as much she stretches out and does well.  Denies any fever or chills or shortness of breath or difficulty with chewing or swallowing loss of bowel bladder control blurry vision double vision or loss of sense of smell or taste    01/10/2021  She already had her cataract surgery.  In June she had a stent placement by Cardiology and Dr. William did not want her to take anti-inflammatories.  She does still take for a leaves maybe 3 times a week.  She does not take meloxicam when she takes the a leaves.  We went over that in details with her and I did tell her since cardiology at least once her 1 year without taking NSAIDs but she hurts sometimes and she needs to take it to function.  She is ambulating without any assistive devices.  I did tell her maybe if she takes it twice a week will be okay but she should concentrate on taking Tylenol instead.  States the left knee is the 1 that hurts the most with pain around 8/10.  She does ambulate without any assistive devices.    We did discuss COVID vaccine she did receive in August 2021 [facility] [employer] [facility].  I directed her to [facility] where she got the initial vaccine and that she can go there and requested booster shot    04/21/2022  Bilateral shoulder pain and bilateral knee pain  Patient did have her cataract surgery in both of her ice.  Now she is taking Aleve which helps better than the meloxicam and stop taking meloxicam.  She takes 2 Aleve in the morning and 2 in the evening.  She still working  full-time around 5 days a week at the [employer].  She complains inability to portion lift her arms up in order to put computer is a little bit higher shelves.  We did give her a left knee Synvisc injection 01/10/2022 which helped a little bit.  She states any twisting motion to both of her knees today hurt quite a bit and it will be locked for 3 days.  She does not want have surgery at this time she had successful cataract surgery in both of her eyes.  She still lives up by herself she still drives.  Her daughters check on her on a daily basis.  She does have some diabetes also.  No fever no chills no shortness of breath or difficulty with chewing or swallowing loss of bowel bladder control.  Past Medical History:   Diagnosis Date    CAD (coronary artery disease)     Followed by Dr. Stevo Kaba    Diabetes mellitus, type 2     DM (diabetes mellitus)     BS didn't check 06/05/2020    DM (diabetes mellitus) 2012    BS didn't check 02/10/2022    High level of uric acid in blood     Hyperlipidemia     Hyperplastic rectal polyp     on 03/20/2009 colonoscopy    Hypertension     Multinodular goiter     noted on 1/20/2015 CTA carotid scan    Multiple thyroid nodules 05/05/2016    on Thyroid U/S    Osteoarthritis     Left knee    Osteoarthritis of back     Osteoporosis, unspecified     Postmenopausal     No history of abnormal pap smear    Proteinuria     Pustular psoriasis     hands and feet    Subclavian artery stenosis     Left; with the proximal segment at 60-70% per 10/02/2008 carotid CTA    Vitamin D deficiency disease      Past Surgical History:   Procedure Laterality Date    CATARACT EXTRACTION W/  INTRAOCULAR LENS IMPLANT Right 4/13/16    [facility]    CATARACT EXTRACTION W/  INTRAOCULAR LENS IMPLANT Left     [facility]    CORONARY ANGIOGRAPHY N/A 6/18/2021    Procedure: ANGIOGRAM, CORONARY ARTERY;  Surgeon: Desiree William MD;  [facility] C[facility] LAB;  Service: Cardiology;  Laterality: N/A;     CORONARY STENT PLACEMENT N/A 2021    Procedure: INSERTION, STENT, CORONARY ARTERY;  Surgeon: Desiree William MD;  Location: [facility] CATH LAB;  Service: Cardiology;  Laterality: N/A;    heart catheterization with stents placed   and 2009    LEFT HEART CATHETERIZATION Left 2021    Procedure: CATHETERIZATION, HEART, LEFT;  Surgeon: Desiree William MD;  Location: [facility] CATH LAB;  Service: Cardiology;  Laterality: Left;  Covid + week of    left knee surgery for bone graft and pin placement       Family History   Problem Relation Age of Onset    Diabetes Mother     Hypertension Mother     Stroke Mother     Hypertension Sister     Cancer Father         Lung cancer (smoker)    No Known Problems Daughter     No Known Problems Son     Heart disease Neg Hx      Social History     Socioeconomic History    Marital status:     Number of children: 2   Occupational History    Occupation: Retired   Tobacco Use    Smoking status: Former Smoker     Packs/day: 1.00     Years: 33.00     Pack years: 33.00     Types: Cigarettes     Quit date: 2004     Years since quittin.7    Smokeless tobacco: Never Used   Substance and Sexual Activity    Alcohol use: Yes     Alcohol/week: 0.0 standard drinks     Comment: Rarely    Drug use: No    Sexual activity: Yes     Partners: Male     Birth control/protection: Post-menopausal   Social History Narrative    She wears seatbelt. She will start working again at [employer] on next week. She states her   in 2020 at the age of 68 due to lung cancer.     Social Determinants of Health     Financial Resource Strain: Low Risk     Difficulty of Paying Living Expenses: Not hard at all   Food Insecurity: No Food Insecurity    Worried About Running Out of Food in the Last Year: Never true    Ran Out of Food in the Last Year: Never true   Transportation Needs: No Transportation Needs    Lack of Transportation (Medical): No    Lack of  Transportation (Non-Medical): No   Physical Activity: Insufficiently Active    Days of Exercise per Week: 5 days    Minutes of Exercise per Session: 10 min   Stress: No Stress Concern Present    Feeling of Stress : Only a little   Social Connections: Unknown    Frequency of Communication with Friends and Family: More than three times a week    Frequency of Social Gatherings with Friends and Family: Once a week    Active Member of Clubs or Organizations: No    Attends Club or Organization Meetings: Never    Marital Status:      Medication List with Changes/Refills   Current Medications    ALLOPURINOL (ZYLOPRIM) 100 MG TABLET    Take 1 tablet by mouth once daily    ASPIRIN 81 MG TAB    Take 1 tablet by mouth.    BLOOD SUGAR DIAGNOSTIC STRP    1 strip by Misc.(Non-Drug; Combo Route) route 2 (two) times daily.    BLOOD SUGAR DIAGNOSTIC STRP    To check BG 2 times daily, to use with insurance preferred meter - Freestyle Lite    BLOOD-GLUCOSE METER KIT    To check BG 2 times daily, to use with insurance preferred meter -  Freestyle Lite    BLOOD-GLUCOSE METER MISC    Use FREESTYLE meter for dx: diabetes    CLOPIDOGREL (PLAVIX) 75 MG TABLET    Take 1 tablet (75 mg total) by mouth once. for 1 dose    DESOXIMETASONE (TOPICORT) 0.25 % CREAM    APPLY  CREAM EXTERNALLY TO AFFECTED AREA TWICE DAILY AS NEEDED    ERGOCALCIFEROL, VITAMIN D2, (VITAMIN D ORAL)    Take 1 tablet by mouth 2 (two) times daily. 2000 units daily    FUROSEMIDE (LASIX) 20 MG TABLET    Take 2 tablets (40 mg total) by mouth once daily.    GLIPIZIDE (GLUCOTROL) 10 MG TABLET    Take 1 tablet (10 mg total) by mouth 2 (two) times daily before meals.    HYDRALAZINE (APRESOLINE) 50 MG TABLET    Take 1 tablet (50 mg total) by mouth 4 (four) times daily.    JANUVIA 100 MG TAB    Take 1 tablet (100 mg total) by mouth once daily.    LANCETS MISC    To check BG 2 times daily, to use with insurance preferred meter - Freestyle Lite    LOSARTAN (COZAAR) 50 MG  TABLET    Take 1 tablet (50 mg total) by mouth once daily.    METFORMIN (GLUCOPHAGE) 500 MG TABLET    TAKE 1 TABLET BY MOUTH IN THE MORNING AND 1 TABLET BY MOUTH AT NOON AND 2 TABLETS IN THE EVENING WITH MEALS    METOPROLOL SUCCINATE (TOPROL-XL) 50 MG 24 HR TABLET    Take 1 tablet (50 mg total) by mouth once daily.    NYSTATIN (MYCOSTATIN) CREAM    Apply topically 2 (two) times daily as needed for Dry Skin. For up to 2 weeks    ROSUVASTATIN (CRESTOR) 20 MG TABLET    TAKE 1 TABLET BY MOUTH NIGHTLY.     Review of patient's allergies indicates:   Allergen Reactions    Codeine      Other reaction(s): Chills     Review of Systems   Constitutional: Negative for decreased appetite.   HENT: Negative for tinnitus.    Eyes: Negative for double vision.   Cardiovascular: Negative for chest pain.   Respiratory: Negative for wheezing.    Hematologic/Lymphatic: Negative for bleeding problem.   Skin: Negative for dry skin.   Musculoskeletal: Positive for arthritis, back pain, joint pain and stiffness. Negative for gout and neck pain.   Gastrointestinal: Negative for abdominal pain.   Genitourinary: Negative for bladder incontinence.   Neurological: Negative for numbness, paresthesias and sensory change.   Psychiatric/Behavioral: Negative for altered mental status.       Objective:   Body mass index is 37.34 kg/m².  There were no vitals filed for this visit.       General    Constitutional: She is oriented to person, place, and time. She appears well-developed.   HENT:   Head: Atraumatic.   Eyes: EOM are normal.   Cardiovascular: Normal rate.    Pulmonary/Chest: Effort normal.   Neurological: She is alert and oriented to person, place, and time.   Psychiatric: Judgment normal.            Cervical rotation without limitation or tenderness  Severe crepitus to both shoulders external rotation and internal rotation.  Flexion of both shoulders to around 60 abduction to 45°.  External rotation 20° with pain in the shoulders.  Positive  bilateral shoulder drop sign to resistive abduction.  Elbow motion is intact.  Radial ulnar pulses 2+  Lumbar with slight deformity seen.  Mild tenderness to palpation between L4 and L5 level.  Forward bending almost to the ankle.  Pelvis is level in the sitting position.  Bilateral hips passive motion without pain in the groin.  Hip flexors, abductors, adductors, quads, hamstrings, ankle extensors and flexors are all 5/5.  Right knee with 8° of valgus alignment.  Lateral joint tenderness and mild tenderness medially.  Some crepitus to compression on the patella.  Slightly lose medial collateral ligament.  Lateral collateral ligaments stable.  Negative anterior drawer.  Negative Char sign  Left knee with 10-12 degrees of valgus alignment.  You can correct down to 7° roughly.  Lateral joint and anterior joint severe tenderness.  Mild to moderate swelling.  Crepitus to range of motion.  Range of motion 0-125.  Large surgical scar on the lateral aspect of the knee  Calves are soft nontender with mild varicosities  Ankle extension and flexion was 5/5 bilaterally  Left foot during gait inverts and walk on the side of her foot.  PT 1+  Sensation intact to touch  Skin is warm to touch slightly dry no obvious lesions    Relevant imaging results reviewed and interpreted by me, discussed with the patient and / or family today   X-ray 06/05/2020 left knee was complete loss of lateral joint space with large osteophyte and cystic changes.  There is a screw in the subchondral area entering from lateral to medial.  Right knee x-ray with narrowing lateral joint space and small osteophytic changes consistent with right knee arthrosis/moderate.  Both knees are in valgus  X-ray and electronic records of the lumbar spine showing with scoliosis degenerative in nature, degenerative disc disease and facet changes.  Assessment:     Encounter Diagnoses   Name Primary?    Arthritis of knee, left Yes    Acquired genu valgum of left knee      Retained orthopedic hardware     DDD (degenerative disc disease), lumbosacral     Arthritis of knee, right     Acquired valgus deformity knee, right     Bilateral shoulder region arthritis         Plan:   Arthritis of knee, left  -     Large Joint Aspiration/Injection: bilateral knee    Acquired genu valgum of left knee    Retained orthopedic hardware    DDD (degenerative disc disease), lumbosacral    Arthritis of knee, right  -     Large Joint Aspiration/Injection: bilateral knee    Acquired valgus deformity knee, right    Bilateral shoulder region arthritis         Patient Instructions   Bilateral knees with severe arthritis and we reviewed her x-rays from 2020 showing complete loss of joint space on the outside  Your shoulders make a lateral noises with limited motion also consistent with arthritis  You are taking Aleve 2 tablets twice a day and that is working better than the meloxicam we put you on  Today will inject both knees each with 80 mg Depo-Medrol mixed with 5 cc 1% lidocaine 04/21/2022  This will increase her blood sugar level for several days watch what she eat  You may ice the knees the next few days if they swell up or hurt more  You can still take Tylenol 325 mg 2 tablets twice a day if needed also on top of her Aleve  I will see you in 3 months with x-ray of both shoulders and both knees  I will send you to physical therapy to work on her arms and you legs/[facility] in than since you been there before      Disclaimer: This note was prepared using a voice recognition system and is likely to have sound alike errors within the text.

## 2022-04-21 NOTE — PROCEDURES
Large Joint Aspiration/Injection: bilateral knee    Date/Time: 4/21/2022 9:40 AM  Performed by: Inocente Saravia MD  Authorized by: Inocente Saravia MD     Consent Done?:  Yes (Verbal)  Site marked: the procedure site was marked    Timeout: prior to procedure the correct patient, procedure, and site was verified      Local anesthesia used?: Yes    Local anesthetic:  Lidocaine 1% without epinephrine    Details:  Needle Size:  22 G  Ultrasonic Guidance for needle placement?: No    Approach:  Anterolateral  Location:  Knee  Laterality:  Bilateral  Site:  Bilateral knee  Medications (Right):  80 mg methylPREDNISolone acetate 80 mg/mL  Medications (Left):  80 mg methylPREDNISolone acetate 80 mg/mL  Patient tolerance:  Patient tolerated the procedure well with no immediate complications

## 2022-07-20 ENCOUNTER — TELEPHONE (OUTPATIENT)
Dept: PREADMISSION TESTING | Facility: HOSPITAL | Age: 67
End: 2022-07-20
Payer: MEDICARE

## 2022-07-20 ENCOUNTER — HOSPITAL ENCOUNTER (OUTPATIENT)
Dept: PREADMISSION TESTING | Facility: HOSPITAL | Age: 67
Discharge: HOME | End: 2022-07-20
Attending: FAMILY MEDICINE
Payer: MEDICARE

## 2022-07-20 ENCOUNTER — PES CALL (OUTPATIENT)
Dept: ADMINISTRATIVE | Facility: CLINIC | Age: 67
End: 2022-07-20
Payer: MEDICARE

## 2022-07-20 DIAGNOSIS — Z12.11 COLON CANCER SCREENING: Primary | ICD-10-CM

## 2022-07-20 NOTE — PRE-PROCEDURE INSTRUCTIONS
Dr. Desiree William, Cardiology inbox messaged for Cardiac Clearance/Plavix hold approval for Colonoscopy.

## 2022-07-21 ENCOUNTER — HOSPITAL ENCOUNTER (OUTPATIENT)
Dept: RADIOLOGY | Facility: HOSPITAL | Age: 67
Discharge: HOME | End: 2022-07-21
Attending: ORTHOPAEDIC SURGERY
Payer: MEDICARE

## 2022-07-21 ENCOUNTER — OFFICE VISIT (OUTPATIENT)
Dept: ORTHOPEDICS | Facility: CLINIC | Age: 67
End: 2022-07-21
Payer: MEDICARE

## 2022-07-21 VITALS — BODY MASS INDEX: 37.56 KG/M2 | WEIGHT: 204.13 LBS | HEIGHT: 62 IN

## 2022-07-21 DIAGNOSIS — M19.011 BILATERAL SHOULDER REGION ARTHRITIS: ICD-10-CM

## 2022-07-21 DIAGNOSIS — M17.11 ARTHRITIS OF KNEE, RIGHT: ICD-10-CM

## 2022-07-21 DIAGNOSIS — E11.21 DIABETIC NEPHROPATHY WITH PROTEINURIA: ICD-10-CM

## 2022-07-21 DIAGNOSIS — M17.12 ARTHRITIS OF KNEE, LEFT: Primary | ICD-10-CM

## 2022-07-21 DIAGNOSIS — M51.37 DDD (DEGENERATIVE DISC DISEASE), LUMBOSACRAL: ICD-10-CM

## 2022-07-21 DIAGNOSIS — E11.40 TYPE 2 DIABETES MELLITUS WITH DIABETIC NEUROPATHY, WITHOUT LONG-TERM CURRENT USE OF INSULIN: ICD-10-CM

## 2022-07-21 DIAGNOSIS — M21.062 ACQUIRED GENU VALGUM OF LEFT KNEE: ICD-10-CM

## 2022-07-21 DIAGNOSIS — M25.519 SHOULDER PAIN, UNSPECIFIED CHRONICITY, UNSPECIFIED LATERALITY: ICD-10-CM

## 2022-07-21 DIAGNOSIS — M19.012 BILATERAL SHOULDER REGION ARTHRITIS: ICD-10-CM

## 2022-07-21 DIAGNOSIS — Z96.9 RETAINED ORTHOPEDIC HARDWARE: ICD-10-CM

## 2022-07-21 DIAGNOSIS — M21.061 ACQUIRED VALGUS DEFORMITY KNEE, RIGHT: ICD-10-CM

## 2022-07-21 DIAGNOSIS — M17.12 ARTHRITIS OF KNEE, LEFT: ICD-10-CM

## 2022-07-21 PROCEDURE — 73030 X-RAY EXAM OF SHOULDER: CPT | Mod: TC,50

## 2022-07-21 PROCEDURE — 73564 X-RAY EXAM KNEE 4 OR MORE: CPT | Mod: 26,50,, | Performed by: RADIOLOGY

## 2022-07-21 PROCEDURE — 99999 PR PBB SHADOW E&M-EST. PATIENT-LVL III: CPT | Mod: PBBFAC,,, | Performed by: ORTHOPAEDIC SURGERY

## 2022-07-21 PROCEDURE — 99213 OFFICE O/P EST LOW 20 MIN: CPT | Mod: PBBFAC | Performed by: ORTHOPAEDIC SURGERY

## 2022-07-21 PROCEDURE — 20610 LARGE JOINT ASPIRATION/INJECTION: R SUBACROMIAL BURSA: ICD-10-PCS | Mod: S$PBB,RT,, | Performed by: ORTHOPAEDIC SURGERY

## 2022-07-21 PROCEDURE — 20610 DRAIN/INJ JOINT/BURSA W/O US: CPT | Mod: PBBFAC,RT | Performed by: ORTHOPAEDIC SURGERY

## 2022-07-21 PROCEDURE — 73564 XR KNEE ORTHO BILAT WITH FLEXION: ICD-10-PCS | Mod: 26,50,, | Performed by: RADIOLOGY

## 2022-07-21 PROCEDURE — 99999 PR PBB SHADOW E&M-EST. PATIENT-LVL III: ICD-10-PCS | Mod: PBBFAC,,, | Performed by: ORTHOPAEDIC SURGERY

## 2022-07-21 PROCEDURE — 73030 X-RAY EXAM OF SHOULDER: CPT | Mod: 26,RT,, | Performed by: RADIOLOGY

## 2022-07-21 PROCEDURE — 73030 X-RAY EXAM OF SHOULDER: CPT | Mod: 26,LT,, | Performed by: RADIOLOGY

## 2022-07-21 PROCEDURE — 73030 XR SHOULDER COMPLETE 2 OR MORE VIEWS BILATERAL: ICD-10-PCS | Mod: 26,RT,, | Performed by: RADIOLOGY

## 2022-07-21 PROCEDURE — 99213 OFFICE O/P EST LOW 20 MIN: CPT | Mod: 25,S$PBB,, | Performed by: ORTHOPAEDIC SURGERY

## 2022-07-21 PROCEDURE — 99213 PR OFFICE/OUTPT VISIT, EST, LEVL III, 20-29 MIN: ICD-10-PCS | Mod: 25,S$PBB,, | Performed by: ORTHOPAEDIC SURGERY

## 2022-07-21 PROCEDURE — 73564 X-RAY EXAM KNEE 4 OR MORE: CPT | Mod: TC,50

## 2022-07-21 RX ORDER — SITAGLIPTIN 100 MG/1
TABLET, FILM COATED ORAL
Qty: 90 TABLET | Refills: 0 | Status: SHIPPED | OUTPATIENT
Start: 2022-07-21 | End: 2022-10-21

## 2022-07-21 RX ORDER — METHYLPREDNISOLONE ACETATE 40 MG/ML
40 INJECTION, SUSPENSION INTRA-ARTICULAR; INTRALESIONAL; INTRAMUSCULAR; SOFT TISSUE
Status: DISCONTINUED | OUTPATIENT
Start: 2022-07-21 | End: 2022-07-21 | Stop reason: HOSPADM

## 2022-07-21 RX ADMIN — METHYLPREDNISOLONE ACETATE 40 MG: 40 INJECTION, SUSPENSION INTRALESIONAL; INTRAMUSCULAR; INTRASYNOVIAL; SOFT TISSUE at 09:07

## 2022-07-21 NOTE — TELEPHONE ENCOUNTER
Refill Decision Note   Alysha Martínezlivan  is requesting a refill authorization.  Brief Assessment and Rationale for Refill:  Approve     Medication Therapy Plan:       Medication Reconciliation Completed: No   Comments:     No Care Gaps recommended.     Note composed:12:12 PM 07/21/2022

## 2022-07-21 NOTE — PROGRESS NOTES
Subjective:     Patient ID: Alysha Rosales is a 67 y.o. female.    Chief Complaint: Pain of the Right Shoulder, Pain of the Left Shoulder, Pain of the Right Knee, and Pain of the Left Knee    HPI:  06/05/2020  [age] very pleasant white female who had injured her left knee in 1978 and had bone grafting and the pin placed in the knee.  Subsequent to that she had developed arthritis and now she says her foot is going out and she is walking on the side of her foot.  Few years back had steroid injection and viscosupplementation with some relief of her pain.  She states that she is hurting in both knees the right less than the left.  She takes Aleve for pills in the morning all together.  She also had been diagnosed with osteoporosis and supposed to take Boniva once every month and she forgets to take it.  She does ambulate without any assistive devices.  Pain is 5/10 with also some low back pain.  Denies any numbness or tingling but states she does have very mild peripheral neuropathy that is not constant.  Occasionally her knees give out and have the feeling of about to fall.  Denies loss of bowel bladder control, fever or chills or shortness of breath or chest pain    07/27/2020  Patient stated the [facility] is did the bone density test and now she is receiving 1 a year injection and she is very pleased with that instead of a monthly treatment.  The injection given into the left knee 06/05/2020 helped quite a bit as well as the meloxicam.  She does take Tylenol on as needed basis and wears her brace on as needed basis since she has severe valgus alignment.  She wears the brace at home most of the time when she goes out occasionally.  Pain is improving at 6/10.  She wants to avoid surgical intervention.  She still working sometimes at [employer] on the weekends.  Denies any fever or chills or shortness of breath or difficulty with chewing or swallowing.      02/12/2021  Patient with bilateral knee  arthrosis and valgus deformity.  She is doing much better at this point.  She thinks meloxicam is medical drug.  She does take it 3 to 4 times a week not daily.  Occasionally takes Tylenol.  Also she complains of morning stiffness in her hands but she exercises it and gets better.  She occasionally does wear her braces.  She did receive Synvisc One injection on 07/27/2020 and that seems to still were working.  She is now working the [employer] whole which makes her walk around and keep active.  As far as her spine is concerned and back is not hurting her at as much she stretches out and does well.  Denies any fever or chills or shortness of breath or difficulty with chewing or swallowing loss of bowel bladder control blurry vision double vision or loss of sense of smell or taste    01/10/2021  She already had her cataract surgery.  In June she had a stent placement by Cardiology and Dr. William did not want her to take anti-inflammatories.  She does still take for a leaves maybe 3 times a week.  She does not take meloxicam when she takes the a leaves.  We went over that in details with her and I did tell her since cardiology at least once her 1 year without taking NSAIDs but she hurts sometimes and she needs to take it to function.  She is ambulating without any assistive devices.  I did tell her maybe if she takes it twice a week will be okay but she should concentrate on taking Tylenol instead.  States the left knee is the 1 that hurts the most with pain around 8/10.  She does ambulate without any assistive devices.    We did discuss COVID vaccine she did receive in August 2021 [employer] [employer].  I directed her to [facility] where she got the initial vaccine and that she can go there and requested booster shot    04/21/2022  Bilateral shoulder pain and bilateral knee pain  Patient did have her cataract surgery in both of her ice.  Now she is taking Aleve which helps better than the meloxicam and stop taking  meloxicam.  She takes 2 Aleve in the morning and 2 in the evening.  She still working full-time around 5 days a week at the [employer].  She complains inability to portion lift her arms up in order to put computer is a little bit higher shelves.  We did give her a left knee Synvisc injection 01/10/2022 which helped a little bit.  She states any twisting motion to both of her knees today hurt quite a bit and it will be locked for 3 days.  She does not want have surgery at this time she had successful cataract surgery in both of her eyes.  She still lives up by herself she still drives.  Her daughters check on her on a daily basis.  She does have some diabetes also.  No fever no chills no shortness of breath or difficulty with chewing or swallowing loss of bowel bladder control.    07/21/2022  Bilateral shoulder pain with the right worse than the left, bilateral knee pain and arthritis but the not hurting as much as the right shoulder.  The cortisone injection given into both knees seems to have helped really well but up her sugar last time she was here.  She said therapy helped a lot and she is doing her own exercises able to get up from sitting position really well without difficulty..  She did not go for therapy x-ray today yet she will do it on her way out.  Her pain is 5/10.  Past Medical History:   Diagnosis Date    CAD (coronary artery disease)     Followed by Dr. Stevo Kaba    Diabetes mellitus, type 2     DM (diabetes mellitus)     BS didn't check 06/05/2020    DM (diabetes mellitus) 2012    BS didn't check 02/10/2022    High level of uric acid in blood     Hyperlipidemia     Hyperplastic rectal polyp     on 03/20/2009 colonoscopy    Hypertension     Multinodular goiter     noted on 1/20/2015 CTA carotid scan    Multiple thyroid nodules 05/05/2016    on Thyroid U/S    Osteoarthritis     Left knee    Osteoarthritis of back     Osteoporosis, unspecified     Postmenopausal     No history of  abnormal pap smear    Proteinuria     Pustular psoriasis     hands and feet    Subclavian artery stenosis     Left; with the proximal segment at 60-70% per 10/02/2008 carotid CTA    Vitamin D deficiency disease      Past Surgical History:   Procedure Laterality Date    CATARACT EXTRACTION W/  INTRAOCULAR LENS IMPLANT Right 16    CPG    CATARACT EXTRACTION W/  INTRAOCULAR LENS IMPLANT Left     CPG    CORONARY ANGIOGRAPHY N/A 2021    Procedure: ANGIOGRAM, CORONARY ARTERY;  Surgeon: Desiree William MD;  Location: [facility] CATH LAB;  Service: Cardiology;  Laterality: N/A;    CORONARY STENT PLACEMENT N/A 2021    Procedure: INSERTION, STENT, CORONARY ARTERY;  Surgeon: Desiree William MD;  Location: [facility] CATH LAB;  Service: Cardiology;  Laterality: N/A;    heart catheterization with stents placed   and 2009    LEFT HEART CATHETERIZATION Left 2021    Procedure: CATHETERIZATION, HEART, LEFT;  Surgeon: Desiree William MD;  Location: [facility] CATH LAB;  Service: Cardiology;  Laterality: Left;  Covid + week of    left knee surgery for bone graft and pin placement       Family History   Problem Relation Age of Onset    Diabetes Mother     Hypertension Mother     Stroke Mother     Hypertension Sister     Cancer Father         Lung cancer (smoker)    No Known Problems Daughter     No Known Problems Son     Heart disease Neg Hx      Social History     Socioeconomic History    Marital status:     Number of children: 2   Occupational History    Occupation: Retired   Tobacco Use    Smoking status: Former Smoker     Packs/day: 1.00     Years: 33.00     Pack years: 33.00     Types: Cigarettes     Quit date: 2004     Years since quittin.0    Smokeless tobacco: Never Used   Substance and Sexual Activity    Alcohol use: Yes     Alcohol/week: 0.0 standard drinks     Comment: Rarely    Drug use: No    Sexual activity: Yes     Partners: Male     Birth control/protection:  Post-menopausal   Social History Narrative    She wears seatbelt. She will start working again at [email or web address] on next week. She states her   in 2020 at the age of 68 due to lung cancer.     Medication List with Changes/Refills   Current Medications    ALLOPURINOL (ZYLOPRIM) 100 MG TABLET    Take 1 tablet by mouth once daily    ASPIRIN 81 MG TAB    Take 1 tablet by mouth.    BLOOD SUGAR DIAGNOSTIC STRP    1 strip by Misc.(Non-Drug; Combo Route) route 2 (two) times daily.    BLOOD SUGAR DIAGNOSTIC STRP    To check BG 2 times daily, to use with insurance preferred meter - Freestyle Lite    BLOOD-GLUCOSE METER KIT    To check BG 2 times daily, to use with insurance preferred meter -  Freestyle Lite    BLOOD-GLUCOSE METER MISC    Use FREESTYLE meter for dx: diabetes    CLOPIDOGREL (PLAVIX) 75 MG TABLET    Take 1 tablet (75 mg total) by mouth once. for 1 dose    DESOXIMETASONE (TOPICORT) 0.25 % CREAM    APPLY  CREAM EXTERNALLY TO AFFECTED AREA TWICE DAILY AS NEEDED    ERGOCALCIFEROL, VITAMIN D2, (VITAMIN D ORAL)    Take 1 tablet by mouth 2 (two) times daily. 2000 units daily    FUROSEMIDE (LASIX) 20 MG TABLET    Take 2 tablets by mouth once daily    GLIPIZIDE (GLUCOTROL) 10 MG TABLET    Take 1 tablet (10 mg total) by mouth 2 (two) times daily before meals.    HYDRALAZINE (APRESOLINE) 50 MG TABLET    Take 1 tablet (50 mg total) by mouth 4 (four) times daily.    JANUVIA 100 MG TAB    Take 1 tablet (100 mg total) by mouth once daily.    LANCETS MISC    To check BG 2 times daily, to use with insurance preferred meter - Freestyle Lite    LOSARTAN (COZAAR) 50 MG TABLET    Take 1 tablet by mouth once daily    METFORMIN (GLUCOPHAGE) 500 MG TABLET    TAKE 1 TABLET BY MOUTH IN THE MORNING AND  1  TABLET  BY  MOUTH  AT  NOON  AND  2  TABLETS  IN  THE  EVENING  WITH  MEALS    METOPROLOL SUCCINATE (TOPROL-XL) 50 MG 24 HR TABLET    Take 1 tablet by mouth once daily    NYSTATIN (MYCOSTATIN) CREAM    Apply topically 2  (two) times daily as needed for Dry Skin. For up to 2 weeks    ROSUVASTATIN (CRESTOR) 20 MG TABLET    TAKE 1 TABLET BY MOUTH NIGHTLY.    SODIUM,POTASSIUM,MAG SULFATES (SUPREP BOWEL PREP KIT) 17.5-3.13-1.6 GRAM SOLR    Take 177 mLs by mouth once daily. for 2 days     Review of patient's allergies indicates:   Allergen Reactions    Codeine      Other reaction(s): Chills     Review of Systems   Constitutional: Negative for decreased appetite.   HENT: Negative for tinnitus.    Eyes: Negative for double vision.   Cardiovascular: Negative for chest pain.   Respiratory: Negative for wheezing.    Hematologic/Lymphatic: Negative for bleeding problem.   Skin: Negative for dry skin.   Musculoskeletal: Positive for arthritis, back pain, joint pain and stiffness. Negative for gout and neck pain.   Gastrointestinal: Negative for abdominal pain.   Genitourinary: Negative for bladder incontinence.   Neurological: Negative for numbness, paresthesias and sensory change.   Psychiatric/Behavioral: Negative for altered mental status.       Objective:   Body mass index is 37.34 kg/m².  There were no vitals filed for this visit.       General    Constitutional: She is oriented to person, place, and time. She appears well-developed.   HENT:   Head: Atraumatic.   Eyes: EOM are normal.   Cardiovascular: Normal rate.    Pulmonary/Chest: Effort normal.   Neurological: She is alert and oriented to person, place, and time.   Psychiatric: Judgment normal.            Cervical rotation without limitation or tenderness  Severe crepitus to both shoulders external rotation and internal rotation.  Flexion of both shoulders to around 60 abduction to 45°.  External rotation 20° with pain in the shoulders.  Positive bilateral shoulder drop sign to resistive abduction.  Elbow motion is intact.  Radial ulnar pulses 2+  Lumbar with slight deformity seen.  Mild tenderness to palpation between L4 and L5 level.  Forward bending almost to the ankle.  Pelvis is  level in the sitting position.  Bilateral hips passive motion without pain in the groin.  Hip flexors, abductors, adductors, quads, hamstrings, ankle extensors and flexors are all 5/5.  Right knee with 8° of valgus alignment.  Lateral joint tenderness and mild tenderness medially.  Some crepitus to compression on the patella.  Slightly lose medial collateral ligament.  Lateral collateral ligaments stable.  Negative anterior drawer.  Negative Char sign  Left knee with 10-12 degrees of valgus alignment.  You can correct down to 7° roughly.  Lateral joint and anterior joint severe tenderness.  Mild to moderate swelling.  Crepitus to range of motion.  Range of motion 0-125.  Large surgical scar on the lateral aspect of the knee  Calves are soft nontender with mild varicosities  Ankle extension and flexion was 5/5 bilaterally  Left foot during gait inverts and walk on the side of her foot.  PT 1+  Sensation intact to touch  Skin is warm to touch slightly dry no obvious lesions    Relevant imaging results reviewed and interpreted by me, discussed with the patient and / or family today   X-ray 06/05/2020 left knee was complete loss of lateral joint space with large osteophyte and cystic changes.  There is a screw in the subchondral area entering from lateral to medial.  Right knee x-ray with narrowing lateral joint space and small osteophytic changes consistent with right knee arthrosis/moderate.  Both knees are in valgus  X-ray and electronic records of the lumbar spine showing with scoliosis degenerative in nature, degenerative disc disease and facet changes.  Assessment:     Encounter Diagnoses   Name Primary?    Arthritis of knee, left Yes    Arthritis of knee, right     Acquired genu valgum of left knee     Retained orthopedic hardware     DDD (degenerative disc disease), lumbosacral     Acquired valgus deformity knee, right     Bilateral shoulder region arthritis         Plan:   Arthritis of knee,  left    Arthritis of knee, right    Acquired genu valgum of left knee    Retained orthopedic hardware    DDD (degenerative disc disease), lumbosacral    Acquired valgus deformity knee, right    Bilateral shoulder region arthritis  -     Large Joint Aspiration/Injection: R subacromial bursa         Patient Instructions   Right shoulder severely painful compared to the left side  The knees are painful but not as much as the shoulder  Your exam showing a lot crepitus and limitation of motion most likely because of severe arthritis and rotator cuff injury  Will obtain x-ray on you way out today  Injected the right shoulder with 40 mg Depo-Medrol mixed with 5 cc 1% lidocaine 01/21/2022  As far as the knees are concerned will hold off on any injections because you sugar becomes uncontrolled for couple days  Continue with the independent exercise that she would told by physical therapy and you feel that you done well and able to stand without too much difficulty like before  I will see you back in 8 weeks      Disclaimer: This note was prepared using a voice recognition system and is likely to have sound alike errors within the text.

## 2022-07-21 NOTE — TELEPHONE ENCOUNTER
No new care gaps identified.  [facility] Embedded Care Gaps. Reference number: [id number]. 7/21/2022   6:50:36 AM CDT

## 2022-07-21 NOTE — PATIENT INSTRUCTIONS
Right shoulder severely painful compared to the left side  The knees are painful but not as much as the shoulder  Your exam showing a lot crepitus and limitation of motion most likely because of severe arthritis and rotator cuff injury  Will obtain x-ray on you way out today  Injected the right shoulder with 40 mg Depo-Medrol mixed with 5 cc 1% lidocaine 01/21/2022  As far as the knees are concerned will hold off on any injections because you sugar becomes uncontrolled for couple days  Continue with the independent exercise that she would told by physical therapy and you feel that you done well and able to stand without too much difficulty like before  I will see you back in 8 weeks

## 2022-07-21 NOTE — PROCEDURES
Large Joint Aspiration/Injection: R subacromial bursa    Date/Time: 7/21/2022 9:40 AM  Performed by: Inocente Saravia MD  Authorized by: Inocente Saravia MD     Consent Done?:  Yes (Verbal)  Site marked: the procedure site was marked    Timeout: prior to procedure the correct patient, procedure, and site was verified      Local anesthesia used?: Yes    Local anesthetic:  Lidocaine 1% without epinephrine  Location:  Shoulder  Site:  R subacromial bursa  Medications:  40 mg methylPREDNISolone acetate 40 mg/mL  Patient tolerance:  Patient tolerated the procedure well with no immediate complications

## 2022-07-25 ENCOUNTER — TELEPHONE (OUTPATIENT)
Dept: CARDIOLOGY | Facility: CLINIC | Age: 67
End: 2022-07-25
Payer: MEDICARE

## 2022-07-25 NOTE — TELEPHONE ENCOUNTER
Patient contacted and advised that  Will address on her next appointment    the patient stated understanding with no questions or concerns.  ----- Message -----  From: Jimy Vizcarra MA  Sent: 7/21/2022   3:07 PM CDT  To: Desiree William MD  Subject: FW: Cardiac Clearance/Plavix Hold Approval f#    Please advise.  MRN# [id number]    Thanks        this pt is scheduled for a Colonoscopy on 08/30/22.  Cardiac Clearance and Plavix 5-7 day hold approval for scope?     ----- Message -----  From: Dseiree William MD  Sent: 7/20/2022  10:50 PM CDT  To: Lalita Lin RN, Nataliia Ramírez Staff  Subject: RE: Cardiac Clearance/Plavix Hold Approval f#    Needs an appointment   Thanks   ----- Message -----  From: Lalita Lin RN  Sent: 7/20/2022   9:00 AM CDT  To: Desiree William MD, Nataliia Ramírez Staff  Subject: Cardiac Clearance/Plavix Hold Approval for C#    Hi Dr. William, this pt is scheduled for a Colonoscopy on 08/30/22.  Cardiac Clearance and Plavix 5-7 day hold approval for scope?

## 2022-07-28 ENCOUNTER — OFFICE VISIT (OUTPATIENT)
Dept: FAMILY MEDICINE | Facility: CLINIC | Age: 67
End: 2022-07-28
Payer: MEDICARE

## 2022-07-28 ENCOUNTER — LAB VISIT (OUTPATIENT)
Dept: LAB | Facility: HOSPITAL | Age: 67
End: 2022-07-28
Attending: FAMILY MEDICINE
Payer: MEDICARE

## 2022-07-28 VITALS
HEART RATE: 81 BPM | RESPIRATION RATE: 18 BRPM | BODY MASS INDEX: 36.68 KG/M2 | OXYGEN SATURATION: 96 % | SYSTOLIC BLOOD PRESSURE: 124 MMHG | WEIGHT: 199.31 LBS | HEIGHT: 62 IN | TEMPERATURE: 98 F | DIASTOLIC BLOOD PRESSURE: 88 MMHG

## 2022-07-28 DIAGNOSIS — N18.31 STAGE 3A CHRONIC KIDNEY DISEASE: ICD-10-CM

## 2022-07-28 DIAGNOSIS — E11.59 HYPERTENSION ASSOCIATED WITH DIABETES: ICD-10-CM

## 2022-07-28 DIAGNOSIS — E11.69 COMBINED HYPERLIPIDEMIA ASSOCIATED WITH TYPE 2 DIABETES MELLITUS: ICD-10-CM

## 2022-07-28 DIAGNOSIS — L97.519 ULCER OF FOOT, CHRONIC, RIGHT, WITH UNSPECIFIED SEVERITY: ICD-10-CM

## 2022-07-28 DIAGNOSIS — E55.9 VITAMIN D DEFICIENCY: ICD-10-CM

## 2022-07-28 DIAGNOSIS — E11.59 HYPERTENSION ASSOCIATED WITH DIABETES: Primary | ICD-10-CM

## 2022-07-28 DIAGNOSIS — E66.01 SEVERE OBESITY (BMI 35.0-39.9) WITH COMORBIDITY: ICD-10-CM

## 2022-07-28 DIAGNOSIS — D50.9 IRON DEFICIENCY ANEMIA, UNSPECIFIED IRON DEFICIENCY ANEMIA TYPE: ICD-10-CM

## 2022-07-28 DIAGNOSIS — E11.21 DIABETIC NEPHROPATHY WITH PROTEINURIA: ICD-10-CM

## 2022-07-28 DIAGNOSIS — I15.2 HYPERTENSION ASSOCIATED WITH DIABETES: Primary | ICD-10-CM

## 2022-07-28 DIAGNOSIS — E04.2 MULTINODULAR GOITER: ICD-10-CM

## 2022-07-28 DIAGNOSIS — I15.2 HYPERTENSION ASSOCIATED WITH DIABETES: ICD-10-CM

## 2022-07-28 DIAGNOSIS — E11.40 TYPE 2 DIABETES MELLITUS WITH DIABETIC NEUROPATHY, WITHOUT LONG-TERM CURRENT USE OF INSULIN: ICD-10-CM

## 2022-07-28 DIAGNOSIS — E78.2 COMBINED HYPERLIPIDEMIA ASSOCIATED WITH TYPE 2 DIABETES MELLITUS: ICD-10-CM

## 2022-07-28 LAB
ALBUMIN SERPL BCP-MCNC: 3.7 G/DL (ref 3.5–5.2)
ALP SERPL-CCNC: 59 U/L (ref 55–135)
ALT SERPL W/O P-5'-P-CCNC: 15 U/L (ref 10–44)
ANION GAP SERPL CALC-SCNC: 12 MMOL/L (ref 8–16)
AST SERPL-CCNC: 11 U/L (ref 10–40)
BASOPHILS # BLD AUTO: 0.08 K/UL (ref 0–0.2)
BASOPHILS NFR BLD: 1 % (ref 0–1.9)
BILIRUB SERPL-MCNC: 0.3 MG/DL (ref 0.1–1)
BUN SERPL-MCNC: 22 MG/DL (ref 8–23)
CALCIUM SERPL-MCNC: 10.6 MG/DL (ref 8.7–10.5)
CHLORIDE SERPL-SCNC: 102 MMOL/L (ref 95–110)
CO2 SERPL-SCNC: 25 MMOL/L (ref 23–29)
CREAT SERPL-MCNC: 0.9 MG/DL (ref 0.5–1.4)
DIFFERENTIAL METHOD: ABNORMAL
EOSINOPHIL # BLD AUTO: 0.2 K/UL (ref 0–0.5)
EOSINOPHIL NFR BLD: 2 % (ref 0–8)
ERYTHROCYTE [DISTWIDTH] IN BLOOD BY AUTOMATED COUNT: 12.5 % (ref 11.5–14.5)
EST. GFR  (AFRICAN AMERICAN): >60 ML/MIN/1.73 M^2
EST. GFR  (NON AFRICAN AMERICAN): >60 ML/MIN/1.73 M^2
ESTIMATED AVG GLUCOSE: 249 MG/DL (ref 68–131)
FERRITIN SERPL-MCNC: 43 NG/ML (ref 20–300)
GLUCOSE SERPL-MCNC: 322 MG/DL (ref 70–110)
HBA1C MFR BLD: 10.3 % (ref 4–5.6)
HCT VFR BLD AUTO: 39.3 % (ref 37–48.5)
HGB BLD-MCNC: 12.8 G/DL (ref 12–16)
IMM GRANULOCYTES # BLD AUTO: 0.04 K/UL (ref 0–0.04)
IMM GRANULOCYTES NFR BLD AUTO: 0.5 % (ref 0–0.5)
LYMPHOCYTES # BLD AUTO: 1.2 K/UL (ref 1–4.8)
LYMPHOCYTES NFR BLD: 15.7 % (ref 18–48)
MCH RBC QN AUTO: 30 PG (ref 27–31)
MCHC RBC AUTO-ENTMCNC: 32.6 G/DL (ref 32–36)
MCV RBC AUTO: 92 FL (ref 82–98)
MONOCYTES # BLD AUTO: 0.6 K/UL (ref 0.3–1)
MONOCYTES NFR BLD: 7.1 % (ref 4–15)
NEUTROPHILS # BLD AUTO: 5.8 K/UL (ref 1.8–7.7)
NEUTROPHILS NFR BLD: 73.7 % (ref 38–73)
NRBC BLD-RTO: 0 /100 WBC
PLATELET # BLD AUTO: 216 K/UL (ref 150–450)
PMV BLD AUTO: 11.9 FL (ref 9.2–12.9)
POTASSIUM SERPL-SCNC: 4.7 MMOL/L (ref 3.5–5.1)
PROT SERPL-MCNC: 6.8 G/DL (ref 6–8.4)
RBC # BLD AUTO: 4.27 M/UL (ref 4–5.4)
SODIUM SERPL-SCNC: 139 MMOL/L (ref 136–145)
WBC # BLD AUTO: 7.88 K/UL (ref 3.9–12.7)

## 2022-07-28 PROCEDURE — 99999 PR PBB SHADOW E&M-EST. PATIENT-LVL V: ICD-10-PCS | Mod: PBBFAC,,, | Performed by: FAMILY MEDICINE

## 2022-07-28 PROCEDURE — 83036 HEMOGLOBIN GLYCOSYLATED A1C: CPT | Performed by: FAMILY MEDICINE

## 2022-07-28 PROCEDURE — 99215 OFFICE O/P EST HI 40 MIN: CPT | Mod: PBBFAC,PO | Performed by: FAMILY MEDICINE

## 2022-07-28 PROCEDURE — 99999 PR PBB SHADOW E&M-EST. PATIENT-LVL V: CPT | Mod: PBBFAC,,, | Performed by: FAMILY MEDICINE

## 2022-07-28 PROCEDURE — 82728 ASSAY OF FERRITIN: CPT | Performed by: FAMILY MEDICINE

## 2022-07-28 PROCEDURE — 85025 COMPLETE CBC W/AUTO DIFF WBC: CPT | Performed by: FAMILY MEDICINE

## 2022-07-28 PROCEDURE — 80053 COMPREHEN METABOLIC PANEL: CPT | Performed by: FAMILY MEDICINE

## 2022-07-28 PROCEDURE — 36415 COLL VENOUS BLD VENIPUNCTURE: CPT | Mod: PO | Performed by: FAMILY MEDICINE

## 2022-07-28 PROCEDURE — 99214 PR OFFICE/OUTPT VISIT, EST, LEVL IV, 30-39 MIN: ICD-10-PCS | Mod: S$PBB,,, | Performed by: FAMILY MEDICINE

## 2022-07-28 PROCEDURE — 99214 OFFICE O/P EST MOD 30 MIN: CPT | Mod: S$PBB,,, | Performed by: FAMILY MEDICINE

## 2022-07-28 RX ORDER — LANOLIN ALCOHOL/MO/W.PET/CERES
1 CREAM (GRAM) TOPICAL 3 TIMES DAILY
COMMUNITY

## 2022-07-28 NOTE — PROGRESS NOTES
Alysha Rosales    Chief Complaint   Patient presents with    Follow-up    Hypertension    Diabetes       History of Present Illness:   Ms. Rosales comes in today for 6-month hypertension and diabetes follow-up.  She is fasting and has not taken medications today.      She states she tries to monitor which she eats and states she eats 1 meal daily (around 5:00 p.m.).  However, she states she gets hungry and over eats after working at [employer].    She states she exercises with physical therapy 2 times per week (knees and shoulders) since June 21, 2022 with help.    She states she randomly performs glucose checks with levels ranging 230-250's (250 this morning).  She states she does not perform home blood pressure checks.    She states she has been taking iron 3 times a day without problems except states she occasionally has constipation but takes MiraLax with help.  She states she no longer feels tired or short of breath since taking iron.    She reports having chronic numbness.    Otherwise, she denies having other acute problems.    She is scheduled to see Dr. Garber GI, on August 30, 2022 for colonoscopy.  She is scheduled to see Dr. Wadsworth, optometrist, on August 5, 2022 for diabetic eye exam.  She is scheduled to see Dr. William, cardiologist, on August 10, 2022 for surveillance of CAD, NSTEMI, Post-PTCA, hypertension, hyperlipidemia, ROSEMARY, CKD3a, diabetes, obesity.    She saw Dr. Saravia, orthopedist, on July 21, 2022 for bilateral knee arthritis, lumbar DDD.       She saw ERIC Hill with rheumatology on October 5, 2020 for surveillance of osteoporosis currently on Reclast and vitamin-D deficiency with 9-month follow-up with DEXA scan and Reclast therapy advised.      Labs:                      WBC                      7.28                01/25/2022                 HGB                      8.3 (L)             01/25/2022                 HCT                      28.0 (L)            01/25/2022                  PLT                      243                 01/25/2022                 CHOL                     133                 01/25/2022                 TRIG                     384 (H)             01/25/2022                 HDL                      40                  01/25/2022                 ALT                      20                  01/25/2022                 AST                      13                  01/25/2022                 NA                       134 (L)             01/25/2022                 K                        3.9                 01/25/2022                 CL                       103                 01/25/2022                 CREATININE               0.9                 01/25/2022                 BUN                      19                  01/25/2022                 CO2                      22 (L)              01/25/2022                 TSH                      2.040               01/25/2022                 INR                      0.9                 06/11/2021                 HGBA1C                   10.1 (H)            01/25/2022             LDLCALC                  16.2 (L)            01/25/2022           FERRITIN                 9 (L)               02/08/2022                        Current Outpatient Medications   Medication Sig    allopurinoL (ZYLOPRIM) 100 MG tablet Take 1 tablet by mouth once daily    aspirin 81 mg Tab Take 1 tablet by mouth.    blood sugar diagnostic Strp 1 strip by Misc.(Non-Drug; Combo Route) route 2 (two) times daily.    blood sugar diagnostic Strp To check BG 2 times daily, to use with insurance preferred meter - Freestyle Lite    blood-glucose meter kit To check BG 2 times daily, to use with insurance preferred meter -  Freestyle Lite    blood-glucose meter Misc Use FREESTYLE meter for dx: diabetes    clopidogreL (PLAVIX) 75 mg tablet Take 1 tablet by mouth once daily    ERGOCALCIFEROL, VITAMIN D2, (VITAMIN D ORAL) Take 1 tablet by mouth 2 (two) times daily. 2000 units  daily    ferrous sulfate (FEOSOL) Tab tablet Take 1 tablet by mouth 3 (three) times daily.    furosemide (LASIX) 20 MG tablet Take 2 tablets by mouth once daily    glipiZIDE (GLUCOTROL) 10 MG tablet Take 1 tablet (10 mg total) by mouth 2 (two) times daily before meals.    hydrALAZINE (APRESOLINE) 50 MG tablet Take 1 tablet (50 mg total) by mouth 4 (four) times daily.    JANUVIA 100 mg Tab Take 1 tablet by mouth once daily    lancets Misc To check BG 2 times daily, to use with insurance preferred meter - Freestyle Lite    losartan (COZAAR) 50 MG tablet Take 1 tablet by mouth once daily    metFORMIN (GLUCOPHAGE) 500 MG tablet TAKE 1 TABLET BY MOUTH IN THE MORNING AND  1  TABLET  BY  MOUTH  AT  NOON  AND  2  TABLETS  IN  THE  EVENING  WITH  MEALS    metoprolol succinate (TOPROL-XL) 50 MG 24 hr tablet Take 1 tablet by mouth once daily    rosuvastatin (CRESTOR) 20 MG tablet TAKE 1 TABLET BY MOUTH NIGHTLY.       Review of Systems   Constitutional:  Positive for activity change. Negative for appetite change, chills, fatigue, fever and unexpected weight change.        Weight 92.9 kg (204 lb 12.9 oz) at January 25, 2022 visit.   Eyes:  Negative for visual disturbance.        See history of present illness.   Respiratory:  Negative for cough, shortness of breath and wheezing.         See history of present illness.   Cardiovascular:  Negative for chest pain, palpitations and leg swelling.        See history of present illness.   Gastrointestinal:  Positive for constipation. Negative for abdominal pain, diarrhea, nausea and vomiting.   Endocrine: Negative for cold intolerance, heat intolerance, polydipsia, polyphagia and polyuria.        See history of present illness.   Genitourinary:  Negative for difficulty urinating.   Musculoskeletal:  Positive for arthralgias. Negative for back pain.        See history of present illness.   Neurological:  Positive for numbness. Negative for headaches.   Hematological:  Does not  bruise/bleed easily.        See history of present illness.   Psychiatric/Behavioral:  Negative for dysphoric mood, sleep disturbance and suicidal ideas. The patient is not nervous/anxious.         Negative for homicidal ideas.     Objective:  Physical Exam  Vitals reviewed.   Constitutional:       General: She is not in acute distress.     Appearance: Normal appearance. She is well-developed. She is obese. She is not ill-appearing or diaphoretic.      Comments: Pleasant and obese.   Neck:      Thyroid: No thyromegaly.   Cardiovascular:      Rate and Rhythm: Normal rate and regular rhythm.      Pulses:           Dorsalis pedis pulses are 3+ on the right side and 3+ on the left side.        Posterior tibial pulses are 3+ on the right side and 3+ on the left side.      Heart sounds: Murmur heard.      Comments: Chronic and soft.  Pulmonary:      Effort: Pulmonary effort is normal. No respiratory distress.      Breath sounds: Normal breath sounds. No wheezing.   Abdominal:      General: Bowel sounds are normal. There is no distension.      Palpations: Abdomen is soft. There is no mass.      Tenderness: There is no abdominal tenderness. There is no guarding or rebound.   Musculoskeletal:         General: No swelling or tenderness. Normal range of motion.      Cervical back: Normal range of motion and neck supple. No tenderness.      Comments: She is ambulatory without problems.     Feet:      Right foot:      Protective Sensation: 5 sites tested.  5 sites sensed.      Skin integrity: Ulcer present. No skin breakdown or dry skin.      Left foot:      Protective Sensation: 5 sites tested.  5 sites sensed.      Skin integrity: No ulcer, skin breakdown or dry skin.      Comments: Clean-based ulcer at bottom of right great toe without active drainage noted; however, sock is soiled in area coinciding with ulcer location.  Lymphadenopathy:      Cervical: No cervical adenopathy.   Neurological:      General: No focal deficit  present.      Mental Status: She is alert and oriented to person, place, and time.   Psychiatric:         Mood and Affect: Mood normal.         Behavior: Behavior normal.         Thought Content: Thought content normal.         Judgment: Judgment normal.       ASSESSMENT:  1. Hypertension associated with diabetes    2. Combined hyperlipidemia associated with type 2 diabetes mellitus    3. Type 2 diabetes mellitus with diabetic neuropathy, without long-term current use of insulin    4. Diabetic nephropathy with proteinuria    5. Stage 3a chronic kidney disease    6. Vitamin D deficiency    7. Multinodular goiter    8. Iron deficiency anemia, unspecified iron deficiency anemia type    9. Ulcer of foot, chronic, right, with unspecified severity    10. Severe obesity (BMI 35.0-39.9) with comorbidity        PLAN:  Alysha was seen today for follow-up, hypertension and diabetes.    Diagnoses and all orders for this visit:    Hypertension associated with diabetes  -     Comprehensive Metabolic Panel; Future    Combined hyperlipidemia associated with type 2 diabetes mellitus  -     Comprehensive Metabolic Panel; Future    Type 2 diabetes mellitus with diabetic neuropathy, without long-term current use of insulin  -     Hemoglobin A1C; Future  -     Comprehensive Metabolic Panel; Future    Diabetic nephropathy with proteinuria  -     Hemoglobin A1C; Future  -     Comprehensive Metabolic Panel; Future    Stage 3a chronic kidney disease  -     Comprehensive Metabolic Panel; Future    Vitamin D deficiency    Multinodular goiter    Iron deficiency anemia, unspecified iron deficiency anemia type  -     CBC Auto Differential; Future  -     Ferritin; Future    Ulcer of foot, chronic, right, with unspecified severity  -     Ambulatory referral/consult to Podiatry; Future    Severe obesity (BMI 35.0-39.9) with comorbidity     Patient advised to call for results.  Continue current medications, follow low sodium, low cholesterol, low  carb diet, daily walks.  Keep follow up with specialists.  Flu shot this fall.  Follow up in about 6 months (around 2/7/2023) for physical.

## 2022-08-10 ENCOUNTER — OFFICE VISIT (OUTPATIENT)
Dept: CARDIOLOGY | Facility: CLINIC | Age: 67
End: 2022-08-10
Payer: MEDICARE

## 2022-08-10 VITALS
BODY MASS INDEX: 37.32 KG/M2 | SYSTOLIC BLOOD PRESSURE: 126 MMHG | HEART RATE: 83 BPM | OXYGEN SATURATION: 96 % | HEIGHT: 62 IN | WEIGHT: 202.81 LBS | DIASTOLIC BLOOD PRESSURE: 70 MMHG

## 2022-08-10 DIAGNOSIS — I10 ESSENTIAL HYPERTENSION: ICD-10-CM

## 2022-08-10 DIAGNOSIS — Z01.810 PREOPERATIVE CARDIOVASCULAR EXAMINATION: ICD-10-CM

## 2022-08-10 DIAGNOSIS — E11.69 COMBINED HYPERLIPIDEMIA ASSOCIATED WITH TYPE 2 DIABETES MELLITUS: Primary | ICD-10-CM

## 2022-08-10 DIAGNOSIS — Z79.02 ANTIPLATELET OR ANTITHROMBOTIC LONG-TERM USE: ICD-10-CM

## 2022-08-10 DIAGNOSIS — I25.118 ATHEROSCLEROTIC HEART DISEASE OF NATIVE CORONARY ARTERY WITH OTHER FORMS OF ANGINA PECTORIS: ICD-10-CM

## 2022-08-10 DIAGNOSIS — E78.2 COMBINED HYPERLIPIDEMIA ASSOCIATED WITH TYPE 2 DIABETES MELLITUS: Primary | ICD-10-CM

## 2022-08-10 DIAGNOSIS — D50.9 IRON DEFICIENCY ANEMIA, UNSPECIFIED IRON DEFICIENCY ANEMIA TYPE: ICD-10-CM

## 2022-08-10 PROCEDURE — 99215 PR OFFICE/OUTPT VISIT, EST, LEVL V, 40-54 MIN: ICD-10-PCS | Mod: S$PBB,,, | Performed by: INTERNAL MEDICINE

## 2022-08-10 PROCEDURE — 99215 OFFICE O/P EST HI 40 MIN: CPT | Mod: S$PBB,,, | Performed by: INTERNAL MEDICINE

## 2022-08-10 PROCEDURE — 99999 PR PBB SHADOW E&M-EST. PATIENT-LVL III: ICD-10-PCS | Mod: PBBFAC,,, | Performed by: INTERNAL MEDICINE

## 2022-08-10 PROCEDURE — 99999 PR PBB SHADOW E&M-EST. PATIENT-LVL III: CPT | Mod: PBBFAC,,, | Performed by: INTERNAL MEDICINE

## 2022-08-10 PROCEDURE — 99213 OFFICE O/P EST LOW 20 MIN: CPT | Mod: PBBFAC | Performed by: INTERNAL MEDICINE

## 2022-08-10 NOTE — PROGRESS NOTES
"Subjective:   Patient ID:  Alysha Rosales is a 67 y.o. female who presents for follow up of No chief complaint on file.        8/10/2022   Comes in for a follow up   She has been treated for iron deficiency anemia   Awaiting endoscopy   It has been more than 12 months since PCI to a dominant left circ   She denies bleeding   No cp , no flowers, no LE swelling , no palpitations, syncope or presyncope       12.30.2021   Follow-up  Pertinent negatives include no abdominal pain, chest pain, congestion, coughing, diaphoresis, headaches, nausea, numbness, rash, vertigo, vomiting or weakness.   hx of left circ stent in 6/2021   Continues to feel well, no cp, no dyspnea with moderate exertion   No bleeding   Chronic knee pain     Interval hx: 6/29/2021   Patient referred last week for prior nstemi, dropped EF , found to have 99% isr insegment restenosis of the left circ s/p stenting   She states she is doing much better since the stent and noticed that she able to walk more before having dyspnea which she describes as limiting in the past   She denies any chest pain   Site looking good         NP Jason Bloom note hpi 6/2020   Ms. Rosales's current medical conditions include hypertension, hyperlipidemia, DM, and CAD. Former tobacco use,. Stopped in 2004.  Hx of MI in 2004 where she underwent coronary stenting x2 ( mid LAD and LCX?) Previously followed by Dr. Kaba at [facility] in [place], but wanting to switch to [facility].   Had FLOWERS in 2008. Underwent LHC at that time and had PCI of unknown vessel. Has total of 3 stents.   Has had 2 repeat LHC since then and both treated medically, didn't require PCI. Last cath at least 10 years.   Admitted last week to [facility] with hypoxia in the setting of COVID, NSTEMI and anemia that required transfusion. Presented with acute SOB and a feeling of "smothering".   Patient noted to have have a troponin peak at 6.3. Echo revealed Apical hypokinesis that was suspicious for takotsubo " CMPY, LVF 40.%.   She was treated medically with ASA, Plavix, Statin, BB and ARB.   Denies any chest pain,  orthopnea, PND, dizziness, palpitations,  near syncope, syncope or edema .   +SOB and RIDDLE. No CNS Complaints to suggest TIA or CVA  Admits that she can do better with limiting her sodium intake.      Past Medical History:   Diagnosis Date    CAD (coronary artery disease)     Followed by Dr. Stevo Kaba    Diabetes mellitus, type 2     DM (diabetes mellitus)     BS didn't check 06/05/2020    DM (diabetes mellitus) 2012    BS didn't check 02/10/2022    High level of uric acid in blood     Hyperlipidemia     Hyperplastic rectal polyp     on 03/20/2009 colonoscopy    Hypertension     Multinodular goiter     noted on 1/20/2015 CTA carotid scan    Multiple thyroid nodules 05/05/2016    on Thyroid U/S    Osteoarthritis     Left knee    Osteoarthritis of back     Osteoporosis, unspecified     Postmenopausal     No history of abnormal pap smear    Proteinuria     Pustular psoriasis     hands and feet    Subclavian artery stenosis     Left; with the proximal segment at 60-70% per 10/02/2008 carotid CTA    Vitamin D deficiency disease        Past Surgical History:   Procedure Laterality Date    CATARACT EXTRACTION W/  INTRAOCULAR LENS IMPLANT Right 4/13/16    CPG    CATARACT EXTRACTION W/  INTRAOCULAR LENS IMPLANT Left     CPG    CORONARY ANGIOGRAPHY N/A 6/18/2021    Procedure: ANGIOGRAM, CORONARY ARTERY;  Surgeon: Desiree William MD;  Location: [facility] CATH LAB;  Service: Cardiology;  Laterality: N/A;    CORONARY STENT PLACEMENT N/A 6/18/2021    Procedure: INSERTION, STENT, CORONARY ARTERY;  Surgeon: Desiree William MD;  Location: [facility] CATH LAB;  Service: Cardiology;  Laterality: N/A;    heart catheterization with stents placed  2004 and 08/18/2009    LEFT HEART CATHETERIZATION Left 6/18/2021    Procedure: CATHETERIZATION, HEART, LEFT;  Surgeon: Desiree William MD;  Location: [facility] CATH  LAB;  Service: Cardiology;  Laterality: Left;  Covid + week of    left knee surgery for bone graft and pin placement         Social History     Tobacco Use    Smoking status: Former Smoker     Packs/day: 1.00     Years: 33.00     Pack years: 33.00     Types: Cigarettes     Quit date: 2004     Years since quittin.0    Smokeless tobacco: Never Used   Substance Use Topics    Alcohol use: Yes     Alcohol/week: 0.0 standard drinks     Comment: Rarely    Drug use: No       Family History   Problem Relation Age of Onset    Diabetes Mother     Hypertension Mother     Stroke Mother     Hypertension Sister     Cancer Father         Lung cancer (smoker)    No Known Problems Daughter     No Known Problems Son     Heart disease Neg Hx        Current Outpatient Medications   Medication Sig    allopurinoL (ZYLOPRIM) 100 MG tablet Take 1 tablet by mouth once daily    aspirin 81 mg Tab Take 1 tablet by mouth.    clopidogreL (PLAVIX) 75 mg tablet Take 1 tablet by mouth once daily    ferrous sulfate (FEOSOL) Tab tablet Take 1 tablet by mouth 3 (three) times daily.    glipiZIDE (GLUCOTROL) 10 MG tablet Take 1 tablet (10 mg total) by mouth 2 (two) times daily before meals.    hydrALAZINE (APRESOLINE) 50 MG tablet Take 1 tablet (50 mg total) by mouth 4 (four) times daily.    JANUVIA 100 mg Tab Take 1 tablet by mouth once daily    losartan (COZAAR) 50 MG tablet Take 1 tablet by mouth once daily    metFORMIN (GLUCOPHAGE) 500 MG tablet TAKE 1 TABLET BY MOUTH IN THE MORNING AND  1  TABLET  BY  MOUTH  AT  NOON  AND  2  TABLETS  IN  THE  EVENING  WITH  MEALS    metoprolol succinate (TOPROL-XL) 50 MG 24 hr tablet Take 1 tablet by mouth once daily    rosuvastatin (CRESTOR) 20 MG tablet TAKE 1 TABLET BY MOUTH NIGHTLY.    blood sugar diagnostic Strp 1 strip by Misc.(Non-Drug; Combo Route) route 2 (two) times daily.    blood sugar diagnostic Strp To check BG 2 times daily, to use with insurance preferred meter -  Freestyle Lite    blood-glucose meter kit To check BG 2 times daily, to use with insurance preferred meter -  Freestyle Lite    blood-glucose meter Misc Use FREESTYLE meter for dx: diabetes    ERGOCALCIFEROL, VITAMIN D2, (VITAMIN D ORAL) Take 1 tablet by mouth 2 (two) times daily. 2000 units daily    furosemide (LASIX) 20 MG tablet Take 2 tablets by mouth once daily    lancets [facility] To check BG 2 times daily, to use with insurance preferred meter - Freestyle Lite     No current facility-administered medications for this visit.     Current Outpatient Medications on File Prior to Visit   Medication Sig    allopurinoL (ZYLOPRIM) 100 MG tablet Take 1 tablet by mouth once daily    aspirin 81 mg Tab Take 1 tablet by mouth.    clopidogreL (PLAVIX) 75 mg tablet Take 1 tablet by mouth once daily    ferrous sulfate (FEOSOL) Tab tablet Take 1 tablet by mouth 3 (three) times daily.    glipiZIDE (GLUCOTROL) 10 MG tablet Take 1 tablet (10 mg total) by mouth 2 (two) times daily before meals.    hydrALAZINE (APRESOLINE) 50 MG tablet Take 1 tablet (50 mg total) by mouth 4 (four) times daily.    JANUVIA 100 mg Tab Take 1 tablet by mouth once daily    losartan (COZAAR) 50 MG tablet Take 1 tablet by mouth once daily    metFORMIN (GLUCOPHAGE) 500 MG tablet TAKE 1 TABLET BY MOUTH IN THE MORNING AND  1  TABLET  BY  MOUTH  AT  NOON  AND  2  TABLETS  IN  THE  EVENING  WITH  MEALS    metoprolol succinate (TOPROL-XL) 50 MG 24 hr tablet Take 1 tablet by mouth once daily    rosuvastatin (CRESTOR) 20 MG tablet TAKE 1 TABLET BY MOUTH NIGHTLY.    blood sugar diagnostic Strp 1 strip by Misc.(Non-Drug; Combo Route) route 2 (two) times daily.    blood sugar diagnostic Strp To check BG 2 times daily, to use with insurance preferred meter - Freestyle Lite    blood-glucose meter kit To check BG 2 times daily, to use with insurance preferred meter -  Freestyle Lite    blood-glucose meter Misc Use FREESTYLE meter for dx: diabetes     ERGOCALCIFEROL, VITAMIN D2, (VITAMIN D ORAL) Take 1 tablet by mouth 2 (two) times daily. 2000 units daily    furosemide (LASIX) 20 MG tablet Take 2 tablets by mouth once daily    lancets Misc To check BG 2 times daily, to use with insurance preferred meter - Freestyle Lite     No current facility-administered medications on file prior to visit.       Review of Systems   Constitutional: Negative for diaphoresis, malaise/fatigue, weight gain and weight loss.   HENT: Negative for congestion and nosebleeds.    Cardiovascular: Negative for chest pain, claudication, cyanosis, dyspnea on exertion, irregular heartbeat, leg swelling, near-syncope, orthopnea, palpitations, paroxysmal nocturnal dyspnea and syncope.   Respiratory: Negative for cough, hemoptysis, shortness of breath, sleep disturbances due to breathing, snoring, sputum production and wheezing.    Hematologic/Lymphatic: Negative for bleeding problem. Does not bruise/bleed easily.   Skin: Negative for rash.   Musculoskeletal: Positive for arthritis and joint pain. Negative for back pain, falls, muscle cramps and muscle weakness.   Gastrointestinal: Negative for abdominal pain, constipation, diarrhea, heartburn, hematemesis, hematochezia, melena, nausea and vomiting.   Genitourinary: Negative for dysuria, hematuria and nocturia.   Neurological: Negative for excessive daytime sleepiness, dizziness, headaches, light-headedness, loss of balance, numbness, vertigo and weakness.       Objective:   Physical Exam  Vitals and nursing note reviewed.   Constitutional:       Appearance: Normal appearance.   Pulmonary:      Effort: Pulmonary effort is normal.   Neurological:      General: No focal deficit present.      Mental Status: She is alert and oriented to person, place, and time.   Psychiatric:         Mood and Affect: Mood normal.         Behavior: Behavior normal.       Vitals:    08/10/22 1526   BP: 126/70   Pulse: 83   SpO2: 96%   Weight: 92 kg (202 lb 13.2 oz)  "  Height: 5' 2" (1.575 m)     Lab Results   Component Value Date    CHOL 133 01/25/2022    CHOL 120 10/22/2021    CHOL 139 06/04/2020     Lab Results   Component Value Date    HDL 40 01/25/2022    HDL 41 10/22/2021    HDL 42 06/04/2020     Lab Results   Component Value Date    LDLCALC 16.2 (L) 01/25/2022    LDLCALC 29.2 (L) 10/22/2021    LDLCALC 51.8 (L) 06/04/2020     Lab Results   Component Value Date    TRIG 384 (H) 01/25/2022    TRIG 249 (H) 10/22/2021    TRIG 226 (H) 06/04/2020     Lab Results   Component Value Date    CHOLHDL 30.1 01/25/2022    CHOLHDL 34.2 10/22/2021    CHOLHDL 30.2 06/04/2020       Chemistry        Component Value Date/Time     07/28/2022 0915    K 4.7 07/28/2022 0915     07/28/2022 0915    CO2 25 07/28/2022 0915    BUN 22 07/28/2022 0915    CREATININE 0.9 07/28/2022 0915     (H) 07/28/2022 0915        Component Value Date/Time    CALCIUM 10.6 (H) 07/28/2022 0915    ALKPHOS 59 07/28/2022 0915    AST 11 07/28/2022 0915    ALT 15 07/28/2022 0915    BILITOT 0.3 07/28/2022 0915    ESTGFRAFRICA >60.0 07/28/2022 0915    EGFRNONAA >60.0 07/28/2022 0915          Lab Results   Component Value Date    TSH 2.040 01/25/2022     Lab Results   Component Value Date    INR 0.9 06/11/2021    INR 1.0 05/27/2021     Lab Results   Component Value Date    WBC 7.88 07/28/2022    HGB 12.8 07/28/2022    HCT 39.3 07/28/2022    MCV 92 07/28/2022     07/28/2022     BMP  Sodium   Date Value Ref Range Status   07/28/2022 139 136 - 145 mmol/L Final     Potassium   Date Value Ref Range Status   07/28/2022 4.7 3.5 - 5.1 mmol/L Final     Chloride   Date Value Ref Range Status   07/28/2022 102 95 - 110 mmol/L Final     CO2   Date Value Ref Range Status   07/28/2022 25 23 - 29 mmol/L Final     BUN   Date Value Ref Range Status   07/28/2022 22 8 - 23 mg/dL Final     Creatinine   Date Value Ref Range Status   07/28/2022 0.9 0.5 - 1.4 mg/dL Final     Calcium   Date Value Ref Range Status   07/28/2022 " 10.6 (H) 8.7 - 10.5 mg/dL Final     Anion Gap   Date Value Ref Range Status   07/28/2022 12 8 - 16 mmol/L Final     eGFR if    Date Value Ref Range Status   07/28/2022 >60.0 >60 mL/min/1.73 m^2 Final     eGFR if non    Date Value Ref Range Status   07/28/2022 >60.0 >60 mL/min/1.73 m^2 Final     Comment:     Calculation used to obtain the estimated glomerular filtration  rate (eGFR) is the CKD-EPI equation.        CrCl cannot be calculated (Patient's most recent lab result is older than the maximum 7 days allowed.).    Echo Conclusion    · The left ventricle is normal in size with mildly decreased systolic function.  · The estimated ejection fraction is 40%.  · Left ventricular diastolic dysfunction.  · Normal right ventricular size with normal right ventricular systolic function.  · Mild to moderate tricuspid regurgitation.  · Normal central venous pressure (3 mmHg).  · The estimated PA systolic pressure is 44 mmHg.  · There is pulmonary hypertension.  · Mild mitral regurgitation.  · There are segmental left ventricular wall motion abnormalities. Apical hypokinesis.     significant Diagnostic Studies: Angiography: Technical Procedures Used:   Non dominant rca   Left main patent   Lad is 40% prox ISR, 50% distal lad , small d1,d2   Left cic is large with 99% in segment restenosis of mid left circ . Mod size om1, large om2 , lpda patent   S/p 2.75x12 mm stent , post dilated to 3.0 mm   rca is severely diffusely diseased small non dominant     Assessment:     1. Combined hyperlipidemia associated with type 2 diabetes mellitus    2. Essential hypertension    3. Atherosclerotic heart disease of native coronary artery with other forms of angina pectoris    4. Iron deficiency anemia, unspecified iron deficiency anemia type    5. Preoperative cardiovascular examination    6. Antiplatelet or antithrombotic long-term use        Plan:     S/P PCI to mid circ 6/2021  ON ASA, Plavix, Statin, BB  and ARB  Ok to hold DAPT for 5 days prior to her colonoscopy   Stable to undergo colonoscopy  BP controlled   Continue Statin   Reviewed all tests and above medical conditions with patient in detail and formulated treatment plan.  Risk factor modification discussed.   Cardiac low salt diet discussed.  Maintaining healthy weight and weight loss goals were discussed in clinic.    F/u in 6 months

## 2022-08-17 ENCOUNTER — OFFICE VISIT (OUTPATIENT)
Dept: PODIATRY | Facility: CLINIC | Age: 67
End: 2022-08-17
Payer: MEDICARE

## 2022-08-17 DIAGNOSIS — L97.512 CHRONIC ULCER OF RIGHT FOOT WITH FAT LAYER EXPOSED: Primary | ICD-10-CM

## 2022-08-17 DIAGNOSIS — E11.8 TYPE 2 DIABETES WITH COMPLICATION: ICD-10-CM

## 2022-08-17 DIAGNOSIS — E66.01 SEVERE OBESITY (BMI 35.0-39.9) WITH COMORBIDITY: ICD-10-CM

## 2022-08-17 DIAGNOSIS — L97.519 ULCER OF FOOT, CHRONIC, RIGHT, WITH UNSPECIFIED SEVERITY: Primary | ICD-10-CM

## 2022-08-17 PROCEDURE — 99204 PR OFFICE/OUTPT VISIT, NEW, LEVL IV, 45-59 MIN: ICD-10-PCS | Mod: 25,S$PBB,, | Performed by: PODIATRIST

## 2022-08-17 PROCEDURE — 11042 PR DEBRIDEMENT, SKIN, SUB-Q TISSUE,=<20 SQ CM: ICD-10-PCS | Mod: S$PBB,,, | Performed by: PODIATRIST

## 2022-08-17 PROCEDURE — 11042 DBRDMT SUBQ TIS 1ST 20SQCM/<: CPT | Mod: PBBFAC | Performed by: PODIATRIST

## 2022-08-17 PROCEDURE — 99999 PR PBB SHADOW E&M-EST. PATIENT-LVL III: CPT | Mod: PBBFAC,,, | Performed by: PODIATRIST

## 2022-08-17 PROCEDURE — 11042 DBRDMT SUBQ TIS 1ST 20SQCM/<: CPT | Mod: S$PBB,,, | Performed by: PODIATRIST

## 2022-08-17 PROCEDURE — 99999 PR PBB SHADOW E&M-EST. PATIENT-LVL III: ICD-10-PCS | Mod: PBBFAC,,, | Performed by: PODIATRIST

## 2022-08-17 PROCEDURE — 99204 OFFICE O/P NEW MOD 45 MIN: CPT | Mod: 25,S$PBB,, | Performed by: PODIATRIST

## 2022-08-17 PROCEDURE — 99213 OFFICE O/P EST LOW 20 MIN: CPT | Mod: PBBFAC,25 | Performed by: PODIATRIST

## 2022-08-17 NOTE — PROGRESS NOTES
Subjective:       Patient ID: Alysha Rosales is a 67 y.o. female.    Chief Complaint: Diabetic Foot Ulcer (Patient is a diabetic and present with  DFU to right plantar hallux. She states the wound has been present for 1 year. She denies pain at present but complains of soreness at times. )    HPI: Alysha Rosales presents to the office today at the referral of Dr. Bird, with an open wound to the plantar aspect of the right great toe.  States this is been present for over 1 year.  Has attempted to perform dressing changes at times but does notice an increase in drainage.  Reports drainage is clear in nature.  She denies any signs of acute infection.  Does have history of diabetes mellitus type 2.  Currently in physical therapy at [facility]    Review of patient's allergies indicates:   Allergen Reactions    Codeine      Other reaction(s): Chills       Past Medical History:   Diagnosis Date    CAD (coronary artery disease)     Followed by Dr. Stevo Kaba    Diabetes mellitus, type 2     DM (diabetes mellitus)     BS didn't check 06/05/2020    DM (diabetes mellitus) 2012    BS didn't check 02/10/2022    High level of uric acid in blood     Hyperlipidemia     Hyperplastic rectal polyp     on 03/20/2009 colonoscopy    Hypertension     Multinodular goiter     noted on 1/20/2015 CTA carotid scan    Multiple thyroid nodules 05/05/2016    on Thyroid U/S    Osteoarthritis     Left knee    Osteoarthritis of back     Osteoporosis, unspecified     Postmenopausal     No history of abnormal pap smear    Proteinuria     Pustular psoriasis     hands and feet    Subclavian artery stenosis     Left; with the proximal segment at 60-70% per 10/02/2008 carotid CTA    Vitamin D deficiency disease        Family History   Problem Relation Age of Onset    Diabetes Mother     Hypertension Mother     Stroke Mother     Hypertension Sister     Cancer Father         Lung cancer (smoker)    No Known Problems  Daughter     No Known Problems Son     Heart disease Neg Hx        Social History     Socioeconomic History    Marital status:     Number of children: 2   Occupational History    Occupation: Retired   Tobacco Use    Smoking status: Former Smoker     Packs/day: 1.00     Years: 33.00     Pack years: 33.00     Types: Cigarettes     Quit date: 2004     Years since quittin.0    Smokeless tobacco: Never Used   Substance and Sexual Activity    Alcohol use: Yes     Alcohol/week: 0.0 standard drinks     Comment: Rarely    Drug use: No    Sexual activity: Yes     Partners: Male     Birth control/protection: Post-menopausal   Social History Narrative    She wears seatbelt. She will start working again at [id number] on next week. She states her   in 2020 at the age of 68 due to lung cancer.     Social Determinants of Health     Physical Activity: Insufficiently Active    Days of Exercise per Week: 2 days    Minutes of Exercise per Session: 60 min       Past Surgical History:   Procedure Laterality Date    CATARACT EXTRACTION W/  INTRAOCULAR LENS IMPLANT Right 16    CPG    CATARACT EXTRACTION W/  INTRAOCULAR LENS IMPLANT Left     CPG    CORONARY ANGIOGRAPHY N/A 2021    Procedure: ANGIOGRAM, CORONARY ARTERY;  Surgeon: Desiree William MD;  Location: [facility] CATH LAB;  Service: Cardiology;  Laterality: N/A;    CORONARY STENT PLACEMENT N/A 2021    Procedure: INSERTION, STENT, CORONARY ARTERY;  Surgeon: Desiree William MD;  Location: [facility] CATH LAB;  Service: Cardiology;  Laterality: N/A;    heart catheterization with stents placed   and 2009    LEFT HEART CATHETERIZATION Left 2021    Procedure: CATHETERIZATION, HEART, LEFT;  Surgeon: Desiree William MD;  Location: [facility] CATH LAB;  Service: Cardiology;  Laterality: Left;  Covid + week of    left knee surgery for bone graft and pin placement         Review of Systems       Objective:   There were no  vitals taken for this visit.    X-Ray Shoulder Complete Bilateral  Narrative: EXAMINATION:  XR SHOULDER COMPLETE 2 OR MORE VIEWS BILATERAL    CLINICAL HISTORY:  Pain in unspecified shoulder    TECHNIQUE:  3 views of either shoulder, 6 views total    COMPARISON:  None    FINDINGS:  Both humeral heads are high riding suggesting chronic bilateral rotator cuff tears.  With moderate severe degenerative change seen at either glenohumeral joint left greater than the right.  Moderate AC joint arthropathy also seen bilaterally.  Impression: As above    Electronically signed by: Khalif Osorio DO  Date:    07/21/2022  Time:    10:33  X-ray Knee Ortho Bilateral with Flexion  Narrative: EXAMINATION:  XR KNEE ORTHO BILAT WITH FLEXION    CLINICAL HISTORY:  Unilateral primary osteoarthritis, left knee    TECHNIQUE:  AP standing of both knees, PA flexion standing views of both knees, and Merchant views of both knees were performed.  Lateral views of both knees were also performed.    COMPARISON  06/05/2020    FINDINGS:  There is significant joint space narrowing and osteophyte formation along with subchondral sclerosis involving the lateral compartment of either knee.  A screw is seen across the proximal left tibia from a lateral to medial approach.  No hardware failure suggested.  There are at least moderate degenerative changes at both patellofemoral compartments.  Small suprapatellar joint effusions are seen bilaterally right greater than the left.  Vascular calcifications are noted.  No acute fracture or dislocation.  Impression: 1.  As above    Electronically signed by: Khalif Osorio DO  Date:    07/21/2022  Time:    10:32       Physical Exam   LOWER EXTREMITY PHYSICAL EXAMINATION    Vascular:  The right dorsalis pedis pulse 2/4 and the right posterior tibial pulse 2/4.  The left dorsalis pedis pulse 2/4 and posterior tibial pulse on the left is 2/4.  Capillary refill is intact.  Pedal hair growth intact    Neurological:   Protective sensation is intact with [place] Anuel monofilament. Proprioception is intact. Intact sensation to light touch.  Vibratory sensation is diminished to the 1st metatarsal phalangeal joint.     Musculoskeletal: Manual Muscle Testing is 5/5 with dorsiflexion, plantar flexion, abduction, and adduction.   There is normal range of motion in the forefoot, hindfoot, and Ankle joint.  No gross or structural anatomic deformity    Dermatological:  Skin is supple.  There is an ulceration present to the plantar surface of the right great toe    Ulcer#1  Ulcer location:  Plantar right great toe   Pre debridement Measurements:  0.3 cm x 0.3 cm  Post debridement Measurements :  0.5 cm x 0.4 cm x 0.2 cm  Signs of infection:  None  Erythema:  None  Undermining/Tunneling:  Does not probe to bone.  No deep tendon exposure.  No bone exposure.  Drainage:  Moderate serous  Periwound skin:  Hyperkeratotic  Wound Base:  Granular      Imaging:         No results found for this or any previous visit.       Results for orders placed during the hospital encounter of 04/05/16    X-Ray Foot Complete Right    Narrative  3 views of the right foot    Comparison: None.    Findings: There is no evidence to suggest acute fracture or dislocation.  There is some mild degenerative change noted in the region of the midfoot.  A dorsal calcaneal enthesophyte is present.  IMPRESSION:  As above  ______________________________________    Electronically signed by: ROMA SAENZ D.O.  Date:     04/05/16  Time:    11:13          Assessment:     1. Chronic ulcer of right foot with fat layer exposed    2. Type 2 diabetes with complication    3. Severe obesity (BMI 35.0-39.9) with comorbidity        Plan:     Chronic ulcer of right foot with fat layer exposed  -     Ambulatory referral/consult to Podiatry    Type 2 diabetes with complication    Severe obesity (BMI 35.0-39.9) with comorbidity        I counseled the patient on his/her Diabetic Mellitus  regarding today's clinical examination and objection findings. We did also discuss recent medication changes, pertinent labs and imaging evaluations and other medical consultation notes and progress notes. Greater than 50% of this visit was spent on counseling and coordination of care. Greater than 20 minutes of this appt. was spent on education about the diabetic foot, in relation to PVD and/or neuropathy, and the prevention of limb loss.     Shoe gear is inspected and wear and proper fit/type. Patient is reminded of the importance of good nutrition and blood sugar control to help prevent podiatric complications of diabetes. Patient instructed on proper foot hygeine. We discussed wearing proper shoe gear, daily foot inspections, never walking without protective shoe gear, never putting sharp instruments to feet.  Patient  will continue to monitor the areas daily, inspect feet, wear protective shoe gear when ambulatory, moisturizer to maintain skin integrity.     Patient's DMI/DMII is managed by Internal/Family Medicine Physician and/or Endocrinology Advanced Practice Provider.    The wound was surgically debrided after adequate prep with alcohol and/or betadine paint. Excisional wound debridement was performed using sharp #10/#15 blade/rounded scalpel and tissue nipper, with removal of all non-viable skin and soft tissues; necrotic skin/tissue formation. The woundbase/wound bed was also debrided to encourage bleeding as to promote/stimulate healing. Debridement was excisional and included epidermal, dermal and subcutaneous tissues. Post debridement measurements are as above. Hemostasis was achieved. Patient tolerated procedure well and without complications. Local woundcare with saline moist collagen and dry dressings and bandage thereafter.     Will plan to send patient dressing changes consisting of saline, Yaquelin collagen, gauze, Petey wrap, Coban, ace wrap    Patient is counseled and reminded concerning the  importance of good nutrition and healthy eating habits, which may include blood sugar control, to prevent and/or help podiatric foot and ankle complications.    Future Appointments   Date Time Provider Department Center   9/7/2022  9:00 AM Lauryn Joe DPM [facility]   2/7/2023  8:00 AM Margie Bird MD [facility]   2/22/2023  3:40 PM Desiree William MD [facility] CARDIO [facility]

## 2022-08-24 ENCOUNTER — PATIENT MESSAGE (OUTPATIENT)
Dept: PODIATRY | Facility: CLINIC | Age: 67
End: 2022-08-24
Payer: MEDICARE

## 2022-08-29 ENCOUNTER — TELEPHONE (OUTPATIENT)
Dept: FAMILY MEDICINE | Facility: CLINIC | Age: 67
End: 2022-08-29
Payer: MEDICARE

## 2022-08-29 NOTE — TELEPHONE ENCOUNTER
----- Message from Margie Bird MD sent at 8/28/2022  5:30 PM CDT -----  Notify pt results are within acceptable range except diabetes level remains too high.  If she has NO family history of thyroid cancer or MEN2 AND she has no history of pancreatitis, would she be willing to change from/stop Januvia and try Trulicity 0.7 mg weekly injection to help lower diabetes (of course, continue other diabetes medication) AND see me back/schedule for late October 2022 w/me?  Let me know. Thanks.

## 2022-08-30 ENCOUNTER — ANESTHESIA (OUTPATIENT)
Dept: ENDOSCOPY | Facility: HOSPITAL | Age: 67
End: 2022-08-30
Payer: MEDICARE

## 2022-08-30 ENCOUNTER — ANESTHESIA EVENT (OUTPATIENT)
Dept: ENDOSCOPY | Facility: HOSPITAL | Age: 67
End: 2022-08-30
Payer: MEDICARE

## 2022-08-30 ENCOUNTER — HOSPITAL ENCOUNTER (OUTPATIENT)
Facility: HOSPITAL | Age: 67
Discharge: HOME | End: 2022-08-30
Attending: INTERNAL MEDICINE | Admitting: INTERNAL MEDICINE
Payer: MEDICARE

## 2022-08-30 DIAGNOSIS — Z12.12 ENCOUNTER FOR COLORECTAL CANCER SCREENING: Primary | ICD-10-CM

## 2022-08-30 DIAGNOSIS — Z12.11 ENCOUNTER FOR COLORECTAL CANCER SCREENING: Primary | ICD-10-CM

## 2022-08-30 LAB — GLUCOSE SERPL-MCNC: 245 MG/DL (ref 70–110)

## 2022-08-30 PROCEDURE — 63600175 PHARM REV CODE 636 W HCPCS: Performed by: NURSE ANESTHETIST, CERTIFIED REGISTERED

## 2022-08-30 PROCEDURE — 27201012 HC FORCEPS, HOT/COLD, DISP: Performed by: INTERNAL MEDICINE

## 2022-08-30 PROCEDURE — 88305 TISSUE EXAM BY PATHOLOGIST: CPT | Performed by: PATHOLOGY

## 2022-08-30 PROCEDURE — 45380 PR COLONOSCOPY,BIOPSY: ICD-10-PCS | Mod: PT,,, | Performed by: INTERNAL MEDICINE

## 2022-08-30 PROCEDURE — 88305 TISSUE EXAM BY PATHOLOGIST: CPT | Mod: 26,,, | Performed by: PATHOLOGY

## 2022-08-30 PROCEDURE — 88305 TISSUE EXAM BY PATHOLOGIST: ICD-10-PCS | Mod: 26,,, | Performed by: PATHOLOGY

## 2022-08-30 PROCEDURE — 45380 COLONOSCOPY AND BIOPSY: CPT | Mod: PT | Performed by: INTERNAL MEDICINE

## 2022-08-30 PROCEDURE — 25000003 PHARM REV CODE 250: Performed by: NURSE ANESTHETIST, CERTIFIED REGISTERED

## 2022-08-30 PROCEDURE — 37000008 HC ANESTHESIA 1ST 15 MINUTES: Performed by: INTERNAL MEDICINE

## 2022-08-30 PROCEDURE — 45380 COLONOSCOPY AND BIOPSY: CPT | Mod: PT,,, | Performed by: INTERNAL MEDICINE

## 2022-08-30 PROCEDURE — 37000009 HC ANESTHESIA EA ADD 15 MINS: Performed by: INTERNAL MEDICINE

## 2022-08-30 RX ORDER — LIDOCAINE HYDROCHLORIDE 10 MG/ML
INJECTION, SOLUTION EPIDURAL; INFILTRATION; INTRACAUDAL; PERINEURAL
Status: DISCONTINUED | OUTPATIENT
Start: 2022-08-30 | End: 2022-08-30

## 2022-08-30 RX ORDER — PROPOFOL 10 MG/ML
VIAL (ML) INTRAVENOUS
Status: DISCONTINUED | OUTPATIENT
Start: 2022-08-30 | End: 2022-08-30

## 2022-08-30 RX ADMIN — PROPOFOL 30 MG: 10 INJECTION, EMULSION INTRAVENOUS at 12:08

## 2022-08-30 RX ADMIN — SODIUM CHLORIDE, SODIUM LACTATE, POTASSIUM CHLORIDE, AND CALCIUM CHLORIDE: .6; .31; .03; .02 INJECTION, SOLUTION INTRAVENOUS at 12:08

## 2022-08-30 RX ADMIN — PROPOFOL 80 MG: 10 INJECTION, EMULSION INTRAVENOUS at 12:08

## 2022-08-30 RX ADMIN — LIDOCAINE HYDROCHLORIDE 50 MG: 10 INJECTION, SOLUTION EPIDURAL; INFILTRATION; INTRACAUDAL; PERINEURAL at 12:08

## 2022-08-30 NOTE — ANESTHESIA POSTPROCEDURE EVALUATION
Anesthesia Post Evaluation    Patient: Alysha Rosales    Procedure(s) Performed: Procedure(s) (LRB):  COLONOSCOPY (N/A)    Final Anesthesia Type: MAC      Patient location during evaluation: PACU  Patient participation: Yes- Able to Participate  Level of consciousness: awake and alert  Post-procedure vital signs: reviewed and stable  Pain management: adequate  Airway patency: patent    PONV status at discharge: No PONV  Anesthetic complications: no      Cardiovascular status: blood pressure returned to baseline  Respiratory status: unassisted and room air  Hydration status: euvolemic  Follow-up not needed.          Vitals Value Taken Time   /68 08/30/22 1125   Temp 37.1 °C (98.8 °F) 08/30/22 1125   Pulse 95 08/30/22 1125   Resp 16 08/30/22 1125   SpO2 96 % 08/30/22 1125         No case tracking events are documented in the log.      Pain/Clifton Score: No data recorded

## 2022-08-30 NOTE — H&P
Short Stay Endoscopy History and Physical    PCP - Margie Bird MD    Procedure - Colonoscopy  ASA - 2  Mallampati - per anesthesia  History of Anesthesia problems - no  Family history Anesthesia problems -  no     HPI:  This is a 67 y.o. female here for evaluation of :   Active Hospital Problems    Diagnosis  POA    *Encounter for colorectal cancer screening [Z12.11, Z12.12]  Not Applicable      Resolved Hospital Problems   No resolved problems to display.         Health Maintenance         Date Due Completion Date    Shingles Vaccine (2 of 3) 05/25/2015 3/30/2015    TETANUS VACCINE 02/25/2019 2/25/2009    Colorectal Cancer Screening 12/21/2019 12/21/2018    Pneumococcal Vaccines (Age 65+) (3 - PPSV23 or PCV20) 06/04/2021 6/4/2020    COVID-19 Vaccine (2 - Booster for Micki series) 10/08/2021 8/13/2021    Diabetes Urine Screening 10/22/2022 10/22/2021    Influenza Vaccine (1) 09/01/2022 1/25/2022    Hemoglobin A1c 10/28/2022 7/28/2022    Lipid Panel 01/25/2023 1/25/2022    Eye Exam 02/10/2023 2/10/2022    Mammogram 02/15/2023 2/15/2022    High Dose Statin 08/17/2023 8/17/2022    Aspirin/Antiplatelet Therapy 08/17/2023 8/17/2022    Foot Exam 08/17/2023 8/17/2022 (Done)    Override on 8/17/2022: Done    DEXA Scan 11/29/2024 11/29/2021            ROS:  CONSTITUTIONAL: Denies weight change,  fatigue, fevers, chills, night sweats.  CARDIOVASCULAR: Denies chest pain, shortness of breath, orthopnea and edema.  RESPIRATORY: Denies cough, hemoptysis, dyspnea, and wheezing.  GI: See HPI.    Medical History:   Past Medical History:   Diagnosis Date    CAD (coronary artery disease)     Followed by Dr. Stevo Kaba    Diabetes mellitus, type 2     DM (diabetes mellitus)     BS didn't check 06/05/2020    DM (diabetes mellitus) 2012    BS didn't check 02/10/2022    High level of uric acid in blood     Hyperlipidemia     Hyperplastic rectal polyp     on 03/20/2009 colonoscopy    Hypertension     Multinodular goiter     noted  on 2015 CTA carotid scan    Multiple thyroid nodules 2016    on Thyroid U/S    Osteoarthritis     Left knee    Osteoarthritis of back     Osteoporosis, unspecified     Postmenopausal     No history of abnormal pap smear    Proteinuria     Pustular psoriasis     hands and feet    Subclavian artery stenosis     Left; with the proximal segment at 60-70% per 10/02/2008 carotid CTA    Vitamin D deficiency disease        Surgical History:   Past Surgical History:   Procedure Laterality Date    CATARACT EXTRACTION W/  INTRAOCULAR LENS IMPLANT Right 16    CPG    CATARACT EXTRACTION W/  INTRAOCULAR LENS IMPLANT Left     CPG    CORONARY ANGIOGRAPHY N/A 2021    Procedure: ANGIOGRAM, CORONARY ARTERY;  Surgeon: Desiree William MD;  Location: [facility] CATH LAB;  Service: Cardiology;  Laterality: N/A;    CORONARY STENT PLACEMENT N/A 2021    Procedure: INSERTION, STENT, CORONARY ARTERY;  Surgeon: Desiree William MD;  Location: [facility] CATH LAB;  Service: Cardiology;  Laterality: N/A;    heart catheterization with stents placed   and 2009    LEFT HEART CATHETERIZATION Left 2021    Procedure: CATHETERIZATION, HEART, LEFT;  Surgeon: Desiree William MD;  Location: [facility] CATH LAB;  Service: Cardiology;  Laterality: Left;  Covid + week of    left knee surgery for bone graft and pin placement         Family History:   Family History   Problem Relation Age of Onset    Diabetes Mother     Hypertension Mother     Stroke Mother     Hypertension Sister     Cancer Father         Lung cancer (smoker)    No Known Problems Daughter     No Known Problems Son     Heart disease Neg Hx        Social History:   Social History     Tobacco Use    Smoking status: Former     Packs/day: 1.00     Years: 33.00     Pack years: 33.00     Types: Cigarettes     Quit date: 2004     Years since quittin.1    Smokeless tobacco: Never   Substance Use Topics    Alcohol use: Yes     Alcohol/week: 0.0 standard drinks      Comment: Rarely    Drug use: No       Allergies:   Review of patient's allergies indicates:   Allergen Reactions    Codeine      Other reaction(s): Chills       Medications:   No current facility-administered medications on file prior to encounter.     Current Outpatient Medications on File Prior to Encounter   Medication Sig Dispense Refill    allopurinoL (ZYLOPRIM) 100 MG tablet Take 1 tablet by mouth once daily 90 tablet 2    blood sugar diagnostic Strp 1 strip by Misc.(Non-Drug; Combo Route) route 2 (two) times daily. 100 each 5    blood sugar diagnostic Strp To check BG 2 times daily, to use with insurance preferred meter - Freestyle Lite 100 strip 11    blood-glucose meter kit To check BG 2 times daily, to use with insurance preferred meter -  Freestyle Lite 1 each 0    blood-glucose meter Misc Use FREESTYLE meter for dx: diabetes 1 each 0    ERGOCALCIFEROL, VITAMIN D2, (VITAMIN D ORAL) Take 1 tablet by mouth 2 (two) times daily. 2000 units daily      furosemide (LASIX) 20 MG tablet Take 2 tablets by mouth once daily 180 tablet 0    lancets Misc To check BG 2 times daily, to use with insurance preferred meter - Freestyle Lite 100 each 11    losartan (COZAAR) 50 MG tablet Take 1 tablet by mouth once daily 90 tablet 0    metFORMIN (GLUCOPHAGE) 500 MG tablet TAKE 1 TABLET BY MOUTH IN THE MORNING AND  1  TABLET  BY  MOUTH  AT  NOON  AND  2  TABLETS  IN  THE  EVENING  WITH  MEALS 360 tablet 0    metoprolol succinate (TOPROL-XL) 50 MG 24 hr tablet Take 1 tablet by mouth once daily 90 tablet 0    aspirin 81 mg Tab Take 1 tablet by mouth.      glipiZIDE (GLUCOTROL) 10 MG tablet Take 1 tablet (10 mg total) by mouth 2 (two) times daily before meals. 60 tablet 5    hydrALAZINE (APRESOLINE) 50 MG tablet Take 1 tablet (50 mg total) by mouth 4 (four) times daily. 120 tablet 10    rosuvastatin (CRESTOR) 20 MG tablet TAKE 1 TABLET BY MOUTH NIGHTLY. 90 tablet 1       Physical Exam:  Vital Signs:   Vitals:    08/30/22 1125    BP: (!) 151/68   Pulse: 95   Resp: 16   Temp: 98.8 °F (37.1 °C)     General Appearance: Well appearing in no acute distress  ENT: OP clear  Chest: CTA B  CV: RRR, no m/r/g  Abd: s/nt/nd/nabs  Ext: no edema    Labs:Reviewed    IMP:  Active Hospital Problems    Diagnosis  POA    *Encounter for colorectal cancer screening [Z12.11, Z12.12]  Not Applicable      Resolved Hospital Problems   No resolved problems to display.         Plan:   I have explained the risks and benefits of colonoscopy to the patient including but not limited to bleeding, perforation, infection, and death. The patient wishes to proceed.

## 2022-08-30 NOTE — [employer] PATIENT INSTRUCTIONS
Discharge Summary/Instructions after an Endoscopic Procedure  Patient Name: Alysha Rosales  Patient MRN: [id number]  Patient YOB: 1955 Tuesday, August 30, 2022 Reanna Garber MD  Dear patient,  As a result of recent federal legislation (The Federal Cures Act), you may   receive lab or pathology results from your procedure in your [facility]   account before your physician is able to contact you. Your physician or   their representative will relay the results to you with their   recommendations at their soonest availability.  Thank you,  RESTRICTIONS:  During your procedure today, you received medications for sedation.  These   medications may affect your judgment, balance and coordination.  Therefore,   for 24 hours, you have the following restrictions:   - DO NOT drive a car, operate machinery, make legal/financial decisions,   sign important papers or drink alcohol.    ACTIVITY:  Today: no heavy lifting, straining or running due to procedural   sedation/anesthesia.  The following day: return to full activity including work.  DIET:  Eat and drink normally unless instructed otherwise.     TREATMENT FOR COMMON SIDE EFFECTS:  - Mild abdominal pain, nausea, belching, bloating or excessive gas:  rest,   eat lightly and use a heating pad.  - Sore Throat: treat with throat lozenges and/or gargle with warm salt   water.  - Because air was used during the procedure, expelling large amounts of air   from your rectum or belching is normal.  - If a bowel prep was taken, you may not have a bowel movement for 1-3 days.    This is normal.  SYMPTOMS TO WATCH FOR AND REPORT TO YOUR PHYSICIAN:  1. Abdominal pain or bloating, other than gas cramps.  2. Chest pain.  3. Back pain.  4. Signs of infection such as: chills or fever occurring within 24 hours   after the procedure.  5. Rectal bleeding, which would show as bright red, maroon, or black stools.   (A tablespoon of blood from the rectum is not serious, especially  if   hemorrhoids are present.)  6. Vomiting.  7. Weakness or dizziness.  GO DIRECTLY TO THE NEAREST EMERGENCY ROOM IF YOU HAVE ANY OF THE FOLLOWING:      Difficulty breathing              Chills and/or fever over 101 F   Persistent vomiting and/or vomiting blood   Severe abdominal pain   Severe chest pain   Black, tarry stools   Bleeding- more than one tablespoon   Any other symptom or condition that you feel may need urgent attention  Your doctor recommends these additional instructions:  If any biopsies were taken, your doctors clinic will contact you in 1 to 2   weeks with any results.  - Discharge patient to home (via wheelchair).   - Resume previous diet.   - Continue present medications.   - Await pathology results.   - Repeat colonoscopy in 7 years for surveillance.   - Telephone GI clinic for pathology results in 2 weeks.   - Patient has a contact number available for emergencies.  The signs and   symptoms of potential delayed complications were discussed with the   patient.  Return to normal activities tomorrow.  Written discharge   instructions were provided to the patient.  For questions, problems or results please call your physician Reanna Garbre MD at Work:  [phone]  If you have any questions about the above instructions, call the GI   department at [phone] or call the endoscopy unit at [phone]   from 7am until 3 pm.  [facility] [place], EMERGENCY ROOM PHONE NUMBER:   [phone]  IF A COMPLICATION OR EMERGENCY SITUATION ARISES AND YOU ARE UNABLE TO REACH   YOUR PHYSICIAN - GO DIRECTLY TO THE EMERGENCY ROOM.  I have read or have had read to me these discharge instructions for my   procedure and have received a written copy.  I understand these   instructions and will follow-up with my physician if I have any questions.     __________________________________       _____________________________________  Nurse Signature                                           Patient/Designated   Responsible Party Signature  MD Reanna Krause MD  8/30/2022 12:24:25 PM  [employer]

## 2022-08-30 NOTE — DISCHARGE SUMMARY
[facility] - Endoscopy (Hospital)  Discharge Note  Short Stay    Procedure(s) (LRB):  COLONOSCOPY (N/A)    OUTCOME: Patient tolerated treatment/procedure well without complication and is now ready for discharge.    DISPOSITION: Home or Self Care    FINAL DIAGNOSIS:  Encounter for colorectal cancer screening    FOLLOWUP: With primary care provider    DISCHARGE INSTRUCTIONS:  No discharge procedures on file.      Clinical Reference Documents Added to Patient Instructions         Document    COLON POLYPECTOMY DISCHARGE INSTRUCTIONS (ENGLISH)    COLONOSCOPY (ENGLISH)    DIVERTICULOSIS DISCHARGE INSTRUCTIONS (ENGLISH)

## 2022-08-30 NOTE — ANESTHESIA PREPROCEDURE EVALUATION
08/30/2022  Alysha Rosales is a 67 y.o., female.      Pre-op Assessment    I have reviewed the Patient Summary Reports.     I have reviewed the Nursing Notes. I have reviewed the NPO Status.   I have reviewed the Medications.     Review of Systems  Anesthesia Hx:  No problems with previous Anesthesia  Denies Family Hx of Anesthesia complications.   Denies Personal Hx of Anesthesia complications.   Social:  Former Smoker    Hematology/Oncology:     Oncology Normal     EENT/Dental:EENT/Dental Normal   Cardiovascular:   Hypertension Past MI CAD  CABG/stent  Angina hyperlipidemia ECG has been reviewed. EF 40%   Pulmonary:  Pulmonary Normal    Renal/:   Chronic Renal Disease, CRI    Hepatic/GI:  Hepatic/GI Normal    Musculoskeletal:   Arthritis     Neurological:  Neurology Normal    Endocrine:   Diabetes, type 2  Obesity / BMI > 30  Dermatological:  Skin Normal    Psych:  Psychiatric Normal           Physical Exam  General: Cooperative, Alert and Oriented    Airway:  Mallampati: II   Mouth Opening: Normal  TM Distance: Normal  Tongue: Normal  Neck ROM: Normal ROM    Dental:  Intact    Chest/Lungs:  Clear to auscultation, Normal Respiratory Rate    Heart:  Rate: Normal  Rhythm: Regular Rhythm        Anesthesia Plan  Type of Anesthesia, risks & benefits discussed:    Anesthesia Type: MAC  Intra-op Monitoring Plan: Standard ASA Monitors  Induction:  IV  Informed Consent: Informed consent signed with the Patient and all parties understand the risks and agree with anesthesia plan.  All questions answered.   ASA Score: 3  Day of Surgery Review of History & Physical: H&P Update referred to the surgeon/provider.I have interviewed and examined the patient. I have reviewed the patient's H&P dated: There are no significant changes.     Ready For Surgery From Anesthesia Perspective.     .

## 2022-08-30 NOTE — TRANSFER OF CARE
"Anesthesia Transfer of Care Note    Patient: Alysha Rosales    Procedure(s) Performed: Procedure(s) (LRB):  COLONOSCOPY (N/A)    Patient location: PACU    Anesthesia Type: MAC    Transport from OR: Transported from OR on room air with adequate spontaneous ventilation    Post pain: adequate analgesia    Post assessment: no apparent anesthetic complications    Post vital signs: stable    Level of consciousness: responds to stimulation    Nausea/Vomiting: no nausea/vomiting    Complications: none    Transfer of care protocol was followed      Last vitals:   Visit Vitals  BP (!) 151/68 (BP Location: Left arm, Patient Position: Lying)   Pulse 95   Temp 37.1 °C (98.8 °F) (Temporal)   Resp 16   Ht 5' 2" (1.575 m)   Wt 90.7 kg (200 lb)   SpO2 96%   Breastfeeding No   BMI 36.58 kg/m²     "

## 2022-08-31 VITALS
TEMPERATURE: 98 F | HEART RATE: 95 BPM | WEIGHT: 200 LBS | OXYGEN SATURATION: 95 % | SYSTOLIC BLOOD PRESSURE: 162 MMHG | BODY MASS INDEX: 36.8 KG/M2 | DIASTOLIC BLOOD PRESSURE: 85 MMHG | HEIGHT: 62 IN | RESPIRATION RATE: 18 BRPM

## 2022-08-31 LAB — POCT GLUCOSE: 245 MG/DL (ref 70–110)

## 2022-09-02 DIAGNOSIS — E11.21 DIABETIC NEPHROPATHY WITH PROTEINURIA: ICD-10-CM

## 2022-09-02 RX ORDER — GLIPIZIDE 10 MG/1
TABLET ORAL
Qty: 180 TABLET | Refills: 1 | Status: SHIPPED | OUTPATIENT
Start: 2022-09-02 | End: 2023-07-09

## 2022-09-03 NOTE — TELEPHONE ENCOUNTER
Refill Decision Note   Alysha Martínezlivan  is requesting a refill authorization.  Brief Assessment and Rationale for Refill:  Approve     Medication Therapy Plan:       Medication Reconciliation Completed: No   Comments:     No Care Gaps recommended.     Note composed:10:41 PM 09/02/2022

## 2022-09-03 NOTE — TELEPHONE ENCOUNTER
No new care gaps identified.  [facility] Embedded Care Gaps. Reference number: [id number]. 9/02/2022   8:39:05 PM CDT

## 2022-09-06 LAB
FINAL PATHOLOGIC DIAGNOSIS: NORMAL
GROSS: NORMAL
Lab: NORMAL
MICROSCOPIC EXAM: NORMAL

## 2022-09-10 ENCOUNTER — PATIENT MESSAGE (OUTPATIENT)
Dept: PODIATRY | Facility: CLINIC | Age: 67
End: 2022-09-10
Payer: MEDICARE

## 2022-09-28 ENCOUNTER — OFFICE VISIT (OUTPATIENT)
Dept: PODIATRY | Facility: CLINIC | Age: 67
End: 2022-09-28
Payer: MEDICARE

## 2022-09-28 DIAGNOSIS — E11.8 TYPE 2 DIABETES WITH COMPLICATION: ICD-10-CM

## 2022-09-28 DIAGNOSIS — L97.512 CHRONIC ULCER OF RIGHT FOOT WITH FAT LAYER EXPOSED: Primary | ICD-10-CM

## 2022-09-28 PROCEDURE — 99999 PR PBB SHADOW E&M-EST. PATIENT-LVL III: CPT | Mod: PBBFAC,,, | Performed by: PODIATRIST

## 2022-09-28 PROCEDURE — 99213 OFFICE O/P EST LOW 20 MIN: CPT | Mod: PBBFAC,25 | Performed by: PODIATRIST

## 2022-09-28 PROCEDURE — 99999 PR PBB SHADOW E&M-EST. PATIENT-LVL III: ICD-10-PCS | Mod: PBBFAC,,, | Performed by: PODIATRIST

## 2022-09-28 PROCEDURE — 11042 DBRDMT SUBQ TIS 1ST 20SQCM/<: CPT | Mod: PBBFAC | Performed by: PODIATRIST

## 2022-09-28 PROCEDURE — 99499 UNLISTED E&M SERVICE: CPT | Mod: S$PBB,,, | Performed by: PODIATRIST

## 2022-09-28 PROCEDURE — 99499 NO LOS: ICD-10-PCS | Mod: S$PBB,,, | Performed by: PODIATRIST

## 2022-09-28 PROCEDURE — 11042 PR DEBRIDEMENT, SKIN, SUB-Q TISSUE,=<20 SQ CM: ICD-10-PCS | Mod: S$PBB,,, | Performed by: PODIATRIST

## 2022-09-28 PROCEDURE — 11042 DBRDMT SUBQ TIS 1ST 20SQCM/<: CPT | Mod: S$PBB,,, | Performed by: PODIATRIST

## 2022-09-28 NOTE — PROGRESS NOTES
Subjective:       Patient ID: Alysha Rosales is a 67 y.o. female.    Chief Complaint: Diabetic Foot Ulcer (Patient continues with DFU to right hallux. She denies pain at present and states she has not had a bandage on her toe. )    HPI: Alysha Rosales presents to the office today  with an open wound to the plantar aspect of the right great toe.  Reports that she has not be very consistent with dressing changes but reports significant improvement with collagen. Reporting 0/10 pain.    Review of patient's allergies indicates:   Allergen Reactions    Codeine      Other reaction(s): Chills       Past Medical History:   Diagnosis Date    CAD (coronary artery disease)     Followed by Dr. Stevo Kaba    Diabetes mellitus, type 2     DM (diabetes mellitus)     BS didn't check 06/05/2020    DM (diabetes mellitus) 2012    BS didn't check 02/10/2022    High level of uric acid in blood     Hyperlipidemia     Hyperplastic rectal polyp     on 03/20/2009 colonoscopy    Hypertension     Multinodular goiter     noted on 1/20/2015 CTA carotid scan    Multiple thyroid nodules 05/05/2016    on Thyroid U/S    Osteoarthritis     Left knee    Osteoarthritis of back     Osteoporosis, unspecified     Postmenopausal     No history of abnormal pap smear    Proteinuria     Pustular psoriasis     hands and feet    Subclavian artery stenosis     Left; with the proximal segment at 60-70% per 10/02/2008 carotid CTA    Vitamin D deficiency disease        Family History   Problem Relation Age of Onset    Diabetes Mother     Hypertension Mother     Stroke Mother     Hypertension Sister     Cancer Father         Lung cancer (smoker)    No Known Problems Daughter     No Known Problems Son     Heart disease Neg Hx        Social History     Socioeconomic History    Marital status:     Number of children: 2   Occupational History    Occupation: Retired   Tobacco Use    Smoking status: Former     Packs/day: 1.00     Years: 33.00      Pack years: 33.00     Types: Cigarettes     Quit date: 2004     Years since quittin.1    Smokeless tobacco: Never   Substance and Sexual Activity    Alcohol use: Yes     Alcohol/week: 0.0 standard drinks     Comment: Rarely    Drug use: No    Sexual activity: Yes     Partners: Male     Birth control/protection: Post-menopausal   Social History Narrative    She wears seatbelt. She will start working again at [employer] on next week. She states her   in 2020 at the age of 68 due to lung cancer.     Social Determinants of Health     Physical Activity: Insufficiently Active    Days of Exercise per Week: 2 days    Minutes of Exercise per Session: 60 min       Past Surgical History:   Procedure Laterality Date    CATARACT EXTRACTION W/  INTRAOCULAR LENS IMPLANT Right 16    CPG    CATARACT EXTRACTION W/  INTRAOCULAR LENS IMPLANT Left     CPG    COLONOSCOPY N/A 2022    Procedure: COLONOSCOPY;  Surgeon: Reanna Garber MD;  Location: [facility] ENDO;  Service: Endoscopy;  Laterality: N/A;    CORONARY ANGIOGRAPHY N/A 2021    Procedure: ANGIOGRAM, CORONARY ARTERY;  Surgeon: Desiree William MD;  Location: [facility] CATH LAB;  Service: Cardiology;  Laterality: N/A;    CORONARY STENT PLACEMENT N/A 2021    Procedure: INSERTION, STENT, CORONARY ARTERY;  Surgeon: Desiree William MD;  Location: [facility] CATH LAB;  Service: Cardiology;  Laterality: N/A;    heart catheterization with stents placed   and 2009    LEFT HEART CATHETERIZATION Left 2021    Procedure: CATHETERIZATION, HEART, LEFT;  Surgeon: Desiree William MD;  Location: [facility] CATH LAB;  Service: Cardiology;  Laterality: Left;  Covid + week of    left knee surgery for bone graft and pin placement         Review of Systems       Objective:   There were no vitals taken for this visit.    X-Ray Shoulder Complete Bilateral  Narrative: EXAMINATION:  XR SHOULDER COMPLETE 2 OR MORE VIEWS BILATERAL    CLINICAL HISTORY:  Pain  in unspecified shoulder    TECHNIQUE:  3 views of either shoulder, 6 views total    COMPARISON:  None    FINDINGS:  Both humeral heads are high riding suggesting chronic bilateral rotator cuff tears.  With moderate severe degenerative change seen at either glenohumeral joint left greater than the right.  Moderate AC joint arthropathy also seen bilaterally.  Impression: As above    Electronically signed by: Khalif Osorio,   Date:    07/21/2022  Time:    10:33  X-ray Knee Ortho Bilateral with Flexion  Narrative: EXAMINATION:  XR KNEE ORTHO BILAT WITH FLEXION    CLINICAL HISTORY:  Unilateral primary osteoarthritis, left knee    TECHNIQUE:  AP standing of both knees, PA flexion standing views of both knees, and Merchant views of both knees were performed.  Lateral views of both knees were also performed.    COMPARISON  06/05/2020    FINDINGS:  There is significant joint space narrowing and osteophyte formation along with subchondral sclerosis involving the lateral compartment of either knee.  A screw is seen across the proximal left tibia from a lateral to medial approach.  No hardware failure suggested.  There are at least moderate degenerative changes at both patellofemoral compartments.  Small suprapatellar joint effusions are seen bilaterally right greater than the left.  Vascular calcifications are noted.  No acute fracture or dislocation.  Impression: 1.  As above    Electronically signed by: Khalif Osorio DO  Date:    07/21/2022  Time:    10:32       Physical Exam   LOWER EXTREMITY PHYSICAL EXAMINATION    Vascular:  The right dorsalis pedis pulse 2/4 and the right posterior tibial pulse 2/4.  The left dorsalis pedis pulse 2/4 and posterior tibial pulse on the left is 2/4.  Capillary refill is intact.  Pedal hair growth intact    Neurological:  Protective sensation is intact with [place] Anuel monofilament. Proprioception is intact. Intact sensation to light touch.  Vibratory sensation is diminished to the  1st metatarsal phalangeal joint.     Musculoskeletal: Manual Muscle Testing is 5/5 with dorsiflexion, plantar flexion, abduction, and adduction.   There is normal range of motion in the forefoot, hindfoot, and Ankle joint.  No gross or structural anatomic deformity    Dermatological:  Skin is supple.  There is an ulceration present to the plantar surface of the right great toe    Ulcer#1  Ulcer location:  Plantar right great toe   Pre debridement Measurements:  0.3 cm x 0.2 cm  Post debridement Measurements :  0.2 cm x 0.1 cm x 0.05 cm  Signs of infection:  None  Erythema:  None  Undermining/Tunneling:  Does not probe to bone.  No deep tendon exposure.  No bone exposure.  Periwound skin:  Hyperkeratotic  Wound Base:  Granular      Imaging:         No results found for this or any previous visit.       Results for orders placed during the hospital encounter of 04/05/16    X-Ray Foot Complete Right    Narrative  3 views of the right foot    Comparison: None.    Findings: There is no evidence to suggest acute fracture or dislocation.  There is some mild degenerative change noted in the region of the midfoot.  A dorsal calcaneal enthesophyte is present.  IMPRESSION:  As above  ______________________________________    Electronically signed by: ROMA SAENZ D.O.  Date:     04/05/16  Time:    11:13          Assessment:     1. Chronic ulcer of right foot with fat layer exposed    2. Type 2 diabetes with complication        Plan:     Chronic ulcer of right foot with fat layer exposed    Type 2 diabetes with complication      The wound was surgically debrided after adequate prep with alcohol and/or betadine paint. Excisional wound debridement was performed using sharp #10/#15 blade/rounded scalpel and tissue nipper, with removal of all non-viable skin and soft tissues; necrotic skin/tissue formation. The woundbase/wound bed was also debrided to encourage bleeding as to promote/stimulate healing. Debridement was  excisional and included epidermal, dermal and subcutaneous tissues. Post debridement measurements are as above. Hemostasis was achieved. Patient tolerated procedure well and without complications. Local woundcare with saline moist collagen and dry dressings and bandage thereafter.     Will plan to send patient dressing changes consisting of saline, Yaquelin collagen, gauze, Petey wrap, Coban, ace wrap    Patient is counseled and reminded concerning the importance of good nutrition and healthy eating habits, which may include blood sugar control, to prevent and/or help podiatric foot and ankle complications.    Future Appointments   Date Time Provider Department Center   10/19/2022 10:15 AM Lauryn Joe DPM [facility]   2/7/2023  8:00 AM Margie Bird MD [facility]   2/22/2023  3:40 PM Desiree William MD [facility]

## 2022-10-19 ENCOUNTER — OFFICE VISIT (OUTPATIENT)
Dept: PODIATRY | Facility: CLINIC | Age: 67
End: 2022-10-19
Payer: MEDICARE

## 2022-10-19 VITALS — BODY MASS INDEX: 36.79 KG/M2 | WEIGHT: 199.94 LBS | HEIGHT: 62 IN

## 2022-10-19 DIAGNOSIS — E66.01 SEVERE OBESITY (BMI 35.0-39.9) WITH COMORBIDITY: ICD-10-CM

## 2022-10-19 DIAGNOSIS — E11.8 TYPE 2 DIABETES WITH COMPLICATION: ICD-10-CM

## 2022-10-19 DIAGNOSIS — Z86.31 HISTORY OF DIABETIC ULCER OF FOOT: Primary | ICD-10-CM

## 2022-10-19 PROCEDURE — 99999 PR PBB SHADOW E&M-EST. PATIENT-LVL III: ICD-10-PCS | Mod: PBBFAC,,, | Performed by: PODIATRIST

## 2022-10-19 PROCEDURE — 99999 PR PBB SHADOW E&M-EST. PATIENT-LVL III: CPT | Mod: PBBFAC,,, | Performed by: PODIATRIST

## 2022-10-19 PROCEDURE — 99213 PR OFFICE/OUTPT VISIT, EST, LEVL III, 20-29 MIN: ICD-10-PCS | Mod: S$PBB,,, | Performed by: PODIATRIST

## 2022-10-19 PROCEDURE — 99213 OFFICE O/P EST LOW 20 MIN: CPT | Mod: PBBFAC | Performed by: PODIATRIST

## 2022-10-19 PROCEDURE — 99213 OFFICE O/P EST LOW 20 MIN: CPT | Mod: S$PBB,,, | Performed by: PODIATRIST

## 2022-10-19 NOTE — PROGRESS NOTES
Subjective:       Patient ID: Alysha Rosales is a 67 y.o. female.    Chief Complaint: Foot Ulcer (3 wk foot ulcer, rates pain 0/10, diabetic, wearing socks and shoes, last seen PCP Dr. Bird on 2022.)    HPI: Alysha Rosales presents to the office today  with an open wound to the plantar aspect of the right great toe.  Reporting 0/10 pain.    Review of patient's allergies indicates:   Allergen Reactions    Codeine      Other reaction(s): Chills       Past Medical History:   Diagnosis Date    CAD (coronary artery disease)     Followed by Dr. Stevo Kaba    Diabetes mellitus, type 2     DM (diabetes mellitus)     BS didn't check 2020    DM (diabetes mellitus)     BS didn't check 02/10/2022    High level of uric acid in blood     Hyperlipidemia     Hyperplastic rectal polyp     on 2009 colonoscopy    Hypertension     Multinodular goiter     noted on 2015 CTA carotid scan    Multiple thyroid nodules 2016    on Thyroid U/S    Osteoarthritis     Left knee    Osteoarthritis of back     Osteoporosis, unspecified     Postmenopausal     No history of abnormal pap smear    Proteinuria     Pustular psoriasis     hands and feet    Subclavian artery stenosis     Left; with the proximal segment at 60-70% per 10/02/2008 carotid CTA    Vitamin D deficiency disease        Family History   Problem Relation Age of Onset    Diabetes Mother     Hypertension Mother     Stroke Mother     Hypertension Sister     Cancer Father         Lung cancer (smoker)    No Known Problems Daughter     No Known Problems Son     Heart disease Neg Hx        Social History     Socioeconomic History    Marital status:     Number of children: 2   Occupational History    Occupation: Retired   Tobacco Use    Smoking status: Former     Packs/day: 1.00     Years: 33.00     Pack years: 33.00     Types: Cigarettes     Quit date: 2004     Years since quittin.2    Smokeless tobacco: Never   Substance and  "Sexual Activity    Alcohol use: Yes     Alcohol/week: 0.0 standard drinks     Comment: Rarely    Drug use: No    Sexual activity: Yes     Partners: Male     Birth control/protection: Post-menopausal   Social History Narrative    She wears seatbelt. She will start working again at [employer] on next week. She states her   in 2020 at the age of 68 due to lung cancer.     Social Determinants of Health     Physical Activity: Insufficiently Active    Days of Exercise per Week: 2 days    Minutes of Exercise per Session: 60 min       Past Surgical History:   Procedure Laterality Date    CATARACT EXTRACTION W/  INTRAOCULAR LENS IMPLANT Right 16    CPG    CATARACT EXTRACTION W/  INTRAOCULAR LENS IMPLANT Left     CPG    COLONOSCOPY N/A 2022    Procedure: COLONOSCOPY;  Surgeon: Reanna Garber MD;  Location: [facility] ENDO;  Service: Endoscopy;  Laterality: N/A;    CORONARY ANGIOGRAPHY N/A 2021    Procedure: ANGIOGRAM, CORONARY ARTERY;  Surgeon: Desiree William MD;  Location: [facility] CATH LAB;  Service: Cardiology;  Laterality: N/A;    CORONARY STENT PLACEMENT N/A 2021    Procedure: INSERTION, STENT, CORONARY ARTERY;  Surgeon: Desiree William MD;  Location: [facility] CATH LAB;  Service: Cardiology;  Laterality: N/A;    heart catheterization with stents placed   and 2009    LEFT HEART CATHETERIZATION Left 2021    Procedure: CATHETERIZATION, HEART, LEFT;  Surgeon: Desiree William MD;  Location: [facility] CATH LAB;  Service: Cardiology;  Laterality: Left;  Covid + week of    left knee surgery for bone graft and pin placement         Review of Systems       Objective:   Ht 5' 2" (1.575 m)   Wt 90.7 kg (199 lb 15.3 oz)   BMI 36.57 kg/m²     X-Ray Shoulder Complete Bilateral  Narrative: EXAMINATION:  XR SHOULDER COMPLETE 2 OR MORE VIEWS BILATERAL    CLINICAL HISTORY:  Pain in unspecified shoulder    TECHNIQUE:  3 views of either shoulder, 6 views " total    COMPARISON:  None    FINDINGS:  Both humeral heads are high riding suggesting chronic bilateral rotator cuff tears.  With moderate severe degenerative change seen at either glenohumeral joint left greater than the right.  Moderate AC joint arthropathy also seen bilaterally.  Impression: As above    Electronically signed by: Khalif Osorio DO  Date:    07/21/2022  Time:    10:33  X-ray Knee Ortho Bilateral with Flexion  Narrative: EXAMINATION:  XR KNEE ORTHO BILAT WITH FLEXION    CLINICAL HISTORY:  Unilateral primary osteoarthritis, left knee    TECHNIQUE:  AP standing of both knees, PA flexion standing views of both knees, and Merchant views of both knees were performed.  Lateral views of both knees were also performed.    COMPARISON  06/05/2020    FINDINGS:  There is significant joint space narrowing and osteophyte formation along with subchondral sclerosis involving the lateral compartment of either knee.  A screw is seen across the proximal left tibia from a lateral to medial approach.  No hardware failure suggested.  There are at least moderate degenerative changes at both patellofemoral compartments.  Small suprapatellar joint effusions are seen bilaterally right greater than the left.  Vascular calcifications are noted.  No acute fracture or dislocation.  Impression: 1.  As above    Electronically signed by: Khalif Osorio DO  Date:    07/21/2022  Time:    10:32       Physical Exam   LOWER EXTREMITY PHYSICAL EXAMINATION    Vascular:  The right dorsalis pedis pulse 2/4 and the right posterior tibial pulse 2/4.  The left dorsalis pedis pulse 2/4 and posterior tibial pulse on the left is 2/4.  Capillary refill is intact.  Pedal hair growth intact    Neurological:  Protective sensation is intact with [place] Anuel monofilament. Proprioception is intact. Intact sensation to light touch.  Vibratory sensation is diminished to the 1st metatarsal phalangeal joint.     Musculoskeletal: Manual Muscle Testing  is 5/5 with dorsiflexion, plantar flexion, abduction, and adduction.   There is normal range of motion in the forefoot, hindfoot, and Ankle joint.  No gross or structural anatomic deformity    Dermatological:  Skin is supple.  History of ulceration to the plantar aspect of the right great toe.  No evidence of ulceration is present.  Skin is appropriately healed.  Slight callusing formation present.  No signs of acute infection.  No evidence of drainage.    Imaging:         No results found for this or any previous visit.       Results for orders placed during the hospital encounter of 04/05/16    X-Ray Foot Complete Right    Narrative  3 views of the right foot    Comparison: None.    Findings: There is no evidence to suggest acute fracture or dislocation.  There is some mild degenerative change noted in the region of the midfoot.  A dorsal calcaneal enthesophyte is present.  IMPRESSION:  As above  ______________________________________    Electronically signed by: ROMA SAENZ D.O.  Date:     04/05/16  Time:    11:13          Assessment:     1. History of diabetic ulcer of foot    2. Type 2 diabetes with complication    3. Severe obesity (BMI 35.0-39.9) with comorbidity        Plan:     History of diabetic ulcer of foot    Type 2 diabetes with complication    Severe obesity (BMI 35.0-39.9) with comorbidity        Foot counseling and education is provided at this visit. Patient is advised to wear socks and shoes at all times.  Do not walk barefoot, or with just socks, even when indoors.  Be sure to check and inspect the inside of the shoe before putting them on her feet.  Protect your feet at all times.  Walking shoes and/or athletic shoes are the best types of shoe gear. Do not wear vinyl or plastic type shoe gear, as they do not stretch and/or breathe.  Protect your feet from hot and/or cold. Elevate the extremities when sitting.  Do not wear excessively tight socks and/or shoe gear. Wiggle your toes for a few  minutes throughout the day. Move your ankles up and down, in and out, to help blood flow in your lower extremity.      Patient is counseled and reminded concerning the importance of good nutrition and healthy eating habits, which may include blood sugar control, to prevent and/or help podiatric foot and ankle complications.    Future Appointments   Date Time Provider Department Center   2/7/2023  8:00 AM Margie Bird MD [facility]   2/22/2023  3:40 PM Desiree William MD ON [facility]  [facility]

## 2022-10-21 DIAGNOSIS — E11.21 DIABETIC NEPHROPATHY WITH PROTEINURIA: ICD-10-CM

## 2022-10-21 DIAGNOSIS — E11.40 TYPE 2 DIABETES MELLITUS WITH DIABETIC NEUROPATHY, WITHOUT LONG-TERM CURRENT USE OF INSULIN: ICD-10-CM

## 2022-10-21 RX ORDER — SITAGLIPTIN 100 MG/1
100 TABLET, FILM COATED ORAL DAILY
Qty: 90 TABLET | Refills: 1 | Status: SHIPPED | OUTPATIENT
Start: 2022-10-21 | End: 2023-05-12

## 2022-10-21 NOTE — TELEPHONE ENCOUNTER
No new care gaps identified.  [facility] Embedded Care Gaps. Reference number: [id number]. 10/21/2022   6:59:37 AM JOEYT

## 2022-10-21 NOTE — TELEPHONE ENCOUNTER
Refill Decision Note   Alysha Martínezlivan  is requesting a refill authorization.  Brief Assessment and Rationale for Refill:  Approve     Medication Therapy Plan:       Medication Reconciliation Completed: No   Comments:     No Care Gaps recommended.     Note composed:5:20 PM 10/21/2022

## 2023-01-04 DIAGNOSIS — E11.9 TYPE 2 DIABETES MELLITUS WITHOUT COMPLICATION: ICD-10-CM

## 2023-01-25 ENCOUNTER — PATIENT MESSAGE (OUTPATIENT)
Dept: ADMINISTRATIVE | Facility: HOSPITAL | Age: 68
End: 2023-01-25
Payer: MEDICARE

## 2023-02-01 DIAGNOSIS — E11.9 TYPE 2 DIABETES MELLITUS WITHOUT COMPLICATION: ICD-10-CM

## 2023-02-07 ENCOUNTER — PATIENT MESSAGE (OUTPATIENT)
Dept: ADMINISTRATIVE | Facility: HOSPITAL | Age: 68
End: 2023-02-07
Payer: MEDICARE

## 2023-02-22 ENCOUNTER — PATIENT OUTREACH (OUTPATIENT)
Dept: ADMINISTRATIVE | Facility: HOSPITAL | Age: 68
End: 2023-02-22
Payer: MEDICARE

## 2023-02-22 NOTE — PROGRESS NOTES
Diabetic Eye Exam Report: Spoke with patient about overdue Eye Exam, Pt accepts appt for 04/05/2023.

## 2023-02-24 ENCOUNTER — OFFICE VISIT (OUTPATIENT)
Dept: URGENT CARE | Facility: CLINIC | Age: 68
End: 2023-02-24
Payer: MEDICARE

## 2023-02-24 VITALS
BODY MASS INDEX: 34.96 KG/M2 | OXYGEN SATURATION: 97 % | HEART RATE: 85 BPM | SYSTOLIC BLOOD PRESSURE: 180 MMHG | RESPIRATION RATE: 20 BRPM | WEIGHT: 190 LBS | DIASTOLIC BLOOD PRESSURE: 93 MMHG | TEMPERATURE: 98 F | HEIGHT: 62 IN

## 2023-02-24 DIAGNOSIS — N18.30 TYPE 2 DIABETES MELLITUS WITH STAGE 3 CHRONIC KIDNEY DISEASE, WITHOUT LONG-TERM CURRENT USE OF INSULIN, UNSPECIFIED WHETHER STAGE 3A OR 3B CKD: ICD-10-CM

## 2023-02-24 DIAGNOSIS — R73.9 HYPERGLYCEMIA: Primary | ICD-10-CM

## 2023-02-24 DIAGNOSIS — R39.15 URGENCY OF URINATION: ICD-10-CM

## 2023-02-24 DIAGNOSIS — E11.22 TYPE 2 DIABETES MELLITUS WITH STAGE 3 CHRONIC KIDNEY DISEASE, WITHOUT LONG-TERM CURRENT USE OF INSULIN, UNSPECIFIED WHETHER STAGE 3A OR 3B CKD: ICD-10-CM

## 2023-02-24 LAB
ALBUMIN SERPL BCP-MCNC: 3.6 G/DL (ref 3.5–5.2)
ALP SERPL-CCNC: 83 U/L (ref 55–135)
ALT SERPL W/O P-5'-P-CCNC: 17 U/L (ref 10–44)
ANION GAP SERPL CALC-SCNC: 14 MMOL/L (ref 8–16)
AST SERPL-CCNC: 15 U/L (ref 10–40)
BACTERIA #/AREA URNS HPF: ABNORMAL /HPF
BASOPHILS # BLD AUTO: 0.08 K/UL (ref 0–0.2)
BASOPHILS NFR BLD: 1.3 % (ref 0–1.9)
BILIRUB SERPL-MCNC: 0.3 MG/DL (ref 0.1–1)
BILIRUB UR QL STRIP: NEGATIVE
BILIRUB UR QL STRIP: NEGATIVE
BUN SERPL-MCNC: 16 MG/DL (ref 8–23)
CALCIUM SERPL-MCNC: 10.1 MG/DL (ref 8.7–10.5)
CHLORIDE SERPL-SCNC: 98 MMOL/L (ref 95–110)
CLARITY UR: ABNORMAL
CLARITY UR: CLEAR
CO2 SERPL-SCNC: 24 MMOL/L (ref 23–29)
COLOR UR: ABNORMAL
COLOR UR: YELLOW
CREAT SERPL-MCNC: 1.1 MG/DL (ref 0.5–1.4)
DIFFERENTIAL METHOD: ABNORMAL
EOSINOPHIL # BLD AUTO: 0.2 K/UL (ref 0–0.5)
EOSINOPHIL NFR BLD: 2.5 % (ref 0–8)
ERYTHROCYTE [DISTWIDTH] IN BLOOD BY AUTOMATED COUNT: 12.5 % (ref 11.5–14.5)
EST. GFR  (NO RACE VARIABLE): 55 ML/MIN/1.73 M^2
GLUCOSE SERPL-MCNC: 384 MG/DL (ref 70–110)
GLUCOSE SERPL-MCNC: 505 MG/DL (ref 70–110)
GLUCOSE UR QL STRIP: ABNORMAL
GLUCOSE UR QL STRIP: POSITIVE
HCT VFR BLD AUTO: 38 % (ref 37–48.5)
HGB BLD-MCNC: 12.9 G/DL (ref 12–16)
HGB UR QL STRIP: ABNORMAL
HYALINE CASTS #/AREA URNS LPF: 0 /LPF
IMM GRANULOCYTES # BLD AUTO: 0.04 K/UL (ref 0–0.04)
IMM GRANULOCYTES NFR BLD AUTO: 0.6 % (ref 0–0.5)
KETONES UR QL STRIP: NEGATIVE
KETONES UR QL STRIP: NEGATIVE
LEUKOCYTE ESTERASE UR QL STRIP: ABNORMAL
LEUKOCYTE ESTERASE UR QL STRIP: POSITIVE
LYMPHOCYTES # BLD AUTO: 1.4 K/UL (ref 1–4.8)
LYMPHOCYTES NFR BLD: 22.2 % (ref 18–48)
MCH RBC QN AUTO: 30.6 PG (ref 27–31)
MCHC RBC AUTO-ENTMCNC: 33.9 G/DL (ref 32–36)
MCV RBC AUTO: 90 FL (ref 82–98)
MICROSCOPIC COMMENT: ABNORMAL
MONOCYTES # BLD AUTO: 0.6 K/UL (ref 0.3–1)
MONOCYTES NFR BLD: 9.5 % (ref 4–15)
NEUTROPHILS # BLD AUTO: 4 K/UL (ref 1.8–7.7)
NEUTROPHILS NFR BLD: 63.9 % (ref 38–73)
NITRITE UR QL STRIP: NEGATIVE
NRBC BLD-RTO: 0 /100 WBC
PH UR STRIP: 6 [PH] (ref 5–8)
PH, POC UA: 5
PLATELET # BLD AUTO: 230 K/UL (ref 150–450)
PMV BLD AUTO: 10.9 FL (ref 9.2–12.9)
POC BLOOD, URINE: POSITIVE
POC NITRATES, URINE: POSITIVE
POCT GLUCOSE: 408 MG/DL (ref 70–110)
POTASSIUM SERPL-SCNC: 4.2 MMOL/L (ref 3.5–5.1)
PROT SERPL-MCNC: 7.2 G/DL (ref 6–8.4)
PROT UR QL STRIP: ABNORMAL
PROT UR QL STRIP: POSITIVE
RBC # BLD AUTO: 4.21 M/UL (ref 4–5.4)
RBC #/AREA URNS HPF: 0 /HPF (ref 0–4)
SODIUM SERPL-SCNC: 136 MMOL/L (ref 136–145)
SP GR UR STRIP: 1.02 (ref 1–1.03)
SP GR UR STRIP: 1.02 (ref 1–1.03)
URN SPEC COLLECT METH UR: ABNORMAL
UROBILINOGEN UR STRIP-ACNC: NEGATIVE EU/DL
UROBILINOGEN UR STRIP-ACNC: NORMAL (ref 0.1–1.1)
WBC # BLD AUTO: 6.3 K/UL (ref 3.9–12.7)
WBC #/AREA URNS HPF: 1 /HPF (ref 0–5)
YEAST URNS QL MICRO: ABNORMAL

## 2023-02-24 PROCEDURE — 81003 POCT URINALYSIS, DIPSTICK, AUTOMATED, W/O SCOPE: ICD-10-PCS | Mod: QW,S$GLB,, | Performed by: EMERGENCY MEDICINE

## 2023-02-24 PROCEDURE — 99215 PR OFFICE/OUTPT VISIT, EST, LEVL V, 40-54 MIN: ICD-10-PCS | Mod: S$GLB,,, | Performed by: EMERGENCY MEDICINE

## 2023-02-24 PROCEDURE — 99215 OFFICE O/P EST HI 40 MIN: CPT | Mod: S$GLB,,, | Performed by: EMERGENCY MEDICINE

## 2023-02-24 PROCEDURE — 80053 COMPREHEN METABOLIC PANEL: CPT | Performed by: NURSE PRACTITIONER

## 2023-02-24 PROCEDURE — 82962 GLUCOSE BLOOD TEST: CPT | Mod: S$GLB,,, | Performed by: EMERGENCY MEDICINE

## 2023-02-24 PROCEDURE — 82962 POCT GLUCOSE, HAND-HELD DEVICE: ICD-10-PCS | Mod: S$GLB,,, | Performed by: EMERGENCY MEDICINE

## 2023-02-24 PROCEDURE — 96361 HYDRATE IV INFUSION ADD-ON: CPT

## 2023-02-24 PROCEDURE — 25000003 PHARM REV CODE 250: Performed by: NURSE PRACTITIONER

## 2023-02-24 PROCEDURE — 99284 EMERGENCY DEPT VISIT MOD MDM: CPT | Mod: 25

## 2023-02-24 PROCEDURE — 81000 URINALYSIS NONAUTO W/SCOPE: CPT | Performed by: NURSE PRACTITIONER

## 2023-02-24 PROCEDURE — 96374 THER/PROPH/DIAG INJ IV PUSH: CPT

## 2023-02-24 PROCEDURE — 63600175 PHARM REV CODE 636 W HCPCS

## 2023-02-24 PROCEDURE — 81003 URINALYSIS AUTO W/O SCOPE: CPT | Mod: QW,S$GLB,, | Performed by: EMERGENCY MEDICINE

## 2023-02-24 PROCEDURE — 85025 COMPLETE CBC W/AUTO DIFF WBC: CPT | Performed by: NURSE PRACTITIONER

## 2023-02-24 RX ORDER — HYDRALAZINE HYDROCHLORIDE 20 MG/ML
INJECTION INTRAMUSCULAR; INTRAVENOUS
Status: COMPLETED
Start: 2023-02-24 | End: 2023-02-24

## 2023-02-24 RX ORDER — HYDRALAZINE HYDROCHLORIDE 20 MG/ML
10 INJECTION INTRAMUSCULAR; INTRAVENOUS
Status: COMPLETED | OUTPATIENT
Start: 2023-02-24 | End: 2023-02-24

## 2023-02-24 RX ADMIN — HYDRALAZINE HYDROCHLORIDE 10 MG: 20 INJECTION INTRAMUSCULAR; INTRAVENOUS at 10:02

## 2023-02-24 RX ADMIN — HYDRALAZINE HYDROCHLORIDE 10 MG: 20 INJECTION, SOLUTION INTRAMUSCULAR; INTRAVENOUS at 10:02

## 2023-02-24 RX ADMIN — SODIUM CHLORIDE 1000 ML: 9 INJECTION, SOLUTION INTRAVENOUS at 09:02

## 2023-02-24 NOTE — PROGRESS NOTES
"Subjective:       Patient ID: Alysha Rosales is a 68 y.o. female.    Vitals:  height is 5' 2" (1.575 m) and weight is 86.2 kg (190 lb). Her oral temperature is 98 °F (36.7 °C). Her blood pressure is 180/93 (abnormal) and her pulse is 85. Her respiration is 20 and oxygen saturation is 97%.     Chief Complaint: Urinary Tract Infection (Possible )    Pt presents today with urgency with little output x1 wk. Tried otc Azo with little relief.     68-year-old female presents today to urgent care for possible urinary tract infection.  Patient reports some frequency and urgency with the sensation of incomplete voiding.  Patient notes decreased urinary output over the last week.  Patient has significant medical history of type 2 diabetes that has been largely uncontrolled.  She also has a history of chronic kidney disease, stage III.  Chart review reveals that her last CMP she had GFR greater than 60.  Denies fever, abdominal pain, flank pain, vomiting, chills.  Denies dysuria.    Urinary Tract Infection   This is a new problem. The current episode started gradual onset. The problem has been gradually worsening. The quality of the pain is described as burning. The pain is at a severity of 6/10. The pain is moderate. There has been no fever. She is Sexually active. Associated symptoms include urgency. Pertinent negatives include no behavior changes, chills, discharge, flank pain, frequency, hematuria, hesitancy, nausea, possible pregnancy, sweats, vomiting, weight loss, bubble bath use, constipation, rash or withholding. She has tried home medications for the symptoms. The treatment provided mild relief. Her past medical history is significant for diabetes mellitus and hypertension. There is no history of catheterization, diabetes insipidus, genitourinary reflux, kidney stones, recurrent UTIs, a single kidney, STD, urinary stasis or a urological procedure.     Constitution: Negative for chills, fatigue and fever. "   Gastrointestinal:  Negative for nausea, vomiting and constipation.   Genitourinary:  Positive for urgency. Negative for frequency, flank pain and hematuria.   Skin:  Negative for rash.     Objective:      Physical Exam   Constitutional: She is oriented to person, place, and time.   HENT:   Head: Normocephalic and atraumatic.   Mouth/Throat: Mucous membranes are dry.   Cardiovascular: Normal rate, regular rhythm, normal heart sounds and normal pulses.   Pulmonary/Chest: Effort normal and breath sounds normal.   Abdominal: Normal appearance. She exhibits no distension. Soft. There is no abdominal tenderness. There is no left CVA tenderness and no right CVA tenderness.   Neurological: She is alert and oriented to person, place, and time.   Skin: Skin is warm and dry.   Psychiatric: Her behavior is normal.   Nursing note and vitals reviewed.      Assessment:       1. Hyperglycemia    2. Urgency of urination    3. Type 2 diabetes mellitus with stage 3 chronic kidney disease, without long-term current use of insulin, unspecified whether stage 3a or 3b CKD        Results for orders placed or performed in visit on 02/24/23   POCT Urinalysis, Dipstick, Automated, W/O Scope   Result Value Ref Range    POC Blood, Urine Positive (A) Negative    POC Bilirubin, Urine Negative Negative    POC Urobilinogen, Urine Normal 0.1 - 1.1    POC Ketones, Urine Negative Negative    POC Protein, Urine Positive (A) Negative    POC Nitrates, Urine Positive (A) Negative    POC Glucose, Urine Positive (A) Negative    pH, UA 5.0     POC Specific Gravity, Urine 1.025 1.003 - 1.029    POC Leukocytes, Urine Positive (A) Negative    Color, UA Light Yellow Light Yellow, Yellow    Clarity, UA Cloudy (A) Clear   POCT Glucose, Hand-Held Device   Result Value Ref Range    POC Glucose 384 (A) 70 - 110 MG/DL         Plan:         Hyperglycemia  -     Refer to Emergency Dept.    Urgency of urination  -     POCT Urinalysis, Dipstick, Automated, W/O  Scope    Type 2 diabetes mellitus with stage 3 chronic kidney disease, without long-term current use of insulin, unspecified whether stage 3a or 3b CKD  -     POCT Glucose, Hand-Held Device           Medical Decision Making:   History:   Old Medical Records: I decided to obtain old medical records.  Initial Assessment:   Possible UTI  Differential Diagnosis:   UTI verses hyperglycemia verses acute renal failure  Clinical Tests:   Lab Tests: Ordered and Reviewed       <> Summary of Lab: Urinalysis shows hematuria, leukocytes, proteinuria, glucosuria.  Accu-Chek was 384  Urgent Care Management:  Patient may have a UTI.  However, given her history of diabetic nephropathy with chronic kidney disease in the past, I have concerns about acute renal failure.  She reports decreased urinary volume over the last week.  We discussed this at length while she was here.  I have advised her to go to the emergency room for further evaluation and treatment.  Called the ER and spoke with RENEE Levy to give report.  Patient was hypertensive on her triage vitals.  Manual was not done prior to her leaving the facility.

## 2023-02-25 ENCOUNTER — HOSPITAL ENCOUNTER (EMERGENCY)
Facility: HOSPITAL | Age: 68
Discharge: HOME | End: 2023-02-25
Attending: STUDENT IN AN ORGANIZED HEALTH CARE EDUCATION/TRAINING PROGRAM
Payer: MEDICARE

## 2023-02-25 VITALS
BODY MASS INDEX: 34.84 KG/M2 | SYSTOLIC BLOOD PRESSURE: 142 MMHG | WEIGHT: 190.5 LBS | HEART RATE: 94 BPM | RESPIRATION RATE: 18 BRPM | OXYGEN SATURATION: 99 % | TEMPERATURE: 99 F | DIASTOLIC BLOOD PRESSURE: 104 MMHG

## 2023-02-25 DIAGNOSIS — I10 PRIMARY HYPERTENSION: ICD-10-CM

## 2023-02-25 DIAGNOSIS — R73.9 HYPERGLYCEMIA: Primary | ICD-10-CM

## 2023-02-25 LAB
POCT GLUCOSE: 307 MG/DL (ref 70–110)
POCT GLUCOSE: 347 MG/DL (ref 70–110)

## 2023-02-25 PROCEDURE — 82962 GLUCOSE BLOOD TEST: CPT | Mod: 91

## 2023-02-25 NOTE — ED PROVIDER NOTES
SCRIBE #1 NOTE: I, Pranay Salvador, am scribing for, and in the presence of, Live Irvin MD. I have scribed the entire note.       History     Chief Complaint   Patient presents with    Hyperglycemia     Pt states she checked her blood sugar about 2 hours PTA and her sugar read at 150, current sugar in triage is 408.      Review of patient's allergies indicates:   Allergen Reactions    Codeine      Other reaction(s): Chills         History of Present Illness     HPI    2/25/2023, 3:25 AM  History obtained from the patient      History of Present Illness: Alysha Rosales is a 68 y.o. female patient with a PMHx of T2DM, HTN who presents to the Emergency Department for evaluation of hyperglycemia which onset gradually 2 hours PTA. Pt states she is compliant with her Metformin. Symptoms are constant and moderate in severity. No mitigating or exacerbating factors reported. Associated sxs include polyuria and polydipsia. Patient denies any fever, chills, weakness, numbness, fatigue, CP, SOB, n/v/d, and all other sxs at this time. No further complaints or concerns at this time.       Arrival mode: Personal vehicle    PCP: Margie Bird MD        Past Medical History:  Past Medical History:   Diagnosis Date    CAD (coronary artery disease)     Followed by Dr. Stevo Kaba    Diabetes mellitus, type 2     DM (diabetes mellitus)     BS didn't check 06/05/2020    DM (diabetes mellitus) 2012    BS didn't check 02/10/2022    High level of uric acid in blood     Hyperlipidemia     Hyperplastic rectal polyp     on 03/20/2009 colonoscopy    Hypertension     Multinodular goiter     noted on 1/20/2015 CTA carotid scan    Multiple thyroid nodules 05/05/2016    on Thyroid U/S    Osteoarthritis     Left knee    Osteoarthritis of back     Osteoporosis, unspecified     Postmenopausal     No history of abnormal pap smear    Proteinuria     Pustular psoriasis     hands and feet    Subclavian artery stenosis     Left; with the proximal  segment at 60-70% per 10/02/2008 carotid CTA    Vitamin D deficiency disease        Past Surgical History:  Past Surgical History:   Procedure Laterality Date    CATARACT EXTRACTION W/  INTRAOCULAR LENS IMPLANT Right 16    [facility]    CATARACT EXTRACTION W/  INTRAOCULAR LENS IMPLANT Left     [facility]    COLONOSCOPY N/A 2022    Procedure: COLONOSCOPY;  Surgeon: Reanna Garber MD;  Location: [facility] ENDO;  Service: Endoscopy;  Laterality: N/A;    CORONARY ANGIOGRAPHY N/A 2021    Procedure: ANGIOGRAM, CORONARY ARTERY;  Surgeon: Desiree William MD;  Location: [facility] CATH LAB;  Service: Cardiology;  Laterality: N/A;    CORONARY STENT PLACEMENT N/A 2021    Procedure: INSERTION, STENT, CORONARY ARTERY;  Surgeon: Desiree William MD;  Location: [facility] CATH LAB;  Service: Cardiology;  Laterality: N/A;    heart catheterization with stents placed   and 2009    LEFT HEART CATHETERIZATION Left 2021    Procedure: CATHETERIZATION, HEART, LEFT;  Surgeon: Desiree William MD;  Location: [facility] CATH LAB;  Service: Cardiology;  Laterality: Left;  Covid + week of    left knee surgery for bone graft and pin placement           Family History:  Family History   Problem Relation Age of Onset    Diabetes Mother     Hypertension Mother     Stroke Mother     Hypertension Sister     Cancer Father         Lung cancer (smoker)    No Known Problems Daughter     No Known Problems Son     Heart disease Neg Hx        Social History:  Social History     Tobacco Use    Smoking status: Former     Packs/day: 1.00     Years: 33.00     Pack years: 33.00     Types: Cigarettes     Quit date: 2004     Years since quittin.6     Passive exposure: Past    Smokeless tobacco: Never   Substance and Sexual Activity    Alcohol use: Yes     Alcohol/week: 0.0 standard drinks     Comment: Rarely    Drug use: No    Sexual activity: Yes     Partners: Male     Birth control/protection: Post-menopausal        Review of Systems     Review  of Systems   Constitutional:  Negative for fever.   HENT:  Negative for sore throat.    Respiratory:  Negative for shortness of breath.    Cardiovascular:  Negative for chest pain.   Gastrointestinal:  Negative for nausea.   Endocrine: Positive for polydipsia and polyuria.   Genitourinary:  Negative for dysuria.   Musculoskeletal:  Negative for back pain.   Skin:  Negative for rash.   Neurological:  Negative for weakness.   Hematological:  Does not bruise/bleed easily.   All other systems reviewed and are negative.     Physical Exam     Initial Vitals [02/24/23 1842]   BP Pulse Resp Temp SpO2   (!) 190/99 68 18 98.8 °F (37.1 °C) 97 %      MAP       --          Physical Exam   Nursing Notes and Vital Signs Reviewed.  Constitutional: Patient is in no acute distress. Well-developed and well-nourished.  Head: Atraumatic. Normocephalic.  Eyes: PERRL. EOM intact. Conjunctivae are not pale. No scleral icterus.  ENT: Mucous membranes are moist. Oropharynx is clear and symmetric.    Neck: Supple. Full ROM. No lymphadenopathy.  Cardiovascular: Regular rate. Regular rhythm. No murmurs, rubs, or gallops. Distal pulses are 2+ and symmetric.  Pulmonary/Chest: No respiratory distress. Clear to auscultation bilaterally. No wheezing or rales.  Abdominal: Soft and non-distended.  There is no tenderness.  No rebound, guarding, or rigidity. Good bowel sounds.  Genitourinary: No CVA tenderness  Musculoskeletal: Moves all extremities. No obvious deformities. No edema. No calf tenderness.  Skin: Warm and dry.  Neurological:  Alert, awake, and appropriate.  Normal speech.  No acute focal neurological deficits are appreciated.  Psychiatric: Normal affect. Good eye contact. Appropriate in content.     ED Course   Procedures  ED Vital Signs:  Vitals:    02/24/23 1842 02/24/23 2216 02/24/23 2300 02/25/23 0136   BP: (!) 190/99 (S) (!) 203/97 (S) (!) 195/70 (S) (!) 206/104   Pulse: 68 79     Resp: 18 16     Temp: 98.8 °F (37.1 °C) 98.7 °F (37.1  °C)     TempSrc: Oral Oral     SpO2: 97% 99%     Weight: 86.4 kg (190 lb 7.6 oz)       02/25/23 0305 02/25/23 0342   BP: (!) 142/104    Pulse: 94    Resp: 18    Temp:  98.5 °F (36.9 °C)   TempSrc:  Oral   SpO2: 99%    Weight:         Abnormal Lab Results:  Labs Reviewed   CBC W/ AUTO DIFFERENTIAL - Abnormal; Notable for the following components:       Result Value    Immature Granulocytes 0.6 (*)     All other components within normal limits   COMPREHENSIVE METABOLIC PANEL - Abnormal; Notable for the following components:    Glucose 505 (*)     eGFR 55 (*)     All other components within normal limits    Narrative:      GLU critical result(s) repeated. Called and verbal readback obtained   from Che Morris RN ED by CD9 02/24/2023 20:55   URINALYSIS, REFLEX TO URINE CULTURE - Abnormal; Notable for the following components:    Protein, UA 2+ (*)     Glucose, UA 3+ (*)     Occult Blood UA 1+ (*)     Leukocytes, UA Trace (*)     All other components within normal limits    Narrative:     Specimen Source->Urine   URINALYSIS MICROSCOPIC - Abnormal; Notable for the following components:    Bacteria Few (*)     All other components within normal limits    Narrative:     Specimen Source->Urine   POCT GLUCOSE - Abnormal; Notable for the following components:    POCT Glucose 408 (*)     All other components within normal limits   POCT GLUCOSE - Abnormal; Notable for the following components:    POCT Glucose 307 (*)     All other components within normal limits   POCT GLUCOSE - Abnormal; Notable for the following components:    POCT Glucose 347 (*)     All other components within normal limits        All Lab Results:  Results for orders placed or performed during the hospital encounter of 02/25/23   CBC auto differential   Result Value Ref Range    WBC 6.30 3.90 - 12.70 K/uL    RBC 4.21 4.00 - 5.40 M/uL    Hemoglobin 12.9 12.0 - 16.0 g/dL    Hematocrit 38.0 37.0 - 48.5 %    MCV 90 82 - 98 fL    MCH 30.6 27.0 - 31.0 pg    MCHC  33.9 32.0 - 36.0 g/dL    RDW 12.5 11.5 - 14.5 %    Platelets 230 150 - 450 K/uL    MPV 10.9 9.2 - 12.9 fL    Immature Granulocytes 0.6 (H) 0.0 - 0.5 %    Gran # (ANC) 4.0 1.8 - 7.7 K/uL    Immature Grans (Abs) 0.04 0.00 - 0.04 K/uL    Lymph # 1.4 1.0 - 4.8 K/uL    Mono # 0.6 0.3 - 1.0 K/uL    Eos # 0.2 0.0 - 0.5 K/uL    Baso # 0.08 0.00 - 0.20 K/uL    nRBC 0 0 /100 WBC    Gran % 63.9 38.0 - 73.0 %    Lymph % 22.2 18.0 - 48.0 %    Mono % 9.5 4.0 - 15.0 %    Eosinophil % 2.5 0.0 - 8.0 %    Basophil % 1.3 0.0 - 1.9 %    Differential Method Automated    Comprehensive metabolic panel   Result Value Ref Range    Sodium 136 136 - 145 mmol/L    Potassium 4.2 3.5 - 5.1 mmol/L    Chloride 98 95 - 110 mmol/L    CO2 24 23 - 29 mmol/L    Glucose 505 (HH) 70 - 110 mg/dL    BUN 16 8 - 23 mg/dL    Creatinine 1.1 0.5 - 1.4 mg/dL    Calcium 10.1 8.7 - 10.5 mg/dL    Total Protein 7.2 6.0 - 8.4 g/dL    Albumin 3.6 3.5 - 5.2 g/dL    Total Bilirubin 0.3 0.1 - 1.0 mg/dL    Alkaline Phosphatase 83 55 - 135 U/L    AST 15 10 - 40 U/L    ALT 17 10 - 44 U/L    Anion Gap 14 8 - 16 mmol/L    eGFR 55 (A) >60 mL/min/1.73 m^2   Urinalysis, Reflex to Urine Culture Urine, Clean Catch    Specimen: Urine   Result Value Ref Range    Specimen UA Urine, Clean Catch     Color, UA Yellow Yellow, Straw, Geeta    Appearance, UA Clear Clear    pH, UA 6.0 5.0 - 8.0    Specific Gravity, UA 1.020 1.005 - 1.030    Protein, UA 2+ (A) Negative    Glucose, UA 3+ (A) Negative    Ketones, UA Negative Negative    Bilirubin (UA) Negative Negative    Occult Blood UA 1+ (A) Negative    Nitrite, UA Negative Negative    Urobilinogen, UA Negative <2.0 EU/dL    Leukocytes, UA Trace (A) Negative   Urinalysis Microscopic   Result Value Ref Range    RBC, UA 0 0 - 4 /hpf    WBC, UA 1 0 - 5 /hpf    Bacteria Few (A) None-Occ /hpf    Yeast, UA None None    Hyaline Casts, UA 0 0-1/lpf /lpf    Microscopic Comment SEE COMMENT    POCT glucose   Result Value Ref Range    POCT Glucose 408  (H) 70 - 110 mg/dL   POCT glucose   Result Value Ref Range    POCT Glucose 307 (H) 70 - 110 mg/dL   POCT glucose   Result Value Ref Range    POCT Glucose 347 (H) 70 - 110 mg/dL         Imaging Results:  Imaging Results    None            The Emergency Provider reviewed the vital signs and test results, which are outlined above.     ED Discussion       3:45 AM: Reassessed pt at this time. Discussed with pt all pertinent ED information and results. Discussed pt dx and plan of tx. Gave pt all f/u and return to the ED instructions. All questions and concerns were addressed at this time. Pt expresses understanding of information and instructions, and is comfortable with plan to discharge. Pt is stable for discharge.    I discussed with patient and/or family/caretaker that evaluation in the ED does not suggest any emergent or life threatening medical conditions requiring immediate intervention beyond what was provided in the ED, and I believe patient is safe for discharge.  Regardless, an unremarkable evaluation in the ED does not preclude the development or presence of a serious of life threatening condition. As such, patient was instructed to return immediately for any worsening or change in current symptoms.         Medical Decision Making:   History:   Old Records Summarized: other records.       <> Summary of Records: Urgent Care, GI, Cardiology  Clinical Tests:   Lab Tests: Ordered and Reviewed         ED Medication(s):  Medications   sodium chloride 0.9% bolus 1,000 mL 1,000 mL (0 mLs Intravenous Stopped 2/24/23 2211)   hydrALAZINE injection 10 mg (10 mg Intravenous Given 2/24/23 2233)       Discharge Medication List as of 2/25/2023  3:41 AM           Follow-up Information       Primary care provider of your choice. Schedule an appointment as soon as possible for a visit in 3 days.    Why: For follow-up on today's visit.             Go to  [facility] - Emergency Dept..    Specialty: Emergency Medicine  Why: As needed,  If symptoms worsen  Contact information:  [address] [facility] [address] [phone]                               Scribe Attestation:   Scribe #1: I performed the above scribed service and the documentation accurately describes the services I performed. I attest to the accuracy of the note.     Attending:   Physician Attestation Statement for Scribe #1: I, Live Irvin MD, personally performed the services described in this documentation, as scribed by Pranay Salvador, in my presence, and it is both accurate and complete.         Attending Attestation:             Attending ED Notes:   68-year-old female presenting for evaluation of elevated blood glucose readings at home.  At time of triage was hypertensive but was asymptomatic from this.  She has noticed increased thirst and urination likely related to poorly controlled diabetes.  A1c has been trending up on prior checks as outpatient.  Patient has plans to follow up closely with primary care for recheck of A1c.  Patient's medical history is complicated by comorbidities including CAD, diabetes, hyperlipidemia, hypertension, and vitamin-D deficiency.  Blood pressure trended down while in department.  Patient was hyperglycemic in the department however did not exhibit signs consistent with DKA or HHS.    Close follow-up with PCP advised. Return to the ED for reevaluation should symptoms worsen, return, or change in character.       Clinical Impression       ICD-10-CM ICD-9-CM   1. Hyperglycemia  R73.9 790.29   2. Primary hypertension  I10 401.9       Disposition:   Disposition: Discharged  Condition: Stable       Live Irvin MD  02/27/23 [id number]

## 2023-02-25 NOTE — FIRST PROVIDER EVALUATION
Medical screening examination initiated.  I have conducted a focused provider triage encounter, findings are as follows:    Brief history of present illness:  Patient thinks she may have a UTI.    Vitals:    02/24/23 1842   BP: (!) 190/99   BP Location: Right arm   Patient Position: Sitting   Pulse: 68   Resp: 18   Temp: 98.8 °F (37.1 °C)   TempSrc: Oral   SpO2: 97%   Weight: 86.4 kg (190 lb 7.6 oz)       Pertinent physical exam:  Blood sugar 408, no distress    Brief workup plan:  Labs, fluids    Preliminary workup initiated; this workup will be continued and followed by the physician or advanced practice provider that is assigned to the patient when roomed.

## 2023-03-03 ENCOUNTER — OFFICE VISIT (OUTPATIENT)
Dept: FAMILY MEDICINE | Facility: CLINIC | Age: 68
End: 2023-03-03
Payer: MEDICARE

## 2023-03-03 ENCOUNTER — LAB VISIT (OUTPATIENT)
Dept: LAB | Facility: HOSPITAL | Age: 68
End: 2023-03-03
Attending: FAMILY MEDICINE
Payer: MEDICARE

## 2023-03-03 VITALS
OXYGEN SATURATION: 96 % | RESPIRATION RATE: 18 BRPM | SYSTOLIC BLOOD PRESSURE: 130 MMHG | DIASTOLIC BLOOD PRESSURE: 78 MMHG | HEIGHT: 62 IN | WEIGHT: 184.5 LBS | BODY MASS INDEX: 33.95 KG/M2 | HEART RATE: 108 BPM | TEMPERATURE: 98 F

## 2023-03-03 DIAGNOSIS — I25.118 ATHEROSCLEROTIC HEART DISEASE OF NATIVE CORONARY ARTERY WITH OTHER FORMS OF ANGINA PECTORIS: ICD-10-CM

## 2023-03-03 DIAGNOSIS — E66.01 SEVERE OBESITY (BMI 35.0-39.9) WITH COMORBIDITY: ICD-10-CM

## 2023-03-03 DIAGNOSIS — R41.3 MEMORY DEFICIT: ICD-10-CM

## 2023-03-03 DIAGNOSIS — R32 URINARY INCONTINENCE, UNSPECIFIED TYPE: ICD-10-CM

## 2023-03-03 DIAGNOSIS — I70.0 AORTIC ATHEROSCLEROSIS: ICD-10-CM

## 2023-03-03 DIAGNOSIS — E11.40 TYPE 2 DIABETES MELLITUS WITH DIABETIC NEUROPATHY, WITHOUT LONG-TERM CURRENT USE OF INSULIN: ICD-10-CM

## 2023-03-03 DIAGNOSIS — E11.40 TYPE 2 DIABETES MELLITUS WITH DIABETIC NEUROPATHY, WITHOUT LONG-TERM CURRENT USE OF INSULIN: Primary | ICD-10-CM

## 2023-03-03 DIAGNOSIS — B37.31 YEAST VAGINITIS: ICD-10-CM

## 2023-03-03 LAB
ESTIMATED AVG GLUCOSE: 283 MG/DL (ref 68–131)
HBA1C MFR BLD: 11.5 % (ref 4–5.6)

## 2023-03-03 PROCEDURE — 99214 PR OFFICE/OUTPT VISIT, EST, LEVL IV, 30-39 MIN: ICD-10-PCS | Mod: S$PBB,,, | Performed by: FAMILY MEDICINE

## 2023-03-03 PROCEDURE — 99214 OFFICE O/P EST MOD 30 MIN: CPT | Mod: S$PBB,,, | Performed by: FAMILY MEDICINE

## 2023-03-03 PROCEDURE — 83036 HEMOGLOBIN GLYCOSYLATED A1C: CPT | Performed by: FAMILY MEDICINE

## 2023-03-03 PROCEDURE — 99215 OFFICE O/P EST HI 40 MIN: CPT | Mod: PBBFAC,PO | Performed by: FAMILY MEDICINE

## 2023-03-03 PROCEDURE — 36415 COLL VENOUS BLD VENIPUNCTURE: CPT | Mod: PO | Performed by: FAMILY MEDICINE

## 2023-03-03 PROCEDURE — 99999 PR PBB SHADOW E&M-EST. PATIENT-LVL V: ICD-10-PCS | Mod: PBBFAC,,, | Performed by: FAMILY MEDICINE

## 2023-03-03 PROCEDURE — 99999 PR PBB SHADOW E&M-EST. PATIENT-LVL V: CPT | Mod: PBBFAC,,, | Performed by: FAMILY MEDICINE

## 2023-03-03 RX ORDER — FLUCONAZOLE 150 MG/1
150 TABLET ORAL DAILY
Qty: 1 TABLET | Refills: 0 | Status: SHIPPED | OUTPATIENT
Start: 2023-03-03 | End: 2023-03-04

## 2023-03-03 NOTE — PROGRESS NOTES
Alysha Rosales    Chief Complaint   Patient presents with    Follow-up    Diabetes    Hypertension       History of Present Illness:   Ms. Rosales comes in today for diabetes and hypertension follow-up.  She is not fasting and has taken morning medications.  She states she exercises by walking at [employer].  She states she has not been monitoring diet.  She states she has not been performing home blood pressure checks or glucose checks she lost her glucometer.    She states she was evaluated at urgent care on February 24, 2023 for vaginal itching with glucose >380 (after eating at [employer]).  She states she was then [place] [facility] [place] for hyperglycemia, hypertension and told to follow-up with PCP in 3 days.      She reports having vaginal itch without vaginal discharge.  She states she feels wet in the vaginal area as she occasionally has urinary incontinence.    She states she no longer has ulcer of her foot.    She complains of having memory problems.  She reports no family history of dementia.    She denies having fever, chills, fatigue, appetite changes; shortness of breath, cough, wheezing; chest pain, palpitations, leg swelling abdominal pain, nausea, vomiting, diarrhea, constipation; unusual urinary symptoms; polydipsia, polyphagia, polyuria, hot or cold intolerance; back pain; numbness, acute visual changes, headache; anxiety, depression, homicidal or suicidal thoughts.     She saw Dr. William, cardiologist, for follow-up on August 10, 2022 for surveillance of combined hyperlipidemia associated with type 2 diabetes mellitus, essential hypertension, antiplatelet or antithrombotic long-term use, atherosclerotic heart disease of native coronary artery with other forms of angina pectoris and iron deficiency anemia, unspecified iron deficiency anemia type.  She did not see ERIC Salinas for diabetes management on July 21, 2022 as scheduled. She saw Dr. Wadsworth, optometrist, on  February 10, 2022 for diabetic eye exam.      She saw Dr. Saravia, orthopedist, on July 21, 2022 for bilateral knee arthritis, lumbar DDD.       She saw ERIC Hill with rheumatology on October 5, 2020 for surveillance of osteoporosis currently on Reclast and vitamin-D deficiency with 9-month follow-up with DEXA scan and Reclast therapy advised.        Labs:                     WBC                      6.30                02/24/2023                 HGB                      12.9                02/24/2023                 HCT                      38.0                02/24/2023                 PLT                      230                 02/24/2023                 CHOL                     133                 01/25/2022                 TRIG                     384 (H)             01/25/2022                 HDL                      40                  01/25/2022                 ALT                      17                  02/24/2023                 AST                      15                  02/24/2023                 NA                       136                 02/24/2023                 K                        4.2                 02/24/2023                 CL                       98                  02/24/2023                 CREATININE               1.1                 02/24/2023                 BUN                      16                  02/24/2023                 CO2                      24                  02/24/2023                 TSH                      2.040               01/25/2022                 INR                      0.9                 06/11/2021                 HGBA1C                   10.3 (H)            07/28/2022             LDLCALC                  16.2 (L)            01/25/2022                Current Outpatient Medications   Medication Sig    allopurinoL (ZYLOPRIM) 100 MG tablet Take 1 tablet by mouth once daily    aspirin 81 mg Tab Take 1 tablet by mouth.    blood sugar diagnostic Strp 1 strip by  Misc.(Non-Drug; Combo Route) route 2 (two) times daily.    blood sugar diagnostic Strp To check BG 2 times daily, to use with insurance preferred meter - Freestyle Lite    blood-glucose meter kit To check BG 2 times daily, to use with insurance preferred meter -  Freestyle Lite    blood-glucose meter Misc Use FREESTYLE meter for dx: diabetes    clopidogreL (PLAVIX) 75 mg tablet Take 1 tablet by mouth once daily    ERGOCALCIFEROL, VITAMIN D2, (VITAMIN D ORAL) Take 1 tablet by mouth 2 (two) times daily. 2000 units daily    ferrous sulfate (FEOSOL) Tab tablet Take 1 tablet by mouth 3 (three) times daily.    furosemide (LASIX) 20 MG tablet Take 2 tablets (40 mg total) by mouth once daily.    glipiZIDE (GLUCOTROL) 10 MG tablet TAKE 1 TABLET BY MOUTH TWICE DAILY BEFORE MEAL(S)    hydrALAZINE (APRESOLINE) 50 MG tablet Take 1 tablet (50 mg total) by mouth 4 (four) times daily.    JANUVIA 100 mg Tab Take 1 tablet (100 mg total) by mouth once daily.    lancets Mis To check BG 2 times daily, to use with insurance preferred meter - Freestyle Lite    losartan (COZAAR) 50 MG tablet Take 1 tablet by mouth once daily    metFORMIN (GLUCOPHAGE) 500 MG tablet TAKE 1 TABLET BY MOUTH ONCE DAILY IN THE MORNING AND 1 ONCE DAILY AT NOON AND 2 ONCE DAILY IN THE EVENING WITH MEALS    metoprolol succinate (TOPROL-XL) 50 MG 24 hr tablet Take 1 tablet by mouth once daily    rosuvastatin (CRESTOR) 20 MG tablet TAKE 1 TABLET BY MOUTH NIGHTLY.       Review of Systems   Constitutional:  Negative for activity change, appetite change, chills, fatigue, fever and unexpected weight change.        Weight 92.9 kg (204 lb 12.9 oz) at January 25, 2022 visit.   Eyes:  Negative for visual disturbance.        See history of present illness.   Respiratory:  Negative for cough, shortness of breath and wheezing.    Cardiovascular:  Negative for chest pain, palpitations and leg swelling.        See history of present illness.   Gastrointestinal:  Negative for  abdominal pain, constipation, diarrhea, nausea and vomiting.   Endocrine: Negative for cold intolerance, heat intolerance, polydipsia, polyphagia and polyuria.        See history of present illness.   Genitourinary:  Negative for difficulty urinating and vaginal discharge.        See history of present illness.   Musculoskeletal:  Positive for arthralgias. Negative for back pain.        See history of present illness.   Neurological:  Negative for numbness and headaches.        Positive for memory issue.   Hematological:  Does not bruise/bleed easily.        See history of present illness.   Psychiatric/Behavioral:  Negative for dysphoric mood, sleep disturbance and suicidal ideas. The patient is not nervous/anxious.         Negative for homicidal ideas.     Objective:  Physical Exam  Vitals reviewed.   Constitutional:       General: She is not in acute distress.     Appearance: Normal appearance. She is well-developed. She is obese. She is not ill-appearing or diaphoretic.      Comments: Pleasant and obese.   Neck:      Thyroid: No thyromegaly.   Cardiovascular:      Rate and Rhythm: Normal rate and regular rhythm.      Pulses:           Dorsalis pedis pulses are 3+ on the right side and 3+ on the left side.        Posterior tibial pulses are 3+ on the right side and 3+ on the left side.      Heart sounds: Murmur heard.      Comments: Chronic and soft.  Pulmonary:      Effort: Pulmonary effort is normal. No respiratory distress.      Breath sounds: Normal breath sounds. No wheezing.   Abdominal:      General: Bowel sounds are normal. There is no distension.      Palpations: Abdomen is soft. There is no mass.      Tenderness: There is no abdominal tenderness. There is no guarding or rebound.   Musculoskeletal:         General: Swelling present. No tenderness. Normal range of motion.      Cervical back: Normal range of motion and neck supple. No tenderness.      Comments: She is ambulatory without problems.  Subtle  swelling at right ankle with full range of motion noted.   Feet:      Right foot:      Protective Sensation: 5 sites tested.  5 sites sensed.      Skin integrity: No ulcer, skin breakdown or dry skin.      Left foot:      Protective Sensation: 5 sites tested.  5 sites sensed.      Skin integrity: No ulcer, skin breakdown or dry skin.   Lymphadenopathy:      Cervical: No cervical adenopathy.   Neurological:      General: No focal deficit present.      Mental Status: She is alert and oriented to person, place, and time.   Psychiatric:         Mood and Affect: Mood normal.         Behavior: Behavior normal.         Thought Content: Thought content normal.         Judgment: Judgment normal.     ASSESSMENT:  1. Type 2 diabetes mellitus with diabetic neuropathy, without long-term current use of insulin    2. Urinary incontinence, unspecified type    3. Yeast vaginitis    4. Memory deficit    5. Aortic atherosclerosis    6. Atherosclerotic heart disease of native coronary artery with other forms of angina pectoris    7. Severe obesity (BMI 35.0-39.9) with comorbidity        PLAN:  Alysha was seen today for follow-up, diabetes and hypertension.    Diagnoses and all orders for this visit:    Type 2 diabetes mellitus with diabetic neuropathy, without long-term current use of insulin  -     Hemoglobin A1C; Future    Urinary incontinence, unspecified type  -     Urine culture; Future    Yeast vaginitis  -     fluconazole (DIFLUCAN) 150 MG Tab; Take 1 tablet (150 mg total) by mouth once daily. for 1 day    Memory deficit  -     Ambulatory referral/consult to Neurology; Future    Aortic atherosclerosis    Atherosclerotic heart disease of native coronary artery with other forms of angina pectoris    Severe obesity (BMI 35.0-39.9) with comorbidity    Patient advised to call for results.  Continue current medications, follow low sodium, low cholesterol, low carb diet, daily walks.  Add Diflucan 150 mg x 1 for subjective yeast;  medication precautions discussed with patient.  Keep follow up with specialists.  Follow up if symptoms worsen or fail to improve.

## 2023-03-10 ENCOUNTER — PATIENT MESSAGE (OUTPATIENT)
Dept: FAMILY MEDICINE | Facility: CLINIC | Age: 68
End: 2023-03-10
Payer: MEDICARE

## 2023-03-17 ENCOUNTER — TELEPHONE (OUTPATIENT)
Dept: FAMILY MEDICINE | Facility: CLINIC | Age: 68
End: 2023-03-17
Payer: MEDICARE

## 2023-03-17 ENCOUNTER — PATIENT MESSAGE (OUTPATIENT)
Dept: FAMILY MEDICINE | Facility: CLINIC | Age: 68
End: 2023-03-17
Payer: MEDICARE

## 2023-03-17 DIAGNOSIS — B37.31 YEAST VAGINITIS: Primary | ICD-10-CM

## 2023-03-17 NOTE — TELEPHONE ENCOUNTER
Tried calling pt to see if she was okay with insulin but unable to reach and no vm set up. I did send her a [email or web address] message as well. Going to generate the RX for you anyway just in case.

## 2023-03-17 NOTE — TELEPHONE ENCOUNTER
"Pt portal message  Regardign vaginal itch and discharge    " this is still bothering me,     This is very frustrating this morning. I have the urge to pee very often and can't stop flow once I start.   I am wearing a pad constantly to stay dry  Istill need blood sugar sandi.  I just felt everything was getting  better."    Please advise    "

## 2023-03-20 NOTE — TELEPHONE ENCOUNTER
"Pt portal message    " Dr Bird gave me 1 pill , I took and after 3 days it started get better  then it started again"    Please advise    "

## 2023-03-21 RX ORDER — FLUCONAZOLE 100 MG/1
TABLET ORAL
Qty: 3 TABLET | Refills: 0 | Status: ON HOLD | OUTPATIENT
Start: 2023-03-21 | End: 2023-04-21 | Stop reason: HOSPADM

## 2023-03-22 PROBLEM — I70.0 AORTIC ATHEROSCLEROSIS: Status: ACTIVE | Noted: 2023-03-22

## 2023-03-31 ENCOUNTER — TELEPHONE (OUTPATIENT)
Dept: ADMINISTRATIVE | Facility: HOSPITAL | Age: 68
End: 2023-03-31
Payer: MEDICARE

## 2023-04-18 ENCOUNTER — HOSPITAL ENCOUNTER (INPATIENT)
Facility: HOSPITAL | Age: 68
LOS: 3 days | Discharge: HOME HEALTH | DRG: 637 | End: 2023-04-21
Attending: EMERGENCY MEDICINE | Admitting: INTERNAL MEDICINE
Payer: MEDICARE

## 2023-04-18 DIAGNOSIS — E11.10 DKA (DIABETIC KETOACIDOSIS): ICD-10-CM

## 2023-04-18 DIAGNOSIS — E13.65 OTHER SPECIFIED DIABETES MELLITUS WITH HYPERGLYCEMIA, WITH LONG-TERM CURRENT USE OF INSULIN: ICD-10-CM

## 2023-04-18 DIAGNOSIS — R73.9 HYPERGLYCEMIA: ICD-10-CM

## 2023-04-18 DIAGNOSIS — S31.829A BUTTOCK WOUND, LEFT, INITIAL ENCOUNTER: ICD-10-CM

## 2023-04-18 DIAGNOSIS — N30.00 ACUTE CYSTITIS WITHOUT HEMATURIA: ICD-10-CM

## 2023-04-18 DIAGNOSIS — E11.10 DIABETIC KETOACIDOSIS WITHOUT COMA ASSOCIATED WITH TYPE 2 DIABETES MELLITUS: Primary | ICD-10-CM

## 2023-04-18 DIAGNOSIS — A41.9 SEPSIS, DUE TO UNSPECIFIED ORGANISM, UNSPECIFIED WHETHER ACUTE ORGAN DYSFUNCTION PRESENT: ICD-10-CM

## 2023-04-18 DIAGNOSIS — B99.9 ENCEPHALOPATHY DUE TO INFECTION: ICD-10-CM

## 2023-04-18 DIAGNOSIS — M21.062 ACQUIRED GENU VALGUM OF LEFT KNEE: ICD-10-CM

## 2023-04-18 DIAGNOSIS — R53.1 WEAKNESS: ICD-10-CM

## 2023-04-18 DIAGNOSIS — Z79.4 OTHER SPECIFIED DIABETES MELLITUS WITH HYPERGLYCEMIA, WITH LONG-TERM CURRENT USE OF INSULIN: ICD-10-CM

## 2023-04-18 DIAGNOSIS — E86.1 INTRAVASCULAR VOLUME DEPLETION: ICD-10-CM

## 2023-04-18 DIAGNOSIS — B00.1 PRIMARY HERPES SIMPLEX INFECTION OF LIPS: ICD-10-CM

## 2023-04-18 DIAGNOSIS — G93.49 ENCEPHALOPATHY DUE TO INFECTION: ICD-10-CM

## 2023-04-18 LAB
ALBUMIN SERPL BCP-MCNC: 3 G/DL (ref 3.5–5.2)
ALLENS TEST: ABNORMAL
ALP SERPL-CCNC: 114 U/L (ref 55–135)
ALT SERPL W/O P-5'-P-CCNC: 22 U/L (ref 10–44)
ANION GAP SERPL CALC-SCNC: 12 MMOL/L (ref 8–16)
ANION GAP SERPL CALC-SCNC: 18 MMOL/L (ref 8–16)
ANION GAP SERPL CALC-SCNC: 22 MMOL/L (ref 8–16)
AST SERPL-CCNC: 45 U/L (ref 10–40)
B-OH-BUTYR BLD STRIP-SCNC: 4.8 MMOL/L (ref 0–0.5)
BACTERIA #/AREA URNS HPF: ABNORMAL /HPF
BASOPHILS # BLD AUTO: 0.11 K/UL (ref 0–0.2)
BASOPHILS NFR BLD: 0.4 % (ref 0–1.9)
BILIRUB SERPL-MCNC: 0.7 MG/DL (ref 0.1–1)
BILIRUB UR QL STRIP: NEGATIVE
BUN SERPL-MCNC: 29 MG/DL (ref 8–23)
BUN SERPL-MCNC: 33 MG/DL (ref 8–23)
BUN SERPL-MCNC: 38 MG/DL (ref 8–23)
CALCIUM SERPL-MCNC: 8.2 MG/DL (ref 8.7–10.5)
CALCIUM SERPL-MCNC: 8.5 MG/DL (ref 8.7–10.5)
CALCIUM SERPL-MCNC: 9.3 MG/DL (ref 8.7–10.5)
CHLORIDE SERPL-SCNC: 106 MMOL/L (ref 95–110)
CHLORIDE SERPL-SCNC: 110 MMOL/L (ref 95–110)
CHLORIDE SERPL-SCNC: 98 MMOL/L (ref 95–110)
CLARITY UR: ABNORMAL
CO2 SERPL-SCNC: 14 MMOL/L (ref 23–29)
CO2 SERPL-SCNC: 14 MMOL/L (ref 23–29)
CO2 SERPL-SCNC: 17 MMOL/L (ref 23–29)
COLOR UR: YELLOW
CREAT SERPL-MCNC: 0.8 MG/DL (ref 0.5–1.4)
CREAT SERPL-MCNC: 0.9 MG/DL (ref 0.5–1.4)
CREAT SERPL-MCNC: 1.1 MG/DL (ref 0.5–1.4)
DELSYS: ABNORMAL
DIFFERENTIAL METHOD: ABNORMAL
EOSINOPHIL # BLD AUTO: 0 K/UL (ref 0–0.5)
EOSINOPHIL NFR BLD: 0 % (ref 0–8)
ERYTHROCYTE [DISTWIDTH] IN BLOOD BY AUTOMATED COUNT: 12.4 % (ref 11.5–14.5)
EST. GFR  (NO RACE VARIABLE): 55 ML/MIN/1.73 M^2
EST. GFR  (NO RACE VARIABLE): >60 ML/MIN/1.73 M^2
EST. GFR  (NO RACE VARIABLE): >60 ML/MIN/1.73 M^2
FIO2: 21
GLUCOSE SERPL-MCNC: 137 MG/DL (ref 70–110)
GLUCOSE SERPL-MCNC: 299 MG/DL (ref 70–110)
GLUCOSE SERPL-MCNC: 418 MG/DL (ref 70–110)
GLUCOSE SERPL-MCNC: 487 MG/DL (ref 70–110)
GLUCOSE UR QL STRIP: ABNORMAL
HCO3 UR-SCNC: 20.2 MMOL/L (ref 24–28)
HCT VFR BLD AUTO: 35.6 % (ref 37–48.5)
HCT VFR BLD CALC: 35 %PCV (ref 36–54)
HGB BLD-MCNC: 12 G/DL (ref 12–16)
HGB UR QL STRIP: ABNORMAL
HYALINE CASTS #/AREA URNS LPF: 0 /LPF
IMM GRANULOCYTES # BLD AUTO: 0.25 K/UL (ref 0–0.04)
IMM GRANULOCYTES NFR BLD AUTO: 0.9 % (ref 0–0.5)
KETONES UR QL STRIP: ABNORMAL
LACTATE SERPL-SCNC: 2.4 MMOL/L (ref 0.5–2.2)
LACTATE SERPL-SCNC: 3 MMOL/L (ref 0.5–2.2)
LEUKOCYTE ESTERASE UR QL STRIP: ABNORMAL
LYMPHOCYTES # BLD AUTO: 0.3 K/UL (ref 1–4.8)
LYMPHOCYTES NFR BLD: 1.2 % (ref 18–48)
MAGNESIUM SERPL-MCNC: 1.3 MG/DL (ref 1.6–2.6)
MAGNESIUM SERPL-MCNC: 3.4 MG/DL (ref 1.6–2.6)
MCH RBC QN AUTO: 29.8 PG (ref 27–31)
MCHC RBC AUTO-ENTMCNC: 33.7 G/DL (ref 32–36)
MCV RBC AUTO: 88 FL (ref 82–98)
MICROSCOPIC COMMENT: ABNORMAL
MODE: ABNORMAL
MONOCYTES # BLD AUTO: 1.5 K/UL (ref 0.3–1)
MONOCYTES NFR BLD: 5.6 % (ref 4–15)
NEUTROPHILS # BLD AUTO: 24.7 K/UL (ref 1.8–7.7)
NEUTROPHILS NFR BLD: 91.9 % (ref 38–73)
NITRITE UR QL STRIP: POSITIVE
NRBC BLD-RTO: 0 /100 WBC
PCO2 BLDA: 38.6 MMHG (ref 35–45)
PH SMN: 7.33 [PH] (ref 7.35–7.45)
PH UR STRIP: 6 [PH] (ref 5–8)
PHOSPHATE SERPL-MCNC: 1.2 MG/DL (ref 2.7–4.5)
PHOSPHATE SERPL-MCNC: 1.6 MG/DL (ref 2.7–4.5)
PLATELET # BLD AUTO: 294 K/UL (ref 150–450)
PMV BLD AUTO: 10.7 FL (ref 9.2–12.9)
PO2 BLDA: 22 MMHG (ref 40–60)
POC BE: -6 MMOL/L
POC IONIZED CALCIUM: 1.25 MMOL/L (ref 1.06–1.42)
POC SATURATED O2: 33 % (ref 95–100)
POCT GLUCOSE: 122 MG/DL (ref 70–110)
POCT GLUCOSE: 221 MG/DL (ref 70–110)
POCT GLUCOSE: 273 MG/DL (ref 70–110)
POCT GLUCOSE: 351 MG/DL (ref 70–110)
POCT GLUCOSE: 438 MG/DL (ref 70–110)
POCT GLUCOSE: 464 MG/DL (ref 70–110)
POTASSIUM BLD-SCNC: 3.4 MMOL/L (ref 3.5–5.1)
POTASSIUM SERPL-SCNC: 3.5 MMOL/L (ref 3.5–5.1)
POTASSIUM SERPL-SCNC: 3.5 MMOL/L (ref 3.5–5.1)
POTASSIUM SERPL-SCNC: 3.6 MMOL/L (ref 3.5–5.1)
PROCALCITONIN SERPL IA-MCNC: 1.97 NG/ML
PROT SERPL-MCNC: 6.9 G/DL (ref 6–8.4)
PROT UR QL STRIP: ABNORMAL
RBC # BLD AUTO: 4.03 M/UL (ref 4–5.4)
RBC #/AREA URNS HPF: 2 /HPF (ref 0–4)
SAMPLE: ABNORMAL
SITE: ABNORMAL
SODIUM BLD-SCNC: 135 MMOL/L (ref 136–145)
SODIUM SERPL-SCNC: 134 MMOL/L (ref 136–145)
SODIUM SERPL-SCNC: 138 MMOL/L (ref 136–145)
SODIUM SERPL-SCNC: 139 MMOL/L (ref 136–145)
SP GR UR STRIP: 1.03 (ref 1–1.03)
TROPONIN I SERPL DL<=0.01 NG/ML-MCNC: 0.04 NG/ML (ref 0–0.03)
URATE SERPL-MCNC: 5.5 MG/DL (ref 2.4–5.7)
URN SPEC COLLECT METH UR: ABNORMAL
UROBILINOGEN UR STRIP-ACNC: NEGATIVE EU/DL
WBC # BLD AUTO: 26.94 K/UL (ref 3.9–12.7)
WBC #/AREA URNS HPF: 33 /HPF (ref 0–5)
WBC CLUMPS URNS QL MICRO: ABNORMAL
YEAST URNS QL MICRO: ABNORMAL

## 2023-04-18 PROCEDURE — 82565 ASSAY OF CREATININE: CPT

## 2023-04-18 PROCEDURE — 80053 COMPREHEN METABOLIC PANEL: CPT | Performed by: EMERGENCY MEDICINE

## 2023-04-18 PROCEDURE — 83735 ASSAY OF MAGNESIUM: CPT | Performed by: EMERGENCY MEDICINE

## 2023-04-18 PROCEDURE — 80048 BASIC METABOLIC PNL TOTAL CA: CPT | Mod: 91,XB | Performed by: EMERGENCY MEDICINE

## 2023-04-18 PROCEDURE — 82330 ASSAY OF CALCIUM: CPT

## 2023-04-18 PROCEDURE — 93010 ELECTROCARDIOGRAM REPORT: CPT | Mod: ,,, | Performed by: INTERNAL MEDICINE

## 2023-04-18 PROCEDURE — 81000 URINALYSIS NONAUTO W/SCOPE: CPT | Performed by: EMERGENCY MEDICINE

## 2023-04-18 PROCEDURE — 93005 ELECTROCARDIOGRAM TRACING: CPT

## 2023-04-18 PROCEDURE — 87086 URINE CULTURE/COLONY COUNT: CPT | Performed by: EMERGENCY MEDICINE

## 2023-04-18 PROCEDURE — 82800 BLOOD PH: CPT

## 2023-04-18 PROCEDURE — 84132 ASSAY OF SERUM POTASSIUM: CPT

## 2023-04-18 PROCEDURE — 25000003 PHARM REV CODE 250: Performed by: EMERGENCY MEDICINE

## 2023-04-18 PROCEDURE — 93010 EKG 12-LEAD: ICD-10-PCS | Mod: ,,, | Performed by: INTERNAL MEDICINE

## 2023-04-18 PROCEDURE — 96375 TX/PRO/DX INJ NEW DRUG ADDON: CPT

## 2023-04-18 PROCEDURE — 85025 COMPLETE CBC W/AUTO DIFF WBC: CPT | Performed by: EMERGENCY MEDICINE

## 2023-04-18 PROCEDURE — 25000003 PHARM REV CODE 250: Performed by: NURSE PRACTITIONER

## 2023-04-18 PROCEDURE — 83735 ASSAY OF MAGNESIUM: CPT | Mod: 91 | Performed by: NURSE PRACTITIONER

## 2023-04-18 PROCEDURE — 83036 HEMOGLOBIN GLYCOSYLATED A1C: CPT | Performed by: NURSE PRACTITIONER

## 2023-04-18 PROCEDURE — 83605 ASSAY OF LACTIC ACID: CPT

## 2023-04-18 PROCEDURE — 20000000 HC ICU ROOM

## 2023-04-18 PROCEDURE — 87186 SC STD MICRODIL/AGAR DIL: CPT | Performed by: EMERGENCY MEDICINE

## 2023-04-18 PROCEDURE — 96372 THER/PROPH/DIAG INJ SC/IM: CPT | Performed by: EMERGENCY MEDICINE

## 2023-04-18 PROCEDURE — 84100 ASSAY OF PHOSPHORUS: CPT | Performed by: EMERGENCY MEDICINE

## 2023-04-18 PROCEDURE — 82803 BLOOD GASES ANY COMBINATION: CPT

## 2023-04-18 PROCEDURE — 82962 GLUCOSE BLOOD TEST: CPT

## 2023-04-18 PROCEDURE — 99291 CRITICAL CARE FIRST HOUR: CPT

## 2023-04-18 PROCEDURE — 63600175 PHARM REV CODE 636 W HCPCS: Performed by: NURSE PRACTITIONER

## 2023-04-18 PROCEDURE — 82010 KETONE BODYS QUAN: CPT | Performed by: EMERGENCY MEDICINE

## 2023-04-18 PROCEDURE — 84550 ASSAY OF BLOOD/URIC ACID: CPT | Performed by: NURSE PRACTITIONER

## 2023-04-18 PROCEDURE — 87040 BLOOD CULTURE FOR BACTERIA: CPT | Mod: 59 | Performed by: EMERGENCY MEDICINE

## 2023-04-18 PROCEDURE — 63600175 PHARM REV CODE 636 W HCPCS: Performed by: EMERGENCY MEDICINE

## 2023-04-18 PROCEDURE — 84484 ASSAY OF TROPONIN QUANT: CPT | Performed by: EMERGENCY MEDICINE

## 2023-04-18 PROCEDURE — 84295 ASSAY OF SERUM SODIUM: CPT

## 2023-04-18 PROCEDURE — 87077 CULTURE AEROBIC IDENTIFY: CPT | Performed by: EMERGENCY MEDICINE

## 2023-04-18 PROCEDURE — 84145 PROCALCITONIN (PCT): CPT | Performed by: EMERGENCY MEDICINE

## 2023-04-18 PROCEDURE — 87088 URINE BACTERIA CULTURE: CPT | Performed by: EMERGENCY MEDICINE

## 2023-04-18 PROCEDURE — S5010 5% DEXTROSE AND 0.45% SALINE: HCPCS | Performed by: NURSE PRACTITIONER

## 2023-04-18 PROCEDURE — 83605 ASSAY OF LACTIC ACID: CPT | Performed by: EMERGENCY MEDICINE

## 2023-04-18 PROCEDURE — 96365 THER/PROPH/DIAG IV INF INIT: CPT | Mod: 59

## 2023-04-18 PROCEDURE — 99900035 HC TECH TIME PER 15 MIN (STAT)

## 2023-04-18 RX ORDER — MAGNESIUM SULFATE HEPTAHYDRATE 40 MG/ML
2 INJECTION, SOLUTION INTRAVENOUS ONCE
Status: COMPLETED | OUTPATIENT
Start: 2023-04-18 | End: 2023-04-18

## 2023-04-18 RX ORDER — SODIUM CHLORIDE 9 MG/ML
INJECTION, SOLUTION INTRAVENOUS CONTINUOUS
Status: DISCONTINUED | OUTPATIENT
Start: 2023-04-18 | End: 2023-04-19

## 2023-04-18 RX ORDER — VALACYCLOVIR HYDROCHLORIDE 500 MG/1
1000 TABLET, FILM COATED ORAL 3 TIMES DAILY
Status: DISCONTINUED | OUTPATIENT
Start: 2023-04-18 | End: 2023-04-19

## 2023-04-18 RX ORDER — POTASSIUM CHLORIDE 7.45 MG/ML
80 INJECTION INTRAVENOUS
Status: DISCONTINUED | OUTPATIENT
Start: 2023-04-18 | End: 2023-04-18

## 2023-04-18 RX ORDER — POTASSIUM CHLORIDE 7.45 MG/ML
10 INJECTION INTRAVENOUS
Status: DISCONTINUED | OUTPATIENT
Start: 2023-04-18 | End: 2023-04-18

## 2023-04-18 RX ORDER — MAGNESIUM SULFATE HEPTAHYDRATE 40 MG/ML
2 INJECTION, SOLUTION INTRAVENOUS
Status: DISCONTINUED | OUTPATIENT
Start: 2023-04-18 | End: 2023-04-18

## 2023-04-18 RX ORDER — MAGNESIUM SULFATE HEPTAHYDRATE 40 MG/ML
4 INJECTION, SOLUTION INTRAVENOUS
Status: DISCONTINUED | OUTPATIENT
Start: 2023-04-18 | End: 2023-04-18

## 2023-04-18 RX ORDER — ACETAMINOPHEN 325 MG/1
650 TABLET ORAL EVERY 4 HOURS PRN
Status: DISCONTINUED | OUTPATIENT
Start: 2023-04-18 | End: 2023-04-21 | Stop reason: HOSPADM

## 2023-04-18 RX ORDER — GLUCAGON 1 MG
1 KIT INJECTION
Status: DISCONTINUED | OUTPATIENT
Start: 2023-04-18 | End: 2023-04-21 | Stop reason: HOSPADM

## 2023-04-18 RX ORDER — DEXTROSE MONOHYDRATE AND SODIUM CHLORIDE 5; .45 G/100ML; G/100ML
125 INJECTION, SOLUTION INTRAVENOUS CONTINUOUS PRN
Status: DISCONTINUED | OUTPATIENT
Start: 2023-04-18 | End: 2023-04-18

## 2023-04-18 RX ORDER — INSULIN ASPART 100 [IU]/ML
1-10 INJECTION, SOLUTION INTRAVENOUS; SUBCUTANEOUS
Status: DISCONTINUED | OUTPATIENT
Start: 2023-04-18 | End: 2023-04-21 | Stop reason: HOSPADM

## 2023-04-18 RX ORDER — POTASSIUM CHLORIDE 7.45 MG/ML
40 INJECTION INTRAVENOUS
Status: DISCONTINUED | OUTPATIENT
Start: 2023-04-18 | End: 2023-04-18

## 2023-04-18 RX ORDER — SODIUM CHLORIDE 0.9 % (FLUSH) 0.9 %
10 SYRINGE (ML) INJECTION
Status: DISCONTINUED | OUTPATIENT
Start: 2023-04-18 | End: 2023-04-21 | Stop reason: HOSPADM

## 2023-04-18 RX ORDER — MAGNESIUM SULFATE HEPTAHYDRATE 40 MG/ML
2 INJECTION, SOLUTION INTRAVENOUS
Status: COMPLETED | OUTPATIENT
Start: 2023-04-18 | End: 2023-04-18

## 2023-04-18 RX ORDER — ONDANSETRON 2 MG/ML
4 INJECTION INTRAMUSCULAR; INTRAVENOUS EVERY 6 HOURS PRN
Status: DISCONTINUED | OUTPATIENT
Start: 2023-04-18 | End: 2023-04-21 | Stop reason: HOSPADM

## 2023-04-18 RX ORDER — SODIUM CHLORIDE 9 MG/ML
125 INJECTION, SOLUTION INTRAVENOUS CONTINUOUS
Status: DISCONTINUED | OUTPATIENT
Start: 2023-04-18 | End: 2023-04-18

## 2023-04-18 RX ORDER — POTASSIUM CHLORIDE 7.45 MG/ML
60 INJECTION INTRAVENOUS
Status: DISCONTINUED | OUTPATIENT
Start: 2023-04-18 | End: 2023-04-18

## 2023-04-18 RX ADMIN — CEFTRIAXONE 1 G: 1 INJECTION, POWDER, FOR SOLUTION INTRAMUSCULAR; INTRAVENOUS at 06:04

## 2023-04-18 RX ADMIN — MAGNESIUM SULFATE HEPTAHYDRATE 2 G: 40 INJECTION, SOLUTION INTRAVENOUS at 02:04

## 2023-04-18 RX ADMIN — SODIUM CHLORIDE: 9 INJECTION, SOLUTION INTRAVENOUS at 08:04

## 2023-04-18 RX ADMIN — VALACYCLOVIR HYDROCHLORIDE 1000 MG: 500 TABLET, FILM COATED ORAL at 09:04

## 2023-04-18 RX ADMIN — POTASSIUM CHLORIDE 10 MEQ: 7.46 INJECTION, SOLUTION INTRAVENOUS at 05:04

## 2023-04-18 RX ADMIN — SODIUM CHLORIDE 0.1 UNITS/KG/HR: 9 INJECTION, SOLUTION INTRAVENOUS at 05:04

## 2023-04-18 RX ADMIN — MAGNESIUM SULFATE HEPTAHYDRATE 2 G: 40 INJECTION, SOLUTION INTRAVENOUS at 05:04

## 2023-04-18 RX ADMIN — INSULIN DETEMIR 10 UNITS: 100 INJECTION, SOLUTION SUBCUTANEOUS at 09:04

## 2023-04-18 RX ADMIN — DEXTROSE AND SODIUM CHLORIDE 125 ML/HR: 5; 450 INJECTION, SOLUTION INTRAVENOUS at 05:04

## 2023-04-18 RX ADMIN — INSULIN ASPART 1 UNITS: 100 INJECTION, SOLUTION INTRAVENOUS; SUBCUTANEOUS at 09:04

## 2023-04-18 RX ADMIN — INSULIN HUMAN 10 UNITS: 100 INJECTION, SOLUTION PARENTERAL at 02:04

## 2023-04-18 RX ADMIN — POTASSIUM PHOSPHATE, MONOBASIC AND POTASSIUM PHOSPHATE, DIBASIC 30 MMOL: 224; 236 INJECTION, SOLUTION, CONCENTRATE INTRAVENOUS at 09:04

## 2023-04-18 RX ADMIN — PIPERACILLIN SODIUM AND TAZOBACTAM SODIUM 4.5 G: 4; .5 INJECTION, POWDER, FOR SOLUTION INTRAVENOUS at 02:04

## 2023-04-18 RX ADMIN — SODIUM CHLORIDE 2000 ML: 9 INJECTION, SOLUTION INTRAVENOUS at 02:04

## 2023-04-18 NOTE — ASSESSMENT & PLAN NOTE
Admit to ICU   DKA protocol with Insulin and IVFs in place   Replace electrolytes as needed   NPO   Serial BMP

## 2023-04-18 NOTE — H&P
[facility] - Emergency Dept.  Critical Care Medicine  History & Physical    Patient Name: Alysha Rosales  MRN: [id number]  Admission Date: 4/18/2023  Hospital Length of Stay: 0 days  Code Status: Full Code  Attending Physician: Brennan Mcmillan MD   Primary Care Provider: Margie Bird MD   Principal Problem: Diabetic ketoacidosis without coma associated with type 2 diabetes mellitus    Subjective:     HPI:  Information obtained by chart review, patient and daughter, Jose.    68-year-old female with a known past medical history of CAD with PCI, HTN, HLD, gout, and DM with recent Rx of Lantus in addition to PO agents who presented to ED 4/18/2023 for evaluation of confusion. ED evaluation with lab findings consistent with DKA and UA revealing UTI. She was given insulin and IVFs and Zosyn. Critical care consulted for ICU admission.     Upon exam, Ms. Encarnacion is lethargic but arouses easily. Daughter found patient sitting on the floor of her home earlier today confused. She went to check on her after receiving a call from patient's boss that she did not show up today for work. 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 She is usually completely oriented and independent. Jose was recently made aware that Ms. Encarnacion does not take all of her medications all the time and misses doses. Ms. Encarnacion does confirm recently being placed on lantus but states she has never taken it. She denies any urinary symptoms. She endorses polyphagia. She has had a fever blister to her upper lip for about a week. Over the past week it has had worsening swelling.       Hospital/ICU Course:  No notes on file     Past Medical History:   Diagnosis Date    CAD (coronary artery disease)     Followed by Dr. Stevo Kaba    Diabetes mellitus, type 2     DM (diabetes mellitus)     BS didn't check 06/05/2020    DM (diabetes mellitus) 2012    BS didn't check 02/10/2022    High level of uric acid  in blood     Hyperlipidemia     Hyperplastic rectal polyp     on 03/20/2009 colonoscopy    Hypertension     Multinodular goiter     noted on 1/20/2015 CTA carotid scan    Multiple thyroid nodules 05/05/2016    on Thyroid U/S    Osteoarthritis     Left knee    Osteoarthritis of back     Osteoporosis, unspecified     Postmenopausal     No history of abnormal pap smear    Proteinuria     Pustular psoriasis     hands and feet    Subclavian artery stenosis     Left; with the proximal segment at 60-70% per 10/02/2008 carotid CTA    Vitamin D deficiency disease        Past Surgical History:   Procedure Laterality Date    CATARACT EXTRACTION W/  INTRAOCULAR LENS IMPLANT Right 4/13/16    [facility]    CATARACT EXTRACTION W/  INTRAOCULAR LENS IMPLANT Left     [facility]    COLONOSCOPY N/A 8/30/2022    Procedure: COLONOSCOPY;  Surgeon: Reanna Garber MD;  Location: [facility] ENDO;  Service: Endoscopy;  Laterality: N/A;    CORONARY ANGIOGRAPHY N/A 6/18/2021    Procedure: ANGIOGRAM, CORONARY ARTERY;  Surgeon: Desiree William MD;  Location: [facility] CATH LAB;  Service: Cardiology;  Laterality: N/A;    CORONARY STENT PLACEMENT N/A 6/18/2021    Procedure: INSERTION, STENT, CORONARY ARTERY;  Surgeon: Desiree William MD;  Location: [facility] CATH LAB;  Service: Cardiology;  Laterality: N/A;    heart catheterization with stents placed  2004 and 08/18/2009    LEFT HEART CATHETERIZATION Left 6/18/2021    Procedure: CATHETERIZATION, HEART, LEFT;  Surgeon: Desiree William MD;  Location: [facility] CATH LAB;  Service: Cardiology;  Laterality: Left;  Covid + week of    left knee surgery for bone graft and pin placement         Review of patient's allergies indicates:   Allergen Reactions    Codeine      Other reaction(s): Chills       Family History       Problem Relation (Age of Onset)    Cancer Father    Diabetes Mother    Hypertension Mother, Sister    No Known Problems Daughter, Son    Stroke Mother          Tobacco Use    Smoking status: Former      Packs/day: 1.00     Years: 33.00     Pack years: 33.00     Types: Cigarettes     Quit date: 2004     Years since quittin.7     Passive exposure: Past    Smokeless tobacco: Never   Substance and Sexual Activity    Alcohol use: Yes     Alcohol/week: 0.0 standard drinks     Comment: Rarely    Drug use: No    Sexual activity: Yes     Partners: Male     Birth control/protection: Post-menopausal         Review of Systems   Constitutional:  Positive for appetite change. Negative for activity change, chills and fever.   HENT:  Negative for congestion and sore throat.         Blister to upper lip with swelling x 1 week   Eyes:  Negative for photophobia and visual disturbance.   Respiratory:  Negative for cough and shortness of breath.    Cardiovascular:  Negative for chest pain and leg swelling.   Gastrointestinal:  Negative for nausea and vomiting.   Endocrine: Positive for polyphagia. Negative for polydipsia and polyuria.   Genitourinary:  Negative for difficulty urinating, dysuria and vaginal discharge.   Neurological:  Negative for dizziness and numbness.   Psychiatric/Behavioral:  Positive for confusion. Negative for sleep disturbance.    Objective:     Vital Signs (Most Recent):  Temp: 97.7 °F (36.5 °C) (23 1633)  Pulse: 97 (23 1703)  Resp: (!) 23 (23 1703)  BP: (!) 168/66 (23 1703)  SpO2: 96 % (23 1703)   Vital Signs (24h Range):  Temp:  [97.7 °F (36.5 °C)-98.8 °F (37.1 °C)] 97.7 °F (36.5 °C)  Pulse:  [] 97  Resp:  [18-28] 23  SpO2:  [96 %-99 %] 96 %  BP: (139-168)/(64-98) 168/66     Weight: 82 kg (180 lb 12.4 oz)  Body mass index is 33.06 kg/m².      Intake/Output Summary (Last 24 hours) at 2023 1739  Last data filed at 2023 1538  Gross per 24 hour   Intake 101.21 ml   Output --   Net 101.21 ml       Physical Exam  Vitals reviewed.   Constitutional:       Appearance: She is obese. She is ill-appearing.   HENT:      Head: Normocephalic and atraumatic.       Comments: Upper lip swelling and erythema with crusted over wound to mid upper lip     Mouth/Throat:      Mouth: Mucous membranes are moist.      Pharynx: Oropharynx is clear.   Eyes:      Extraocular Movements: Extraocular movements intact.      Conjunctiva/sclera: Conjunctivae normal.   Cardiovascular:      Rate and Rhythm: Normal rate and regular rhythm.   Pulmonary:      Effort: Pulmonary effort is normal.      Breath sounds: No wheezing or rhonchi.   Abdominal:      General: Bowel sounds are normal.      Palpations: Abdomen is soft.   Genitourinary:     General: Normal vulva.      Vagina: No vaginal discharge.      Rectum: Normal.   Musculoskeletal:      Cervical back: Normal range of motion and neck supple.      Right lower leg: No edema.      Left lower leg: No edema.   Skin:     General: Skin is warm and dry.      Comments: Left buttock wound- appears pressure related; right great toe nail missing, scant blood- no signs of infection  Neurological:      Mental Status: She is easily aroused. She is lethargic.      Comments: Oriented to self only. Follows commands. Moving all extremities with equal strength.    Psychiatric:      Comments: Pleasant and redirectable.        Vents:       Lines/Drains/Airways       Peripheral Intravenous Line  Duration                  Peripheral IV - Single Lumen 04/18/23 0000 22 G Left Antecubital <1 day         Peripheral IV - Single Lumen 04/18/23 1554 20 G Anterior;Proximal;Right Upper Arm <1 day         Peripheral IV - Single Lumen 04/18/23 1659 22 G Posterior;Right Hand <1 day                    Significant Labs:    CBC/Anemia Profile:  Recent Labs   Lab 04/18/23  1314 04/18/23  1403   WBC 26.94*  --    HGB 12.0  --    HCT 35.6* 35*     --    MCV 88  --    RDW 12.4  --         Chemistries:  Recent Labs   Lab 04/18/23  1314 04/18/23  1554   * 138   K 3.5 3.5   CL 98 106   CO2 14* 14*   BUN 38* 33*   CREATININE 1.1 0.9   CALCIUM 9.3 8.5*   ALBUMIN 3.0*  --    PROT  6.9  --    BILITOT 0.7  --    ALKPHOS 114  --    ALT 22  --    AST 45*  --    MG 1.3*  --    PHOS  --  1.6*       All pertinent labs within the past 24 hours have been reviewed.    Significant Imaging:   I have reviewed all pertinent imaging results/findings within the past 24 hours.    Assessment/Plan:     Cardiac/Vascular  Atherosclerotic heart disease of native coronary artery with other forms of angina pectoris  Hx of PCI   Resume home meds when able to tolerate PO    Hyperlipidemia  Statin when able to take PO     Hypertension associated with diabetes  BP a little elevated   Will resume PO meds when able to take PO meds  Hydralazine PRN SBP >180 mmHg   Monitor       Renal/  Acute cystitis without hematuria  UA consistent with UTI; prior cultures pan-sensitive E. Coli   S/p zosyn in ED  Change to Rocephin   Follow cultures     ID  Primary herpes simplex infection of lips  Lip swollen with lesion mid upper lip   Reportedly present for past week. Patient notes hx of fever blister 2-3xs/year  Valtrex - if able to tolerate oral- if not, can switch to acyclovir IV until DKA resolved     Endocrine  * Diabetic ketoacidosis without coma associated with type 2 diabetes mellitus  Admit to ICU   DKA protocol with Insulin and IVFs in place   Replace electrolytes as needed   NPO   Serial BMP     Other  Buttock wound, left, initial encounter  Present on admit- see media   Appears to be pressure related, but daughter reports patient active and working   Wound care consulted         Critical Care Time: 32 minutes  Critical secondary to Patient has a condition that poses threat to life and bodily function: DKA, UTI requiring high risk medication infusion, frequent glucose and chemistry assessment and high risk for further decline and life-threatening metabolic derangements.      Critical care was time spent personally by me on the following activities: development of treatment plan with patient or surrogate and bedside  caregivers, discussions with consultants, evaluation of patient's response to treatment, examination of patient, ordering and performing treatments and interventions, ordering and review of laboratory studies, ordering and review of radiographic studies, pulse oximetry, re-evaluation of patient's condition. This critical care time did not overlap with that of any other provider or involve time for any procedures.     Elissa Sage NP  Critical Care Medicine  [facility] - Emergency Dept.

## 2023-04-18 NOTE — SUBJECTIVE & OBJECTIVE
Past Medical History:   Diagnosis Date    CAD (coronary artery disease)     Followed by Dr. Stevo Kaba    Diabetes mellitus, type 2     DM (diabetes mellitus)     BS didn't check 06/05/2020    DM (diabetes mellitus) 2012    BS didn't check 02/10/2022    High level of uric acid in blood     Hyperlipidemia     Hyperplastic rectal polyp     on 03/20/2009 colonoscopy    Hypertension     Multinodular goiter     noted on 1/20/2015 CTA carotid scan    Multiple thyroid nodules 05/05/2016    on Thyroid U/S    Osteoarthritis     Left knee    Osteoarthritis of back     Osteoporosis, unspecified     Postmenopausal     No history of abnormal pap smear    Proteinuria     Pustular psoriasis     hands and feet    Subclavian artery stenosis     Left; with the proximal segment at 60-70% per 10/02/2008 carotid CTA    Vitamin D deficiency disease        Past Surgical History:   Procedure Laterality Date    CATARACT EXTRACTION W/  INTRAOCULAR LENS IMPLANT Right 4/13/16    [facility]    CATARACT EXTRACTION W/  INTRAOCULAR LENS IMPLANT Left     [facility]    COLONOSCOPY N/A 8/30/2022    Procedure: COLONOSCOPY;  Surgeon: Reanna Garber MD;  Location: [facility] ENDO;  Service: Endoscopy;  Laterality: N/A;    CORONARY ANGIOGRAPHY N/A 6/18/2021    Procedure: ANGIOGRAM, CORONARY ARTERY;  Surgeon: Desiree William MD;  Location: [facility] CATH LAB;  Service: Cardiology;  Laterality: N/A;    CORONARY STENT PLACEMENT N/A 6/18/2021    Procedure: INSERTION, STENT, CORONARY ARTERY;  Surgeon: Desiree William MD;  Location: [facility] CATH LAB;  Service: Cardiology;  Laterality: N/A;    heart catheterization with stents placed  2004 and 08/18/2009    LEFT HEART CATHETERIZATION Left 6/18/2021    Procedure: CATHETERIZATION, HEART, LEFT;  Surgeon: Desiree William MD;  Location: [facility] CATH LAB;  Service: Cardiology;  Laterality: Left;  Covid + week of    left knee surgery for bone graft and pin placement         Review of patient's allergies indicates:   Allergen  Reactions    Codeine      Other reaction(s): Chills       Family History       Problem Relation (Age of Onset)    Cancer Father    Diabetes Mother    Hypertension Mother, Sister    No Known Problems Daughter, Son    Stroke Mother          Tobacco Use    Smoking status: Former     Packs/day: 1.00     Years: 33.00     Pack years: 33.00     Types: Cigarettes     Quit date: 2004     Years since quittin.7     Passive exposure: Past    Smokeless tobacco: Never   Substance and Sexual Activity    Alcohol use: Yes     Alcohol/week: 0.0 standard drinks     Comment: Rarely    Drug use: No    Sexual activity: Yes     Partners: Male     Birth control/protection: Post-menopausal         Review of Systems   Constitutional:  Positive for appetite change. Negative for activity change, chills and fever.   HENT:  Negative for congestion and sore throat.         Blister to lip with swelling x 1 week   Eyes:  Negative for photophobia and visual disturbance.   Respiratory:  Negative for cough and shortness of breath.    Cardiovascular:  Negative for chest pain and leg swelling.   Gastrointestinal:  Negative for nausea and vomiting.   Endocrine: Positive for polyphagia. Negative for polydipsia and polyuria.   Genitourinary:  Negative for difficulty urinating, dysuria and vaginal discharge.   Neurological:  Negative for dizziness and numbness.   Psychiatric/Behavioral:  Positive for confusion. Negative for sleep disturbance.    Objective:     Vital Signs (Most Recent):  Temp: 97.7 °F (36.5 °C) (23 1633)  Pulse: 97 (23 1703)  Resp: (!) 23 (23 1703)  BP: (!) 168/66 (23 1703)  SpO2: 96 % (23 1703)   Vital Signs (24h Range):  Temp:  [97.7 °F (36.5 °C)-98.8 °F (37.1 °C)] 97.7 °F (36.5 °C)  Pulse:  [] 97  Resp:  [18-28] 23  SpO2:  [96 %-99 %] 96 %  BP: (139-168)/(64-98) 168/66     Weight: 82 kg (180 lb 12.4 oz)  Body mass index is 33.06 kg/m².      Intake/Output Summary (Last 24 hours) at 2023  1739  Last data filed at 4/18/2023 1538  Gross per 24 hour   Intake 101.21 ml   Output --   Net 101.21 ml       Physical Exam  Vitals reviewed.   Constitutional:       Appearance: She is obese. She is ill-appearing.   HENT:      Head: Normocephalic and atraumatic.      Comments: Upper lip swelling and erythema with crusted over wound to mid upper lip     Mouth/Throat:      Mouth: Mucous membranes are moist.      Pharynx: Oropharynx is clear.   Eyes:      Extraocular Movements: Extraocular movements intact.      Conjunctiva/sclera: Conjunctivae normal.   Cardiovascular:      Rate and Rhythm: Normal rate and regular rhythm.   Pulmonary:      Effort: Pulmonary effort is normal.      Breath sounds: No wheezing or rhonchi.   Abdominal:      General: Bowel sounds are normal.      Palpations: Abdomen is soft.   Genitourinary:     General: Normal vulva.      Vagina: No vaginal discharge.      Rectum: Normal.   Musculoskeletal:      Cervical back: Normal range of motion and neck supple.      Right lower leg: No edema.      Left lower leg: No edema.   Skin:     General: Skin is warm and dry.      Comments: Left buttock wound- appears pressure related   Neurological:      Mental Status: She is easily aroused. She is lethargic.      Comments: Oriented to self only. Follows commands. Moving all extremities with equal strength.    Psychiatric:      Comments: Pleasant and redirectable.        Vents:       Lines/Drains/Airways       Peripheral Intravenous Line  Duration                  Peripheral IV - Single Lumen 04/18/23 0000 22 G Left Antecubital <1 day         Peripheral IV - Single Lumen 04/18/23 1554 20 G Anterior;Proximal;Right Upper Arm <1 day         Peripheral IV - Single Lumen 04/18/23 1659 22 G Posterior;Right Hand <1 day                    Significant Labs:    CBC/Anemia Profile:  Recent Labs   Lab 04/18/23  1314 04/18/23  1403   WBC 26.94*  --    HGB 12.0  --    HCT 35.6* 35*     --    MCV 88  --    RDW 12.4   --         Chemistries:  Recent Labs   Lab 04/18/23  1314 04/18/23  1554   * 138   K 3.5 3.5   CL 98 106   CO2 14* 14*   BUN 38* 33*   CREATININE 1.1 0.9   CALCIUM 9.3 8.5*   ALBUMIN 3.0*  --    PROT 6.9  --    BILITOT 0.7  --    ALKPHOS 114  --    ALT 22  --    AST 45*  --    MG 1.3*  --    PHOS  --  1.6*       All pertinent labs within the past 24 hours have been reviewed.    Significant Imaging:   I have reviewed all pertinent imaging results/findings within the past 24 hours.

## 2023-04-18 NOTE — PHARMACY MED REC
"Admission Medication History     The home medication history was taken by Gabe Vizcarra.    You may go to "Admission" then "Reconcile Home Medications" tabs to review and/or act upon these items.     The home medication list has been updated by the Pharmacy department.   Please read ALL comments highlighted in yellow.   Please address this information as you see fit.    Feel free to contact us if you have any questions or require assistance.      Medications listed below were obtained from: Patient/family and Analytic software- [employer]  (Not in a hospital admission)      Gabe Vizcarra  [id number]    Current Outpatient Medications on File Prior to Encounter   Medication Sig Dispense Refill Last Dose    allopurinoL (ZYLOPRIM) 100 MG tablet Take 1 tablet by mouth once daily 90 tablet 0 4/17/2023    aspirin 81 MG Chew Take 81 mg by mouth once daily.   4/17/2023    clopidogreL (PLAVIX) 75 mg tablet Take 1 tablet by mouth once daily 90 tablet 0 4/17/2023    ERGOCALCIFEROL, VITAMIN D2, (VITAMIN D ORAL) Take 2,000 Units by mouth once daily.   4/17/2023    ferrous sulfate (FEOSOL) Tab tablet Take 1 tablet by mouth 3 (three) times daily.   4/17/2023    furosemide (LASIX) 20 MG tablet Take 2 tablets (40 mg total) by mouth once daily. 180 tablet 3 4/17/2023    glipiZIDE (GLUCOTROL) 10 MG tablet TAKE 1 TABLET BY MOUTH TWICE DAILY BEFORE MEAL(S) 180 tablet 1 4/17/2023    hydrALAZINE (APRESOLINE) 50 MG tablet Take 1 tablet (50 mg total) by mouth 4 (four) times daily. 120 tablet 10 4/17/2023    insulin detemir U-100 (LEVEMIR FLEXTOUCH U-100 INSULN) 100 unit/mL (3 mL) InPn pen Inject 10 Units into the skin once daily. 1 each 1 4/17/2023    JANUVIA 100 mg Tab Take 1 tablet (100 mg total) by mouth once daily. 90 tablet 1 4/17/2023    losartan (COZAAR) 50 MG tablet Take 1 tablet by mouth once daily 90 tablet 0 4/17/2023    metFORMIN (GLUCOPHAGE) 500 MG tablet TAKE 1 TABLET BY MOUTH ONCE DAILY IN THE MORNING AND 1 ONCE DAILY AT " "NOON AND 2 ONCE DAILY IN THE EVENING WITH MEALS 360 tablet 1 4/17/2023    metoprolol succinate (TOPROL-XL) 50 MG 24 hr tablet Take 1 tablet by mouth once daily 90 tablet 0 4/17/2023    rosuvastatin (CRESTOR) 20 MG tablet TAKE 1 TABLET BY MOUTH NIGHTLY. 90 tablet 1 4/17/2023    blood sugar diagnostic Strp To check BG 2 times daily, to use with insurance preferred meter - Freestyle Lite 100 strip 11     fluconazole (DIFLUCAN) 100 MG tablet Take 1 pill by mouth once a week for 3 weeks. (Patient not taking: Reported on 4/18/2023) 3 tablet 0 Not Taking    pen needle, diabetic (BD ULTRA-FINE SHORT PEN NEEDLE) 31 gauge x 5/16" Ndle 1 Units by Misc.(Non-Drug; Combo Route) route once daily. 100 each 3                          .        "

## 2023-04-18 NOTE — ASSESSMENT & PLAN NOTE
UA consistent with UTI; prior cultures pan-sensitive E. Coli   S/p zosyn in ED  Change to Rocephin   Follow cultures

## 2023-04-18 NOTE — ASSESSMENT & PLAN NOTE
BP a little elevated   Will resume PO meds when able to take PO meds  Hydralazine PRN SBP >180 mmHg   Monitor

## 2023-04-18 NOTE — HPI
Information obtained by chart review, patient and daughter, Jose.    68-year-old female with a known past medical history of CAD with PCI, HTN, HLD, gout, and DM with recent Rx of Lantus in addition to PO agents who presented to ED 4/18/2023 for evaluation of confusion. ED evaluation with lab findings consistent with DKA and UA revealing UTI. She was given insulin and IVFs and Zosyn. Critical care consulted for ICU admission.     Upon exam, Ms. Encarnacion is lethargic but arouses easily. Daughter found patient sitting on the floor of her home earlier today confused. She went to check on her after receiving a call from patient's boss that she did not show up today for work. 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 She is usually completely oriented and independent. Jose was recently made aware that Ms. Encarnacion does not take all of her medications all the time and misses doses. Ms. Encarnacion does confirm recently being placed on lantus but states she has never taken it. She denies any urinary symptoms. She endorses polyphagia. She has had a fever blister to her upper lip for about a week. Over the past week it has had worsening swelling.

## 2023-04-18 NOTE — ASSESSMENT & PLAN NOTE
Lip swollen with lesion mid upper lip   Reportedly present for past week. Patient notes hx of fever blister 2-3xs/year  Valtrex - if able to tolerate oral- if not, can switch to acyclovir IV until DKA resolved

## 2023-04-18 NOTE — ASSESSMENT & PLAN NOTE
Present on admit- see media   Appears to be pressure related, but daughter reports patient active and working   Wound care consulted

## 2023-04-18 NOTE — ED PROVIDER NOTES
"SCRIBE #1 NOTE: I, Elizabeth Grigsby, am scribing for, and in the presence of, Yumiko Baca MD. I have scribed the entire note.      History      Chief Complaint   Patient presents with    Weakness     Generalized weakness, recent UTI,  with AASI. Given 500mL IVF.        Review of patient's allergies indicates:   Allergen Reactions    Codeine      Other reaction(s): Chills        HPI   HPI    4/18/2023, 1:07 PM   History obtained from the patient and the pt's daughter at bedside      History of Present Illness: Alysha Rosalse is a 68 y.o. female patient with a PMHx of CAD, DM2, HLD, and HTN who presents to the Emergency Department for generalized weakness. Per the pt's daughter, the pt did not show up to work this morning which is abnormal, so EMS was called. Pt was found on the floor of her home. Pt is unable to state why she was on the floor and the pt's daughter was not at the home to witness the fall. Pt's daughter states that the pt had a UTI about a month ago which was treated with Cipro and has been having syncopal episodes since then. The pt states that she did not show up to work because she has a "fever blister" to her R upper lip. Symptoms are constant and moderate in severity. No mitigating or exacerbating factors reported. Associated sxs include syncope. Pt also c/o R upper lip swelling. Patient denies any V/D, abdominal pain, CP, SOB, fever, chills, and all other sxs at this time. Prior Tx includes 500 mL of IVF administered by AASI. No further complaints or concerns at this time.         Arrival mode: EMS    PCP: Margie Bird MD       Past Medical History:  Past Medical History:   Diagnosis Date    CAD (coronary artery disease)     Followed by Dr. Stevo Kaba    Diabetes mellitus, type 2     DM (diabetes mellitus)     BS didn't check 06/05/2020    DM (diabetes mellitus) 2012    BS didn't check 02/10/2022    High level of uric acid in blood     Hyperlipidemia     Hyperplastic rectal " polyp     on 03/20/2009 colonoscopy    Hypertension     Multinodular goiter     noted on 1/20/2015 CTA carotid scan    Multiple thyroid nodules 05/05/2016    on Thyroid U/S    Osteoarthritis     Left knee    Osteoarthritis of back     Osteoporosis, unspecified     Postmenopausal     No history of abnormal pap smear    Proteinuria     Pustular psoriasis     hands and feet    Subclavian artery stenosis     Left; with the proximal segment at 60-70% per 10/02/2008 carotid CTA    Vitamin D deficiency disease        Past Surgical History:  Past Surgical History:   Procedure Laterality Date    CATARACT EXTRACTION W/  INTRAOCULAR LENS IMPLANT Right 4/13/16    [facility]    CATARACT EXTRACTION W/  INTRAOCULAR LENS IMPLANT Left     [facility]    COLONOSCOPY N/A 8/30/2022    Procedure: COLONOSCOPY;  Surgeon: Reanna Garber MD;  Location: [facility] ENDO;  Service: Endoscopy;  Laterality: N/A;    CORONARY ANGIOGRAPHY N/A 6/18/2021    Procedure: ANGIOGRAM, CORONARY ARTERY;  Surgeon: Desiree William MD;  Location: [facility] CATH LAB;  Service: Cardiology;  Laterality: N/A;    CORONARY STENT PLACEMENT N/A 6/18/2021    Procedure: INSERTION, STENT, CORONARY ARTERY;  Surgeon: Desiree William MD;  Location: [facility] CATH LAB;  Service: Cardiology;  Laterality: N/A;    heart catheterization with stents placed  2004 and 08/18/2009    LEFT HEART CATHETERIZATION Left 6/18/2021    Procedure: CATHETERIZATION, HEART, LEFT;  Surgeon: Desiree William MD;  Location: [facility] CATH LAB;  Service: Cardiology;  Laterality: Left;  Covid + week of    left knee surgery for bone graft and pin placement           Family History:  Family History   Problem Relation Age of Onset    Diabetes Mother     Hypertension Mother     Stroke Mother     Hypertension Sister     Cancer Father         Lung cancer (smoker)    No Known Problems Daughter     No Known Problems Son     Heart disease Neg Hx        Social History:  Social History     Tobacco Use    Smoking status: Former      Packs/day: 1.00     Years: 33.00     Pack years: 33.00     Types: Cigarettes     Quit date: 2004     Years since quittin.7     Passive exposure: Past    Smokeless tobacco: Never   Substance and Sexual Activity    Alcohol use: Yes     Alcohol/week: 0.0 standard drinks     Comment: Rarely    Drug use: No    Sexual activity: Yes     Partners: Male     Birth control/protection: Post-menopausal       ROS   Review of Systems   Constitutional:  Negative for chills and fever.   HENT:  Positive for facial swelling (R upper lip). Negative for sore throat.    Respiratory:  Negative for shortness of breath.    Cardiovascular:  Negative for chest pain.   Gastrointestinal:  Negative for abdominal pain, diarrhea and vomiting.   Genitourinary:  Negative for dysuria.   Musculoskeletal:  Negative for back pain.   Skin:  Negative for rash.   Neurological:  Positive for syncope and weakness (generalized).   Hematological:  Does not bruise/bleed easily.   All other systems reviewed and are negative.    Physical Exam      Initial Vitals [23 1233]   BP Pulse Resp Temp SpO2   (!) 139/98 93 18 98.8 °F (37.1 °C) 99 %      MAP       --          Physical Exam  Nursing Notes and Vital Signs Reviewed.  Constitutional: Patient is in no acute distress. Disheveled. Unkempt. Morbid obesity.  Head: Atraumatic. Normocephalic.  Eyes: PERRL. EOM intact. Conjunctivae are not pale. No scleral icterus.  ENT: Mucous membranes are dry. Oropharynx is clear and symmetric.    Neck: Supple. Full ROM. No lymphadenopathy.  Cardiovascular: Regular rate. Regular rhythm. No murmurs, rubs, or gallops. Distal pulses are 2+ and symmetric.  Pulmonary/Chest: No respiratory distress. Clear to auscultation bilaterally. No wheezing or rales.  Abdominal: Soft and non-distended.  There is no tenderness.  No rebound, guarding, or rigidity.   Musculoskeletal: Moves all extremities. No obvious deformities. No edema.  Skin: There is a herpetic lesion to the R upper  lip with mild tenderness and induration noted.  Neurological:  Alert, awake, and appropriate.  Normal speech.  No acute focal neurological deficits are appreciated.  Psychiatric: Normal affect. Good eye contact. Appropriate in content.    ED Course    Critical Care    Date/Time: 4/18/2023 4:34 PM  Performed by: Yumiko Baca MD  Authorized by: Yumiko Baca MD   Direct patient critical care time: 25 minutes  Additional history critical care time: 7 minutes  Ordering / reviewing critical care time: 12 minutes  Documentation critical care time: 7 minutes  Consulting other physicians critical care time: 9 minutes  Total critical care time (exclusive of procedural time) : 60 minutes  Critical care time was exclusive of separately billable procedures and treating other patients and teaching time.  Critical care was necessary to treat or prevent imminent or life-threatening deterioration of the following conditions: dehydration, sepsis, endocrine crisis and metabolic crisis (DKA).  Critical care was time spent personally by me on the following activities: blood draw for specimens, development of treatment plan with patient or surrogate, discussions with consultants, interpretation of cardiac output measurements, evaluation of patient's response to treatment, ordering and review of laboratory studies, ordering and review of radiographic studies, pulse oximetry, re-evaluation of patient's condition, review of old charts, examination of patient, obtaining history from patient or surrogate and ordering and performing treatments and interventions.      ED Vital Signs:  Vitals:    04/18/23 1233 04/18/23 1300 04/18/23 1312 04/18/23 1320   BP: (!) 139/98      Pulse: 93   101   Resp: 18      Temp: 98.8 °F (37.1 °C)      TempSrc: Oral      SpO2: 99%      Weight:  84 kg (185 lb 3 oz) 82 kg (180 lb 12.4 oz)     04/18/23 1445 04/18/23 1500 04/18/23 1533 04/18/23 1633   BP: (!) 162/67 (!) 153/65 (!) 150/67 (!) 158/64   Pulse: 91  97 92 97   Resp: 18 20 (!) 22 (!) 22   Temp:    97.7 °F (36.5 °C)   TempSrc:    Oral   SpO2: 96% 99% 97% 96%   Weight:        04/18/23 1648 04/18/23 1703   BP:  (!) 168/66   Pulse: 101 97   Resp: (!) 28 (!) 23   Temp:     TempSrc:     SpO2: 96% 96%   Weight:         Abnormal Lab Results:  Labs Reviewed   URINALYSIS, REFLEX TO URINE CULTURE - Abnormal; Notable for the following components:       Result Value    Appearance, UA Hazy (*)     Protein, UA 2+ (*)     Glucose, UA 4+ (*)     Ketones, UA 2+ (*)     Occult Blood UA 3+ (*)     Nitrite, UA Positive (*)     Leukocytes, UA 2+ (*)     All other components within normal limits    Narrative:     Specimen Source->Urine   BETA - HYDROXYBUTYRATE, SERUM - Abnormal; Notable for the following components:    Beta-Hydroxybutyrate 4.8 (*)     All other components within normal limits   MAGNESIUM - Abnormal; Notable for the following components:    Magnesium 1.3 (*)     All other components within normal limits   CBC W/ AUTO DIFFERENTIAL - Abnormal; Notable for the following components:    WBC 26.94 (*)     Hematocrit 35.6 (*)     Immature Granulocytes 0.9 (*)     Gran # (ANC) 24.7 (*)     Immature Grans (Abs) 0.25 (*)     Lymph # 0.3 (*)     Mono # 1.5 (*)     Gran % 91.9 (*)     Lymph % 1.2 (*)     All other components within normal limits   COMPREHENSIVE METABOLIC PANEL - Abnormal; Notable for the following components:    Sodium 134 (*)     CO2 14 (*)     Glucose 487 (*)     BUN 38 (*)     Albumin 3.0 (*)     AST 45 (*)     Anion Gap 22 (*)     eGFR 55 (*)     All other components within normal limits    Narrative:       GLU critical result(s) called and verbal readback obtained from   SONIA ABREU RN  by [email or web address] 04/18/2023 13:45   TROPONIN I - Abnormal; Notable for the following components:    Troponin I 0.040 (*)     All other components within normal limits   LACTIC ACID, PLASMA - Abnormal; Notable for the following components:    Lactate (Lactic Acid) 2.4 (*)     All  other components within normal limits   PROCALCITONIN - Abnormal; Notable for the following components:    Procalcitonin 1.97 (*)     All other components within normal limits   URINALYSIS MICROSCOPIC - Abnormal; Notable for the following components:    WBC, UA 33 (*)     WBC Clumps, UA Many (*)     Bacteria Many (*)     All other components within normal limits    Narrative:     Specimen Source->Urine   BASIC METABOLIC PANEL - Abnormal; Notable for the following components:    CO2 14 (*)     Glucose 299 (*)     BUN 33 (*)     Calcium 8.5 (*)     Anion Gap 18 (*)     All other components within normal limits   PHOSPHORUS - Abnormal; Notable for the following components:    Phosphorus 1.6 (*)     All other components within normal limits   LACTIC ACID, PLASMA - Abnormal; Notable for the following components:    Lactate (Lactic Acid) 3.0 (*)     All other components within normal limits    Narrative:     Add on or collect with next BMP   POCT GLUCOSE - Abnormal; Notable for the following components:    POCT Glucose 464 (*)     All other components within normal limits   ISTAT PROCEDURE - Abnormal; Notable for the following components:    POC PH 7.327 (*)     POC PO2 22 (*)     POC HCO3 20.2 (*)     POC SATURATED O2 33 (*)     POC Glucose 418 (*)     POC Sodium 135 (*)     POC Potassium 3.4 (*)     POC Hematocrit 35 (*)     All other components within normal limits   POCT GLUCOSE - Abnormal; Notable for the following components:    POCT Glucose 438 (*)     All other components within normal limits   POCT GLUCOSE - Abnormal; Notable for the following components:    POCT Glucose 351 (*)     All other components within normal limits   POCT GLUCOSE - Abnormal; Notable for the following components:    POCT Glucose 273 (*)     All other components within normal limits   POCT GLUCOSE - Abnormal; Notable for the following components:    POCT Glucose 221 (*)     All other components within normal limits   CULTURE, BLOOD    CULTURE, BLOOD   CULTURE, URINE   URIC ACID    Narrative:     Add on or collect with next BMP   BASIC METABOLIC PANEL   PHOSPHORUS   HEMOGLOBIN A1C   MAGNESIUM   POCT GLUCOSE MONITORING CONTINUOUS   POCT GLUCOSE MONITORING CONTINUOUS   POCT GLUCOSE MONITORING CONTINUOUS   POCT GLUCOSE MONITORING CONTINUOUS        All Lab Results:  Results for orders placed or performed during the hospital encounter of 04/18/23   Urinalysis, Reflex to Urine Culture Urine, Catheterized    Specimen: Urine   Result Value Ref Range    Specimen UA Urine, Catheterized     Color, UA Yellow Yellow, Straw, Geeta    Appearance, UA Hazy (A) Clear    pH, UA 6.0 5.0 - 8.0    Specific Gravity, UA 1.030 1.005 - 1.030    Protein, UA 2+ (A) Negative    Glucose, UA 4+ (A) Negative    Ketones, UA 2+ (A) Negative    Bilirubin (UA) Negative Negative    Occult Blood UA 3+ (A) Negative    Nitrite, UA Positive (A) Negative    Urobilinogen, UA Negative <2.0 EU/dL    Leukocytes, UA 2+ (A) Negative   Beta - Hydroxybutyrate, Serum   Result Value Ref Range    Beta-Hydroxybutyrate 4.8 (H) 0.0 - 0.5 mmol/L   Magnesium   Result Value Ref Range    Magnesium 1.3 (L) 1.6 - 2.6 mg/dL   CBC auto differential   Result Value Ref Range    WBC 26.94 (H) 3.90 - 12.70 K/uL    RBC 4.03 4.00 - 5.40 M/uL    Hemoglobin 12.0 12.0 - 16.0 g/dL    Hematocrit 35.6 (L) 37.0 - 48.5 %    MCV 88 82 - 98 fL    MCH 29.8 27.0 - 31.0 pg    MCHC 33.7 32.0 - 36.0 g/dL    RDW 12.4 11.5 - 14.5 %    Platelets 294 150 - 450 K/uL    MPV 10.7 9.2 - 12.9 fL    Immature Granulocytes 0.9 (H) 0.0 - 0.5 %    Gran # (ANC) 24.7 (H) 1.8 - 7.7 K/uL    Immature Grans (Abs) 0.25 (H) 0.00 - 0.04 K/uL    Lymph # 0.3 (L) 1.0 - 4.8 K/uL    Mono # 1.5 (H) 0.3 - 1.0 K/uL    Eos # 0.0 0.0 - 0.5 K/uL    Baso # 0.11 0.00 - 0.20 K/uL    nRBC 0 0 /100 WBC    Gran % 91.9 (H) 38.0 - 73.0 %    Lymph % 1.2 (L) 18.0 - 48.0 %    Mono % 5.6 4.0 - 15.0 %    Eosinophil % 0.0 0.0 - 8.0 %    Basophil % 0.4 0.0 - 1.9 %     Differential Method Automated    Comprehensive metabolic panel   Result Value Ref Range    Sodium 134 (L) 136 - 145 mmol/L    Potassium 3.5 3.5 - 5.1 mmol/L    Chloride 98 95 - 110 mmol/L    CO2 14 (L) 23 - 29 mmol/L    Glucose 487 (HH) 70 - 110 mg/dL    BUN 38 (H) 8 - 23 mg/dL    Creatinine 1.1 0.5 - 1.4 mg/dL    Calcium 9.3 8.7 - 10.5 mg/dL    Total Protein 6.9 6.0 - 8.4 g/dL    Albumin 3.0 (L) 3.5 - 5.2 g/dL    Total Bilirubin 0.7 0.1 - 1.0 mg/dL    Alkaline Phosphatase 114 55 - 135 U/L    AST 45 (H) 10 - 40 U/L    ALT 22 10 - 44 U/L    Anion Gap 22 (H) 8 - 16 mmol/L    eGFR 55 (A) >60 mL/min/1.73 m^2   Troponin I #1   Result Value Ref Range    Troponin I 0.040 (H) 0.000 - 0.026 ng/mL   Lactic acid, plasma   Result Value Ref Range    Lactate (Lactic Acid) 2.4 (H) 0.5 - 2.2 mmol/L   Procalcitonin   Result Value Ref Range    Procalcitonin 1.97 (H) <0.25 ng/mL   Urinalysis Microscopic   Result Value Ref Range    RBC, UA 2 0 - 4 /hpf    WBC, UA 33 (H) 0 - 5 /hpf    WBC Clumps, UA Many (A) None-Rare    Bacteria Many (A) None-Occ /hpf    Yeast, UA None None    Hyaline Casts, UA 0 0-1/lpf /lpf    Microscopic Comment SEE COMMENT    Basic metabolic panel   Result Value Ref Range    Sodium 138 136 - 145 mmol/L    Potassium 3.5 3.5 - 5.1 mmol/L    Chloride 106 95 - 110 mmol/L    CO2 14 (L) 23 - 29 mmol/L    Glucose 299 (H) 70 - 110 mg/dL    BUN 33 (H) 8 - 23 mg/dL    Creatinine 0.9 0.5 - 1.4 mg/dL    Calcium 8.5 (L) 8.7 - 10.5 mg/dL    Anion Gap 18 (H) 8 - 16 mmol/L    eGFR >60 >60 mL/min/1.73 m^2   Phosphorus   Result Value Ref Range    Phosphorus 1.6 (L) 2.7 - 4.5 mg/dL   Lactic acid, plasma   Result Value Ref Range    Lactate (Lactic Acid) 3.0 (H) 0.5 - 2.2 mmol/L   Uric acid   Result Value Ref Range    Uric Acid 5.5 2.4 - 5.7 mg/dL   POCT glucose   Result Value Ref Range    POCT Glucose 464 (HH) 70 - 110 mg/dL   ISTAT PROCEDURE   Result Value Ref Range    POC PH 7.327 (L) 7.35 - 7.45    POC PCO2 38.6 35 - 45 mmHg    POC  PO2 22 (L) 40 - 60 mmHg    POC HCO3 20.2 (L) 24 - 28 mmol/L    POC BE -6 -2 to 2 mmol/L    POC SATURATED O2 33 (L) 95 - 100 %    POC Glucose 418 (H) 70 - 110 mg/dL    POC Sodium 135 (L) 136 - 145 mmol/L    POC Potassium 3.4 (L) 3.5 - 5.1 mmol/L    POC Ionized Calcium 1.25 1.06 - 1.42 mmol/L    POC Hematocrit 35 (L) 36 - 54 %PCV    Sample VENOUS     Site Other     Allens Test N/A     DelSys Room Air     Mode SPONT     FiO2 21    POCT glucose   Result Value Ref Range    POCT Glucose 438 (H) 70 - 110 mg/dL   POCT glucose   Result Value Ref Range    POCT Glucose 351 (H) 70 - 110 mg/dL   POCT glucose   Result Value Ref Range    POCT Glucose 273 (H) 70 - 110 mg/dL   POCT glucose   Result Value Ref Range    POCT Glucose 221 (H) 70 - 110 mg/dL         Imaging Results:  Imaging Results              X-Ray Chest AP Portable (Final result)  Result time 04/18/23 13:46:23      Final result by ARNULFO Danielson Sr., MD (04/18/23 13:46:23)                   Impression:      1. There is no focal pulmonary infiltrate visualized.  2. The size of the heart is prominent.  Some of this may be secondary to magnification.  3. There is marked narrowing of the left subacromial space. There is moderate narrowing of the right subacromial space.  This is characteristic of rotator cuff tears.  4. There is a mild amount of levoconvex curvature in the superior aspect of the thoracic spine.  .      Electronically signed by: Inocente Danielson MD  Date:    04/18/2023  Time:    13:46               Narrative:    EXAMINATION:  XR CHEST AP PORTABLE    CLINICAL HISTORY:  Weakness    COMPARISON:  05/28/2021    FINDINGS:  The size of the heart is prominent.  There is no focal pulmonary infiltrate visualized.  There is no pneumothorax.  The costophrenic angles are sharp.  There is marked narrowing of the left subacromial space.  There is moderate narrowing of the right subacromial space.  There is a mild amount of levoconvex curvature in the superior aspect of  the thoracic spine.                                       CT Head Without Contrast (Final result)  Result time 04/18/23 13:53:28      Final result by ARNULFO Danielson Sr., MD (04/18/23 13:53:28)                   Impression:      1. There are subacute to chronic appearing ischemic changes in the left basal ganglia.  2. There is no intracranial hemorrhage.  All CT scans at this facility use dose modulation, iterative reconstruction, and/or weight base dosing when appropriate to reduce radiation dose when appropriate to reduce radiation dose to as low as reasonably achievable.      Electronically signed by: Inocente Danielson MD  Date:    04/18/2023  Time:    13:53               Narrative:    EXAMINATION:  CT HEAD WITHOUT CONTRAST    CLINICAL HISTORY:  Mental status change, unknown cause;    TECHNIQUE:  Standard brain CT protocol without IV contrast was performed.    COMPARISON:  None    FINDINGS:  There are subacute to chronic appearing ischemic changes in the left basal ganglia.  There is no intracranial hemorrhage.  There is no skull fracture. The paranasal sinuses are normal in appearance.                                     The EKG was ordered, reviewed, and independently interpreted by the ED provider.  Interpretation time: 13:44  Rate: 101 BPM  Rhythm: sinus tachycardia with premature supraventricular complexes  Interpretation: Possible Inferior infarct, age undetermined. No STEMI.           The Emergency Provider reviewed the vital signs and test results, which are outlined above.    ED Discussion     4:33 PM: Discussed case with Elissa Sage NP (Critical Care Medicine). Dr. Mcmillan agrees with current care and management of pt and accepts admission.   Admitting Service: Critical Care Medicine   Admitting Physician: Dr. Mcmillan  Admit to: ICU    4:34 PM: Re-evaluated pt. I have discussed test results, shared treatment plan, and the need for admission with patient and family at bedside. Pt and family express  understanding at this time and agree with all information. All questions answered. Pt and family have no further questions or concerns at this time. Pt is ready for admit.           ED Medication(s):  Medications   potassium chloride 10 mEq in 100 mL IVPB (0 mEq Intravenous Stopped 4/18/23 1829)   insulin regular 1 Units/mL in sodium chloride 0.9% 100 mL infusion (0.1 Units/kg/hr × 82 kg Intravenous New Bag 4/18/23 1704)   dextrose 10% bolus 125 mL 125 mL (has no administration in time range)   dextrose 10% bolus 250 mL 250 mL (has no administration in time range)   sodium chloride 0.9% flush 10 mL (has no administration in time range)   0.9%  NaCl infusion (125 mL/hr Intravenous Not Given 4/18/23 1730)   dextrose 5 % and 0.45 % NaCl infusion (125 mL/hr Intravenous New Bag 4/18/23 1700)   ondansetron injection 4 mg (has no administration in time range)   acetaminophen tablet 650 mg (has no administration in time range)   dextrose 10% bolus 125 mL 125 mL (has no administration in time range)   dextrose 10% bolus 250 mL 250 mL (has no administration in time range)   potassium chloride 10 mEq in 100 mL IVPB (has no administration in time range)     And   potassium chloride 10 mEq in 100 mL IVPB (has no administration in time range)     And   potassium chloride 10 mEq in 100 mL IVPB (has no administration in time range)   magnesium sulfate 2g in water 50mL IVPB (premix) (has no administration in time range)   magnesium sulfate 2g in water 50mL IVPB (premix) (has no administration in time range)   sodium phosphate 15 mmol in dextrose 5 % (D5W) 250 mL IVPB (has no administration in time range)   sodium phosphate 20.01 mmol in dextrose 5 % (D5W) 250 mL IVPB (has no administration in time range)   sodium phosphate 30 mmol in dextrose 5 % (D5W) 250 mL IVPB (has no administration in time range)   cefTRIAXone (ROCEPHIN) 1 g in dextrose 5 % in water (D5W) 5 % 50 mL IVPB (MB+) (1 g Intravenous New Bag 4/18/23 1805)    valACYclovir tablet 1,000 mg (has no administration in time range)   sodium chloride 0.9% bolus 2,000 mL 2,000 mL (0 mLs Intravenous Stopped 4/18/23 1511)   magnesium sulfate 2g in water 50mL IVPB (premix) (0 g Intravenous Stopped 4/18/23 1633)   insulin regular injection 10 Units 0.1 mL (10 Units Intravenous Given 4/18/23 1439)   insulin regular injection 10 Units 0.1 mL (10 Units Subcutaneous Given 4/18/23 1437)   piperacillin-tazobactam (ZOSYN) 4.5 g in dextrose 5 % in water (D5W) 5 % 100 mL IVPB (MB+) (0 g Intravenous Stopped 4/18/23 1535)   magnesium sulfate 2g in water 50mL IVPB (premix) (2 g Intravenous New Bag 4/18/23 1715)           New Prescriptions    No medications on file         Medical Decision Making    Medical Decision Making:   Clinical Tests:   Lab Tests: Ordered and Reviewed  Radiological Study: Ordered and Reviewed  Medical Tests: Ordered and Reviewed  ED Management:  Patient being admitted for DKA, sepsis due to UTI, given iv fluids, insulin, iv antibiotics, magnesium replacement in the ER, ICU consulted for admission.          Scribe Attestation:   Scribe #1: I performed the above scribed service and the documentation accurately describes the services I performed. I attest to the accuracy of the note.    Attending:   Physician Attestation Statement for Scribe #1: I, Yumiko Baca MD, personally performed the services described in this documentation, as scribed by Elizabeth Grigsby, in my presence, and it is both accurate and complete.          Clinical Impression       ICD-10-CM ICD-9-CM   1. Diabetic ketoacidosis without coma associated with type 2 diabetes mellitus  E11.10 250.12   2. Hyperglycemia  R73.9 790.29   3. Weakness  R53.1 780.79   4. DKA (diabetic ketoacidosis)  E11.10 250.12   5. Sepsis, due to unspecified organism, unspecified whether acute organ dysfunction present  A41.9 038.9     995.91   6. Acute cystitis without hematuria  N30.00 595.0   7. Intravascular volume depletion   E86.1 276.52   8. Encephalopathy due to infection  G93.49 348.39    B99.9 136.9   9. Primary herpes simplex infection of lips  B00.1 054.9       Disposition:   Disposition: Admitted  Condition: Nelson Baca MD  04/18/23 1919

## 2023-04-19 ENCOUNTER — PATIENT MESSAGE (OUTPATIENT)
Dept: ADMINISTRATIVE | Facility: HOSPITAL | Age: 68
End: 2023-04-19
Payer: MEDICARE

## 2023-04-19 LAB
ANION GAP SERPL CALC-SCNC: 14 MMOL/L (ref 8–16)
ANISOCYTOSIS BLD QL SMEAR: SLIGHT
BASOPHILS NFR BLD: 0 % (ref 0–1.9)
BUN SERPL-MCNC: 31 MG/DL (ref 8–23)
BURR CELLS BLD QL SMEAR: ABNORMAL
CALCIUM SERPL-MCNC: 8.4 MG/DL (ref 8.7–10.5)
CHLORIDE SERPL-SCNC: 107 MMOL/L (ref 95–110)
CO2 SERPL-SCNC: 13 MMOL/L (ref 23–29)
CREAT SERPL-MCNC: 0.9 MG/DL (ref 0.5–1.4)
DIFFERENTIAL METHOD: ABNORMAL
EOSINOPHIL NFR BLD: 0 % (ref 0–8)
ERYTHROCYTE [DISTWIDTH] IN BLOOD BY AUTOMATED COUNT: 12.6 % (ref 11.5–14.5)
EST. GFR  (NO RACE VARIABLE): >60 ML/MIN/1.73 M^2
ESTIMATED AVG GLUCOSE: 258 MG/DL (ref 68–131)
GIANT PLATELETS BLD QL SMEAR: PRESENT
GLUCOSE SERPL-MCNC: 214 MG/DL (ref 70–110)
HBA1C MFR BLD: 10.6 % (ref 4–5.6)
HCT VFR BLD AUTO: 34.6 % (ref 37–48.5)
HGB BLD-MCNC: 11.2 G/DL (ref 12–16)
IMM GRANULOCYTES # BLD AUTO: ABNORMAL K/UL (ref 0–0.04)
IMM GRANULOCYTES NFR BLD AUTO: ABNORMAL % (ref 0–0.5)
LYMPHOCYTES NFR BLD: 15 % (ref 18–48)
MAGNESIUM SERPL-MCNC: 1.9 MG/DL (ref 1.6–2.6)
MCH RBC QN AUTO: 30.6 PG (ref 27–31)
MCHC RBC AUTO-ENTMCNC: 32.4 G/DL (ref 32–36)
MCV RBC AUTO: 95 FL (ref 82–98)
METAMYELOCYTES NFR BLD MANUAL: 2 %
MONOCYTES NFR BLD: 5 % (ref 4–15)
NEUTROPHILS NFR BLD: 72 % (ref 38–73)
NEUTS BAND NFR BLD MANUAL: 6 %
NRBC BLD-RTO: 0 /100 WBC
OVALOCYTES BLD QL SMEAR: ABNORMAL
PHOSPHATE SERPL-MCNC: 3.9 MG/DL (ref 2.7–4.5)
PLATELET # BLD AUTO: 192 K/UL (ref 150–450)
PLATELET BLD QL SMEAR: ABNORMAL
PMV BLD AUTO: 10.8 FL (ref 9.2–12.9)
POCT GLUCOSE: 168 MG/DL (ref 70–110)
POCT GLUCOSE: 188 MG/DL (ref 70–110)
POCT GLUCOSE: 196 MG/DL (ref 70–110)
POCT GLUCOSE: 202 MG/DL (ref 70–110)
POCT GLUCOSE: 212 MG/DL (ref 70–110)
POCT GLUCOSE: 223 MG/DL (ref 70–110)
POCT GLUCOSE: 247 MG/DL (ref 70–110)
POCT GLUCOSE: 251 MG/DL (ref 70–110)
POCT GLUCOSE: 253 MG/DL (ref 70–110)
POIKILOCYTOSIS BLD QL SMEAR: SLIGHT
POLYCHROMASIA BLD QL SMEAR: ABNORMAL
POTASSIUM SERPL-SCNC: 3.8 MMOL/L (ref 3.5–5.1)
RBC # BLD AUTO: 3.66 M/UL (ref 4–5.4)
SODIUM SERPL-SCNC: 134 MMOL/L (ref 136–145)
WBC # BLD AUTO: 17.55 K/UL (ref 3.9–12.7)

## 2023-04-19 PROCEDURE — 36415 COLL VENOUS BLD VENIPUNCTURE: CPT | Performed by: NURSE PRACTITIONER

## 2023-04-19 PROCEDURE — 84100 ASSAY OF PHOSPHORUS: CPT | Performed by: NURSE PRACTITIONER

## 2023-04-19 PROCEDURE — 85007 BL SMEAR W/DIFF WBC COUNT: CPT | Performed by: NURSE PRACTITIONER

## 2023-04-19 PROCEDURE — 63600175 PHARM REV CODE 636 W HCPCS: Performed by: NURSE PRACTITIONER

## 2023-04-19 PROCEDURE — 25000003 PHARM REV CODE 250: Performed by: INTERNAL MEDICINE

## 2023-04-19 PROCEDURE — 25000003 PHARM REV CODE 250: Performed by: NURSE PRACTITIONER

## 2023-04-19 PROCEDURE — 36415 COLL VENOUS BLD VENIPUNCTURE: CPT | Performed by: INTERNAL MEDICINE

## 2023-04-19 PROCEDURE — 85027 COMPLETE CBC AUTOMATED: CPT | Performed by: NURSE PRACTITIONER

## 2023-04-19 PROCEDURE — 11000001 HC ACUTE MED/SURG PRIVATE ROOM

## 2023-04-19 PROCEDURE — 80048 BASIC METABOLIC PNL TOTAL CA: CPT | Performed by: NURSE PRACTITIONER

## 2023-04-19 PROCEDURE — 94761 N-INVAS EAR/PLS OXIMETRY MLT: CPT

## 2023-04-19 PROCEDURE — 83735 ASSAY OF MAGNESIUM: CPT | Performed by: NURSE PRACTITIONER

## 2023-04-19 PROCEDURE — 87529 HSV DNA AMP PROBE: CPT | Performed by: INTERNAL MEDICINE

## 2023-04-19 RX ORDER — ENOXAPARIN SODIUM 100 MG/ML
40 INJECTION SUBCUTANEOUS EVERY 24 HOURS
Status: DISCONTINUED | OUTPATIENT
Start: 2023-04-19 | End: 2023-04-21 | Stop reason: HOSPADM

## 2023-04-19 RX ORDER — CLOPIDOGREL BISULFATE 75 MG/1
75 TABLET ORAL DAILY
Status: DISCONTINUED | OUTPATIENT
Start: 2023-04-19 | End: 2023-04-21 | Stop reason: HOSPADM

## 2023-04-19 RX ORDER — ATORVASTATIN CALCIUM 40 MG/1
40 TABLET, FILM COATED ORAL DAILY
Status: DISCONTINUED | OUTPATIENT
Start: 2023-04-19 | End: 2023-04-21 | Stop reason: HOSPADM

## 2023-04-19 RX ORDER — NAPROXEN SODIUM 220 MG/1
81 TABLET, FILM COATED ORAL DAILY
Status: DISCONTINUED | OUTPATIENT
Start: 2023-04-19 | End: 2023-04-21 | Stop reason: HOSPADM

## 2023-04-19 RX ORDER — ALLOPURINOL 100 MG/1
100 TABLET ORAL DAILY
Status: DISCONTINUED | OUTPATIENT
Start: 2023-04-19 | End: 2023-04-21 | Stop reason: HOSPADM

## 2023-04-19 RX ORDER — HYDRALAZINE HYDROCHLORIDE 50 MG/1
50 TABLET, FILM COATED ORAL 4 TIMES DAILY
Status: DISCONTINUED | OUTPATIENT
Start: 2023-04-19 | End: 2023-04-21 | Stop reason: HOSPADM

## 2023-04-19 RX ORDER — LANOLIN ALCOHOL/MO/W.PET/CERES
1 CREAM (GRAM) TOPICAL 3 TIMES DAILY
Status: DISCONTINUED | OUTPATIENT
Start: 2023-04-19 | End: 2023-04-21 | Stop reason: HOSPADM

## 2023-04-19 RX ORDER — METOPROLOL SUCCINATE 50 MG/1
50 TABLET, EXTENDED RELEASE ORAL DAILY
Status: DISCONTINUED | OUTPATIENT
Start: 2023-04-19 | End: 2023-04-21 | Stop reason: HOSPADM

## 2023-04-19 RX ORDER — SODIUM CHLORIDE 9 MG/ML
INJECTION, SOLUTION INTRAVENOUS CONTINUOUS
Status: DISCONTINUED | OUTPATIENT
Start: 2023-04-19 | End: 2023-04-21 | Stop reason: HOSPADM

## 2023-04-19 RX ORDER — MUPIROCIN 20 MG/G
OINTMENT TOPICAL 2 TIMES DAILY
Status: DISCONTINUED | OUTPATIENT
Start: 2023-04-19 | End: 2023-04-21 | Stop reason: HOSPADM

## 2023-04-19 RX ORDER — VALACYCLOVIR HYDROCHLORIDE 500 MG/1
2000 TABLET, FILM COATED ORAL 2 TIMES DAILY
Status: COMPLETED | OUTPATIENT
Start: 2023-04-19 | End: 2023-04-19

## 2023-04-19 RX ORDER — CHOLECALCIFEROL (VITAMIN D3) 25 MCG
1000 TABLET ORAL DAILY
Status: DISCONTINUED | OUTPATIENT
Start: 2023-04-19 | End: 2023-04-21 | Stop reason: HOSPADM

## 2023-04-19 RX ORDER — AMOXICILLIN 250 MG
2 CAPSULE ORAL DAILY
Status: DISCONTINUED | OUTPATIENT
Start: 2023-04-19 | End: 2023-04-21 | Stop reason: HOSPADM

## 2023-04-19 RX ORDER — LOSARTAN POTASSIUM 50 MG/1
50 TABLET ORAL DAILY
Status: DISCONTINUED | OUTPATIENT
Start: 2023-04-19 | End: 2023-04-21 | Stop reason: HOSPADM

## 2023-04-19 RX ADMIN — FERROUS SULFATE TAB 325 MG (65 MG ELEMENTAL FE) 1 EACH: 325 (65 FE) TAB at 02:04

## 2023-04-19 RX ADMIN — ASPIRIN 81 MG CHEWABLE TABLET 81 MG: 81 TABLET CHEWABLE at 10:04

## 2023-04-19 RX ADMIN — INSULIN DETEMIR 10 UNITS: 100 INJECTION, SOLUTION SUBCUTANEOUS at 08:04

## 2023-04-19 RX ADMIN — SODIUM CHLORIDE 1000 ML: 9 INJECTION, SOLUTION INTRAVENOUS at 08:04

## 2023-04-19 RX ADMIN — CLOPIDOGREL BISULFATE 75 MG: 75 TABLET ORAL at 10:04

## 2023-04-19 RX ADMIN — INSULIN ASPART 4 UNITS: 100 INJECTION, SOLUTION INTRAVENOUS; SUBCUTANEOUS at 08:04

## 2023-04-19 RX ADMIN — LOSARTAN POTASSIUM 50 MG: 50 TABLET, FILM COATED ORAL at 10:04

## 2023-04-19 RX ADMIN — SODIUM CHLORIDE: 9 INJECTION, SOLUTION INTRAVENOUS at 06:04

## 2023-04-19 RX ADMIN — SENNOSIDES AND DOCUSATE SODIUM 2 TABLET: 8.6; 5 TABLET ORAL at 10:04

## 2023-04-19 RX ADMIN — VALACYCLOVIR HYDROCHLORIDE 2000 MG: 500 TABLET, FILM COATED ORAL at 08:04

## 2023-04-19 RX ADMIN — FERROUS SULFATE TAB 325 MG (65 MG ELEMENTAL FE) 1 EACH: 325 (65 FE) TAB at 09:04

## 2023-04-19 RX ADMIN — CEFTRIAXONE 1 G: 1 INJECTION, POWDER, FOR SOLUTION INTRAMUSCULAR; INTRAVENOUS at 05:04

## 2023-04-19 RX ADMIN — HYDRALAZINE HYDROCHLORIDE 50 MG: 50 TABLET ORAL at 09:04

## 2023-04-19 RX ADMIN — SODIUM CHLORIDE: 9 INJECTION, SOLUTION INTRAVENOUS at 05:04

## 2023-04-19 RX ADMIN — METOPROLOL SUCCINATE 50 MG: 50 TABLET, FILM COATED, EXTENDED RELEASE ORAL at 10:04

## 2023-04-19 RX ADMIN — ENOXAPARIN SODIUM 40 MG: 40 INJECTION SUBCUTANEOUS at 05:04

## 2023-04-19 RX ADMIN — INSULIN ASPART 2 UNITS: 100 INJECTION, SOLUTION INTRAVENOUS; SUBCUTANEOUS at 02:04

## 2023-04-19 RX ADMIN — HYDRALAZINE HYDROCHLORIDE 50 MG: 50 TABLET ORAL at 05:04

## 2023-04-19 RX ADMIN — INSULIN ASPART 6 UNITS: 100 INJECTION, SOLUTION INTRAVENOUS; SUBCUTANEOUS at 06:04

## 2023-04-19 RX ADMIN — VALACYCLOVIR HYDROCHLORIDE 2000 MG: 500 TABLET, FILM COATED ORAL at 09:04

## 2023-04-19 RX ADMIN — MUPIROCIN: 20 OINTMENT TOPICAL at 08:04

## 2023-04-19 RX ADMIN — ALLOPURINOL 100 MG: 100 TABLET ORAL at 10:04

## 2023-04-19 RX ADMIN — INSULIN ASPART 2 UNITS: 100 INJECTION, SOLUTION INTRAVENOUS; SUBCUTANEOUS at 10:04

## 2023-04-19 RX ADMIN — HYDRALAZINE HYDROCHLORIDE 50 MG: 50 TABLET ORAL at 02:04

## 2023-04-19 RX ADMIN — Medication 1000 UNITS: at 10:04

## 2023-04-19 RX ADMIN — INSULIN DETEMIR 10 UNITS: 100 INJECTION, SOLUTION SUBCUTANEOUS at 09:04

## 2023-04-19 RX ADMIN — ATORVASTATIN CALCIUM 40 MG: 40 TABLET, FILM COATED ORAL at 10:04

## 2023-04-19 NOTE — ASSESSMENT & PLAN NOTE
Present on admit- see media   Appears to be pressure related, patient denies immobility or long periods of sitting (work is clerical/accounting)   Wound care consulted

## 2023-04-19 NOTE — SUBJECTIVE & OBJECTIVE
Past Medical History:   Diagnosis Date    CAD (coronary artery disease)     Followed by Dr. Stevo Kaba    Diabetes mellitus, type 2     DM (diabetes mellitus)     BS didn't check 06/05/2020    DM (diabetes mellitus) 2012    BS didn't check 02/10/2022    High level of uric acid in blood     Hyperlipidemia     Hyperplastic rectal polyp     on 03/20/2009 colonoscopy    Hypertension     Multinodular goiter     noted on 1/20/2015 CTA carotid scan    Multiple thyroid nodules 05/05/2016    on Thyroid U/S    Osteoarthritis     Left knee    Osteoarthritis of back     Osteoporosis, unspecified     Postmenopausal     No history of abnormal pap smear    Proteinuria     Pustular psoriasis     hands and feet    Subclavian artery stenosis     Left; with the proximal segment at 60-70% per 10/02/2008 carotid CTA    Vitamin D deficiency disease        Past Surgical History:   Procedure Laterality Date    CATARACT EXTRACTION W/  INTRAOCULAR LENS IMPLANT Right 4/13/16    [facility]    CATARACT EXTRACTION W/  INTRAOCULAR LENS IMPLANT Left     [facility]    COLONOSCOPY N/A 8/30/2022    Procedure: COLONOSCOPY;  Surgeon: Reanna Garber MD;  Location: [facility] ENDO;  Service: Endoscopy;  Laterality: N/A;    CORONARY ANGIOGRAPHY N/A 6/18/2021    Procedure: ANGIOGRAM, CORONARY ARTERY;  Surgeon: Desiree William MD;  Location: [facility] CATH LAB;  Service: Cardiology;  Laterality: N/A;    CORONARY STENT PLACEMENT N/A 6/18/2021    Procedure: INSERTION, STENT, CORONARY ARTERY;  Surgeon: Desiree William MD;  Location: [facility] CATH LAB;  Service: Cardiology;  Laterality: N/A;    heart catheterization with stents placed  2004 and 08/18/2009    LEFT HEART CATHETERIZATION Left 6/18/2021    Procedure: CATHETERIZATION, HEART, LEFT;  Surgeon: Desiree William MD;  Location: [facility] CATH LAB;  Service: Cardiology;  Laterality: Left;  Covid + week of    left knee surgery for bone graft and pin placement         Review of patient's allergies indicates:   Allergen  "Reactions    Codeine      Other reaction(s): Chills       No current facility-administered medications on file prior to encounter.     Current Outpatient Medications on File Prior to Encounter   Medication Sig    allopurinoL (ZYLOPRIM) 100 MG tablet Take 1 tablet by mouth once daily    aspirin 81 MG Chew Take 81 mg by mouth once daily.    clopidogreL (PLAVIX) 75 mg tablet Take 1 tablet by mouth once daily    ERGOCALCIFEROL, VITAMIN D2, (VITAMIN D ORAL) Take 2,000 Units by mouth once daily.    ferrous sulfate (FEOSOL) Tab tablet Take 1 tablet by mouth 3 (three) times daily.    furosemide (LASIX) 20 MG tablet Take 2 tablets (40 mg total) by mouth once daily.    glipiZIDE (GLUCOTROL) 10 MG tablet TAKE 1 TABLET BY MOUTH TWICE DAILY BEFORE MEAL(S)    hydrALAZINE (APRESOLINE) 50 MG tablet Take 1 tablet (50 mg total) by mouth 4 (four) times daily.    insulin detemir U-100 (LEVEMIR FLEXTOUCH U-100 INSULN) 100 unit/mL (3 mL) InPn pen Inject 10 Units into the skin once daily.    JANUVIA 100 mg Tab Take 1 tablet (100 mg total) by mouth once daily.    losartan (COZAAR) 50 MG tablet Take 1 tablet by mouth once daily    metFORMIN (GLUCOPHAGE) 500 MG tablet TAKE 1 TABLET BY MOUTH ONCE DAILY IN THE MORNING AND 1 ONCE DAILY AT NOON AND 2 ONCE DAILY IN THE EVENING WITH MEALS    metoprolol succinate (TOPROL-XL) 50 MG 24 hr tablet Take 1 tablet by mouth once daily    rosuvastatin (CRESTOR) 20 MG tablet TAKE 1 TABLET BY MOUTH NIGHTLY.    blood sugar diagnostic Strp To check BG 2 times daily, to use with insurance preferred meter - Freestyle Lite    fluconazole (DIFLUCAN) 100 MG tablet Take 1 pill by mouth once a week for 3 weeks. (Patient not taking: Reported on 4/18/2023)    pen needle, diabetic (BD ULTRA-FINE SHORT PEN NEEDLE) 31 gauge x 5/16" Ndle 1 Units by Misc.(Non-Drug; Combo Route) route once daily.     Family History       Problem Relation (Age of Onset)    Cancer Father    Diabetes Mother    Hypertension Mother, Sister    No " Known Problems Daughter, Son    Stroke Mother          Tobacco Use    Smoking status: Former     Packs/day: 1.00     Years: 33.00     Pack years: 33.00     Types: Cigarettes     Quit date: 2004     Years since quittin.7     Passive exposure: Past    Smokeless tobacco: Never   Substance and Sexual Activity    Alcohol use: Yes     Alcohol/week: 0.0 standard drinks     Comment: Rarely    Drug use: No    Sexual activity: Yes     Partners: Male     Birth control/protection: Post-menopausal     Review of Systems   Constitutional:  Positive for activity change. Negative for chills and fever.   HENT:  Negative for congestion and sore throat.    Eyes:  Negative for photophobia and visual disturbance.   Respiratory:  Negative for cough and shortness of breath.    Cardiovascular:  Negative for chest pain and leg swelling.   Gastrointestinal:  Positive for abdominal distention. Negative for diarrhea, nausea and vomiting.   Endocrine: Negative for polyuria.   Genitourinary:  Negative for difficulty urinating, dysuria and frequency.   Musculoskeletal:  Negative for arthralgias and back pain.   Skin:  Positive for wound.        Lip lesion    Neurological:  Negative for dizziness, weakness, light-headedness and headaches.   Psychiatric/Behavioral:  Negative for agitation, behavioral problems, confusion and sleep disturbance.    Objective:     Vital Signs (Most Recent):  Temp: 98.1 °F (36.7 °C) (23 0715)  Pulse: 97 (23 0900)  Resp: (!) 24 (23 0900)  BP: (!) 155/67 (23 1005)  SpO2: 96 % (23 0900)   Vital Signs (24h Range):  Temp:  [97.7 °F (36.5 °C)-101 °F (38.3 °C)] 98.1 °F (36.7 °C)  Pulse:  [] 97  Resp:  [19-28] 24  SpO2:  [92 %-97 %] 96 %  BP: (107-175)/(52-77) 155/67     Weight: 83.6 kg (184 lb 4.9 oz)  Body mass index is 33.71 kg/m².    Physical Exam  Vitals reviewed.   Constitutional:       General: She is not in acute distress.     Appearance: She is ill-appearing. She is not  toxic-appearing.   HENT:      Head: Normocephalic and atraumatic.      Mouth/Throat:      Mouth: Mucous membranes are moist.      Pharynx: Oropharynx is clear.   Eyes:      Extraocular Movements: Extraocular movements intact.      Conjunctiva/sclera: Conjunctivae normal.   Cardiovascular:      Rate and Rhythm: Normal rate and regular rhythm.   Pulmonary:      Effort: Pulmonary effort is normal.      Breath sounds: No wheezing or rhonchi.   Abdominal:      General: Bowel sounds are decreased.      Palpations: Abdomen is soft.   Musculoskeletal:         General: No deformity.      Cervical back: Normal range of motion and neck supple.      Right lower leg: No edema.      Left lower leg: No edema.   Skin:     General: Skin is warm and dry.   Neurological:      General: No focal deficit present.      Mental Status: She is alert and oriented to person, place, and time.   Psychiatric:         Mood and Affect: Mood normal.         Thought Content: Thought content normal.         Judgment: Judgment normal.       Significant Labs: All pertinent labs within the past 24 hours have been reviewed.  CBC:   Recent Labs   Lab 04/18/23  1314 04/18/23  1403 04/19/23  0422   WBC 26.94*  --  17.55*   HGB 12.0  --  11.2*   HCT 35.6* 35* 34.6*     --  192     CMP:   Recent Labs   Lab 04/18/23  1314 04/18/23  1554 04/18/23  1953 04/19/23  0422   * 138 139 134*   K 3.5 3.5 3.6 3.8   CL 98 106 110 107   CO2 14* 14* 17* 13*   * 299* 137* 214*   BUN 38* 33* 29* 31*   CREATININE 1.1 0.9 0.8 0.9   CALCIUM 9.3 8.5* 8.2* 8.4*   PROT 6.9  --   --   --    ALBUMIN 3.0*  --   --   --    BILITOT 0.7  --   --   --    ALKPHOS 114  --   --   --    AST 45*  --   --   --    ALT 22  --   --   --    ANIONGAP 22* 18* 12 14       Significant Imaging: I have reviewed all pertinent imaging results/findings within the past 24 hours.

## 2023-04-19 NOTE — PLAN OF CARE
Problem: Diabetic Ketoacidosis  Goal: Fluid and Electrolyte Balance with Absence of Ketosis  Outcome: Ongoing, Progressing     Problem: Adult Inpatient Plan of Care  Goal: Plan of Care Review  Outcome: Ongoing, Progressing  Goal: Patient-Specific Goal (Individualized)  Outcome: Ongoing, Progressing  Goal: Optimal Comfort and Wellbeing  Outcome: Ongoing, Progressing  Goal: Readiness for Transition of Care  Outcome: Ongoing, Progressing     Problem: Diabetes Comorbidity  Goal: Blood Glucose Level Within Targeted Range  Outcome: Ongoing, Progressing     Problem: Skin Injury Risk Increased  Goal: Skin Health and Integrity  Outcome: Ongoing, Progressing     Problem: Impaired Wound Healing  Goal: Optimal Wound Healing  Outcome: Ongoing, Progressing

## 2023-04-19 NOTE — PLAN OF CARE
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]  Sepsis, due to unspecified organism, unspecified whether acute organ dysfunction present [A41.9]    Admission Date: 4/18/2023  Expected Discharge Date:     Discharge Barriers Identified: None    Payor: MEDICARE / Plan: MEDICARE PART A & B / Product Type: Government /     Extended Emergency Contact Information  Primary Emergency Contact: Jose Rosales  Mobile Phone: [phone]  Relation: Daughter    Discharge Plan A: Home Health  Discharge Plan B: Home with family      [facility]  [place], [place] - [address] [place] [address]  [address] [place] [address]  [place] [address]  Phone: [phone] Fax: [phone]    [facility] - [place], [place] - [address]  [address]  [facility] [address]  Phone: [phone] Fax: [phone]    [facility] [place] [address]  [address] [facility] [place] [address]  Phone: [phone] Fax: [phone]      Initial Assessment (most recent)       Adult Discharge Assessment - 04/19/23 1011          Discharge Assessment    Assessment Type Discharge Planning Assessment     Confirmed/corrected address, phone number and insurance Yes     Confirmed Demographics Correct on Facesheet     Source of Information patient;family     Communicated SANTANA with patient/caregiver Date not available/Unable to determine     Reason For Admission Diabetic ketoacidosis without coma associated with type 2 diabetes mellitus     People in Home alone     Facility Arrived From: home     Do you expect to  return to your current living situation? Other (see comments)   either home or daughters home    Do you have help at home or someone to help you manage your care at home? Yes     Who are your caregiver(s) and their phone number(s)? Jose Rosales (Daughter)     Prior to hospitilization cognitive status: Alert/Oriented     Current cognitive status: Alert/Oriented     Home Accessibility stairs to enter home     Number of Stairs, Main Entrance five     Stair Railings, Main Entrance railings safe and in good condition     Home Layout Able to live on 1st floor     Equipment Currently Used at Home glucometer     Readmission within 30 days? No     Patient currently being followed by outpatient case management? No     Do you currently have service(s) that help you manage your care at home? No     Do you take prescription medications? Yes     Do you have prescription coverage? Yes     Coverage MCR     Do you have any problems affording any of your prescribed medications? No     Is the patient taking medications as prescribed? yes     Who is going to help you get home at discharge? Jose Rosales (Daughter)     How do you get to doctors appointments? car, drives self;family or friend will provide     Are you on dialysis? No     Do you take coumadin? No     Discharge Plan A Home Health     Discharge Plan B Home with family     DME Needed Upon Discharge  none     Discharge Plan discussed with: Patient;Adult children     Discharge Barriers Identified None                   Anticipated DC dispo: home health vs home with family   Prior Level of Function: independent with ADLs   PCP: Margie Bird MD    Comments:  CM met with patient and daughter at bedside to introduce role and discuss discharge planning. Patient lives alone, still works and drives. Daughter, Jose, will be help at home and can provide transport at time of discharge. Patient may discharge to her daughters home for 1-2 weeks at discharge. CM discharge needs  depends on hospital progress. CM will continue following to assist with other needs.

## 2023-04-19 NOTE — NURSING
Patient arrived on unit on stretcher from ED, confused with no distress noted. See flow sheet for further assessment.

## 2023-04-19 NOTE — HPI
68-year-old female with a known past medical history of CAD with PCI, HTN, HLD, gout, and DM with recent Rx of Lantus in addition to PO agents who presented to ED 2023 for evaluation of confusion. ED evaluation with lab findings consistent with DKA and UA revealing UTI. She was given insulin and IVFs and Zosyn. Critical care consulted for ICU admission.      Upon exam on admission, Ms. Encarnacion is lethargic but arouses easily. Daughter found patient sitting on the floor of her home earlier today confused. She went to check on her after receiving a call from patient's boss that she did not show up today for work. 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 She is usually completely oriented and independent. Jose was recently made aware that Ms. Encarnacion does not take all of her medications all the time and misses doses. Ms. Encarnacion does confirm recently being placed on lantus but states she has never taken it. She denies any urinary symptoms. She endorses polyphagia. She has had a fever blister to her upper lip for about a week. Over the past week it has had worsening swelling.     On admission, Beta-hydroxybutyrate: 4.8, LA: 3.0, A, Glu: 464, UA: consistent with UTI. Placed on insulin infusion, IV antibiotics and transferred to ICU. CBG improved, AG: WNL, insulin infusion d/c. Hospital Medicine consulted to assume care for UTI, Hyperglycemia, metabolic encephalopathy.     Patient is a full code, surrogate decision maker is her daughter, Jose Rosales. Admitted to Hospital Medicine.

## 2023-04-19 NOTE — CONSULTS
[facility] ([facility])  [facility] Medicine  Consult Note    Patient Name: Alysha Rosales  MRN: [id number]  Admission Date: 4/18/2023  Hospital Length of Stay: 1 days  Attending Physician: Ara Grigsby MD   Primary Care Provider: Margie Bird MD           Patient information was obtained from patient, past medical records and ER records.     Inpatient consult to Hospitalist  Consult performed by: Celeste Singh NP  Consult ordered by: Elissa Sage NP  Reason for consult: Stepdown        Subjective:     Principal Problem: Diabetic ketoacidosis without coma associated with type 2 diabetes mellitus    Chief Complaint:   Chief Complaint   Patient presents with    Weakness     Generalized weakness, recent UTI,  with AASI. Given 500mL IVF.         HPI: 68-year-old female with a known past medical history of CAD with PCI, HTN, HLD, gout, and DM with recent Rx of Lantus in addition to PO agents who presented to ED 4/18/2023 for evaluation of confusion. ED evaluation with lab findings consistent with DKA and UA revealing UTI. She was given insulin and IVFs and Zosyn. Critical care consulted for ICU admission.      Upon exam on admission, Ms. Encarnacion is lethargic but arouses easily. Daughter found patient sitting on the floor of her home earlier today confused. She went to check on her after receiving a call from patient's boss that she did not show up today for work. 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 She is usually completely oriented and independent. Jose was recently made aware that Ms. Encarnacion does not take all of her medications all the time and misses doses. Ms. Encarnacion does confirm recently being placed on lantus but states she has never taken it. She denies any urinary symptoms. She endorses polyphagia. She has had a fever blister to her upper lip for about a week. Over the past week it has had worsening swelling.     On  admission, Beta-hydroxybutyrate: 4.8, LA: 3.0, A, Glu: 464, UA: consistent with UTI. Placed on insulin infusion, IV antibiotics and transferred to ICU. CBG improved, AG: WNL, insulin infusion d/c. Hospital Medicine consulted to assume care for UTI, Hyperglycemia, metabolic encephalopathy.     Patient is a full code, surrogate decision maker is her daughter, Jose Rosales. Admitted to Hospital Medicine.         Past Medical History:   Diagnosis Date    CAD (coronary artery disease)     Followed by Dr. Stevo Kaba    Diabetes mellitus, type 2     DM (diabetes mellitus)     BS didn't check 2020    DM (diabetes mellitus)     BS didn't check 02/10/2022    High level of uric acid in blood     Hyperlipidemia     Hyperplastic rectal polyp     on 2009 colonoscopy    Hypertension     Multinodular goiter     noted on 2015 CTA carotid scan    Multiple thyroid nodules 2016    on Thyroid U/S    Osteoarthritis     Left knee    Osteoarthritis of back     Osteoporosis, unspecified     Postmenopausal     No history of abnormal pap smear    Proteinuria     Pustular psoriasis     hands and feet    Subclavian artery stenosis     Left; with the proximal segment at 60-70% per 10/02/2008 carotid CTA    Vitamin D deficiency disease        Past Surgical History:   Procedure Laterality Date    CATARACT EXTRACTION W/  INTRAOCULAR LENS IMPLANT Right 16    CPG    CATARACT EXTRACTION W/  INTRAOCULAR LENS IMPLANT Left     CPG    COLONOSCOPY N/A 2022    Procedure: COLONOSCOPY;  Surgeon: Reanna Garber MD;  Location: [facility] ENDO;  Service: Endoscopy;  Laterality: N/A;    CORONARY ANGIOGRAPHY N/A 2021    Procedure: ANGIOGRAM, CORONARY ARTERY;  Surgeon: Desiree William MD;  Location: [facility] CATH LAB;  Service: Cardiology;  Laterality: N/A;    CORONARY STENT PLACEMENT N/A 2021    Procedure: INSERTION, STENT, CORONARY ARTERY;  Surgeon: Desiree William MD;  Location:  [facility] CATH LAB;  Service: Cardiology;  Laterality: N/A;    heart catheterization with stents placed  2004 and 08/18/2009    LEFT HEART CATHETERIZATION Left 6/18/2021    Procedure: CATHETERIZATION, HEART, LEFT;  Surgeon: Desiree William MD;  Location: [facility] CATH LAB;  Service: Cardiology;  Laterality: Left;  Covid + week of    left knee surgery for bone graft and pin placement         Review of patient's allergies indicates:   Allergen Reactions    Codeine      Other reaction(s): Chills       No current facility-administered medications on file prior to encounter.     Current Outpatient Medications on File Prior to Encounter   Medication Sig    allopurinoL (ZYLOPRIM) 100 MG tablet Take 1 tablet by mouth once daily    aspirin 81 MG Chew Take 81 mg by mouth once daily.    clopidogreL (PLAVIX) 75 mg tablet Take 1 tablet by mouth once daily    ERGOCALCIFEROL, VITAMIN D2, (VITAMIN D ORAL) Take 2,000 Units by mouth once daily.    ferrous sulfate (FEOSOL) Tab tablet Take 1 tablet by mouth 3 (three) times daily.    furosemide (LASIX) 20 MG tablet Take 2 tablets (40 mg total) by mouth once daily.    glipiZIDE (GLUCOTROL) 10 MG tablet TAKE 1 TABLET BY MOUTH TWICE DAILY BEFORE MEAL(S)    hydrALAZINE (APRESOLINE) 50 MG tablet Take 1 tablet (50 mg total) by mouth 4 (four) times daily.    insulin detemir U-100 (LEVEMIR FLEXTOUCH U-100 INSULN) 100 unit/mL (3 mL) InPn pen Inject 10 Units into the skin once daily.    JANUVIA 100 mg Tab Take 1 tablet (100 mg total) by mouth once daily.    losartan (COZAAR) 50 MG tablet Take 1 tablet by mouth once daily    metFORMIN (GLUCOPHAGE) 500 MG tablet TAKE 1 TABLET BY MOUTH ONCE DAILY IN THE MORNING AND 1 ONCE DAILY AT NOON AND 2 ONCE DAILY IN THE EVENING WITH MEALS    metoprolol succinate (TOPROL-XL) 50 MG 24 hr tablet Take 1 tablet by mouth once daily    rosuvastatin (CRESTOR) 20 MG tablet TAKE 1 TABLET BY MOUTH NIGHTLY.    blood sugar diagnostic Strp To check BG 2 times  "daily, to use with insurance preferred meter - Freestyle Lite    fluconazole (DIFLUCAN) 100 MG tablet Take 1 pill by mouth once a week for 3 weeks. (Patient not taking: Reported on 2023)    pen needle, diabetic (BD ULTRA-FINE SHORT PEN NEEDLE) 31 gauge x 5/16" Ndle 1 Units by Misc.(Non-Drug; Combo Route) route once daily.     Family History       Problem Relation (Age of Onset)    Cancer Father    Diabetes Mother    Hypertension Mother, Sister    No Known Problems Daughter, Son    Stroke Mother          Tobacco Use    Smoking status: Former     Packs/day: 1.00     Years: 33.00     Pack years: 33.00     Types: Cigarettes     Quit date: 2004     Years since quittin.7     Passive exposure: Past    Smokeless tobacco: Never   Substance and Sexual Activity    Alcohol use: Yes     Alcohol/week: 0.0 standard drinks     Comment: Rarely    Drug use: No    Sexual activity: Yes     Partners: Male     Birth control/protection: Post-menopausal     Review of Systems   Constitutional:  Positive for activity change. Negative for chills and fever.   HENT:  Negative for congestion and sore throat.    Eyes:  Negative for photophobia and visual disturbance.   Respiratory:  Negative for cough and shortness of breath.    Cardiovascular:  Negative for chest pain and leg swelling.   Gastrointestinal:  Positive for abdominal distention. Negative for diarrhea, nausea and vomiting.   Endocrine: Negative for polyuria.   Genitourinary:  Negative for difficulty urinating, dysuria and frequency.   Musculoskeletal:  Negative for arthralgias and back pain.   Skin:  Positive for wound.        Lip lesion    Neurological:  Negative for dizziness, weakness, light-headedness and headaches.   Psychiatric/Behavioral:  Negative for agitation, behavioral problems, confusion and sleep disturbance.    Objective:     Vital Signs (Most Recent):  Temp: 98.1 °F (36.7 °C) (23 0715)  Pulse: 97 (23 0900)  Resp: (!) 24 (23 " 0900)  BP: (!) 155/67 (04/19/23 1005)  SpO2: 96 % (04/19/23 0900)   Vital Signs (24h Range):  Temp:  [97.7 °F (36.5 °C)-101 °F (38.3 °C)] 98.1 °F (36.7 °C)  Pulse:  [] 97  Resp:  [19-28] 24  SpO2:  [92 %-97 %] 96 %  BP: (107-175)/(52-77) 155/67     Weight: 83.6 kg (184 lb 4.9 oz)  Body mass index is 33.71 kg/m².    Physical Exam  Vitals reviewed.   Constitutional:       General: She is not in acute distress.     Appearance: She is ill-appearing. She is not toxic-appearing.   HENT:      Head: Normocephalic and atraumatic.      Mouth/Throat:      Mouth: Mucous membranes are moist.      Pharynx: Oropharynx is clear.   Eyes:      Extraocular Movements: Extraocular movements intact.      Conjunctiva/sclera: Conjunctivae normal.   Cardiovascular:      Rate and Rhythm: Normal rate and regular rhythm.   Pulmonary:      Effort: Pulmonary effort is normal.      Breath sounds: No wheezing or rhonchi.   Abdominal:      General: Bowel sounds are decreased.      Palpations: Abdomen is soft.   Musculoskeletal:         General: No deformity.      Cervical back: Normal range of motion and neck supple.      Right lower leg: No edema.      Left lower leg: No edema.   Skin:     General: Skin is warm and dry.   Neurological:      General: No focal deficit present.      Mental Status: She is alert and oriented to person, place, and time.   Psychiatric:         Mood and Affect: Mood normal.         Thought Content: Thought content normal.         Judgment: Judgment normal.       Significant Labs: All pertinent labs within the past 24 hours have been reviewed.  CBC:   Recent Labs   Lab 04/18/23  1314 04/18/23  1403 04/19/23  0422   WBC 26.94*  --  17.55*   HGB 12.0  --  11.2*   HCT 35.6* 35* 34.6*     --  192     CMP:   Recent Labs   Lab 04/18/23  1314 04/18/23  1554 04/18/23  1953 04/19/23  0422   * 138 139 134*   K 3.5 3.5 3.6 3.8   CL 98 106 110 107   CO2 14* 14* 17* 13*   * 299* 137* 214*   BUN 38* 33* 29* 31*    CREATININE 1.1 0.9 0.8 0.9   CALCIUM 9.3 8.5* 8.2* 8.4*   PROT 6.9  --   --   --    ALBUMIN 3.0*  --   --   --    BILITOT 0.7  --   --   --    ALKPHOS 114  --   --   --    AST 45*  --   --   --    ALT 22  --   --   --    ANIONGAP 22* 18* 12 14       Significant Imaging: I have reviewed all pertinent imaging results/findings within the past 24 hours.    Assessment/Plan:     * Diabetic ketoacidosis without coma associated with type 2 diabetes mellitus  Multifactorial, non-complaint with regimen, elevated consumption of carbs, UTI    --HGA1c: 10.6  --DKA resolved   --Insulin infusion stopped  --Diabetes Education    Primary herpes simplex infection of lips  Lesion to upper lip    --Valtrex      Acute cystitis without hematuria  UA: consistent with UTI, prior cultures: pan-sensitive E. Coli    --Cont Rocephin  --Follow Culture      Buttock wound, left, initial encounter  Present on admission    --Consult wound care- see note      Atherosclerotic heart disease of native coronary artery with other forms of angina pectoris  Hx of PCI    --REsumed Plavix and ASA    Hyperlipidemia  Chronic, followed by PCP    --Continue statin      Hypertension associated with diabetes  Patient's FSGs are uncontrolled due to hyperglycemia on current medication regimen.  Last A1c reviewed-   Lab Results   Component Value Date    HGBA1C 10.6 (H) 04/18/2023     Most recent fingerstick glucose reviewed-   Recent Labs   Lab 04/19/23  0717 04/19/23  0825 04/19/23  1236 04/19/23  1413   POCTGLUCOSE 212* 202* 196* 188*     Current correctional scale  High  Maintain anti-hyperglycemic dose as follows-   Antihyperglycemics (From admission, onward)    Start     Stop Route Frequency Ordered    04/19/23 0900  insulin detemir U-100 (Levemir) pen 10 Units         -- SubQ 2 times daily 04/19/23 0457    04/18/23 2125  insulin aspart U-100 pen 1-10 Units         -- SubQ Before meals & nightly PRN 04/18/23 2026        Hold Oral hypoglycemics while patient is  in the hospital.      VTE Risk Mitigation (From admission, onward)         Ordered     enoxaparin injection 40 mg  Daily         04/19/23 0939     IP VTE HIGH RISK PATIENT  Once         04/18/23 1632     Place sequential compression device  Until discontinued         04/18/23 1632                .    Thank you for your consult. I will follow-up with patient. Please contact us if you have any additional questions.    Celeste Singh NP  Department of Hospital Medicine   [facility] - [facility] ([facility])

## 2023-04-19 NOTE — ASSESSMENT & PLAN NOTE
Home meds: metoprolol, losartan, and hydralazine resumed  Hydralazine PRN SBP >180 mmHg   Monitor

## 2023-04-19 NOTE — CONSULTS
[facility] - [facility] ([facility])  Wound Care    Patient Name:  Alysha Rosales   MRN:  [id number]  Date: 2023  Diagnosis: Diabetic ketoacidosis without coma associated with type 2 diabetes mellitus    History:     Past Medical History:   Diagnosis Date    CAD (coronary artery disease)     Followed by Dr. Stevo Kaba    Diabetes mellitus, type 2     DM (diabetes mellitus)     BS didn't check 2020    DM (diabetes mellitus)     BS didn't check 02/10/2022    High level of uric acid in blood     Hyperlipidemia     Hyperplastic rectal polyp     on 2009 colonoscopy    Hypertension     Multinodular goiter     noted on 2015 CTA carotid scan    Multiple thyroid nodules 2016    on Thyroid U/S    Osteoarthritis     Left knee    Osteoarthritis of back     Osteoporosis, unspecified     Postmenopausal     No history of abnormal pap smear    Proteinuria     Pustular psoriasis     hands and feet    Subclavian artery stenosis     Left; with the proximal segment at 60-70% per 10/02/2008 carotid CTA    Vitamin D deficiency disease        Social History     Socioeconomic History    Marital status:     Number of children: 2   Occupational History    Occupation: Retired   Tobacco Use    Smoking status: Former     Packs/day: 1.00     Years: 33.00     Pack years: 33.00     Types: Cigarettes     Quit date: 2004     Years since quittin.7     Passive exposure: Past    Smokeless tobacco: Never   Substance and Sexual Activity    Alcohol use: Yes     Alcohol/week: 0.0 standard drinks     Comment: Rarely    Drug use: No    Sexual activity: Yes     Partners: Male     Birth control/protection: Post-menopausal   Social History Narrative    She wears seatbelt. She will start working again at [employer] on next week. She states her   in 2020 at the age of 68 due to lung cancer.     Social Determinants of Health     Physical Activity: Sufficiently Active    Days of Exercise per  "Week: 5 days    Minutes of Exercise per Session: 60 min       Precautions:     Allergies as of 04/18/2023 - Reviewed 04/18/2023   Allergen Reaction Noted    Codeine  07/24/2013       [facility] Assessment Details/Treatment     Consulted on this 69 y/o F patient due to present on admission altered skin integrity. Patient is currently awake and alert, denies pain, admitted with DKA, and was found on the floor at home confused, unsure of how long she was down.   Ulcer noted to left medial buttock consistend with unstageable pressure injury with thin layer adherent slough covered wound bed, erythema surrounding, and scattered full thickness satellite lesions also noted surrounding. Patient does deny hx of "boil or ulcer" to site. No fluctuance or indurtation noted. Cleansed with saline and patted dry, dwain wound skin painted with cavilon. Duoderm applied to site to promote autolytic debridement.  Bilateral elbows with stage 1 pressure injuries noted, nonblanchable redness. Painted with cavilon and foam dressings applied.  Bilateral lateral ankle abrasions noted that are scab covered, foam dressings applied after cleansing.  Right dorsal foot with nonblanchable redness. Multiple scab covered ulcerations noted consistent with abrasions. Painted with betadine and left RICARDO. Right great toe nail avulsion noted. Patient unsure of when occurred. Nail bed moist and pink, serosanguinous drainage. Cleansed with saline. Xeroform gauze applied and wrapped with gauze to secure.     04/19/23 0801   WOCN Assessment   WOCN Total Time (mins) 30   Visit Date 04/19/23   Visit Time 0801   Consult Type New   WOCN Speciality Wound   Wound pressure;At risk for pressure Injury;other   Intervention assessed;applied;chart review;coordination of care;team conference;orders        Altered Skin Integrity 04/18/23 Left Buttocks Full thickness tissue loss. Base is covered by slough and/or eschar in the wound bed   Date First Assessed: 04/18/23   Altered " Skin Integrity Present on Admission - Did Patient arrive to the hospital with altered skin?: yes  Side: Left  Location: Buttocks  Description of Altered Skin Integrity: Full thickness tissue loss. Base is covere...   Wound Image    Description of Altered Skin Integrity Full thickness tissue loss. Base is covered by slough and/or eschar in the wound bed   Dressing Appearance Intact   Drainage Amount Scant   Drainage Characteristics/Odor Serous   Appearance Red;Tan;Slough;Moist   Tissue loss description Full thickness   Periwound Area Redness;Satellite lesion   Wound Edges Open   Wound Length (cm) 2 cm   Wound Width (cm) 2 cm   Wound Depth (cm) 0.1 cm   Wound Volume (cm^3) 0.4 cm^3   Wound Surface Area (cm^2) 4 cm^2   Care Cleansed with:;Sterile normal saline;Applied:;Skin Barrier   Dressing Hydrocolloid;Applied   Dressing Change Due 04/24/23        Altered Skin Integrity 04/18/23 Right anterior Toe, first Avulsion   Date First Assessed: 04/18/23   Altered Skin Integrity Present on Admission - Did Patient arrive to the hospital with altered skin?: yes  Side: Right  Orientation: anterior  Location: Toe, first  Is this injury device related?: No  Primary Wound Type:...   Wound Image    Description of Altered Skin Integrity   (not pressure-related skin injury)   Dressing Appearance Open to air   Drainage Amount Scant   Drainage Characteristics/Odor Serosanguineous   Appearance Pink;Red;Moist   Care Cleansed with:;Sterile normal saline;Applied:;Povidone iodine   Dressing Non-adherent;Gauze   Dressing Change Due 04/20/23        Altered Skin Integrity 04/18/23 2030 Right anterior Foot Abrasion(s)   Date First Assessed/Time First Assessed: 04/18/23 2030   Side: Right  Orientation: anterior  Location: Foot  Is this injury device related?: No  Primary Wound Type: Abrasion(s)   Wound Image   (see LDA for right 1st toe nail avulsion)   Description of Altered Skin Integrity   (not pressure-related skin injury)   Dressing  Appearance Open to air   Drainage Amount None   Appearance Pink;Red;Tan;Dry   Tissue loss description Partial thickness   Periwound Area Redness   Care Cleansed with:;Sterile normal saline;Applied:;Povidone iodine        Altered Skin Integrity 04/19/23 Left posterior Elbow Intact skin with non-blanchable redness of localized area   Date First Assessed: 04/19/23   Altered Skin Integrity Present on Admission - Did Patient arrive to the hospital with altered skin?: yes  Side: Left  Orientation: posterior  Location: Elbow  Description of Altered Skin Integrity: Intact skin with non-...   Description of Altered Skin Integrity Intact skin with non-blanchable redness of localized area   Dressing Appearance Open to air   Drainage Amount None   Appearance Red   Tissue loss description Not applicable   Wound Edges Defined   Wound Length (cm) 3 cm   Wound Width (cm) 3 cm   Wound Surface Area (cm^2) 9 cm^2   Care Cleansed with:;Sterile normal saline;Applied:;Skin Barrier   Dressing Foam;Applied   Dressing Change Due 04/26/23        Altered Skin Integrity 04/19/23 Right posterior Elbow Intact skin with non-blanchable redness of localized area   Date First Assessed: 04/19/23   Altered Skin Integrity Present on Admission - Did Patient arrive to the hospital with altered skin?: yes  Side: Right  Orientation: posterior  Location: Elbow  Description of Altered Skin Integrity: Intact skin with non...   Description of Altered Skin Integrity Intact skin with non-blanchable redness of localized area   Dressing Appearance Open to air   Drainage Amount None   Appearance Red   Tissue loss description Not applicable   Periwound Area Intact   Wound Edges Defined   Wound Length (cm) 3 cm   Wound Width (cm) 3 cm   Wound Surface Area (cm^2) 9 cm^2   Care Cleansed with:;Sterile normal saline;Applied:;Skin Barrier   Dressing Foam;Applied   Dressing Change Due 04/26/23        Altered Skin Integrity 04/19/23 Right lateral Ankle Abrasion(s)   Date  First Assessed: 04/19/23   Altered Skin Integrity Present on Admission - Did Patient arrive to the hospital with altered skin?: yes  Side: Right  Orientation: lateral  Location: Ankle  Primary Wound Type: Abrasion(s)   Wound Image    Dressing Appearance Open to air   Drainage Amount None   Appearance Red;Tan;Dry   Periwound Area Intact   Wound Edges Defined   Wound Length (cm) 1 cm   Wound Width (cm) 1 cm   Wound Depth (cm) 0.1 cm   Wound Volume (cm^3) 0.1 cm^3   Wound Surface Area (cm^2) 1 cm^2   Care Cleansed with:;Sterile normal saline;Applied:;Skin Barrier   Dressing Foam;Applied   Dressing Change Due 04/26/23        Altered Skin Integrity 04/19/23 Left lateral Ankle Abrasion(s)   Date First Assessed: 04/19/23   Altered Skin Integrity Present on Admission - Did Patient arrive to the hospital with altered skin?: yes  Side: Left  Orientation: lateral  Location: Ankle  Primary Wound Type: Abrasion(s)   Wound Image    Dressing Appearance Open to air   Drainage Amount None   Appearance Red;Tan;Dry   Periwound Area Intact   Wound Edges Defined   Wound Length (cm) 1 cm   Wound Width (cm) 1.5 cm   Wound Depth (cm) 0.1 cm   Wound Volume (cm^3) 0.15 cm^3   Wound Surface Area (cm^2) 1.5 cm^2   Care Cleansed with:;Sterile normal saline;Applied:;Skin Barrier   Dressing Foam;Applied   Dressing Change Due 04/26/23       Recommendations made to primary team for wound care, pressure injury prevention measures     04/19/2023

## 2023-04-19 NOTE — PROGRESS NOTES
[facility] ([facility])  Critical Care Medicine  Progress Note    Patient Name: Alysha Rosales  MRN: [id number]  Admission Date: 4/18/2023  Hospital Length of Stay: 1 days  Code Status: Full Code  Attending Provider: Brennan Mcmillan MD  Primary Care Provider: Margie Bird MD   Principal Problem: Diabetic ketoacidosis without coma associated with type 2 diabetes mellitus    Subjective:     HPI:  Information obtained by chart review, patient and daughter, Jose.    68-year-old female with a known past medical history of CAD with PCI, HTN, HLD, gout, and DM with recent Rx of Lantus in addition to PO agents who presented to ED 4/18/2023 for evaluation of confusion. ED evaluation with lab findings consistent with DKA and UA revealing UTI. She was given insulin and IVFs and Zosyn. Critical care consulted for ICU admission.     Upon exam, Ms. Encarnacion is lethargic but arouses easily. Daughter found patient sitting on the floor of her home earlier today confused. She went to check on her after receiving a call from patient's boss that she did not show up today for work. 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 She is usually completely oriented and independent. Jose was recently made aware that Ms. Encarnacion does not take all of her medications all the time and misses doses. Ms. Encarnacion does confirm recently being placed on lantus but states she has never taken it. She denies any urinary symptoms. She endorses polyphagia. She has had a fever blister to her upper lip for about a week. Over the past week it has had worsening swelling.       Hospital/ICU Course:  4/19: resolution of DKA overnight with transition to SQ insulin and oral intake; labs this am with ongoing closed AG but decreased HCO to 13. Tmax 101; urine culture remains pending; hemodynamics acceptable; mental status back to baseline. Patient denies any urinary symptoms, fever, frequency,  polyuria, polydipsia, or abdominal symptoms.           Objective:     Vital Signs (Most Recent):  Temp: 98.1 °F (36.7 °C) (04/19/23 0715)  Pulse: 95 (04/19/23 0600)  Resp: (!) 24 (04/19/23 0600)  BP: (!) 117/56 (04/19/23 0600)  SpO2: (!) 92 % (04/19/23 0600)   Vital Signs (24h Range):  Temp:  [97.7 °F (36.5 °C)-101 °F (38.3 °C)] 98.1 °F (36.7 °C)  Pulse:  [] 95  Resp:  [18-28] 24  SpO2:  [92 %-99 %] 92 %  BP: (107-175)/(52-98) 117/56     Weight: 83.6 kg (184 lb 4.9 oz)  Body mass index is 33.71 kg/m².      Intake/Output Summary (Last 24 hours) at 4/19/2023 0745  Last data filed at 4/19/2023 0600  Gross per 24 hour   Intake 1686.79 ml   Output 250 ml   Net 1436.79 ml       Physical Exam  Vitals reviewed.   Constitutional:       General: She is not in acute distress.     Appearance: She is not toxic-appearing.   HENT:      Head: Normocephalic and atraumatic.      Mouth/Throat:      Mouth: Mucous membranes are moist.      Pharynx: Oropharynx is clear.   Eyes:      Extraocular Movements: Extraocular movements intact.      Conjunctiva/sclera: Conjunctivae normal.   Cardiovascular:      Rate and Rhythm: Normal rate and regular rhythm.   Pulmonary:      Effort: Pulmonary effort is normal.      Breath sounds: No wheezing or rhonchi.   Abdominal:      General: Bowel sounds are normal.      Palpations: Abdomen is soft.   Musculoskeletal:         General: No deformity.      Cervical back: Normal range of motion and neck supple.      Right lower leg: No edema.      Left lower leg: No edema.   Skin:     General: Skin is warm and dry.   Neurological:      General: No focal deficit present.      Mental Status: She is alert and oriented to person, place, and time.   Psychiatric:         Mood and Affect: Mood normal.         Thought Content: Thought content normal.         Judgment: Judgment normal.     Review of Systems   Constitutional:  Negative for chills and fever.   HENT:  Negative for congestion and sore throat.     Eyes:  Negative for photophobia and visual disturbance.   Respiratory:  Negative for cough and shortness of breath.    Cardiovascular:  Negative for chest pain and leg swelling.   Gastrointestinal:  Negative for diarrhea, nausea and vomiting.   Genitourinary:  Negative for difficulty urinating, dysuria and frequency.   Musculoskeletal:  Negative for arthralgias and back pain.   Skin:  Positive for wound.        Lip lesion    Neurological:  Negative for dizziness and headaches.   Psychiatric/Behavioral:  Negative for confusion and sleep disturbance.      Vents:       Lines/Drains/Airways       Drain  Duration             Female External Urinary Catheter 04/18/23 2030 <1 day              Peripheral Intravenous Line  Duration                  Peripheral IV - Single Lumen 04/18/23 0000 22 G Left Antecubital 1 day         Peripheral IV - Single Lumen 04/18/23 1554 20 G Anterior;Proximal;Right Upper Arm <1 day         Peripheral IV - Single Lumen 04/18/23 1659 22 G Posterior;Right Hand <1 day                    Significant Labs:    CBC/Anemia Profile:  Recent Labs   Lab 04/18/23  1314 04/18/23  1403 04/19/23  0422   WBC 26.94*  --  17.55*   HGB 12.0  --  11.2*   HCT 35.6* 35* 34.6*     --  192   MCV 88  --  95   RDW 12.4  --  12.6        Chemistries:  Recent Labs   Lab 04/18/23  1314 04/18/23  1554 04/18/23  1953 04/19/23  0422   * 138 139 134*   K 3.5 3.5 3.6 3.8   CL 98 106 110 107   CO2 14* 14* 17* 13*   BUN 38* 33* 29* 31*   CREATININE 1.1 0.9 0.8 0.9   CALCIUM 9.3 8.5* 8.2* 8.4*   ALBUMIN 3.0*  --   --   --    PROT 6.9  --   --   --    BILITOT 0.7  --   --   --    ALKPHOS 114  --   --   --    ALT 22  --   --   --    AST 45*  --   --   --    MG 1.3*  --  3.4* 1.9   PHOS  --  1.6* 1.2* 3.9       All pertinent labs within the past 24 hours have been reviewed.    Significant Imaging:  I have reviewed all pertinent imaging results/findings within the past 24 hours.      ABG  Recent Labs   Lab 04/18/23  9603    PH 7.327*   PO2 22*   PCO2 38.6   HCO3 20.2*   BE -6     Assessment/Plan:     Cardiac/Vascular  Atherosclerotic heart disease of native coronary artery with other forms of angina pectoris  Hx of PCI   Resumed plavix and ASA     Hyperlipidemia  Statin     Hypertension associated with diabetes  Home meds: metoprolol, losartan, and hydralazine resumed  Hydralazine PRN SBP >180 mmHg   Monitor       Renal/  Acute cystitis without hematuria  UA consistent with UTI; prior cultures pan-sensitive E. Coli   Cont Rocephin   Follow cultures     ID  Primary herpes simplex infection of lips  Lip swollen with lesion mid upper lip - looking better today   Reportedly present for past week. Patient notes hx of fever blister 2-3xs/year  valtrex     Endocrine  * Diabetic ketoacidosis without coma associated with type 2 diabetes mellitus  Etiology likely multi-factorial- insulin deficiency (non-compliant with lantus ordered), dietary indiscretion, and UTI  hgb A1C 10.6  DKA has resolved based on labs, transitioned to SQ insulin overnight     Diabetic diet   levemir 10 units BID   Accu checks AcHS with SSI   Holding PO meds  Diabetic education       Other  Buttock wound, left, initial encounter  Present on admit- see media   Appears to be pressure related, patient denies immobility or long periods of sitting (work is clerical/accounting)   Wound care consulted      DVT prophylaxis: lovenox   GI prophylaxis: not indicated   Code status: Full   Disposition: stable for transfer out of ICU. Consulted hospital medicine to assume primary management. Critical care team will sign off.     Elissa Sage NP  Critical Care Medicine  [facility] ([facility])

## 2023-04-19 NOTE — CONSULTS
Diabetes Education  Author: Mirian Young, RN  Date: 4/19/2023      Consulted for diabetes education. Patient admitted with Diabetic Ketoacidosis. Patient states she has been a diabetic for over 20 years. She recently was placed on insulin but states her pharmacy didn't have it ready and she never went back to go get it and hasn't started taking as ordered. Also states she doesn't check her blood sugar levels currently but she has all the supplies to do so at home. Verbalized understanding of a diabetic diet but states she doesn't always eat a diabetic diet and eats whatever she wants. Dietician already consulted to see patient. Educational handouts attached to AVS. Patient understands the importance of maintaining a normal blood sugar level. Recommend patient get insulin filled at [facility] for bedside delivery prior to discharge, to start checking blood sugar levels at least once daily prior to breakfast, implementing a diabetic diet, and taking medications as ordered.                   Health Maintenance was reviewed today with patient. Discussed with patient importance of routine eye exams, foot exams/foot care, blood work (i.e.: A1c, microalbumin, and lipid), dental visits, yearly flu vaccine, and pneumonia vaccine as indicated by PCP. Patient verbalized understanding.     Health Maintenance Topics with due status: Not Due       Topic Last Completion Date    DEXA Scan 11/29/2021    Colorectal Cancer Screening 08/30/2022    High Dose Statin 03/03/2023    Aspirin/Antiplatelet Therapy 03/03/2023    Foot Exam 03/03/2023    Hemoglobin A1c 04/18/2023     Health Maintenance Due   Topic Date Due    Shingles Vaccine (2 of 3) 05/25/2015    TETANUS VACCINE  02/25/2019    Pneumococcal Vaccines (Age 65+) (3 - PPSV23 if available, else PCV20) 06/04/2021    COVID-19 Vaccine (2 - Booster for Micki series) 10/08/2021    Influenza Vaccine (1) 09/01/2022    Diabetes Urine Screening  10/22/2022    Lipid Panel   01/25/2023    Eye Exam  02/10/2023    Mammogram  02/15/2023               The patient was encouraged to communicate with her physician and care team regarding her condition(s) and treatment.  I provided the patient with my contact information today and encouraged her to contact me via phone or patient portal as needed.

## 2023-04-19 NOTE — PLAN OF CARE
Gave 1L bolus IVF (NS).  Minimum urine output via purwick.  Changed external catheter after pt had small BM.  Sugars ranged in the upper 180s to low 200s.  Not much of an appetite.  Family at bedside this morning; provider updated her.  Orders to transfer to floor.

## 2023-04-19 NOTE — ASSESSMENT & PLAN NOTE
Patient's FSGs are uncontrolled due to hyperglycemia on current medication regimen.  Last A1c reviewed-   Lab Results   Component Value Date    HGBA1C 10.6 (H) 04/18/2023     Most recent fingerstick glucose reviewed-   Recent Labs   Lab 04/19/23  0717 04/19/23  0825 04/19/23  1236 04/19/23  1413   POCTGLUCOSE 212* 202* 196* 188*     Current correctional scale  High  Maintain anti-hyperglycemic dose as follows-   Antihyperglycemics (From admission, onward)    Start     Stop Route Frequency Ordered    04/19/23 0900  insulin detemir U-100 (Levemir) pen 10 Units         -- SubQ 2 times daily 04/19/23 0457    04/18/23 2125  insulin aspart U-100 pen 1-10 Units         -- SubQ Before meals & nightly PRN 04/18/23 2026        Hold Oral hypoglycemics while patient is in the hospital.

## 2023-04-19 NOTE — ASSESSMENT & PLAN NOTE
Multifactorial, non-complaint with regimen, elevated consumption of carbs, UTI    --HGA1c: 10.6  --DKA resolved   --Insulin infusion stopped  --Diabetes Education

## 2023-04-19 NOTE — HOSPITAL COURSE
4/19: resolution of DKA overnight with transition to SQ insulin and oral intake; labs this am with ongoing closed AG but decreased HCO to 13. Tmax 101; urine culture remains pending; hemodynamics acceptable; mental status back to baseline. Patient denies any urinary symptoms, fever, frequency, polyuria, polydipsia, or abdominal symptoms.

## 2023-04-19 NOTE — ASSESSMENT & PLAN NOTE
UA: consistent with UTI, prior cultures: pan-sensitive E. Coli    --Cont Rocephin  --Follow Culture

## 2023-04-19 NOTE — SUBJECTIVE & OBJECTIVE
Objective:     Vital Signs (Most Recent):  Temp: 98.1 °F (36.7 °C) (04/19/23 0715)  Pulse: 95 (04/19/23 0600)  Resp: (!) 24 (04/19/23 0600)  BP: (!) 117/56 (04/19/23 0600)  SpO2: (!) 92 % (04/19/23 0600)   Vital Signs (24h Range):  Temp:  [97.7 °F (36.5 °C)-101 °F (38.3 °C)] 98.1 °F (36.7 °C)  Pulse:  [] 95  Resp:  [18-28] 24  SpO2:  [92 %-99 %] 92 %  BP: (107-175)/(52-98) 117/56     Weight: 83.6 kg (184 lb 4.9 oz)  Body mass index is 33.71 kg/m².      Intake/Output Summary (Last 24 hours) at 4/19/2023 0745  Last data filed at 4/19/2023 0600  Gross per 24 hour   Intake 1686.79 ml   Output 250 ml   Net 1436.79 ml       Physical Exam  Vitals reviewed.   Constitutional:       General: She is not in acute distress.     Appearance: She is not toxic-appearing.   HENT:      Head: Normocephalic and atraumatic.      Mouth/Throat:      Mouth: Mucous membranes are moist.      Pharynx: Oropharynx is clear.   Eyes:      Extraocular Movements: Extraocular movements intact.      Conjunctiva/sclera: Conjunctivae normal.   Cardiovascular:      Rate and Rhythm: Normal rate and regular rhythm.   Pulmonary:      Effort: Pulmonary effort is normal.      Breath sounds: No wheezing or rhonchi.   Abdominal:      General: Bowel sounds are normal.      Palpations: Abdomen is soft.   Musculoskeletal:         General: No deformity.      Cervical back: Normal range of motion and neck supple.      Right lower leg: No edema.      Left lower leg: No edema.   Skin:     General: Skin is warm and dry.   Neurological:      General: No focal deficit present.      Mental Status: She is alert and oriented to person, place, and time.   Psychiatric:         Mood and Affect: Mood normal.         Thought Content: Thought content normal.         Judgment: Judgment normal.     Review of Systems   Constitutional:  Negative for chills and fever.   HENT:  Negative for congestion and sore throat.    Eyes:  Negative for photophobia and visual disturbance.    Respiratory:  Negative for cough and shortness of breath.    Cardiovascular:  Negative for chest pain and leg swelling.   Gastrointestinal:  Negative for diarrhea, nausea and vomiting.   Genitourinary:  Negative for difficulty urinating, dysuria and frequency.   Musculoskeletal:  Negative for arthralgias and back pain.   Skin:  Positive for wound.        Lip lesion    Neurological:  Negative for dizziness and headaches.   Psychiatric/Behavioral:  Negative for confusion and sleep disturbance.      Vents:       Lines/Drains/Airways       Drain  Duration             Female External Urinary Catheter 04/18/23 2030 <1 day              Peripheral Intravenous Line  Duration                  Peripheral IV - Single Lumen 04/18/23 0000 22 G Left Antecubital 1 day         Peripheral IV - Single Lumen 04/18/23 1554 20 G Anterior;Proximal;Right Upper Arm <1 day         Peripheral IV - Single Lumen 04/18/23 1659 22 G Posterior;Right Hand <1 day                    Significant Labs:    CBC/Anemia Profile:  Recent Labs   Lab 04/18/23  1314 04/18/23  1403 04/19/23  0422   WBC 26.94*  --  17.55*   HGB 12.0  --  11.2*   HCT 35.6* 35* 34.6*     --  192   MCV 88  --  95   RDW 12.4  --  12.6        Chemistries:  Recent Labs   Lab 04/18/23  1314 04/18/23  1554 04/18/23  1953 04/19/23  0422   * 138 139 134*   K 3.5 3.5 3.6 3.8   CL 98 106 110 107   CO2 14* 14* 17* 13*   BUN 38* 33* 29* 31*   CREATININE 1.1 0.9 0.8 0.9   CALCIUM 9.3 8.5* 8.2* 8.4*   ALBUMIN 3.0*  --   --   --    PROT 6.9  --   --   --    BILITOT 0.7  --   --   --    ALKPHOS 114  --   --   --    ALT 22  --   --   --    AST 45*  --   --   --    MG 1.3*  --  3.4* 1.9   PHOS  --  1.6* 1.2* 3.9       All pertinent labs within the past 24 hours have been reviewed.    Significant Imaging:  I have reviewed all pertinent imaging results/findings within the past 24 hours.

## 2023-04-19 NOTE — CONSULTS
"Nutrition-Related Diabetes Education      Time Spent: 20 minutes    Learners: Patient    Current HbA1c: 10.6    Is patient aware of their A1c and their goal A1c?       _______ yes    Home diabetes medication(s): metformin (GLUCOPHAGE) 500 MG tablet, glipiZIDE (GLUCOTROL) 10 MG tablet    Nutrition Education with handouts: "Carbohydrate Counting for People with Diabetes" and "Diabetes Label Reading Tips" ([employer])    Comments: Educated patient on dietary sources of carbohydrates, carb counting and daily consistent carbohydrate intake. Discussed the importance of carbohydrates in the diet, but with diabetes, focusing on consistent carb intake throughout the day with emphasis on protein and fiber.  For a 2,000-calorie diet, aim for 225-275g (45-55%) daily CHO (goal: 3-4 servings (45-60 g) of carbs per meal with snacks in between).    Pt states that she is ready to make a change within her daily dietary patterns because she does not want to ever feel like this again. Pt states that she will begin grocery shopping more efficiently so she is able to prepare more appropriate meals for herself that are compliant to carb counting and daily consistent carbohydrate intake.      Barriers to Learning: None    Follow up: Yes    Please consult as needed.  Thank you!   Adriel Ibarra, Registration Eligible, Provisional LDN  "

## 2023-04-19 NOTE — ASSESSMENT & PLAN NOTE
Etiology likely multi-factorial- insulin deficiency (non-compliant with lantus ordered), dietary indiscretion, and UTI  hgb A1C 10.6  DKA has resolved based on labs, transitioned to SQ insulin overnight     Diabetic diet   levemir 10 units BID   Accu checks AcHS with [facility]SI   Holding PO meds  Diabetic education

## 2023-04-19 NOTE — ASSESSMENT & PLAN NOTE
Lip swollen with lesion mid upper lip - looking better today   Reportedly present for past week. Patient notes hx of fever blister 2-3xs/year  valtrex

## 2023-04-20 LAB
ALBUMIN SERPL BCP-MCNC: 2 G/DL (ref 3.5–5.2)
ALP SERPL-CCNC: 107 U/L (ref 55–135)
ALT SERPL W/O P-5'-P-CCNC: 18 U/L (ref 10–44)
ANION GAP SERPL CALC-SCNC: 10 MMOL/L (ref 8–16)
AST SERPL-CCNC: 17 U/L (ref 10–40)
BACTERIA UR CULT: ABNORMAL
BASOPHILS # BLD AUTO: 0.05 K/UL (ref 0–0.2)
BASOPHILS NFR BLD: 0.3 % (ref 0–1.9)
BILIRUB SERPL-MCNC: 0.3 MG/DL (ref 0.1–1)
BUN SERPL-MCNC: 25 MG/DL (ref 8–23)
CALCIUM SERPL-MCNC: 8.3 MG/DL (ref 8.7–10.5)
CHLORIDE SERPL-SCNC: 107 MMOL/L (ref 95–110)
CO2 SERPL-SCNC: 19 MMOL/L (ref 23–29)
CREAT SERPL-MCNC: 0.7 MG/DL (ref 0.5–1.4)
DIFFERENTIAL METHOD: ABNORMAL
EOSINOPHIL # BLD AUTO: 0.2 K/UL (ref 0–0.5)
EOSINOPHIL NFR BLD: 1.4 % (ref 0–8)
ERYTHROCYTE [DISTWIDTH] IN BLOOD BY AUTOMATED COUNT: 12.5 % (ref 11.5–14.5)
EST. GFR  (NO RACE VARIABLE): >60 ML/MIN/1.73 M^2
GLUCOSE SERPL-MCNC: 158 MG/DL (ref 70–110)
HCT VFR BLD AUTO: 32.1 % (ref 37–48.5)
HGB BLD-MCNC: 10.6 G/DL (ref 12–16)
IMM GRANULOCYTES # BLD AUTO: 0.14 K/UL (ref 0–0.04)
IMM GRANULOCYTES NFR BLD AUTO: 0.8 % (ref 0–0.5)
LYMPHOCYTES # BLD AUTO: 0.7 K/UL (ref 1–4.8)
LYMPHOCYTES NFR BLD: 4.3 % (ref 18–48)
MCH RBC QN AUTO: 29.9 PG (ref 27–31)
MCHC RBC AUTO-ENTMCNC: 33 G/DL (ref 32–36)
MCV RBC AUTO: 91 FL (ref 82–98)
MONOCYTES # BLD AUTO: 1.1 K/UL (ref 0.3–1)
MONOCYTES NFR BLD: 6.2 % (ref 4–15)
NEUTROPHILS # BLD AUTO: 14.7 K/UL (ref 1.8–7.7)
NEUTROPHILS NFR BLD: 87 % (ref 38–73)
NRBC BLD-RTO: 0 /100 WBC
PLATELET # BLD AUTO: 247 K/UL (ref 150–450)
PMV BLD AUTO: 11 FL (ref 9.2–12.9)
POCT GLUCOSE: 199 MG/DL (ref 70–110)
POCT GLUCOSE: 256 MG/DL (ref 70–110)
POCT GLUCOSE: 266 MG/DL (ref 70–110)
POTASSIUM SERPL-SCNC: 3.5 MMOL/L (ref 3.5–5.1)
PROT SERPL-MCNC: 5.8 G/DL (ref 6–8.4)
RBC # BLD AUTO: 3.54 M/UL (ref 4–5.4)
SODIUM SERPL-SCNC: 136 MMOL/L (ref 136–145)
WBC # BLD AUTO: 16.9 K/UL (ref 3.9–12.7)

## 2023-04-20 PROCEDURE — 63600175 PHARM REV CODE 636 W HCPCS: Performed by: STUDENT IN AN ORGANIZED HEALTH CARE EDUCATION/TRAINING PROGRAM

## 2023-04-20 PROCEDURE — 97166 OT EVAL MOD COMPLEX 45 MIN: CPT

## 2023-04-20 PROCEDURE — 11000001 HC ACUTE MED/SURG PRIVATE ROOM

## 2023-04-20 PROCEDURE — 36415 COLL VENOUS BLD VENIPUNCTURE: CPT | Performed by: NURSE PRACTITIONER

## 2023-04-20 PROCEDURE — 63600175 PHARM REV CODE 636 W HCPCS: Performed by: NURSE PRACTITIONER

## 2023-04-20 PROCEDURE — 25000003 PHARM REV CODE 250: Performed by: INTERNAL MEDICINE

## 2023-04-20 PROCEDURE — 25000003 PHARM REV CODE 250: Performed by: NURSE PRACTITIONER

## 2023-04-20 PROCEDURE — 97116 GAIT TRAINING THERAPY: CPT

## 2023-04-20 PROCEDURE — 85025 COMPLETE CBC W/AUTO DIFF WBC: CPT | Performed by: NURSE PRACTITIONER

## 2023-04-20 PROCEDURE — 25000003 PHARM REV CODE 250: Performed by: STUDENT IN AN ORGANIZED HEALTH CARE EDUCATION/TRAINING PROGRAM

## 2023-04-20 PROCEDURE — 97162 PT EVAL MOD COMPLEX 30 MIN: CPT

## 2023-04-20 PROCEDURE — 97530 THERAPEUTIC ACTIVITIES: CPT

## 2023-04-20 PROCEDURE — 80053 COMPREHEN METABOLIC PANEL: CPT | Performed by: NURSE PRACTITIONER

## 2023-04-20 RX ORDER — HYDRALAZINE HYDROCHLORIDE 20 MG/ML
10 INJECTION INTRAMUSCULAR; INTRAVENOUS EVERY 6 HOURS PRN
Status: DISCONTINUED | OUTPATIENT
Start: 2023-04-20 | End: 2023-04-21 | Stop reason: HOSPADM

## 2023-04-20 RX ORDER — LABETALOL HYDROCHLORIDE 5 MG/ML
10 INJECTION, SOLUTION INTRAVENOUS ONCE
Status: COMPLETED | OUTPATIENT
Start: 2023-04-20 | End: 2023-04-20

## 2023-04-20 RX ORDER — POTASSIUM CHLORIDE 20 MEQ/1
40 TABLET, EXTENDED RELEASE ORAL ONCE
Status: COMPLETED | OUTPATIENT
Start: 2023-04-20 | End: 2023-04-20

## 2023-04-20 RX ADMIN — Medication 1000 UNITS: at 09:04

## 2023-04-20 RX ADMIN — HYDRALAZINE HYDROCHLORIDE 50 MG: 50 TABLET ORAL at 09:04

## 2023-04-20 RX ADMIN — HYDRALAZINE HYDROCHLORIDE 10 MG: 20 INJECTION, SOLUTION INTRAMUSCULAR; INTRAVENOUS at 03:04

## 2023-04-20 RX ADMIN — INSULIN ASPART 6 UNITS: 100 INJECTION, SOLUTION INTRAVENOUS; SUBCUTANEOUS at 05:04

## 2023-04-20 RX ADMIN — HYDRALAZINE HYDROCHLORIDE 50 MG: 50 TABLET ORAL at 12:04

## 2023-04-20 RX ADMIN — LOSARTAN POTASSIUM 50 MG: 50 TABLET, FILM COATED ORAL at 09:04

## 2023-04-20 RX ADMIN — SENNOSIDES AND DOCUSATE SODIUM 2 TABLET: 8.6; 5 TABLET ORAL at 09:04

## 2023-04-20 RX ADMIN — CEFTRIAXONE 1 G: 1 INJECTION, POWDER, FOR SOLUTION INTRAMUSCULAR; INTRAVENOUS at 05:04

## 2023-04-20 RX ADMIN — INSULIN ASPART 6 UNITS: 100 INJECTION, SOLUTION INTRAVENOUS; SUBCUTANEOUS at 11:04

## 2023-04-20 RX ADMIN — HYDRALAZINE HYDROCHLORIDE 50 MG: 50 TABLET ORAL at 10:04

## 2023-04-20 RX ADMIN — ASPIRIN 81 MG CHEWABLE TABLET 81 MG: 81 TABLET CHEWABLE at 09:04

## 2023-04-20 RX ADMIN — FERROUS SULFATE TAB 325 MG (65 MG ELEMENTAL FE) 1 EACH: 325 (65 FE) TAB at 09:04

## 2023-04-20 RX ADMIN — POTASSIUM CHLORIDE 40 MEQ: 1500 TABLET, EXTENDED RELEASE ORAL at 02:04

## 2023-04-20 RX ADMIN — CLOPIDOGREL BISULFATE 75 MG: 75 TABLET ORAL at 09:04

## 2023-04-20 RX ADMIN — INSULIN DETEMIR 10 UNITS: 100 INJECTION, SOLUTION SUBCUTANEOUS at 09:04

## 2023-04-20 RX ADMIN — MUPIROCIN: 20 OINTMENT TOPICAL at 10:04

## 2023-04-20 RX ADMIN — MUPIROCIN: 20 OINTMENT TOPICAL at 09:04

## 2023-04-20 RX ADMIN — ALLOPURINOL 100 MG: 100 TABLET ORAL at 09:04

## 2023-04-20 RX ADMIN — HYDRALAZINE HYDROCHLORIDE 50 MG: 50 TABLET ORAL at 05:04

## 2023-04-20 RX ADMIN — LABETALOL HYDROCHLORIDE 10 MG: 5 INJECTION INTRAVENOUS at 04:04

## 2023-04-20 RX ADMIN — INSULIN ASPART 2 UNITS: 100 INJECTION, SOLUTION INTRAVENOUS; SUBCUTANEOUS at 09:04

## 2023-04-20 RX ADMIN — FERROUS SULFATE TAB 325 MG (65 MG ELEMENTAL FE) 1 EACH: 325 (65 FE) TAB at 02:04

## 2023-04-20 RX ADMIN — FERROUS SULFATE TAB 325 MG (65 MG ELEMENTAL FE) 1 EACH: 325 (65 FE) TAB at 10:04

## 2023-04-20 RX ADMIN — METOPROLOL SUCCINATE 50 MG: 50 TABLET, FILM COATED, EXTENDED RELEASE ORAL at 09:04

## 2023-04-20 RX ADMIN — ATORVASTATIN CALCIUM 40 MG: 40 TABLET, FILM COATED ORAL at 09:04

## 2023-04-20 RX ADMIN — ENOXAPARIN SODIUM 40 MG: 40 INJECTION SUBCUTANEOUS at 05:04

## 2023-04-20 NOTE — PT/OT/SLP EVAL
"Occupational Therapy Evaluation and Treatment    Name: Alysha Rosales  MRN: [id number]  Admitting Diagnosis: Diabetic ketoacidosis without coma associated with type 2 diabetes mellitus  Recent Surgery: * No surgery found *      Recommendations:     Discharge Recommendations: home health OT (24/7 SPV and A)  Level of Assistance Recommended: 24 hours light assistance  Discharge Equipment Recommendations: walker, rolling  Barriers to discharge: None    Assessment:     Alysha Rosales is a 68 y.o. female with a medical diagnosis of Diabetic ketoacidosis without coma associated with type 2 diabetes mellitus. She presents with performance deficits affecting function including weakness, impaired endurance, impaired self care skills, impaired functional mobility, impaired balance, impaired cardiopulmonary response to activity, edema, impaired skin, decreased ROM.    Rehab Prognosis: Good; patient would benefit from acute OT services to address these deficits and reach maximum level of function.    Plan:     Patient to be seen 2 x/week to address the above listed problems via self-care/home management, therapeutic activities, therapeutic exercises  Plan of Care Expires: 05/04/23  Plan of Care Reviewed with: patient    Subjective     Chief Complaint: Reported "My shoulder is stiff."  Patient Comments/Goals: get better  Pain/Comfort:  Pain Rating 1: 0/10  Pain Rating Post-Intervention 1: 0/10    Social History:  Living Environment: Patient lives alone in a single story home with number of outside stair(s): 4  Prior Level of Function: Prior to admission, patient was independent with ADLs and community distance ambulation.  Roles and Routines: Patient was driving and working at a [employer]  prior to admission.  Equipment Used at Home: shower chair  DME owned (not currently used): none  Assistance Upon Discharge:  daughter- plans to d/c home with daughter    Objective:     Communicated with nurse, Maris, prior to session. " Patient found supine with blood pressure cuff, pulse ox (continuous), telemetry, peripheral IV upon OT entry to room.    General Precautions: Standard, fall   Orthopedic Precautions: N/A   Braces: N/A    Respiratory Status: Room air    Occupational Performance    Gait belt applied - Yes    Bed Mobility:   Supine to sit from left side of bed with contact guard assistance    Functional Mobility/Transfers:  Sit <> Stand Transfer with contact guard assistance with rolling walker  Bed <> Chair Transfer using Step Transfer technique with contact guard assistance with rolling walker  Functional Mobility: Patient completed x400ft functional mobility with RW and CGA to increase dynamic standing balance and activity tolerance needed for ADL completion.    Activities of Daily Living:  Upper Body Dressing: minimum assistance  Toileting: moderate assistance    Cognitive/Visual Perceptual:  Cognitive/Psychosocial Skills:    -     Oriented to: Person, Place, Time, Situation  -     Follows Commands/attention: Follows two-step commands    Physical Exam:  Balance:    -     Sitting: supervision  -     Standing: contact guard assistance  Upper Extremity Range of Motion:     -       Right Upper Extremity: WFL except AROM of shdr 90*, PROM WFL  -       Left Upper Extremity: WFL  Upper Extremity Strength: -       Right Upper Extremity: Deficits: grossly 4/5  -       Left Upper Extremity: Deficits: grossly 4/5   Strength:    -       Right Upper Extremity: Deficits: fair  -       Left Upper Extremity: Deficits: fair    AMPAC 6 Click ADL:  AMPAC Total Score: 19    Treatment & Education:  Therapist provided facilitation and instruction of proper body mechanics, energy conservation, and fall prevention strategies during tasks listed above  Patient educated on role of OT, POC, and goals for therapy  Patient educated on importance of OOB activities with staff member assistance and sitting OOB majority of the day  Encouraged completion of B  UE AROM therex throughout the day to increase functional strength and activity tolerance needed for ADL completion. Educated on self PROM of R shdr.    Patient left up in chair with all lines intact, call button in reach, RN notified, and chair alarm on.    GOALS:   Multidisciplinary Problems       Occupational Therapy Goals          Problem: Occupational Therapy    Goal Priority Disciplines Outcome Interventions   Occupational Therapy Goal     OT, PT/OT     Description: Goals to be met by: 5/4/23     Patient will increase functional independence with ADLs by performing:    Toileting from toilet with Stand-by Assistance for hygiene and clothing management.   Toilet transfer to toilet with Stand-by Assistance.  Increased functional strength in B UE grossly by 1/2 MM grade.                         History:     Past Medical History:   Diagnosis Date    CAD (coronary artery disease)     Followed by Dr. Stevo Kaba    Diabetes mellitus, type 2     DM (diabetes mellitus)     BS didn't check 06/05/2020    DM (diabetes mellitus) 2012    BS didn't check 02/10/2022    High level of uric acid in blood     Hyperlipidemia     Hyperplastic rectal polyp     on 03/20/2009 colonoscopy    Hypertension     Multinodular goiter     noted on 1/20/2015 CTA carotid scan    Multiple thyroid nodules 05/05/2016    on Thyroid U/S    Osteoarthritis     Left knee    Osteoarthritis of back     Osteoporosis, unspecified     Postmenopausal     No history of abnormal pap smear    Proteinuria     Pustular psoriasis     hands and feet    Subclavian artery stenosis     Left; with the proximal segment at 60-70% per 10/02/2008 carotid CTA    Vitamin D deficiency disease          Past Surgical History:   Procedure Laterality Date    CATARACT EXTRACTION W/  INTRAOCULAR LENS IMPLANT Right 4/13/16    CPG    CATARACT EXTRACTION W/  INTRAOCULAR LENS IMPLANT Left     CPG    COLONOSCOPY N/A 8/30/2022    Procedure: COLONOSCOPY;  Surgeon: Reanna Garber,  MD;  Location: [facility] ENDO;  Service: Endoscopy;  Laterality: N/A;    CORONARY ANGIOGRAPHY N/A 6/18/2021    Procedure: ANGIOGRAM, CORONARY ARTERY;  Surgeon: Desiree William MD;  Location: [facility] CATH LAB;  Service: Cardiology;  Laterality: N/A;    CORONARY STENT PLACEMENT N/A 6/18/2021    Procedure: INSERTION, STENT, CORONARY ARTERY;  Surgeon: Desiree William MD;  Location: [facility] CATH LAB;  Service: Cardiology;  Laterality: N/A;    heart catheterization with stents placed  2004 and 08/18/2009    LEFT HEART CATHETERIZATION Left 6/18/2021    Procedure: CATHETERIZATION, HEART, LEFT;  Surgeon: Desiree William MD;  Location: [facility] CATH LAB;  Service: Cardiology;  Laterality: Left;  Covid + week of    left knee surgery for bone graft and pin placement         Time Tracking:     OT Date of Treatment: 04/20/23  OT Start Time: 0915  OT Stop Time: 0940  OT Total Time (min): 25 min    Billable Minutes: Evaluation 15 and Therapeutic Activity 10    4/20/2023

## 2023-04-20 NOTE — PLAN OF CARE
Problem: Diabetic Ketoacidosis  Goal: Fluid and Electrolyte Balance with Absence of Ketosis  Outcome: Ongoing, Progressing     Problem: Adult Inpatient Plan of Care  Goal: Plan of Care Review  Outcome: Ongoing, Progressing  Goal: Patient-Specific Goal (Individualized)  Outcome: Ongoing, Progressing  Goal: Absence of Hospital-Acquired Illness or Injury  Outcome: Ongoing, Progressing  Goal: Optimal Comfort and Wellbeing  Outcome: Ongoing, Progressing  Goal: Readiness for Transition of Care  Outcome: Ongoing, Progressing     Problem: Diabetes Comorbidity  Goal: Blood Glucose Level Within Targeted Range  Outcome: Ongoing, Progressing     Problem: Skin Injury Risk Increased  Goal: Skin Health and Integrity  Outcome: Ongoing, Progressing     Problem: Impaired Wound Healing  Goal: Optimal Wound Healing  Outcome: Ongoing, Progressing

## 2023-04-20 NOTE — PLAN OF CARE
OT renetta completed. Sup>sit CGA, sit>stand CGA, functional mobility x400ft with RW and CGA, step>pivot to bedside chair with RW and CGA. Recommending HHOT with 24/7 SPV and A at d/c.

## 2023-04-20 NOTE — HOSPITAL COURSE
68-year-old female with a known past medical history of CAD with PCI, HTN, HLD, gout, and DM with recent Rx of Lantus in addition to PO agents who presented to ED 2023 for evaluation of confusion. ED evaluation with lab findings consistent with DKA and UA revealing UTI.    On admission, Beta-hydroxybutyrate: 4.8, LA: 3.0, A, Glu: 464, UA: consistent with UTI. Placed on insulin infusion, IV antibiotics and transferred to ICU. CBG improved, AG: WNL, insulin infusion d/c. Hospital Medicine consulted on  to assume care for UTI, Hyperglycemia, metabolic encephalopathy.      surrogate decision maker is her daughter, Jose Rosales. Admitted to Hospital Medicine.     : BG in 150-230 range on detemir 10 units b.i.d., within past 24 hours patient received approximately 16 units of sliding scale insulin; accordingly changed detemir to 15 units b.i.d., continue sliding scale insulin, monitor blood glucose and adjust insulin regimen accordingly.    Pending urine cultures, blood culture x2 as of -so far.  Continue antibiotics and deescalate based on culture results.    Discussed on code status, patient opted for DNR DNI.

## 2023-04-20 NOTE — ASSESSMENT & PLAN NOTE
UA: consistent with UTI, prior cultures: pan-sensitive E. Coli    --Cont Rocephin  --Follow Culture    4/20    Pending urine cultures, blood culture x2 as of 4/18-so far.  Continue antibiotics and deescalate based on culture results.

## 2023-04-20 NOTE — PT/OT/SLP EVAL
Physical Therapy Evaluation    Patient Name:  Alysha Rosales   MRN:  [id number]    Recommendations:     Discharge Recommendations: home health PT   Discharge Equipment Recommendations: walker, rolling   Barriers to discharge: None    Assessment:     Alysha Rosales is a 68 y.o. female admitted with a medical diagnosis of Diabetic ketoacidosis without coma associated with type 2 diabetes mellitus.  She presents with the following impairments/functional limitations: weakness, impaired endurance, impaired functional mobility, gait instability, impaired balance, decreased lower extremity function, decreased safety awareness which limits mobility and increases risk of falls. Pt able to participate in OOB activity including gait. Recommend continued PT to address lingering deficits and for safety awareness.    Rehab Prognosis: Good; patient would benefit from acute skilled PT services to address these deficits and reach maximum level of function.    Recent Surgery: * No surgery found *      Plan:     During this hospitalization, patient to be seen 3 x/week to address the identified rehab impairments via gait training, therapeutic activities, therapeutic exercises, neuromuscular re-education and progress toward the following goals:    Plan of Care Expires:  05/04/23    Subjective     Chief Complaint: DKA  Patient/Family Comments/goals: to go home/get better  Pain/Comfort:  Pain Rating 1: 0/10  Pain Rating Post-Intervention 1: 0/10    Patients cultural, spiritual, Moravian conflicts given the current situation: no    Living Environment:  Pt lives in [facility] alone with 4 steps at entry.   Prior to admission, patients level of function was independent with all ADLs and functional tasks including household and community mobility.  Equipment used at home: shower chair.  DME owned (not currently used): none.  Upon discharge, patient will have assistance from daughter. Pt reports that she will likely live with her daughter for  some time following discharge.    Objective:     Communicated with nursing (Maris) and performed chart review via epic prior to session.  Patient found supine with telemetry, peripheral IV, blood pressure cuff, pulse ox (continuous)  upon PT entry to room.    General Precautions: Standard, fall  Orthopedic Precautions:N/A   Braces: N/A  Respiratory Status: Room air    Exams:  Cognitive Exam:  Patient is oriented to Person, Place, Time, and Situation; although pt reports she does not fully remember details of her coming to hospital  Gross Motor Coordination:  WFL  RLE ROM: WFL  RLE Strength: WFL  LLE ROM: WFL  LLE Strength: WFL    Functional Mobility:  Bed Mobility:     Scooting: contact guard assistance  Supine to Sit: contact guard assistance  Sit to Supine: contact guard assistance  Transfers:     Sit to Stand:  contact guard assistance with rolling walker  Bed to Chair: contact guard assistance with  rolling walker  using  Stand Pivot  Gait: 400ft CGA with RW, slow pace, flexed posture, step to gait pattern  Balance: fair      AM-PAC 6 CLICK MOBILITY  Total Score:16       Treatment & Education:  Educated pt on benefits of consistent participation in PT services to meet functional goals. Educated pt on seated therex to promote strength and joint mobility. Exercises included AP, LAQ, marching. Educated to perform exercises intermittently throughout day to tolerance. Educated pt on importance of sitting OOB to promote endurance and overall activity tolerance. Educated pt on call don't fall policy and use of call button to alert nursing staff of needs (including to assist with returning back to bed). Pt expressed understanding.      Patient left up in chair with all lines intact, call button in reach, chair alarm on, nursing notified, and family present.    GOALS:   Multidisciplinary Problems       Physical Therapy Goals          Problem: Physical Therapy    Goal Priority Disciplines Outcome Goal Variances  Interventions   Physical Therapy Goal     PT, PT/OT      Description: Pt will perform bed mobility independently in order to participate in EOB activity.  Pt will perform transfers independently in order to participate in OOB activity.   Pt will ambulate 500 ft  I with no AD in order to participate in daily tasks.                         History:     Past Medical History:   Diagnosis Date    CAD (coronary artery disease)     Followed by Dr. Stevo Kaba    Diabetes mellitus, type 2     DM (diabetes mellitus)     BS didn't check 06/05/2020    DM (diabetes mellitus) 2012    BS didn't check 02/10/2022    High level of uric acid in blood     Hyperlipidemia     Hyperplastic rectal polyp     on 03/20/2009 colonoscopy    Hypertension     Multinodular goiter     noted on 1/20/2015 CTA carotid scan    Multiple thyroid nodules 05/05/2016    on Thyroid U/S    Osteoarthritis     Left knee    Osteoarthritis of back     Osteoporosis, unspecified     Postmenopausal     No history of abnormal pap smear    Proteinuria     Pustular psoriasis     hands and feet    Subclavian artery stenosis     Left; with the proximal segment at 60-70% per 10/02/2008 carotid CTA    Vitamin D deficiency disease        Past Surgical History:   Procedure Laterality Date    CATARACT EXTRACTION W/  INTRAOCULAR LENS IMPLANT Right 4/13/16    CPG    CATARACT EXTRACTION W/  INTRAOCULAR LENS IMPLANT Left     CPG    COLONOSCOPY N/A 8/30/2022    Procedure: COLONOSCOPY;  Surgeon: Reanna Garber MD;  Location: [facility] ENDO;  Service: Endoscopy;  Laterality: N/A;    CORONARY ANGIOGRAPHY N/A 6/18/2021    Procedure: ANGIOGRAM, CORONARY ARTERY;  Surgeon: Desiree William MD;  Location: [facility] CATH LAB;  Service: Cardiology;  Laterality: N/A;    CORONARY STENT PLACEMENT N/A 6/18/2021    Procedure: INSERTION, STENT, CORONARY ARTERY;  Surgeon: Desiree William MD;  Location: [facility] CATH LAB;  Service: Cardiology;  Laterality: N/A;    heart catheterization with stents  placed  2004 and 08/18/2009    LEFT HEART CATHETERIZATION Left 6/18/2021    Procedure: CATHETERIZATION, HEART, LEFT;  Surgeon: Desiree William MD;  Location: [facility] CATH LAB;  Service: Cardiology;  Laterality: Left;  Covid + week of    left knee surgery for bone graft and pin placement         Time Tracking:     PT Received On: 04/20/23  PT Start Time: 0924     PT Stop Time: 0947  PT Total Time (min): 23 min     Billable Minutes: Evaluation 10 and Gait Training 13      04/20/2023

## 2023-04-20 NOTE — SUBJECTIVE & OBJECTIVE
Interval History:     Denied acute issues overnight, denied chest pain, dizziness, fever, chills, appeared alert and oriented x4,; opted for DNR DNI.    Emphasized on compliance with medications at the time of discharge, patient stated that she would not repeat it again and stated she will be compliant with medications and follow up visits; stated having last BM yesterday;         Review of Systems      Constitutional:  Positive for activity change. Negative for chills and fever.   HENT:  Negative for congestion and sore throat.    Eyes:  Negative for photophobia and visual disturbance.   Respiratory:  Negative for cough and shortness of breath.    Cardiovascular:  Negative for chest pain and leg swelling.   Gastrointestinal:  Negative for diarrhea, nausea and vomiting.   Endocrine: Negative for polyuria.   Genitourinary:  Negative for difficulty urinating, dysuria and frequency.   Musculoskeletal:  Negative for arthralgias and back pain.   Skin:  Positive for wound. Lip lesion    Neurological:  Negative for dizziness, weakness, light-headedness and headaches.   Psychiatric/Behavioral:  Negative for agitation, behavioral problems, confusion and sleep disturbance.    Objective:     Vital Signs (Most Recent):  Temp: 98.2 °F (36.8 °C) (04/20/23 1100)  Pulse: 88 (04/20/23 1130)  Resp: (!) 28 (04/20/23 1130)  BP: (!) 178/98 (04/20/23 1100)  SpO2: 98 % (04/20/23 1130)   Vital Signs (24h Range):  Temp:  [98 °F (36.7 °C)-98.6 °F (37 °C)] 98.2 °F (36.8 °C)  Pulse:  [84-97] 88  Resp:  [10-34] 28  SpO2:  [94 %-99 %] 98 %  BP: (124-193)/(61-98) 178/98     Weight: 83.9 kg (184 lb 15.5 oz)  Body mass index is 33.83 kg/m².    Intake/Output Summary (Last 24 hours) at 4/20/2023 1242  Last data filed at 4/20/2023 0515  Gross per 24 hour   Intake 79.53 ml   Output 651 ml   Net -571.47 ml      Physical Exam      Constitutional:       General: She is not in acute distress.     Appearance: She is ill-appearing. She is not  toxic-appearing.   HENT:      Head: Normocephalic and atraumatic.      Mouth/Throat:      Mouth: Mucous membranes are moist.      Pharynx: Oropharynx is clear.   Eyes:      Extraocular Movements: Extraocular movements intact.      Conjunctiva/sclera: Conjunctivae normal.   Cardiovascular:      Rate and Rhythm: Normal rate and regular rhythm.   Pulmonary:      Effort: Pulmonary effort is normal.      Breath sounds: No wheezing or rhonchi.   Abdominal:         Palpations: Abdomen is soft.   Musculoskeletal:         General: No deformity.      Cervical back: Normal range of motion and neck supple.      Right lower leg: No edema.      Left lower leg: No edema.   Skin:     General: Skin is warm and dry.   Neurological:      General: No focal deficit present.      Mental Status: She is alert and oriented to person, place, and time.   Psychiatric:         Mood and Affect: Mood normal.         Thought Content: Thought content normal.         Judgment: Judgment normal.     Significant Labs: All pertinent labs within the past 24 hours have been reviewed.  CBC:   Recent Labs   Lab 04/18/23  1314 04/18/23  1403 04/19/23  0422 04/20/23  0425   WBC 26.94*  --  17.55* 16.90*   HGB 12.0  --  11.2* 10.6*   HCT 35.6* 35* 34.6* 32.1*     --  192 247     CMP:   Recent Labs   Lab 04/18/23  1314 04/18/23  1554 04/18/23  1953 04/19/23  0422 04/20/23  0425   *   < > 139 134* 136   K 3.5   < > 3.6 3.8 3.5   CL 98   < > 110 107 107   CO2 14*   < > 17* 13* 19*   *   < > 137* 214* 158*   BUN 38*   < > 29* 31* 25*   CREATININE 1.1   < > 0.8 0.9 0.7   CALCIUM 9.3   < > 8.2* 8.4* 8.3*   PROT 6.9  --   --   --  5.8*   ALBUMIN 3.0*  --   --   --  2.0*   BILITOT 0.7  --   --   --  0.3   ALKPHOS 114  --   --   --  107   AST 45*  --   --   --  17   ALT 22  --   --   --  18   ANIONGAP 22*   < > 12 14 10    < > = values in this interval not displayed.       Significant Imaging:     Imaging Results              X-Ray Chest AP Portable  (Final result)  Result time 04/18/23 13:46:23      Final result by ARNULFO Danielson Sr., MD (04/18/23 13:46:23)                   Impression:      1. There is no focal pulmonary infiltrate visualized.  2. The size of the heart is prominent.  Some of this may be secondary to magnification.  3. There is marked narrowing of the left subacromial space. There is moderate narrowing of the right subacromial space.  This is characteristic of rotator cuff tears.  4. There is a mild amount of levoconvex curvature in the superior aspect of the thoracic spine.  .      Electronically signed by: Inocente Danielson MD  Date:    04/18/2023  Time:    13:46               Narrative:    EXAMINATION:  XR CHEST AP PORTABLE    CLINICAL HISTORY:  Weakness    COMPARISON:  05/28/2021    FINDINGS:  The size of the heart is prominent.  There is no focal pulmonary infiltrate visualized.  There is no pneumothorax.  The costophrenic angles are sharp.  There is marked narrowing of the left subacromial space.  There is moderate narrowing of the right subacromial space.  There is a mild amount of levoconvex curvature in the superior aspect of the thoracic spine.                                       CT Head Without Contrast (Final result)  Result time 04/18/23 13:53:28      Final result by ARNULFO Danielson Sr., MD (04/18/23 13:53:28)                   Impression:      1. There are subacute to chronic appearing ischemic changes in the left basal ganglia.  2. There is no intracranial hemorrhage.  All CT scans at this facility use dose modulation, iterative reconstruction, and/or weight base dosing when appropriate to reduce radiation dose when appropriate to reduce radiation dose to as low as reasonably achievable.      Electronically signed by: Inocente Danielson MD  Date:    04/18/2023  Time:    13:53               Narrative:    EXAMINATION:  CT HEAD WITHOUT CONTRAST    CLINICAL HISTORY:  Mental status change, unknown cause;    TECHNIQUE:  Standard brain  CT protocol without IV contrast was performed.    COMPARISON:  None    FINDINGS:  There are subacute to chronic appearing ischemic changes in the left basal ganglia.  There is no intracranial hemorrhage.  There is no skull fracture. The paranasal sinuses are normal in appearance.

## 2023-04-20 NOTE — ASSESSMENT & PLAN NOTE
Multifactorial, non-complaint with regimen, elevated consumption of carbs, UTI  Home regimen includes- metformin, januvia, glipizide, (has been recently ordered by PCP (3/2023) to take Lantus 10 units daily, however pt did not start it yet)    --HGA1c: 10.6  --DKA resolved   --Insulin infusion stopped on 4/19  --Diabetes Education    4/20    BG in 150-230 range on detemir 10 units b.i.d., within past 24 hours patient received approximately 16 units of sliding scale insulin; accordingly changed detemir to 15 units b.i.d., continue sliding scale insulin, monitor blood glucose and adjust insulin regimen accordingly.

## 2023-04-20 NOTE — PROGRESS NOTES
[facility] ([facility])  [facility] Medicine  Progress Note    Patient Name: Alysha Rosales  MRN: [id number]  Patient Class: IP- Inpatient   Admission Date: 2023  Length of Stay: 2 days  Attending Physician: Sarah Carpio, *  Primary Care Provider: Margie Bird MD        Subjective:     Principal Problem:Diabetic ketoacidosis without coma associated with type 2 diabetes mellitus        HPI:  68-year-old female with a known past medical history of CAD with PCI, HTN, HLD, gout, and DM with recent Rx of Lantus in addition to PO agents who presented to ED 2023 for evaluation of confusion. ED evaluation with lab findings consistent with DKA and UA revealing UTI. She was given insulin and IVFs and Zosyn. Critical care consulted for ICU admission.      Upon exam on admission, Ms. Encarnacion is lethargic but arouses easily. Daughter found patient sitting on the floor of her home earlier today confused. She went to check on her after receiving a call from patient's boss that she did not show up today for work. 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 She is usually completely oriented and independent. Jose was recently made aware that Ms. Encarnacion does not take all of her medications all the time and misses doses. Ms. Encarnacion does confirm recently being placed on lantus but states she has never taken it. She denies any urinary symptoms. She endorses polyphagia. She has had a fever blister to her upper lip for about a week. Over the past week it has had worsening swelling.     On admission, Beta-hydroxybutyrate: 4.8, LA: 3.0, A, Glu: 464, UA: consistent with UTI. Placed on insulin infusion, IV antibiotics and transferred to ICU. CBG improved, AG: WNL, insulin infusion d/c. Hospital Medicine consulted to assume care for UTI, Hyperglycemia, metabolic encephalopathy.     Patient is a full code, surrogate decision maker is her daughter, Jose  Bobby. Admitted to Hospital Medicine.         Overview/Hospital Course:  68-year-old female with a known past medical history of CAD with PCI, HTN, HLD, gout, and DM with recent Rx of Lantus in addition to PO agents who presented to ED 2023 for evaluation of confusion. ED evaluation with lab findings consistent with DKA and UA revealing UTI.    On admission, Beta-hydroxybutyrate: 4.8, LA: 3.0, A, Glu: 464, UA: consistent with UTI. Placed on insulin infusion, IV antibiotics and transferred to ICU. CBG improved, AG: WNL, insulin infusion d/c. Hospital Medicine consulted on  to assume care for UTI, Hyperglycemia, metabolic encephalopathy.      surrogate decision maker is her daughter, Jose Rosales. Admitted to Hospital Medicine.     : BG in 150-230 range on detemir 10 units b.i.d., within past 24 hours patient received approximately 16 units of sliding scale insulin; accordingly changed detemir to 15 units b.i.d., continue sliding scale insulin, monitor blood glucose and adjust insulin regimen accordingly.    Pending urine cultures, blood culture x2 as of -so far.  Continue antibiotics and deescalate based on culture results.    Discussed on code status, patient opted for DNR DNI.      Interval History:     Denied acute issues overnight, denied chest pain, dizziness, fever, chills, appeared alert and oriented x4,; opted for DNR DNI.    Emphasized on compliance with medications at the time of discharge, patient stated that she would not repeat it again and stated she will be compliant with medications and follow up visits; stated having last BM yesterday;         Review of Systems      Constitutional:  Positive for activity change. Negative for chills and fever.   HENT:  Negative for congestion and sore throat.    Eyes:  Negative for photophobia and visual disturbance.   Respiratory:  Negative for cough and shortness of breath.    Cardiovascular:  Negative for chest pain and leg swelling.    Gastrointestinal:  Negative for diarrhea, nausea and vomiting.   Endocrine: Negative for polyuria.   Genitourinary:  Negative for difficulty urinating, dysuria and frequency.   Musculoskeletal:  Negative for arthralgias and back pain.   Skin:  Positive for wound. Lip lesion    Neurological:  Negative for dizziness, weakness, light-headedness and headaches.   Psychiatric/Behavioral:  Negative for agitation, behavioral problems, confusion and sleep disturbance.    Objective:     Vital Signs (Most Recent):  Temp: 98.2 °F (36.8 °C) (04/20/23 1100)  Pulse: 88 (04/20/23 1130)  Resp: (!) 28 (04/20/23 1130)  BP: (!) 178/98 (04/20/23 1100)  SpO2: 98 % (04/20/23 1130)   Vital Signs (24h Range):  Temp:  [98 °F (36.7 °C)-98.6 °F (37 °C)] 98.2 °F (36.8 °C)  Pulse:  [84-97] 88  Resp:  [10-34] 28  SpO2:  [94 %-99 %] 98 %  BP: (124-193)/(61-98) 178/98     Weight: 83.9 kg (184 lb 15.5 oz)  Body mass index is 33.83 kg/m².    Intake/Output Summary (Last 24 hours) at 4/20/2023 1242  Last data filed at 4/20/2023 0515  Gross per 24 hour   Intake 79.53 ml   Output 651 ml   Net -571.47 ml      Physical Exam      Constitutional:       General: She is not in acute distress.     Appearance: She is ill-appearing. She is not toxic-appearing.   HENT:      Head: Normocephalic and atraumatic.      Mouth/Throat:      Mouth: Mucous membranes are moist.      Pharynx: Oropharynx is clear.   Eyes:      Extraocular Movements: Extraocular movements intact.      Conjunctiva/sclera: Conjunctivae normal.   Cardiovascular:      Rate and Rhythm: Normal rate and regular rhythm.   Pulmonary:      Effort: Pulmonary effort is normal.      Breath sounds: No wheezing or rhonchi.   Abdominal:         Palpations: Abdomen is soft.   Musculoskeletal:         General: No deformity.      Cervical back: Normal range of motion and neck supple.      Right lower leg: No edema.      Left lower leg: No edema.   Skin:     General: Skin is warm and dry.   Neurological:       General: No focal deficit present.      Mental Status: She is alert and oriented to person, place, and time.   Psychiatric:         Mood and Affect: Mood normal.         Thought Content: Thought content normal.         Judgment: Judgment normal.     Significant Labs: All pertinent labs within the past 24 hours have been reviewed.  CBC:   Recent Labs   Lab 04/18/23  1314 04/18/23  1403 04/19/23  0422 04/20/23  0425   WBC 26.94*  --  17.55* 16.90*   HGB 12.0  --  11.2* 10.6*   HCT 35.6* 35* 34.6* 32.1*     --  192 247     CMP:   Recent Labs   Lab 04/18/23  1314 04/18/23  1554 04/18/23  1953 04/19/23  0422 04/20/23  0425   *   < > 139 134* 136   K 3.5   < > 3.6 3.8 3.5   CL 98   < > 110 107 107   CO2 14*   < > 17* 13* 19*   *   < > 137* 214* 158*   BUN 38*   < > 29* 31* 25*   CREATININE 1.1   < > 0.8 0.9 0.7   CALCIUM 9.3   < > 8.2* 8.4* 8.3*   PROT 6.9  --   --   --  5.8*   ALBUMIN 3.0*  --   --   --  2.0*   BILITOT 0.7  --   --   --  0.3   ALKPHOS 114  --   --   --  107   AST 45*  --   --   --  17   ALT 22  --   --   --  18   ANIONGAP 22*   < > 12 14 10    < > = values in this interval not displayed.       Significant Imaging:     Imaging Results              X-Ray Chest AP Portable (Final result)  Result time 04/18/23 13:46:23      Final result by ARNULFO Danielson Sr., MD (04/18/23 13:46:23)                   Impression:      1. There is no focal pulmonary infiltrate visualized.  2. The size of the heart is prominent.  Some of this may be secondary to magnification.  3. There is marked narrowing of the left subacromial space. There is moderate narrowing of the right subacromial space.  This is characteristic of rotator cuff tears.  4. There is a mild amount of levoconvex curvature in the superior aspect of the thoracic spine.  .      Electronically signed by: Inocente Danielson MD  Date:    04/18/2023  Time:    13:46               Narrative:    EXAMINATION:  XR CHEST AP PORTABLE    CLINICAL  HISTORY:  Weakness    COMPARISON:  05/28/2021    FINDINGS:  The size of the heart is prominent.  There is no focal pulmonary infiltrate visualized.  There is no pneumothorax.  The costophrenic angles are sharp.  There is marked narrowing of the left subacromial space.  There is moderate narrowing of the right subacromial space.  There is a mild amount of levoconvex curvature in the superior aspect of the thoracic spine.                                       CT Head Without Contrast (Final result)  Result time 04/18/23 13:53:28      Final result by ARNULFO Danielson Sr., MD (04/18/23 13:53:28)                   Impression:      1. There are subacute to chronic appearing ischemic changes in the left basal ganglia.  2. There is no intracranial hemorrhage.  All CT scans at this facility use dose modulation, iterative reconstruction, and/or weight base dosing when appropriate to reduce radiation dose when appropriate to reduce radiation dose to as low as reasonably achievable.      Electronically signed by: Inocente Danielson MD  Date:    04/18/2023  Time:    13:53               Narrative:    EXAMINATION:  CT HEAD WITHOUT CONTRAST    CLINICAL HISTORY:  Mental status change, unknown cause;    TECHNIQUE:  Standard brain CT protocol without IV contrast was performed.    COMPARISON:  None    FINDINGS:  There are subacute to chronic appearing ischemic changes in the left basal ganglia.  There is no intracranial hemorrhage.  There is no skull fracture. The paranasal sinuses are normal in appearance.                                         Assessment/Plan:      * Diabetic ketoacidosis without coma associated with type 2 diabetes mellitus  Multifactorial, non-complaint with regimen, elevated consumption of carbs, UTI  Home regimen includes- metformin, januvia, glipizide, (has been recently ordered by PCP (3/2023) to take Lantus 10 units daily, however pt did not start it yet)    --HGA1c: 10.6  --DKA resolved   --Insulin infusion  stopped on 4/19  --Diabetes Education    4/20    BG in 150-230 range on detemir 10 units b.i.d., within past 24 hours patient received approximately 16 units of sliding scale insulin; accordingly changed detemir to 15 units b.i.d., continue sliding scale insulin, monitor blood glucose and adjust insulin regimen accordingly.              Primary herpes simplex infection of lips  Lesion to upper lip    --Valtrex      Acute cystitis without hematuria  UA: consistent with UTI, prior cultures: pan-sensitive E. Coli    --Cont Rocephin  --Follow Culture    4/20    Pending urine cultures, blood culture x2 as of 4/18-so far.  Continue antibiotics and deescalate based on culture results.        Buttock wound, left, initial encounter  Present on admission    --Consult wound care- see note      Atherosclerotic heart disease of native coronary artery with other forms of angina pectoris  Hx of PCI    --REsumed Plavix and ASA    Hyperlipidemia  Chronic, followed by PCP    --Continue statin      Hypertension associated with diabetes  Patient's FSGs are uncontrolled due to hyperglycemia on current medication regimen.  Last A1c reviewed-   Lab Results   Component Value Date    HGBA1C 10.6 (H) 04/18/2023     Most recent fingerstick glucose reviewed-   Recent Labs   Lab 04/19/23  0717 04/19/23  0825 04/19/23  1236 04/19/23  1413   POCTGLUCOSE 212* 202* 196* 188*     Current correctional scale  High  Maintain anti-hyperglycemic dose as follows-   Antihyperglycemics (From admission, onward)    Start     Stop Route Frequency Ordered    04/19/23 0900  insulin detemir U-100 (Levemir) pen 10 Units         -- SubQ 2 times daily 04/19/23 0457    04/18/23 2125  insulin aspart U-100 pen 1-10 Units         -- SubQ Before meals & nightly PRN 04/18/23 2026        Hold Oral hypoglycemics while patient is in the hospital.      VTE Risk Mitigation (From admission, onward)         Ordered     enoxaparin injection 40 mg  Daily         04/19/23 0927      IP VTE HIGH RISK PATIENT  Once         04/18/23 1632     Place sequential compression device  Until discontinued         04/18/23 1632                Discharge Planning   SANTANA:      Code Status: Full Code   Is the patient medically ready for discharge?:     Reason for patient still in hospital (select all that apply): f/u on cultures, BG and adjust insulin regimen accordingly;   Discharge Plan A: Home Health            Critical care time spent on the evaluation and treatment of severe organ dysfunction, review of pertinent labs and imaging studies, discussions with consulting providers and discussions with patient/family: 76 minutes.      Sarah Carpio MD  Department of Hospital Medicine   [facility] ([facility])

## 2023-04-20 NOTE — PLAN OF CARE
Pt had no events or concerns overnight, pt remains A&O X 4, full bath given this am. Pt uses call light appropriately, no needs at this time.

## 2023-04-21 VITALS
RESPIRATION RATE: 18 BRPM | DIASTOLIC BLOOD PRESSURE: 67 MMHG | WEIGHT: 184.94 LBS | OXYGEN SATURATION: 95 % | TEMPERATURE: 98 F | BODY MASS INDEX: 34.03 KG/M2 | HEIGHT: 62 IN | HEART RATE: 83 BPM | SYSTOLIC BLOOD PRESSURE: 145 MMHG

## 2023-04-21 LAB
ALBUMIN SERPL BCP-MCNC: 2 G/DL (ref 3.5–5.2)
ALP SERPL-CCNC: 91 U/L (ref 55–135)
ALT SERPL W/O P-5'-P-CCNC: 18 U/L (ref 10–44)
ANION GAP SERPL CALC-SCNC: 8 MMOL/L (ref 8–16)
AST SERPL-CCNC: 14 U/L (ref 10–40)
BASOPHILS # BLD AUTO: 0.03 K/UL (ref 0–0.2)
BASOPHILS NFR BLD: 0.3 % (ref 0–1.9)
BILIRUB SERPL-MCNC: 0.3 MG/DL (ref 0.1–1)
BUN SERPL-MCNC: 15 MG/DL (ref 8–23)
CALCIUM SERPL-MCNC: 8.5 MG/DL (ref 8.7–10.5)
CHLORIDE SERPL-SCNC: 108 MMOL/L (ref 95–110)
CO2 SERPL-SCNC: 21 MMOL/L (ref 23–29)
CREAT SERPL-MCNC: 0.6 MG/DL (ref 0.5–1.4)
DIFFERENTIAL METHOD: ABNORMAL
EOSINOPHIL # BLD AUTO: 0.2 K/UL (ref 0–0.5)
EOSINOPHIL NFR BLD: 1.8 % (ref 0–8)
ERYTHROCYTE [DISTWIDTH] IN BLOOD BY AUTOMATED COUNT: 12.5 % (ref 11.5–14.5)
EST. GFR  (NO RACE VARIABLE): >60 ML/MIN/1.73 M^2
GLUCOSE SERPL-MCNC: 116 MG/DL (ref 70–110)
HCT VFR BLD AUTO: 32.5 % (ref 37–48.5)
HGB BLD-MCNC: 10.7 G/DL (ref 12–16)
HSV-1 DNA BY PCR: NEGATIVE
HSV-2 DNA BY PCR: NEGATIVE
IMM GRANULOCYTES # BLD AUTO: 0.08 K/UL (ref 0–0.04)
IMM GRANULOCYTES NFR BLD AUTO: 0.7 % (ref 0–0.5)
LYMPHOCYTES # BLD AUTO: 1 K/UL (ref 1–4.8)
LYMPHOCYTES NFR BLD: 9.1 % (ref 18–48)
MCH RBC QN AUTO: 29.8 PG (ref 27–31)
MCHC RBC AUTO-ENTMCNC: 32.9 G/DL (ref 32–36)
MCV RBC AUTO: 91 FL (ref 82–98)
MONOCYTES # BLD AUTO: 0.6 K/UL (ref 0.3–1)
MONOCYTES NFR BLD: 5.6 % (ref 4–15)
NEUTROPHILS # BLD AUTO: 9.2 K/UL (ref 1.8–7.7)
NEUTROPHILS NFR BLD: 82.5 % (ref 38–73)
NRBC BLD-RTO: 0 /100 WBC
PLATELET # BLD AUTO: 269 K/UL (ref 150–450)
PMV BLD AUTO: 10.3 FL (ref 9.2–12.9)
POCT GLUCOSE: 131 MG/DL (ref 70–110)
POCT GLUCOSE: 155 MG/DL (ref 70–110)
POTASSIUM SERPL-SCNC: 3.6 MMOL/L (ref 3.5–5.1)
PROT SERPL-MCNC: 5.7 G/DL (ref 6–8.4)
RBC # BLD AUTO: 3.59 M/UL (ref 4–5.4)
SODIUM SERPL-SCNC: 137 MMOL/L (ref 136–145)
WBC # BLD AUTO: 11.15 K/UL (ref 3.9–12.7)

## 2023-04-21 PROCEDURE — 25000003 PHARM REV CODE 250: Performed by: INTERNAL MEDICINE

## 2023-04-21 PROCEDURE — 25000003 PHARM REV CODE 250: Performed by: NURSE PRACTITIONER

## 2023-04-21 PROCEDURE — 85025 COMPLETE CBC W/AUTO DIFF WBC: CPT | Performed by: NURSE PRACTITIONER

## 2023-04-21 PROCEDURE — 36415 COLL VENOUS BLD VENIPUNCTURE: CPT | Performed by: NURSE PRACTITIONER

## 2023-04-21 PROCEDURE — 80053 COMPREHEN METABOLIC PANEL: CPT | Performed by: NURSE PRACTITIONER

## 2023-04-21 RX ORDER — CEFDINIR 300 MG/1
300 CAPSULE ORAL 2 TIMES DAILY
Qty: 10 CAPSULE | Refills: 0 | Status: SHIPPED | OUTPATIENT
Start: 2023-04-21 | End: 2023-04-21 | Stop reason: SDUPTHER

## 2023-04-21 RX ORDER — INSULIN DETEMIR 100 [IU]/ML
15 INJECTION, SOLUTION SUBCUTANEOUS DAILY
Qty: 3 ML | Refills: 1 | Status: SHIPPED | OUTPATIENT
Start: 2023-04-21 | End: 2023-05-12 | Stop reason: SDUPTHER

## 2023-04-21 RX ORDER — CEFDINIR 300 MG/1
300 CAPSULE ORAL 2 TIMES DAILY
Qty: 10 CAPSULE | Refills: 0 | Status: SHIPPED | OUTPATIENT
Start: 2023-04-21 | End: 2023-04-26

## 2023-04-21 RX ADMIN — ATORVASTATIN CALCIUM 40 MG: 40 TABLET, FILM COATED ORAL at 09:04

## 2023-04-21 RX ADMIN — FERROUS SULFATE TAB 325 MG (65 MG ELEMENTAL FE) 1 EACH: 325 (65 FE) TAB at 09:04

## 2023-04-21 RX ADMIN — HYDRALAZINE HYDROCHLORIDE 50 MG: 50 TABLET ORAL at 09:04

## 2023-04-21 RX ADMIN — INSULIN ASPART 2 UNITS: 100 INJECTION, SOLUTION INTRAVENOUS; SUBCUTANEOUS at 11:04

## 2023-04-21 RX ADMIN — HYDRALAZINE HYDROCHLORIDE 50 MG: 50 TABLET ORAL at 12:04

## 2023-04-21 RX ADMIN — ASPIRIN 81 MG CHEWABLE TABLET 81 MG: 81 TABLET CHEWABLE at 09:04

## 2023-04-21 RX ADMIN — Medication 1000 UNITS: at 09:04

## 2023-04-21 RX ADMIN — ALLOPURINOL 100 MG: 100 TABLET ORAL at 09:04

## 2023-04-21 RX ADMIN — SENNOSIDES AND DOCUSATE SODIUM 2 TABLET: 8.6; 5 TABLET ORAL at 09:04

## 2023-04-21 RX ADMIN — METOPROLOL SUCCINATE 50 MG: 50 TABLET, FILM COATED, EXTENDED RELEASE ORAL at 09:04

## 2023-04-21 RX ADMIN — LOSARTAN POTASSIUM 50 MG: 50 TABLET, FILM COATED ORAL at 09:04

## 2023-04-21 RX ADMIN — MUPIROCIN: 20 OINTMENT TOPICAL at 09:04

## 2023-04-21 RX ADMIN — CLOPIDOGREL BISULFATE 75 MG: 75 TABLET ORAL at 09:04

## 2023-04-21 NOTE — NURSING
Discharge instructions, written and verbal, given to patient. Teach back complete. Patient waiting for family member to come get her.

## 2023-04-21 NOTE — PHYSICIAN QUERY
"PT Name: Alysha Rosales  MR #: [id number]     DOCUMENTATION CLARIFICATION     CDS/: ISIRDA Minor,  RN, CCDS               Contact information:   [email or web address]     This form is a permanent document in the medical record.    Query Date: April 21, 2023    By submitting this query, we are merely seeking further clarification of documentation.  Please utilize your independent clinical judgment when addressing the question(s) below.    The Medical Record contains the following:   Indicator Supporting Clinical Findings Location in Medical Record    Kidney (Renal) Insufficiency      Kidney (Renal) Failure/Injury      Nephrotoxic Agents     x BUN/Creatinine           GFR 4/18 Cr: 1.1,  0.9,  0.8;  BUN 38;  eGFR: 55  4/19   "    0.9  4/20    "    0.7  4/21     "   0.6   Labs      Urine: Casts         Eosinophils     x Dehydration life-threatening deterioration of the following conditions: dehydration 4/18 ED provider        Nausea/Vomiting      Dialysis/CRRT     x Treatment NS, bolus, 1000 ml; 4/18   (tot 2L)  NaCl, infusion  @100Ml/hr; 4/19   MAR   x Other Daughter found patient sitting on the floor of her home earlier today confused; She went to check on her after receiving a call from patient's boss that she did not show up today for work  DKA; UTI  4/19 [facility] approved diagnostic criteria for acute kidney injury is based on KDIGO criteria:    An increase in serum creatinine > 0.3mg/dl within 48 hours  OR  Increase in serum creatinine to > 1.5x baseline, which is known or presumed to have occurred within the prior 7 days  OR  Urine volume <0.5 ml/kg/hr for 6 hours       The clinical guidelines noted above are only a system guideline. It does not replace the providers clinical judgment.     Provider, please specify the diagnosis or diagnoses associated with above clinical findings.     [  x  ] Unspecified Acute Kidney Failure/Injury     [    ] Other Acute Kidney " Failure/Injury (please specify): ____________     [    ] Other (please specify): _______________________________   [  ] Clinically Undetermined       Please document in your progress notes daily for the duration of treatment until resolved and include in your discharge summary.    References:   KDIGO Clinical Practice Guideline for Acute Kidney Injury. (2012, March). Retrieved October 21, 2020, from https://kdigo.org/wp-content/uploads/2016/10/SSGIU-3857-OUA-Guideline-English.pdf    ISABELLE Schmid MD, SUSI Ruiz MD, & NINOSKA Richardson MD. (1960). Renal medullary necrosis [Abstract]. The American Journal of Medicine, 29(1), 132-156. Doi:https://www.sciencedirect.com/science/article/abs/pii/3615926664587495    NINOSKA Santigao MD, & DYLAN Box MD, MS. (2020, June 18). Definition and staging of chronic kidney disease in adults ([id number] SUSI Madrid MD, ScD & [id number] EMILY Carnes MD, MSc, Eds.). Retrieved October 21, 2020, from https://www.[employer]/contents/definition-and-staging-of-chronic-kidney-disease-in-adults?search=ckd%20staging&source=search_result&selectedTitle=1~150&usage_type=default&display_rank=1     ARNULFO Hernandez MD, FACP. (2015, Karo 15). Acute kidney injury revisited. Retrieved October 21, 2020, from https://acphospitalist.org/archives/2015/06/coding-acute-kidney-injury.htm    RAF Plasencia MD. (2019, July). Renal Cortical Necrosis. Retrieved October 21, 2020, from https://www.[employer]/professional/genitourinary-disorders/renovascular-disorders/renal-cortical-necrosis    Form No. 83416

## 2023-04-21 NOTE — PLAN OF CARE
[facility] Med Surg  Discharge Final Note    Primary Care Provider: Margie Bird MD    Expected Discharge Date: 4/21/2023    Final Discharge Note (most recent)       Final Note - 04/21/23 1049          Final Note    Assessment Type Final Discharge Note     Anticipated Discharge Disposition Home-Health Care [facility]     Hospital Resources/Appts/Education Provided Appointments scheduled and added to AVS        Post-Acute Status    Post-Acute Authorization Home Health     Home Health Status Set-up Complete/Auth obtained     Coverage medicare                                  Contact Info       Margie Bird MD   Specialty: Family Medicine   Relationship: PCP - General    [address] [address]   Phone: [phone]       Next Steps: Schedule an appointment as soon as possible for a visit in 1 week(s)    Instructions: Routine follow up following hospital discharge for DKA

## 2023-04-21 NOTE — PHYSICIAN QUERY
PT Name: Alysha Rosales  MR #: [id number]     DOCUMENTATION CLARIFICATION     CDS/: Parmjit Costa RN, CCDS               Contact information:   [email or web address]     This form is a permanent document in the medical record.     Query Date: April 21, 2023    By submitting this query, we are merely seeking further clarification of documentation.  Please utilize your independent clinical judgment when addressing the question(s) below.    The Medical Record contains the following:   Indicators   Supporting Clinical Findings Location in Medical Record    Non-blanchable erythema/redness     x Ulcer/Injury/Skin Breakdown PULM: Left Butt wound ; Left buttock wound- appears pressure related  Buttock wound, left, initial encounter  Present on admit- see media   Appears to be pressure related, but daughter reports patient active and working ;  Wound care consulted;   4/18 Pulmonology     Deep Tissue Injury     x Wound care consult WD. C:  Left Buttocks Full thickness tissue loss. Base is covered by slough and/or eschar in the wound bed; PIC  --Ulcer noted to left medial buttock consistend with unstageable pressure injury with thin layer adherent slough covered wound bed, erythema surrounding, and scattered full thickness satellite lesions also noted surrounding.       Left buttocks      4/19  wound care    x Acute/Chronic Illness Admit to ICU for DKA;   HX: CAD with PCI, HTN, HLD, gout, and DM 4/18 pulmonology / ICU    Medication/Treatment      Other       The clinical guidelines noted are only a system guideline. It does not replace the providers clinical judgment.    Per the National Pressure Injury Advisory Panel:   A pressure injury is localized damage to the skin and underlying soft tissue usually over a bony prominence or related to a medical or other device. The injury can present as intact skin or an open ulcer and may be painful. The injury occurs as a result of intense and/or prolonged pressure  or pressure in combination with shear. The tolerance of soft tissue for pressure and shear may also be affected by microclimate, nutrition, perfusion, co-morbidities and condition of the soft tissue.       Stage 1 Pressure Injury:  Intact skin with a localized area of non-blanchable erythema, which may appear differently in darkly pigmented skin. Color changes do not include purple or maroon discoloration; these may indicate deep tissue pressure injury.    Stage 2 Pressure Injury:  Partial-thickness loss of skin with exposed dermis. The wound bed is viable, pink or red, moist, and may also present as an intact or ruptured serum-filled blister.    Stage 3 Pressure Injury:  Full-thickness loss of skin, in which adipose (fat) is visible in the ulcer and granulation tissue and epibole (rolled wound edges) are often present. Slough and/or eschar may be visible. Undermining and tunneling may occur.    Stage 4 Pressure Injury:  Full-thickness skin and tissue loss with exposed or directly palpable fascia, muscle, tendon, ligament, cartilage or bone in the ulcer. Slough and/or eschar may be visible. Epibole (rolled edges), undermining and/or tunneling often occur.    Unstageable Pressure Injury:  Full-thickness skin and tissue loss in which the extent of tissue damage within the ulcer cannot be confirmed because it is obscured by slough or eschar. If slough or eschar is removed, a Stage 3 or Stage 4 pressure injury will be revealed.    Deep Tissue Pressure Injury:  Intact or non-intact skin with localized area of persistent non-blanchable deep red, maroon, purple discoloration or epidermal separation revealing a dark wound bed or blood filled blister. This injury results from intense and/or prolonged pressure and shear forces at the bone-muscle interface. The wound may evolve rapidly to reveal the actual extent of tissue injury, or may resolve without tissue loss. If necrotic tissue, subcutaneous tissue, granulation tissue,  fascia, muscle or other underlying structures are visible, this indicates a full thickness pressure injury (Unstageable, Stage 3 or Stage 4). Do not use DTPI to describe vascular, traumatic, neuropathic, or dermatologic conditions.   Medical Device Related Pressure Injury: This describes an etiology. Medical device related pressure injuries result from the use of devices designed and applied for diagnostic or therapeutic purposes. The resultant pressure injury generally conforms to the pattern or shape of the device. The injury should be staged using the staging system.    Mucosal Membrane Pressure Injury: Mucosal membrane pressure injury is found on mucous membranes with a history of a medical device in use at the location of the injury. Due to the anatomy of the tissue these ulcers cannot be staged.       Provider, please provide the integumentary diagnosis related to the documentation of        Left Buttock wound :     [   ] Pressure Injury/Decubitus Ulcer, Stage 3   [   ] Pressure Injury/Decubitus Ulcer, Stage 4   [  x ] Pressure Injury/Decubitus Ulcer, Unstageable   [   ] Diabetic ulcer;  Non-pressure ulcer, skin breakdown only   [   ] Diabetic ulcer;  Non-pressure ulcer, exposed fat layer   [   ] Diabetic ulcer;  Non-pressure ulcer, muscle involvement without evidence of necrosis   [   ] Diabetic ulcer;  Non-pressure ulcer, other depth (please specify): ___________   [   ] Other Integumentary Diagnosis (please specify):______________   [  ] Clinically Undetermined       Please document in your progress notes daily for the duration of treatment until resolved and include in your discharge summary.    Reference:    DYLAN Goldstein., EMILY Rodriguez., Goldberg, M., JILLIAN Salcido., DYLAN Diana, & RONAK Miles. (2016). Revised National Pressure Ulcer Advisory Panel Pressure Injury Staging System: Revised Pressure Injury Staging System. J Wound Ostomy Continence Nurs, 43(6), 585-597. doi:10.1097/won.[id number]    Form  No.25009

## 2023-04-21 NOTE — PLAN OF CARE
04/21/23 1042   Post-Acute Status   Post-Acute Authorization Home Health   Home Health Status Referrals Sent   Coverage medicare   Discharge Plan   Discharge Plan A Home Health     Pending [facility] to accept. Pending DME.

## 2023-04-21 NOTE — PLAN OF CARE
Discussed plan of care with pt. Pt verbalized understanding. No signs of acute distress. Pt turns independently. Bed alarm set with bed at lowest position. Tele monitor [id number] place. Call light within reach. Purposeful rounding Q2h.      Chart check complete.     Problem: Diabetic Ketoacidosis  Goal: Fluid and Electrolyte Balance with Absence of Ketosis  Outcome: Ongoing, Progressing     Problem: Adult Inpatient Plan of Care  Goal: Plan of Care Review  Outcome: Ongoing, Progressing  Goal: Patient-Specific Goal (Individualized)  Outcome: Ongoing, Progressing  Goal: Absence of Hospital-Acquired Illness or Injury  Outcome: Ongoing, Progressing  Goal: Optimal Comfort and Wellbeing  Outcome: Ongoing, Progressing  Goal: Readiness for Transition of Care  Outcome: Ongoing, Progressing     Problem: Diabetes Comorbidity  Goal: Blood Glucose Level Within Targeted Range  Outcome: Ongoing, Progressing     Problem: Skin Injury Risk Increased  Goal: Skin Health and Integrity  Outcome: Ongoing, Progressing     Problem: Impaired Wound Healing  Goal: Optimal Wound Healing  Outcome: Ongoing, Progressing     Problem: Pain Acute  Goal: Acceptable Pain Control and Functional Ability  Outcome: Ongoing, Progressing     Problem: Fall Injury Risk  Goal: Absence of Fall and Fall-Related Injury  Outcome: Ongoing, Progressing

## 2023-04-21 NOTE — SUBJECTIVE & OBJECTIVE
Interval History: No acute events overnight. Doing well this AM with no complaints at our encounter. Denies CP, SOB, Abdominal pain, fevers/chills.      Review of Systems  Objective:     Vital Signs (Most Recent):  Temp: 98 °F (36.7 °C) (04/21/23 0759)  Pulse: 94 (04/21/23 0759)  Resp: 18 (04/21/23 0759)  BP: (!) 146/68 (04/21/23 0759)  SpO2: 96 % (04/21/23 0759)   Vital Signs (24h Range):  Temp:  [98 °F (36.7 °C)-99.3 °F (37.4 °C)] 98 °F (36.7 °C)  Pulse:  [80-97] 94  Resp:  [10-31] 18  SpO2:  [95 %-100 %] 96 %  BP: (136-217)/(62-98) 146/68     Weight: 83.9 kg (184 lb 15.5 oz)  Body mass index is 33.83 kg/m².  No intake or output data in the 24 hours ending 04/21/23 0825   Physical Exam  GEN: No acute distress, pleasant, body habitus normal  HEENT: atraumatic and normocephalic  CARDS: regular rate and rhythm, no m/g, pulses palpable in LE  PULM: breathing comfortably on room air, chest symmetric, nonlabored, no abnormal breath sounds on auscultation  ABD: nontender, nondistended, soft, no organomegaly, BS+  Neuro: Alert and oriented x3, CN's I-IX grossly intact, sensation and motor intact; follows directions and answers questions appropriately    Significant Labs: BMP:   Recent Labs   Lab 04/21/23  0556   *      K 3.6      CO2 21*   BUN 15   CREATININE 0.6   CALCIUM 8.5*     CBC:   Recent Labs   Lab 04/20/23 0425 04/21/23  0556   WBC 16.90* 11.15   HGB 10.6* 10.7*   HCT 32.1* 32.5*    269     CMP:   Recent Labs   Lab 04/20/23 0425 04/21/23  0556    137   K 3.5 3.6    108   CO2 19* 21*   * 116*   BUN 25* 15   CREATININE 0.7 0.6   CALCIUM 8.3* 8.5*   PROT 5.8* 5.7*   ALBUMIN 2.0* 2.0*   BILITOT 0.3 0.3   ALKPHOS 107 91   AST 17 14   ALT 18 18   ANIONGAP 10 8           Significant Imaging:   Imaging Results              X-Ray Chest AP Portable (Final result)  Result time 04/18/23 13:46:23      Final result by ARNULFO Danielson Sr., MD (04/18/23 13:46:23)                    Impression:      1. There is no focal pulmonary infiltrate visualized.  2. The size of the heart is prominent.  Some of this may be secondary to magnification.  3. There is marked narrowing of the left subacromial space. There is moderate narrowing of the right subacromial space.  This is characteristic of rotator cuff tears.  4. There is a mild amount of levoconvex curvature in the superior aspect of the thoracic spine.  .      Electronically signed by: Inocente Danielson MD  Date:    04/18/2023  Time:    13:46               Narrative:    EXAMINATION:  XR CHEST AP PORTABLE    CLINICAL HISTORY:  Weakness    COMPARISON:  05/28/2021    FINDINGS:  The size of the heart is prominent.  There is no focal pulmonary infiltrate visualized.  There is no pneumothorax.  The costophrenic angles are sharp.  There is marked narrowing of the left subacromial space.  There is moderate narrowing of the right subacromial space.  There is a mild amount of levoconvex curvature in the superior aspect of the thoracic spine.                                       CT Head Without Contrast (Final result)  Result time 04/18/23 13:53:28      Final result by ARNULFO Danielson Sr., MD (04/18/23 13:53:28)                   Impression:      1. There are subacute to chronic appearing ischemic changes in the left basal ganglia.  2. There is no intracranial hemorrhage.  All CT scans at this facility use dose modulation, iterative reconstruction, and/or weight base dosing when appropriate to reduce radiation dose when appropriate to reduce radiation dose to as low as reasonably achievable.      Electronically signed by: Inocente Danielson MD  Date:    04/18/2023  Time:    13:53               Narrative:    EXAMINATION:  CT HEAD WITHOUT CONTRAST    CLINICAL HISTORY:  Mental status change, unknown cause;    TECHNIQUE:  Standard brain CT protocol without IV contrast was performed.    COMPARISON:  None    FINDINGS:  There are subacute to chronic appearing ischemic  changes in the left basal ganglia.  There is no intracranial hemorrhage.  There is no skull fracture. The paranasal sinuses are normal in appearance.

## 2023-04-21 NOTE — DISCHARGE SUMMARY
[facility] Medicine  Discharge Summary      Patient Name: Alysha Rosales  MRN: [id number]  [facility]: [id number]  Patient Class: IP- Inpatient  Admission Date: 2023  Hospital Length of Stay: 3 days  Discharge Date and Time:  2023 10:13 AM  Attending Physician: Aris Huffman MD   Discharging Provider: Aris Huffman MD  Primary Care Provider: Margie Bird MD    Primary Care Team: Networked reference to record PCT     HPI:   68-year-old female with a known past medical history of CAD with PCI, HTN, HLD, gout, and DM with recent Rx of Lantus in addition to PO agents who presented to ED 2023 for evaluation of confusion. ED evaluation with lab findings consistent with DKA and UA revealing UTI. She was given insulin and IVFs and Zosyn. Critical care consulted for ICU admission.      Upon exam on admission, Ms. Encarnacion is lethargic but arouses easily. Daughter found patient sitting on the floor of her home earlier today confused. She went to check on her after receiving a call from patient's boss that she did not show up today for work. 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. She is usually completely oriented and independent. Jose was recently made aware that Ms. Encarnacion does not take all of her medications all the time and misses doses. Ms. Encarnacion does confirm recently being placed on lantus but states she has never taken it. She denies any urinary symptoms. She endorses polyphagia. She has had a fever blister to her upper lip for about a week. Over the past week it has had worsening swelling.     On admission, Beta-hydroxybutyrate: 4.8, LA: 3.0, A, Glu: 464, UA: consistent with UTI. Placed on insulin infusion, IV antibiotics and transferred to ICU. CBG improved, AG: WNL, insulin infusion d/c. Hospital Medicine consulted to assume care for UTI, Hyperglycemia, metabolic encephalopathy.     Patient is a full code, surrogate  decision maker is her daughter, Jose Rosales. Admitted to Hospital Medicine.         * No surgery found *      Hospital Course:   68-year-old female with a known past medical history of CAD with PCI, HTN, HLD, gout, and DM with recent Rx of Lantus in addition to PO agents who presented to ED 2023 for evaluation of confusion. ED evaluation with lab findings consistent with DKA and UA revealing UTI.    On admission, Beta-hydroxybutyrate: 4.8, LA: 3.0, A, Glu: 464, UA: consistent with UTI. Placed on insulin infusion, IV antibiotics and transferred to ICU. CBG improved, AG: WNL, insulin infusion d/c. Hospital Medicine consulted on  to assume care for UTI, Hyperglycemia, metabolic encephalopathy.      surrogate decision maker is her daughter, Jose Rosales. Admitted to Hospital Medicine.     : BG in 150-230 range on detemir 10 units b.i.d., within past 24 hours patient received approximately 16 units of sliding scale insulin; accordingly changed detemir to 15 units b.i.d., continue sliding scale insulin, monitor blood glucose and adjust insulin regimen accordingly.    Pending urine cultures, blood culture x2 as of -so far.  Continue antibiotics and deescalate based on culture results.    Discussed on code status, patient opted for DNR DNI.     : On day of discharge, patient ,  serum bicarbonate level 21, no signs of dehydration and patient tolerating diabetic diet. Will increase patient's home basal insulin regimen from 10u QHS to 15u. Patient regularly sees Dr. Bird OP who is managing her insulin regimen and confirms an appointment within the next 2 weeks. Defer further insulin changes to their team.     Patient's blood cultures NGTD (final), but urine cultures grew [employer]RO[employer]ER YOUNGAE sensitive to 3rd generation cephalosporins. Received 2 days of therapy while hospitalized, will discharge with additional 5 days of omnicef therapy. Patient acknowledged understanding and agreed to the  "plan.     BP (!) 146/68   Pulse 94   Temp 98 °F (36.7 °C)   Resp 18   Ht 5' 2" (1.575 m)   Wt 83.9 kg (184 lb 15.5 oz)   SpO2 96%   BMI 33.83 kg/m²     GEN: No acute distress, pleasant, body habitus normal  HEENT: atraumatic and normocephalic  CARDS: regular rate and rhythm, no m/g, pulses palpable in LE  PULM: breathing comfortably on room air, chest symmetric, nonlabored, no abnormal breath sounds on auscultation  ABD: nontender, nondistended, soft, no organomegaly, BS+  Neuro: Alert and oriented x3, CN's I-IX grossly intact, sensation and motor intact; follows directions and answers questions appropriately      Goals of Care Treatment Preferences:  Code Status: Full Code      Consults:   Consults (From admission, onward)        Status Ordering Provider     Inpatient consult to Hospitalist  Once        Provider:  Sarah Carpio MD    Completed MILENA RIVAS     Inpatient consult to Registered Dietitian/Nutritionist  Once        Provider:  (Not yet assigned)    MILENA Putnam     Inpatient consult to Diabetes educator  Once        Provider:  (Not yet assigned)    Completed MILENA RIVAS          Endocrine  * Diabetic ketoacidosis without coma associated with type 2 diabetes mellitus  Multifactorial, non-complaint with regimen, elevated consumption of carbs, UTI  Home regimen includes- metformin, januvia, glipizide, (has been recently ordered by PCP (3/2023) to take Lantus 10 units daily, however pt did not start it yet)    --HGA1c: 10.6  --DKA resolved   --Insulin infusion stopped on 4/19  --Diabetes Education    4/20    BG in 150-230 range on detemir 10 units b.i.d., within past 24 hours patient received approximately 16 units of sliding scale insulin; accordingly changed detemir to 15 units b.i.d., continue sliding scale insulin, monitor blood glucose and adjust insulin regimen accordingly.                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 Schedule an appointment as soon as possible for a visit in 1 week(s).    Specialty: Family Medicine  Why: Routine follow up following hospital discharge for DKA  Contact information:  [address] [facility] [address] [phone]                       Patient Instructions:      Ambulatory referral/consult to Diabetes Education   Standing Status: Future   Referral Priority: Routine Referral Type: Consultation   Referral Reason: Specialty Services Required   Requested Specialty: Diabetes   Number of Visits Requested: 1 Expiration Date: 04/21/24     Diet diabetic       Significant Diagnostic Studies:   BMP: Recent Labs   Lab 04/21/23  0556   *      K 3.6      CO2 21*   BUN 15   CREATININE 0.6   CALCIUM 8.5*     CBC:   Recent Labs   Lab 04/20/23  0425 04/21/23  0556   WBC 16.90* 11.15   HGB 10.6* 10.7*   HCT 32.1* 32.5*    269     CMP:   Recent Labs   Lab 04/20/23  0425 04/21/23  0556    137   K 3.5 3.6    108   CO2 19* 21*   * 116*   BUN 25* 15   CREATININE 0.7 0.6   CALCIUM 8.3* 8.5*   PROT 5.8* 5.7*   ALBUMIN 2.0* 2.0*   BILITOT 0.3 0.3   ALKPHOS 107 91   AST 17 14   ALT 18 18   ANIONGAP 10 8     Cardiac Markers: No results for input(s): CKMB,  MYOGLOBIN, BNP, TROPISTAT in the last 48 hours.  Coagulation: No results for input(s): PT, INR, APTT in the last 48 hours.  Lactic Acid: No results for input(s): LACTATE in the last 48 hours.  Magnesium: No results for input(s): MG in the last 48 hours.  Troponin: No results for input(s): TROPONINI, TROPONINIHS in the last 48 hours.  TSH: No results for input(s): TSH in the last 4320 hours.  Urine Studies:   No results for input(s): COLORU, APPEARANCEUA, PHUR, SPECGRAV, PROTEINUA, GLUCUA, KETONESU, BILIRUBINUA, OCCULTUA, NITRITE, UROBILINOGEN, LEUKOCYTESUR, RBCUA, WBCUA, BACTERIA, SQUAMEPITHEL, HYALINECASTS in the last 48 hours.    Invalid input(s): WRIGHTSUR    Imaging Results          X-Ray Chest AP Portable (Final result)  Result time 04/18/23 13:46:23    Final result by ARNULFO Danielson Sr., MD (04/18/23 13:46:23)                 Impression:      1. There is no focal pulmonary infiltrate visualized.  2. The size of the heart is prominent.  Some of this may be secondary to magnification.  3. There is marked narrowing of the left subacromial space. There is moderate narrowing of the right subacromial space.  This is characteristic of rotator cuff tears.  4. There is a mild amount of levoconvex curvature in the superior aspect of the thoracic spine.  .      Electronically signed by: Inocente Danielson MD  Date:    04/18/2023  Time:    13:46             Narrative:    EXAMINATION:  XR CHEST AP PORTABLE    CLINICAL HISTORY:  Weakness    COMPARISON:  05/28/2021    FINDINGS:  The size of the heart is prominent.  There is no focal pulmonary infiltrate visualized.  There is no pneumothorax.  The costophrenic angles are sharp.  There is marked narrowing of the left subacromial space.  There is moderate narrowing of the right subacromial space.  There is a mild amount of levoconvex curvature in the superior aspect of the thoracic spine.                               CT Head Without Contrast (Final result)  Result time 04/18/23  13:53:28    Final result by ARNULFO Danielson Sr., MD (04/18/23 13:53:28)                 Impression:      1. There are subacute to chronic appearing ischemic changes in the left basal ganglia.  2. There is no intracranial hemorrhage.  All CT scans at this facility use dose modulation, iterative reconstruction, and/or weight base dosing when appropriate to reduce radiation dose when appropriate to reduce radiation dose to as low as reasonably achievable.      Electronically signed by: Inocente Danielson MD  Date:    04/18/2023  Time:    13:53             Narrative:    EXAMINATION:  CT HEAD WITHOUT CONTRAST    CLINICAL HISTORY:  Mental status change, unknown cause;    TECHNIQUE:  Standard brain CT protocol without IV contrast was performed.    COMPARISON:  None    FINDINGS:  There are subacute to chronic appearing ischemic changes in the left basal ganglia.  There is no intracranial hemorrhage.  There is no skull fracture. The paranasal sinuses are normal in appearance.                                  Pending Diagnostic Studies:     Procedure Component Value Units Date/Time    Herpes simplex Virus (HSV) Type 1 & 2 DNA by PCR [[id number]] Collected: 04/19/23 0537    Order Status: Sent Lab Status: In process Updated: 04/19/23 1219    Specimen: Blood     Narrative:      Collection has been rescheduled by ARW2 at 04/19/2023 04:24 Reason:   Unable to collect spk with nurse on shift nurse Josefa         Medications:  Reconciled Home Medications:      Medication List      START taking these medications    cefdinir 300 MG capsule  Commonly known as: OMNICEF  Take 1 capsule (300 mg total) by mouth 2 (two) times daily. for 5 days        CHANGE how you take these medications    LEVEMIR FLEXTOUCH U-100 INSULN 100 unit/mL (3 mL) Inpn pen  Generic drug: insulin detemir U-100 (Levemir)  Inject 15 Units into the skin once daily.  What changed: how much to take        CONTINUE taking these medications    allopurinoL 100 MG  "tablet  Commonly known as: ZYLOPRIM  Take 1 tablet by mouth once daily     aspirin 81 MG Chew  Take 81 mg by mouth once daily.     blood sugar diagnostic Strp  To check BG 2 times daily, to use with insurance preferred meter - Freestyle Lite     clopidogreL 75 mg tablet  Commonly known as: PLAVIX  Take 1 tablet by mouth once daily     ferrous sulfate Tab tablet  Commonly known as: FEOSOL  Take 1 tablet by mouth 3 (three) times daily.     furosemide 20 MG tablet  Commonly known as: LASIX  Take 2 tablets (40 mg total) by mouth once daily.     glipiZIDE 10 MG tablet  Commonly known as: GLUCOTROL  TAKE 1 TABLET BY MOUTH TWICE DAILY BEFORE MEAL(S)     hydrALAZINE 50 MG tablet  Commonly known as: APRESOLINE  Take 1 tablet (50 mg total) by mouth 4 (four) times daily.     JANUVIA 100 MG Tab  Generic drug: SITagliptin phosphate  Take 1 tablet (100 mg total) by mouth once daily.     losartan 50 MG tablet  Commonly known as: COZAAR  Take 1 tablet by mouth once daily     metFORMIN 500 MG tablet  Commonly known as: GLUCOPHAGE  TAKE 1 TABLET BY MOUTH ONCE DAILY IN THE MORNING AND 1 ONCE DAILY AT NOON AND 2 ONCE DAILY IN THE EVENING WITH MEALS     metoprolol succinate 50 MG 24 hr tablet  Commonly known as: TOPROL-XL  Take 1 tablet by mouth once daily     pen needle, diabetic 31 gauge x 5/16" Ndle  Commonly known as: BD ULTRA-FINE SHORT PEN NEEDLE  1 Units by Misc.(Non-Drug; Combo Route) route once daily.     rosuvastatin 20 MG tablet  Commonly known as: CRESTOR  TAKE 1 TABLET BY MOUTH NIGHTLY.     VITAMIN D ORAL  Take 2,000 Units by mouth once daily.        STOP taking these medications    fluconazole 100 MG tablet  Commonly known as: DIFLUCAN            Indwelling Lines/Drains at time of discharge:   Lines/Drains/Airways     None                 Time spent on the discharge of patient: 20 minutes  of time spent on discharge including examining patient, providing discharge instructions, arranging follow-up and documentation.       "     Aris Huffman MD  Department of Hospital Medicine  '[facility]

## 2023-04-23 LAB
BACTERIA BLD CULT: NORMAL
BACTERIA BLD CULT: NORMAL

## 2023-04-24 ENCOUNTER — PATIENT OUTREACH (OUTPATIENT)
Dept: ADMINISTRATIVE | Facility: CLINIC | Age: 68
End: 2023-04-24
Payer: MEDICARE

## 2023-04-24 NOTE — PROGRESS NOTES
C3 nurse spoke with Alysha Rosales for a TCC post hospital discharge follow up call. The patient has a scheduled HOS appointment with Margie Bird MD on 4/25/23 @ 4609.

## 2023-04-25 ENCOUNTER — TELEPHONE (OUTPATIENT)
Dept: FAMILY MEDICINE | Facility: CLINIC | Age: 68
End: 2023-04-25
Payer: MEDICARE

## 2023-04-28 ENCOUNTER — TELEPHONE (OUTPATIENT)
Dept: FAMILY MEDICINE | Facility: CLINIC | Age: 68
End: 2023-04-28
Payer: MEDICARE

## 2023-04-28 ENCOUNTER — LAB VISIT (OUTPATIENT)
Dept: LAB | Facility: HOSPITAL | Age: 68
End: 2023-04-28
Attending: FAMILY MEDICINE
Payer: MEDICARE

## 2023-04-28 DIAGNOSIS — N39.0 URINARY TRACT INFECTION WITHOUT HEMATURIA, SITE UNSPECIFIED: Primary | ICD-10-CM

## 2023-04-28 DIAGNOSIS — N39.0 URINARY TRACT INFECTION, SITE NOT SPECIFIED: Primary | ICD-10-CM

## 2023-04-28 LAB
BILIRUB UR QL STRIP: NEGATIVE
CLARITY UR REFRACT.AUTO: CLEAR
COLOR UR AUTO: COLORLESS
GLUCOSE UR QL STRIP: NEGATIVE
HGB UR QL STRIP: NEGATIVE
KETONES UR QL STRIP: NEGATIVE
LEUKOCYTE ESTERASE UR QL STRIP: NEGATIVE
NITRITE UR QL STRIP: NEGATIVE
PH UR STRIP: 5 [PH] (ref 5–8)
PROT UR QL STRIP: ABNORMAL
SP GR UR STRIP: 1.01 (ref 1–1.03)
URN SPEC COLLECT METH UR: ABNORMAL

## 2023-04-28 PROCEDURE — 81003 URINALYSIS AUTO W/O SCOPE: CPT | Performed by: FAMILY MEDICINE

## 2023-04-28 PROCEDURE — 87186 SC STD MICRODIL/AGAR DIL: CPT | Performed by: FAMILY MEDICINE

## 2023-04-28 PROCEDURE — 87086 URINE CULTURE/COLONY COUNT: CPT | Performed by: FAMILY MEDICINE

## 2023-04-28 PROCEDURE — 87088 URINE BACTERIA CULTURE: CPT | Performed by: FAMILY MEDICINE

## 2023-04-28 PROCEDURE — 87077 CULTURE AEROBIC IDENTIFY: CPT | Performed by: FAMILY MEDICINE

## 2023-04-28 NOTE — TELEPHONE ENCOUNTER
----- Message from Nirmala Palmer sent at 4/28/2023  9:20 AM CDT -----  Contact: Halie/[facility] home [facility]  Halie would like a call back at [phone], in regards to needing an order put in for patient to have an urinalysis, a verbal would be fine.   Thanks   Am

## 2023-04-28 NOTE — TELEPHONE ENCOUNTER
I have put the following orders and/or medications to this note.  Please advise home health and pt - call for result.    Orders Placed This Encounter   Procedures    Urine culture     Standing Status:   Future     Standing Expiration Date:   6/26/2024

## 2023-05-03 LAB — BACTERIA UR CULT: ABNORMAL

## 2023-05-11 DIAGNOSIS — E11.40 TYPE 2 DIABETES MELLITUS WITH DIABETIC NEUROPATHY, WITHOUT LONG-TERM CURRENT USE OF INSULIN: ICD-10-CM

## 2023-05-11 DIAGNOSIS — E11.21 DIABETIC NEPHROPATHY WITH PROTEINURIA: ICD-10-CM

## 2023-05-11 NOTE — TELEPHONE ENCOUNTER
Impression: Age-related nuclear cataract, bilateral: H25.13. Plan: Cataracts account for the patient's complaints. Discussed all risks, benefits, procedures and recovery for cataract surgery in detail with the patient. Patient understands changing glasses will not improve vision. Patient desires to have surgery, recommend phacoemulsification with intraocular lens implant. Discussed lens implant options. Patient would like standard lens with a distance refractive goal.  Patient understands that glasses are needed after surgery. Lv 3/3/23  Nv 5/12/23

## 2023-05-11 NOTE — TELEPHONE ENCOUNTER
No care due was identified.  [facility] Embedded Care Due Messages. Reference number: [id number].   5/11/2023 9:50:51 AM CDT

## 2023-05-12 ENCOUNTER — OFFICE VISIT (OUTPATIENT)
Dept: FAMILY MEDICINE | Facility: CLINIC | Age: 68
End: 2023-05-12
Payer: MEDICARE

## 2023-05-12 VITALS
SYSTOLIC BLOOD PRESSURE: 138 MMHG | BODY MASS INDEX: 32.66 KG/M2 | HEART RATE: 104 BPM | TEMPERATURE: 99 F | DIASTOLIC BLOOD PRESSURE: 76 MMHG | WEIGHT: 177.5 LBS | HEIGHT: 62 IN | OXYGEN SATURATION: 98 %

## 2023-05-12 DIAGNOSIS — N39.0 URINARY TRACT INFECTION WITHOUT HEMATURIA, SITE UNSPECIFIED: Primary | ICD-10-CM

## 2023-05-12 DIAGNOSIS — L89.329 PRESSURE INJURY OF SKIN OF LEFT BUTTOCK, UNSPECIFIED INJURY STAGE: ICD-10-CM

## 2023-05-12 DIAGNOSIS — E11.40 TYPE 2 DIABETES MELLITUS WITH DIABETIC NEUROPATHY, WITHOUT LONG-TERM CURRENT USE OF INSULIN: ICD-10-CM

## 2023-05-12 DIAGNOSIS — N18.31 STAGE 3A CHRONIC KIDNEY DISEASE: ICD-10-CM

## 2023-05-12 PROCEDURE — 99214 OFFICE O/P EST MOD 30 MIN: CPT | Mod: S$PBB,,, | Performed by: FAMILY MEDICINE

## 2023-05-12 PROCEDURE — 99214 PR OFFICE/OUTPT VISIT, EST, LEVL IV, 30-39 MIN: ICD-10-PCS | Mod: S$PBB,,, | Performed by: FAMILY MEDICINE

## 2023-05-12 PROCEDURE — 99999 PR PBB SHADOW E&M-EST. PATIENT-LVL V: CPT | Mod: PBBFAC,,, | Performed by: FAMILY MEDICINE

## 2023-05-12 PROCEDURE — 99999 PR PBB SHADOW E&M-EST. PATIENT-LVL V: ICD-10-PCS | Mod: PBBFAC,,, | Performed by: FAMILY MEDICINE

## 2023-05-12 PROCEDURE — 99215 OFFICE O/P EST HI 40 MIN: CPT | Mod: PBBFAC,PO | Performed by: FAMILY MEDICINE

## 2023-05-12 RX ORDER — SULFAMETHOXAZOLE AND TRIMETHOPRIM 800; 160 MG/1; MG/1
1 TABLET ORAL 2 TIMES DAILY
Qty: 14 TABLET | Refills: 0 | Status: SHIPPED | OUTPATIENT
Start: 2023-05-12 | End: 2023-05-19

## 2023-05-12 RX ORDER — INSULIN DETEMIR 100 [IU]/ML
15 INJECTION, SOLUTION SUBCUTANEOUS DAILY
Qty: 3 ML | Refills: 2 | Status: SHIPPED | OUTPATIENT
Start: 2023-05-12 | End: 2023-09-03

## 2023-05-12 RX ORDER — SITAGLIPTIN 100 MG/1
TABLET, FILM COATED ORAL
Qty: 90 TABLET | Refills: 1 | Status: SHIPPED | OUTPATIENT
Start: 2023-05-12 | End: 2023-12-05 | Stop reason: SDUPTHER

## 2023-05-12 NOTE — PROGRESS NOTES
Alysha Rosales    Chief Complaint   Patient presents with    Hospital Follow Up    Recurrent UTI       History of Present Illness:   Transitional Care Note    Family and/or Caretaker present at visit?  Son Tres came with her today but sits in lobby during visit.  Diagnostic tests reviewed/disposition: No diagnosic tests pending after this hospitalization.  Disease/illness education: Discussed issue with patient  Home health/community services discussion/referrals: Patient has home health established at [facility].   Establishment or re-establishment of referral orders for community resources: No other necessary community resources.   Discussion with other health care providers: No discussion with other health care providers necessary.           Ms. Rosales comes in today for hospital follow-up with follow-up for recurrent UTI.  She states she has taken all of her morning medications today.  She is accompanied by her son Tres who leaves the exam room to sit in the lobby during her visit with me today.    Per hospital discharge summary, she was hospitalized at [facility] on 2023 through 2023 for DKA, UTI. She presented to ER for evaluation of confusion. ER lab findings were consistent with DKA and UA revealing UTI. On admission, Beta-hydroxybutyrate: 4.8, LA: 3.0, A, Glu: 464, UA: consistent with UTI. She was placed on insulin infusion, IV antibiotics and transferred to ICU. CBG improved, AG: WNL, insulin infusion d/c. Hospital Medicine was consulted  to assume care for UTI, hyperglycemia, metabolic encephalopathy. Sliding scale insulin was given during hospitalization then changed to Detemir 15 units twice daily.  Blood cultures NGTD (final), but urine cultures grew CITROBACTER YOUNGAE sensitive to 3rd generation cephalosporins. She received 2 days of therapy while hospitalized and was discharged with additional 5 days of Omnicef therapy. She was advised to follow-up with PCP in 1  week.  She was discharged home with [facility] for PT, OT, and nurse evaluation.    She states she feels okay today.  She states she was discharged from [facility] on yesterday.  She states [facility] nurse advised ulcer at left buttock look okay and to apply Delia Butt paste.    She states she wears pad as she has urine leakage due to difficulty holding her urine if she cannot quickly go to the restroom.  She states she completed Omnicef as discharged home with for treatment of UTI.    She states she performs home glucose checks every morning with levels ranging 110-189 (this week) but does not perform home blood pressure checks.  She states she monitors her diet and tries to exercise.    She states she has not worked since April 18, 2023.  She states she desires to return to work at this time but states she will only work 3 days a week.    Otherwise, she denies having fever, chills, fatigue, appetite changes; shortness of breath, cough, wheezing; chest pain, palpitations, leg swelling; abdominal pain, nausea, vomiting, diarrhea, constipation; other unusual urinary symptoms; polydipsia, polyphagia, polyuria, hot or cold intolerance; back pain; acute visual changes, numbness, headache; anxiety, depression, homicidal or suicidal thoughts.        04/28/2023 Urine culture              Amox/K Clav'ate >16/8 mcg/mL Resistant     Amp/Sulbactam >16/8 mcg/mL Resistant    Cefazolin >16 mcg/mL Resistant    Cefepime 16 mcg/mL Intermediate    Ceftriaxone >32 mcg/mL Resistant    Ciprofloxacin <=1 mcg/mL Sensitive    Ertapenem >1 mcg/mL Resistant    Gentamicin <=4 mcg/mL Sensitive    Levofloxacin <=2 mcg/mL Sensitive    Meropenem <=1 mcg/mL Sensitive    Nitrofurantoin 64 mcg/mL Intermediate    Piperacillin/Tazo >64 mcg/mL Resistant    Tobramycin <=4 mcg/mL Sensitive    Trimeth/Sulfa <=2/38 mcg/mL Sensitive           Current Outpatient Medications   Medication Sig    allopurinoL (ZYLOPRIM) 100 MG tablet Take 1  "tablet by mouth once daily    aspirin 81 MG Chew Take 81 mg by mouth once daily.    blood sugar diagnostic Strp To check BG 2 times daily, to use with insurance preferred meter - Freestyle Lite    clopidogreL (PLAVIX) 75 mg tablet Take 1 tablet by mouth once daily    ERGOCALCIFEROL, VITAMIN D2, (VITAMIN D ORAL) Take 2,000 Units by mouth once daily.    ferrous sulfate (FEOSOL) Tab tablet Take 1 tablet by mouth 3 (three) times daily.    furosemide (LASIX) 20 MG tablet Take 2 tablets (40 mg total) by mouth once daily.    glipiZIDE (GLUCOTROL) 10 MG tablet TAKE 1 TABLET BY MOUTH TWICE DAILY BEFORE MEAL(S)    hydrALAZINE (APRESOLINE) 50 MG tablet Take 1 tablet (50 mg total) by mouth 4 (four) times daily.    insulin detemir U-100, Levemir, (LEVEMIR FLEXPEN) 100 unit/mL (3 mL) InPn pen Inject 15 Units into the skin once daily.    JANUVIA 100 mg Tab Take 1 tablet by mouth once daily    losartan (COZAAR) 50 MG tablet Take 1 tablet by mouth once daily    metFORMIN (GLUCOPHAGE) 500 MG tablet TAKE 1 TABLET BY MOUTH ONCE DAILY IN THE MORNING AND 1 ONCE DAILY AT NOON AND 2 ONCE DAILY IN THE EVENING WITH MEALS    metoprolol succinate (TOPROL-XL) 50 MG 24 hr tablet Take 1 tablet by mouth once daily    pen needle, diabetic (BD ULTRA-FINE SHORT PEN NEEDLE) 31 gauge x 5/16" Ndle 1 Units by Misc.(Non-Drug; Combo Route) route once daily.    rosuvastatin (CRESTOR) 20 MG tablet TAKE 1 TABLET BY MOUTH NIGHTLY.     Review of Systems   Constitutional:  Negative for activity change, appetite change, chills, fatigue, fever and unexpected weight change.        Weight 83.7 kg (184 lb 8.4 oz) at March 3, 2023 visit.   Eyes:  Negative for visual disturbance.        See history of present illness.   Respiratory:  Negative for cough, shortness of breath and wheezing.    Cardiovascular:  Negative for chest pain, palpitations and leg swelling.        See history of present illness.   Gastrointestinal:  Negative for abdominal pain, constipation, " diarrhea, nausea and vomiting.   Endocrine: Negative for cold intolerance, heat intolerance, polydipsia, polyphagia and polyuria.        See history of present illness.   Genitourinary:  Negative for difficulty urinating, dysuria, frequency, hematuria and urgency.        See history of present illness.   Musculoskeletal:  Negative for arthralgias and back pain.   Neurological:  Negative for numbness and headaches.        Positive for memory issue.   Hematological:  Does not bruise/bleed easily.        See history of present illness.   Psychiatric/Behavioral:  Negative for dysphoric mood, sleep disturbance and suicidal ideas. The patient is not nervous/anxious.         Negative for homicidal ideas.     Objective:  Physical Exam  Vitals reviewed.   Constitutional:       General: She is not in acute distress.     Appearance: Normal appearance. She is well-developed. She is obese. She is not ill-appearing or diaphoretic.      Comments: Pleasant and obese.   Neck:      Thyroid: No thyromegaly.   Cardiovascular:      Rate and Rhythm: Normal rate and regular rhythm.      Heart sounds: Murmur heard.      Comments: Chronic and soft.  Pulmonary:      Effort: Pulmonary effort is normal. No respiratory distress.      Breath sounds: Normal breath sounds. No wheezing.   Abdominal:      General: Bowel sounds are normal. There is no distension.      Palpations: Abdomen is soft. There is no mass.      Tenderness: There is no abdominal tenderness. There is no guarding or rebound.   Musculoskeletal:         General: No swelling or tenderness. Normal range of motion.      Cervical back: Normal range of motion and neck supple. No tenderness.      Comments: She is ambulatory without problems.     Lymphadenopathy:      Cervical: No cervical adenopathy.   Skin:     Comments: Open sore without drainage at left buttock.   Neurological:      General: No focal deficit present.      Mental Status: She is alert and oriented to person, place, and  time.   Psychiatric:         Mood and Affect: Mood normal.         Behavior: Behavior normal.         Thought Content: Thought content normal.         Judgment: Judgment normal.       ASSESSMENT:  1. Urinary tract infection without hematuria, site unspecified    2. Stage 3a chronic kidney disease    3. Type 2 diabetes mellitus with diabetic neuropathy, without long-term current use of insulin    4. Pressure injury of skin of left buttock, unspecified injury stage        PLAN:  Alysha was seen today for hospital follow up and recurrent uti.    Diagnoses and all orders for this visit:    Urinary tract infection without hematuria, site unspecified  -     sulfamethoxazole-trimethoprim 800-160mg (BACTRIM DS) 800-160 mg Tab; Take 1 tablet by mouth 2 (two) times daily. for 7 days    Stage 3a chronic kidney disease    Type 2 diabetes mellitus with diabetic neuropathy, without long-term current use of insulin  -     insulin detemir U-100, Levemir, (LEVEMIR FLEXPEN) 100 unit/mL (3 mL) InPn pen; Inject 15 Units into the skin once daily.    Pressure injury of skin of left buttock, unspecified injury stage      Continue current medications, follow low sodium, low cholesterol, low carb diet, daily walks.  Add Bactrim DS twice daily x 7 days. medication precautions discussed with patient.  Continue wound care for buttock sore.  Prescription refills as noted above.  Keep follow up with specialists.  Flu shot this fall.  Work excuse to return to work without restrictions on Monday, May 15, 2023. She states she will only work on Monday, Tuesday and Wednesday of each week ([employer]).  Follow up if symptoms worsen or fail to improve. But, keep 5/23/2023 scheduled physical appointment with me.    35 minutes of total time spent on the encounter, which includes face to face time and non-face to face time preparing to see the patient (eg, review of tests), Obtaining and/or reviewing separately obtained history, Documenting clinical  information in the electronic or other health record, Independently interpreting results (not separately reported) and communicating results to the patient/family/caregiver, or Care coordination (not separately reported).      This note is  generated with speech recognition software and is subject to transcription error and sound alike phrases that may be missed by proofreading.

## 2023-05-16 ENCOUNTER — TELEPHONE (OUTPATIENT)
Dept: ADMINISTRATIVE | Facility: HOSPITAL | Age: 68
End: 2023-05-16
Payer: MEDICARE

## 2023-05-16 ENCOUNTER — PATIENT MESSAGE (OUTPATIENT)
Dept: ADMINISTRATIVE | Facility: HOSPITAL | Age: 68
End: 2023-05-16
Payer: MEDICARE

## 2023-05-23 ENCOUNTER — CLINICAL SUPPORT (OUTPATIENT)
Dept: DIABETES | Facility: CLINIC | Age: 68
End: 2023-05-23
Payer: MEDICARE

## 2023-05-23 ENCOUNTER — OFFICE VISIT (OUTPATIENT)
Dept: FAMILY MEDICINE | Facility: CLINIC | Age: 68
End: 2023-05-23
Payer: MEDICARE

## 2023-05-23 VITALS
SYSTOLIC BLOOD PRESSURE: 134 MMHG | HEIGHT: 62 IN | HEART RATE: 94 BPM | BODY MASS INDEX: 33.6 KG/M2 | TEMPERATURE: 97 F | OXYGEN SATURATION: 97 % | RESPIRATION RATE: 18 BRPM | WEIGHT: 182.56 LBS | DIASTOLIC BLOOD PRESSURE: 54 MMHG

## 2023-05-23 DIAGNOSIS — E11.59 HYPERTENSION ASSOCIATED WITH DIABETES: ICD-10-CM

## 2023-05-23 DIAGNOSIS — Z23 NEED FOR PROPHYLACTIC VACCINATION AGAINST STREPTOCOCCUS PNEUMONIAE (PNEUMOCOCCUS): ICD-10-CM

## 2023-05-23 DIAGNOSIS — N18.31 STAGE 3A CHRONIC KIDNEY DISEASE: ICD-10-CM

## 2023-05-23 DIAGNOSIS — M81.0 AGE-RELATED OSTEOPOROSIS WITHOUT CURRENT PATHOLOGICAL FRACTURE: Chronic | ICD-10-CM

## 2023-05-23 DIAGNOSIS — E11.10 DIABETIC KETOACIDOSIS WITHOUT COMA ASSOCIATED WITH TYPE 2 DIABETES MELLITUS: Primary | ICD-10-CM

## 2023-05-23 DIAGNOSIS — E11.40 TYPE 2 DIABETES MELLITUS WITH DIABETIC NEUROPATHY, WITHOUT LONG-TERM CURRENT USE OF INSULIN: ICD-10-CM

## 2023-05-23 DIAGNOSIS — E11.69 COMBINED HYPERLIPIDEMIA ASSOCIATED WITH TYPE 2 DIABETES MELLITUS: ICD-10-CM

## 2023-05-23 DIAGNOSIS — Z78.0 POSTMENOPAUSAL: ICD-10-CM

## 2023-05-23 DIAGNOSIS — R32 URINARY INCONTINENCE, UNSPECIFIED TYPE: Primary | ICD-10-CM

## 2023-05-23 DIAGNOSIS — Z79.4 OTHER SPECIFIED DIABETES MELLITUS WITH HYPERGLYCEMIA, WITH LONG-TERM CURRENT USE OF INSULIN: ICD-10-CM

## 2023-05-23 DIAGNOSIS — E66.9 OBESITY (BMI 30.0-34.9): ICD-10-CM

## 2023-05-23 DIAGNOSIS — E13.65 OTHER SPECIFIED DIABETES MELLITUS WITH HYPERGLYCEMIA, WITH LONG-TERM CURRENT USE OF INSULIN: ICD-10-CM

## 2023-05-23 DIAGNOSIS — I25.118 ATHEROSCLEROTIC HEART DISEASE OF NATIVE CORONARY ARTERY WITH OTHER FORMS OF ANGINA PECTORIS: ICD-10-CM

## 2023-05-23 DIAGNOSIS — E78.2 COMBINED HYPERLIPIDEMIA ASSOCIATED WITH TYPE 2 DIABETES MELLITUS: ICD-10-CM

## 2023-05-23 DIAGNOSIS — E11.21 DIABETIC NEPHROPATHY WITH PROTEINURIA: ICD-10-CM

## 2023-05-23 DIAGNOSIS — I15.2 HYPERTENSION ASSOCIATED WITH DIABETES: ICD-10-CM

## 2023-05-23 DIAGNOSIS — I70.0 AORTIC ATHEROSCLEROSIS: ICD-10-CM

## 2023-05-23 DIAGNOSIS — Z12.31 VISIT FOR SCREENING MAMMOGRAM: ICD-10-CM

## 2023-05-23 PROCEDURE — 90732 PPSV23 VACC 2 YRS+ SUBQ/IM: CPT | Mod: PBBFAC,PO

## 2023-05-23 PROCEDURE — 99213 OFFICE O/P EST LOW 20 MIN: CPT | Mod: PBBFAC | Performed by: DIETITIAN, REGISTERED

## 2023-05-23 PROCEDURE — 99999 PR PBB SHADOW E&M-EST. PATIENT-LVL III: ICD-10-PCS | Mod: PBBFAC,,, | Performed by: DIETITIAN, REGISTERED

## 2023-05-23 PROCEDURE — 99999 PR PBB SHADOW E&M-EST. PATIENT-LVL III: CPT | Mod: PBBFAC,,, | Performed by: DIETITIAN, REGISTERED

## 2023-05-23 PROCEDURE — G0108 DIAB MANAGE TRN  PER INDIV: HCPCS | Mod: PBBFAC | Performed by: DIETITIAN, REGISTERED

## 2023-05-23 PROCEDURE — 99215 OFFICE O/P EST HI 40 MIN: CPT | Mod: 25,S$PBB,, | Performed by: FAMILY MEDICINE

## 2023-05-23 PROCEDURE — 99215 OFFICE O/P EST HI 40 MIN: CPT | Mod: PBBFAC,27,PO,25 | Performed by: FAMILY MEDICINE

## 2023-05-23 PROCEDURE — 99215 PR OFFICE/OUTPT VISIT, EST, LEVL V, 40-54 MIN: ICD-10-PCS | Mod: 25,S$PBB,, | Performed by: FAMILY MEDICINE

## 2023-05-23 PROCEDURE — G0009 ADMIN PNEUMOCOCCAL VACCINE: HCPCS | Mod: PBBFAC,PO

## 2023-05-23 PROCEDURE — 99999 PR PBB SHADOW E&M-EST. PATIENT-LVL V: ICD-10-PCS | Mod: PBBFAC,,, | Performed by: FAMILY MEDICINE

## 2023-05-23 PROCEDURE — 99999 PR PBB SHADOW E&M-EST. PATIENT-LVL V: CPT | Mod: PBBFAC,,, | Performed by: FAMILY MEDICINE

## 2023-05-23 NOTE — PATIENT INSTRUCTIONS
Please call Dr. Bird for your results.    Please get Tetanus, Shingles and Covid shots from your local pharmacy.    Thanks.

## 2023-05-23 NOTE — PROGRESS NOTES
Diabetes Care Specialist Progress Note  Author: Randee Gómez RD, CDE  Date: 5/23/2023    Program Intake  Reason for Diabetes Program Visit:: Initial Diabetes Assessment  Current diabetes risk level:: high  In the last 12 months, have you:: been admitted to a hospital  Was the ER or hospital admission related to diabetes?: Yes  Permission to speak with others about care:: no    Lab Results   Component Value Date    HGBA1C 10.6 (H) 04/18/2023       Clinical    Patient Health Rating  Compared to other people your age, how would you rate your health?: Fair    Problem Review  Reviewed Problem List with Patient: yes  Active comorbidities affecting diabetes self-care.: yes  Comorbidities: Cardiovascular Disease, Hypertension    Clinical Assessment  Current Diabetes Treatment: Oral Medication, Diet, Insulin  Have you ever experienced hypoglycemia (low blood sugar)?: yes  In the last month, how often have you experienced low blood sugar?: other (see comments) (not recently)  Are you able to tell when your blood sugar is low?: Yes  What symptoms do you experience?: Shaky  Have you ever been hospitalized because your blood sugar was too low?: no  How do you treat hypoglycemia (low blood sugar)?: other (eat something)  Have you ever experienced hyperglycemia (high blood sugar)?: yes  In the last month, how often have you experienced high blood sugar?: more than once a week  Are you able to tell when your blood sugar is high?: Yes  What are your symptoms?: thirst, fatigue  Have you ever been hospitalized because your blood sugar was high?: yes (see comments)    Medication Information  How do you obtain your medications?: Patient drives  How many days a week do you miss your medications?: 2 (if she missed a dose, it would be a night meds)  Do you use a pill box or medication chart to help you manage your medications?: Pill box    Labs  Do you have regular lab work to monitor your medications?: Yes  Type of Regular Lab Work:  A1c, Cholesterol, Microalbumin, CBC  Where do you get your labs drawn?: [facility]  Lab Compliance Barriers: No    Nutritional Status  Diet: Regular  Meal Plan 24 Hour Recall: Breakfast, Lunch, Dinner, Snack  Meal Plan 24 Hour Recall - Breakfast: 1 slice of raisin bread, coffee with monk fruit sugar, small container of creamer  Meal Plan 24 Hour Recall - Lunch: 1 link of boudin, water  Meal Plan 24 Hour Recall - Dinner: Mexican: tamale, enchilada, 1/3 cup of beans, 1/3 cup of rice, crystal light  Meal Plan 24 Hour Recall - Snack: nothing yesterday  Change in appetite?: No  Dentation:: Intact  Recent Changes in Weight: Weight Loss  Was weight loss intentional or unintentional?: Unintentional (had a fever blister and couldn't eat)  Current nutritional status an area of need that is impacting patient's ability to self-manage diabetes?: No    Additional Social History    Support  Does anyone support you with your diabetes care?: yes  Who supports you?: self, son/daughter  Who takes you to your medical appointments?: self  Does the current support meet the patient's needs?: Yes  Is Support an area impacting ability to self-manage diabetes?: No         Cognitive/Behavioral Health  Alert and Oriented: Yes  Difficulty Thinking: No  Requires Prompting: No  Requires assistance for routine expression?: No  Cognitive or behavioral barriers impacting ability to self-manage diabetes?: No    Culture/Jehovah's witness  Culture or Jew beliefs that may impact ability to access healthcare: No    Communication  Language preference: English  Hearing Problems: No  Vision Problems: Yes  Vision problem type:: Decreased Vision  Vision Assistance: Glasses  Communication needs impacting ability to self-manage diabetes?: No    Health Literacy  Preferred Learning Method: Face to Face, Reading Materials  Health literacy needs impacting ability to self-manage diabetes?: No      Diabetes Self-Management Skills Assessment    Diabetes Disease  Process/Treatment Options  Patient/caregiver able to state what happens when someone has diabetes.: yes  Patient/caregiver knows what type of diabetes they have.: yes  Diabetes Type : Type II  Patient/caregiver able to identify at least three signs and symptoms of diabetes.: yes  Identified signs and symptoms:: frequent infections, fatigue, frequent urination, increased thirst  Patient able to identify at least three risk factors for diabetes.: no  Diabetes Disease Process/Treatment Options: Skills Assessment Completed: Yes  Assessment indicates:: Adequate understanding  Area of need?: No    Nutrition/Healthy Eating  Challenges to healthy eating:: other (see comments) (lack of preparation)  Method of carbohydrate measurement:: no method  Patient can identify foods that impact blood sugar.: yes  Patient-identified foods:: starches (bread, pasta, rice, cereal), sweets, soda  Nutrition/Healthy Eating Skills Assessment Completed:: Yes  Assessment indicates:: Instruction Needed  Area of need?: Yes    Physical Activity/Exercise  Patient's daily activity level:: sedentary  Patient formally exercises outside of work.: no  Reasons for not exercising:: other (see comments) (motivation)  Patient can identify forms of physical activity.: yes  Stated forms of physical activity:: any movement performed by muscles that uses energy  Patient can identify reasons why exercise/physical activity is important in diabetes management.: yes  Identified reasons:: keeps body and joints flexible, strengthens heart, muscles, and bones  Physical Activity/Exercise Skills Assessment Completed: : Yes  Assessment indicates:: Adequate understanding  Area of need?: No    Medications  Patient is able to describe current diabetes management routine.: yes  Diabetes management routine:: diet, insulin, oral medications  Patient is able to identify current diabetes medications, dosages, and appropriate timing of medications.: yes (Glipizide 10mg BID //  Levemir 15u HS // Metformin 500/500/1000 mg daily)  Patient understands the purpose of the medications taken for diabetes.: yes  Patient reports problems or concerns with current medication regimen.: no  Medication Skills Assessment Completed:: Yes  Assessment indicates:: Adequate understanding  Area of need?: No    Home Blood Glucose Monitoring  Patient states that blood sugar is checked at home daily.: yes  Monitoring Method:: home glucometer  How often do you check your blood sugar?: Once a day  When do you check your blood sugar?: Before breakfast  When you check what is your typical blood sugar range? : FBG has been as low as 76 and as high as 240  Blood glucose logs:: no  Home Blood Glucose Monitoring Skills Assessment Completed: : Yes  Assessment indicates:: Instruction Needed  Area of need?: No    Acute Complications  Patient is able to identify types of acute complications: Yes  Patient Identified:: Diabetic Ketoacidosis, Hyperglycemia  Patient is able to state the basic meaning of hyperglycemia?: Yes  Able to state the blood sugar range for hyperglycemia?: yes  Patient stated range:: over 300  Patient able to state proper treatment of hyperglycemia?: yes  Patient identified:: increase water intake, monitor blood sugar, take medication as recommended  Acute Complications Skills Assessment Completed: : Yes  Assessment indicates:: Instruction Needed (review today)  Area of need?: No    Chronic Complications  Patient can identify major chronic complications of diabetes.: yes  Stated chronic complications:: neuropathy/nerve damage, heart disease/heart attack  Patient can identify ways to prevent or delay diabetes complications.: yes  Stated ways to prevent complications:: controlling blood sugar  Patient is aware that having diabetes increases risk of heart disease?: Yes  Patient is taking statin?: Yes  Chronic Complications Skills Assessment Completed: : Yes  Assessment indicates:: Instruction Needed  Area of  need?: No              Assessment Summary and Plan    Based on today's diabetes care assessment, the following areas of need were identified:      Social 5/23/2023   Support No   Cognitive/Behavioral Health No   Culture/Synagogue No   Communication No   Health Literacy No        Clinical 5/23/2023   Lab Compliance No   Nutritional Status No        Diabetes Self-Management Skills 5/23/2023   Diabetes Disease Process/Treatment Options Reviewed pathophysiology of type 2 diabetes, complications related to the disease, importance of annual dilated eye exam, and daily foot exam.   Nutrition/Healthy Eating Yes- see care plan.    Physical Activity/Exercise Patient had OT after discharge from hospital. Has list of strengthening exercises she can do at home. Recommended seated exercises and walking around her house.     Medication Discussed MOA, onset, side effects, dosage of meds.     Home Blood Glucose Monitoring Provided BG logs for patient to complete and to bring back to follow-up visit in 1 month.   Acute Complications Reviewed blood glucose goals, prevention, detection, signs and symptoms, and treatment of hypoglycemia and hyperglycemia, and when to contact the clinic.     Chronic Complications Discussed importance of A1c less than 7 to reduce risk of micro and macro complications, including nephropathy, neuropathy, retinopathy, heart attack and stroke. Reviewed the importance of controlled Blood Pressure and Cholesterol Lab Values in preventing disease.   Health maintenance reviewed            Today's interventions were provided through individual discussion, instruction, and written materials were provided.      Patient verbalized understanding of instruction and written materials.  Pt was able to return back demonstration of instructions today. Patient understood key points, needs reinforcement and further instruction.     Diabetes Self-Management Care Plan:    Today's Diabetes Self-Management Care Plan was developed  with Alysha's input. Alysha has agreed to work toward the following goal(s) to improve his/her overall diabetes control.      Care Plan: Diabetes Management   Updates made since 4/23/2023 12:00 AM        Problem: Healthy Eating         Goal: Eat 3 meals daily with 30-45g/2-3 servings of Carbohydrate per meal.    Start Date: 5/23/2023   Expected End Date: 8/23/2023   Priority: High   Barriers: Lack of Motivation to Change   Note:    Patient lives alone, needs to focus on meal prep. Has used pre-portioned meals in the past ([employer] and [employer]).        Task: Reviewed the sources and role of Carbohydrate, Protein, and Fat and how each nutrient impacts blood sugar. Completed 5/23/2023        Task: Provided visual examples using dry measuring cups, food models, and other familiar objects such as computer mouse, deck or cards, tennis ball etc. to help with visualization of portions. Completed 5/23/2023        Task: Review the importance of balancing carbohydrates with each meal using portion control techniques to count servings of carbohydrate and label reading to identify serving size and amount of total carbs per serving. Completed 5/23/2023        Task: Provided Sample plate method and reviewed the use of the plate to estimate amounts of carbohydrate per meal. Completed 5/23/2023          Follow Up Plan     Follow up in about 1 month (around 6/23/2023) for E11.10.    Today's care plan and follow up schedule was discussed with patient.  Alysha verbalized understanding of the care plan, goals, and agrees to follow up plan.        The patient was encouraged to communicate with his/her health care provider/physician and care team regarding his/her condition(s) and treatment.  I provided the patient with my contact information today and encouraged to contact me via phone or [facility] Portal as needed.     Length of Visit   Total Time: 60 Minutes

## 2023-05-23 NOTE — PROGRESS NOTES
"HISTORY OF PRESENT ILLNESS: Ms. Rosales comes in today not fasting and with taking morning medication without acute problems for annual wellness examination.    END OF LIFE DECISION: She does not have a living will but does not desire life support.    Current Outpatient Medications   Medication Sig    allopurinoL (ZYLOPRIM) 100 MG tablet Take 1 tablet by mouth once daily    aspirin 81 MG Chew Take 81 mg by mouth once daily.    blood sugar diagnostic Strp To check BG 2 times daily, to use with insurance preferred meter - Freestyle Lite    clopidogreL (PLAVIX) 75 mg tablet Take 1 tablet by mouth once daily    ERGOCALCIFEROL, VITAMIN D2, (VITAMIN D ORAL) Take 2,000 Units by mouth once daily.    ferrous sulfate (FEOSOL) Tab tablet Take 1 tablet by mouth 3 (three) times daily.    furosemide (LASIX) 20 MG tablet Take 2 tablets (40 mg total) by mouth once daily.    glipiZIDE (GLUCOTROL) 10 MG tablet TAKE 1 TABLET BY MOUTH TWICE DAILY BEFORE MEAL(S)    hydrALAZINE (APRESOLINE) 50 MG tablet Take 1 tablet (50 mg total) by mouth 4 (four) times daily.    insulin detemir U-100, Levemir, (LEVEMIR FLEXPEN) 100 unit/mL (3 mL) InPn pen Inject 15 Units into the skin once daily.    JANUVIA 100 mg Tab Take 1 tablet by mouth once daily    losartan (COZAAR) 50 MG tablet Take 1 tablet by mouth once daily    metFORMIN (GLUCOPHAGE) 500 MG tablet TAKE 1 TABLET BY MOUTH ONCE DAILY IN THE MORNING AND 1 ONCE DAILY AT NOON AND 2 ONCE DAILY IN THE EVENING WITH MEALS    metoprolol succinate (TOPROL-XL) 50 MG 24 hr tablet Take 1 tablet by mouth once daily    pen needle, diabetic (BD ULTRA-FINE SHORT PEN NEEDLE) 31 gauge x 5/16" Ndle 1 Units by Misc.(Non-Drug; Combo Route) route once daily.    rosuvastatin (CRESTOR) 20 MG tablet TAKE 1 TABLET BY MOUTH NIGHTLY.       SCREENINGS:   Cholesterol (with CMP): January 25, 2022.  FFS/Colonoscopy: March 20, 2009; repeat in 7 years. December 21, 2018 FIT-KIT (-). She desires Fit-Kit again.   Mammogram: " February 15, 2022 - okay.  GYN Exam: July 11, 2017 - okay. Pap smears 2009 - 2013, 2016 - okay. Pap smear no longer needed.   Dexa Scan: August 7, 2017 - osteoporosis; repeat in 2 years. November 29, 2021 -  osteopenia.  Eye Exam: February 10, 2022 with Dr. JIGNESH Wadsworth. She wears glasses.   Dental Exam: She wears denture on top and bottom.   PPD: Negative in the past.    Immunizations: Tdap - February 25, 2009. Advised insurance-covered benefit only at local pharmacy.  Gardasil - N./A.  Zostavax - March 30, 2015.  Shingrix - Never. Advised insurance-covered benefit only at local pharmacy.  Pneumovax - November 21, 2008.  She desires.    Prevnar-13 - June 4, 2020.  Seasonal Flu - January 25, 2022.   Hepatitis B vaccine - August 28, 2013, July 25, 2013, July 29, 2014.  Covid-19 ([employer]) vaccine series - August 13, 2021.    ROS:  GENERAL: Denies fever, chills, fatigue or unusual weight change. Appetite normal. Weight 80.5 kg (177 lb 7.5 oz) at May 12, 2023 visit. Does not monitor diet. Exercises by walking at [employer] 3 time per week.   SKIN: Denies rashes, itching, changes in mole, color or texture of skin or easy bruising.   HEAD: Denies headaches or recent head trauma.  EYES: Denies change in vision, pain, diplopia, redness or discharge. Wears glasses.  EARS: Denies ear pain, discharge, vertigo except decreased hearing but states hearing tests performed have been okay.  NOSE: Denies loss of smell, epistaxis or rhinitis.  MOUTH & THROAT: Denies hoarseness or change in voice. Denies excessive gum bleeding or mouth sores. Denies sore throat.  NODES: Denies swollen glands.  CHEST: Denies RIDDLE, wheezing, cough, or sputum production.  CARDIOVASCULAR: Denies chest pain, PND, orthopnea or reduced exercise tolerance. Denies palpitations. Saw cardiology Dr. William on August 10, 2022 for surveillance of CAD, NSTEMI, Post-PTCA, hypertension, hyperlipidemia, ROSEMARY, CKD3a, diabetes, obesity.  Does not perform home blood pressure  "checks.  ABDOMEN: Denies diarrhea, constipation, nausea, vomiting, abdominal pain, or blood in stool.  URINARY: Denies flank pain, dysuria or hematuria. Saw Dr. Bryant, nephrologist, on September 19, 2017 for CKD stage 1, diabetes nephropathy, proteinuria surveillance. Reports finished antibiotic and no longer with incontinence.  GENITOURINARY: Denies flank pain, dysuria, frequency or hematuria. Performs monthly breast self exams.  ENDOCRINE: Reports performs glucose checks every morning with levels ranging 200's when eats carbs and 70's when eats correctly (249 last night with eating mexican food). Thyroid ultrasound on June 24, 2020 - Multinodular thyroid gland again noted.  No nodules meeting criteria for biopsy or further follow-up per TI rads criteria. Repeat thyroid ultrasound every 1 - 2 years.  HEME/LYMPH: Denies bleeding problems.  PERIPHERAL VASCULAR:Denies claudication or cyanosis.  MUSCULOSKELETAL: Reports occasional morning stiffness, pain in low back, left knee but not today. Saw Dr. Saravia, orthopedist, on February 12, 2021 for lumbar DDD, left knee OA with Hylan shot given. Denies edema. Saw ERIC Hill with rheumatology on October 5, 2020 for surveillance of osteoporosis currently on Reclast and vitamin-D deficiency with 9-month follow-up with DEXA scan and Reclast therapy advised and given again on November 29, 2021.   NEUROLOGIC: Denies history of seizures, tremors, paralysis, alteration of gait or coordination. Reports chronic numbness in hands, feet.  PSYCHIATRIC: Denies mood swings, depression, anxiety or homicidal or suicidal thoughts. Denies sleep problems.     PE:   Blood Pressure (Abnormal) 134/54   Pulse 94   Temperature 97.4 °F (36.3 °C)   Respiration 18   Height 5' 2" (1.575 m)   Weight 82.8 kg (182 lb 8.7 oz)   Oxygen Saturation 97%   Body Mass Index 33.39 kg/m²   APPEARANCE: Well nourished, well developed female, obese and pleasant, alert and oriented in no acute distress.   HEAD: " "Non tender. Full range of motion.  EYES: PERRL, conjunctiva pink, lids no edema.  EARS: External canal patent, no swelling or redness. TM's shiny and clear.  NOSE: Mucosa and turbinates pink, not swollen. No discharge. Non tender sinuses.  THROAT: No pharyngeal erythema or exudate. No stridor. She wears top dentures, bottom partials.  NECK: Supple, no mass, thyroid not enlarged.  NODES: No cervical, axillary lymph node enlargement.  CHEST: Normal respiratory effort. Lungs clear to auscultation.  CARDIOVASCULAR: Normal S1, S2. Chronic soft cardiac murmur noted. PMI not displaced. No carotid bruit. Pedal pulses palpable bilaterally. No edema. Feet look okay without ulcerations.  ABDOMEN: Bowel sounds present. Not distended. Soft. No tenderness, masses or organomegaly.  BREAST EXAM: Symmetrical, no external lesions, no discharge, no masses palpated  PELVIC EXAM: Not examined per patient request.  RECTAL EXAM: Not examined per patient request.  MUSCULOSKELETAL: No joint deformities or stiffness except tender at right heel. She is ambulatory without problems.  SKIN: No rashes or suspicious lesions, normal color and turgor except "quiet" eczematous skin changes at bottom of feet (with much improved appearance compared to other visits with me).  NEUROLOGIC: Cranial Nerves: II-XII grossly intact. DTR's: Knees, Ankles 2+ and equal bilaterally. Gait & Posture: Normal gait and fine motion.  PSYCHIATRIC: Patient alert, oriented x 3. Mood/Affect normal without acute anxiety or depression noted. Judgment/insight good as she makes appropriate decisions on today's examination.    Protective Sensation (w/ 10 gram monofilament):  Right: Intact  Left: Intact    Visual Inspection:  Normal -  Bilateral    Pedal Pulses:   Right: Present  Left: Present    Posterior Tibialis Pulses:   Right:Present  Left: Present      ASSESSMENT:    ICD-10-CM ICD-9-CM    1. Urinary incontinence, unspecified type  R32 788.30 Urine culture      2. Stage 3a " chronic kidney disease  N18.31 585.3       3. Type 2 diabetes mellitus with diabetic neuropathy, without long-term current use of insulin  E11.40 250.60 Ambulatory referral/consult to Optometry     357.2 Microalbumin/Creatinine Ratio, Urine      4. Aortic atherosclerosis  I70.0 440.0       5. Atherosclerotic heart disease of native coronary artery with other forms of angina pectoris  I25.118 414.01      413.9       6. Combined hyperlipidemia associated with type 2 diabetes mellitus  E11.69 250.80 Lipid Panel    E78.2 272.2       7. Hypertension associated with diabetes  E11.59 250.80 Lipid Panel    I15.2 401.9 TSH      8. Diabetic nephropathy with proteinuria  E11.21 250.40      583.81       9. Age-related osteoporosis without current pathological fracture  M81.0 733.01       10. Obesity (BMI 30.0-34.9)  E66.9 278.00       11. Postmenopausal  Z78.0 V49.81       12. Visit for screening mammogram  Z12.31 V76.12 Mammo Digital Screening Bilat w/ Shad      13. Need for prophylactic vaccination against Streptococcus pneumoniae (pneumococcus)  Z23 V03.82 Pneumococcal Polysaccharide Vaccine (23 Valent) (SQ/IM)        PLAN:  1. Age-appropriate counseling-appropriate low-sodium, low-cholesterol, low carbohydrate diet and exercise daily, monthly breast self exam, annual wellness examination.   2. Patient advised to call for results.  3. Continue current medications.  4. Prescription refill as noted above.  5. Annual eye and dental examinations encouraged.  6. Keep follow up with specialists.  7. Follow up in about 4 months (around 9/22/2023) for hypertension, diabetes, anemia follow up.     40 minutes of total time spent on the encounter, which includes face to face time and non-face to face time preparing to see the patient (eg, review of tests), Obtaining and/or reviewing separately obtained history, Documenting clinical information in the electronic or other health record, Independently interpreting results (not separately  reported) and communicating results to the patient/family/caregiver, or Care coordination (not separately reported).

## 2023-05-24 ENCOUNTER — DOCUMENT SCAN (OUTPATIENT)
Dept: HOME HEALTH SERVICES | Facility: HOSPITAL | Age: 68
End: 2023-05-24
Payer: MEDICARE

## 2023-05-26 ENCOUNTER — TELEPHONE (OUTPATIENT)
Dept: FAMILY MEDICINE | Facility: CLINIC | Age: 68
End: 2023-05-26
Payer: MEDICARE

## 2023-05-26 DIAGNOSIS — N39.0 URINARY TRACT INFECTION WITHOUT HEMATURIA, SITE UNSPECIFIED: Primary | ICD-10-CM

## 2023-05-26 RX ORDER — CIPROFLOXACIN 500 MG/1
500 TABLET ORAL EVERY 12 HOURS
Qty: 14 TABLET | Refills: 0 | Status: SHIPPED | OUTPATIENT
Start: 2023-05-26 | End: 2023-06-02

## 2023-05-26 NOTE — TELEPHONE ENCOUNTER
Advise pt lab shows she is still w/urine infection. Needs Cipro 500 mg twice daily x 7 days.  Stop by in 2 weeks for urine culture, then call for results.  If still w/infection, will need to see/consult with urology. Thanks for call.    I have put the following orders and/or medications to this note.  Please advise pt and assist.    Orders Placed This Encounter   Procedures    Urine culture     Standing Status:   Future     Standing Expiration Date:   7/24/2024       Medications Ordered This Encounter   Medications    ciprofloxacin HCl (CIPRO) 500 MG tablet     Sig: Take 1 tablet (500 mg total) by mouth every 12 (twelve) hours. for 7 days     Dispense:  14 tablet     Refill:  0

## 2023-05-26 NOTE — TELEPHONE ENCOUNTER
----- Message from Mary You sent at 5/26/2023 10:21 AM CDT -----  Contact: Alysha  Curt requests someone call her regarding her test results. Please call her back at [phone].    Thanks  TS

## 2023-05-26 NOTE — TELEPHONE ENCOUNTER
"Lvm for pt to return call regarding lab results    "Advise pt lab shows she is still w/urine infection. Needs Cipro 500 mg twice daily x 7 days.  Stop by in 2 weeks for urine culture, then call for results.  If still w/infection, will need to see/consult with urology. Thanks for call."  "

## 2023-06-01 ENCOUNTER — EXTERNAL HOME HEALTH (OUTPATIENT)
Dept: HOME HEALTH SERVICES | Facility: HOSPITAL | Age: 68
End: 2023-06-01
Payer: MEDICARE

## 2023-06-08 ENCOUNTER — PATIENT MESSAGE (OUTPATIENT)
Dept: FAMILY MEDICINE | Facility: CLINIC | Age: 68
End: 2023-06-08
Payer: MEDICARE

## 2023-06-08 ENCOUNTER — PATIENT MESSAGE (OUTPATIENT)
Dept: ADMINISTRATIVE | Facility: HOSPITAL | Age: 68
End: 2023-06-08
Payer: MEDICARE

## 2023-06-08 ENCOUNTER — TELEPHONE (OUTPATIENT)
Dept: ADMINISTRATIVE | Facility: HOSPITAL | Age: 68
End: 2023-06-08
Payer: MEDICARE

## 2023-06-08 ENCOUNTER — TELEPHONE (OUTPATIENT)
Dept: FAMILY MEDICINE | Facility: CLINIC | Age: 68
End: 2023-06-08
Payer: MEDICARE

## 2023-06-09 ENCOUNTER — LAB VISIT (OUTPATIENT)
Dept: LAB | Facility: HOSPITAL | Age: 68
End: 2023-06-09
Attending: FAMILY MEDICINE
Payer: MEDICARE

## 2023-06-09 DIAGNOSIS — N39.0 URINARY TRACT INFECTION WITHOUT HEMATURIA, SITE UNSPECIFIED: ICD-10-CM

## 2023-06-09 PROCEDURE — 87186 SC STD MICRODIL/AGAR DIL: CPT | Performed by: FAMILY MEDICINE

## 2023-06-09 PROCEDURE — 87077 CULTURE AEROBIC IDENTIFY: CPT | Performed by: FAMILY MEDICINE

## 2023-06-09 PROCEDURE — 87088 URINE BACTERIA CULTURE: CPT | Performed by: FAMILY MEDICINE

## 2023-06-09 PROCEDURE — 87086 URINE CULTURE/COLONY COUNT: CPT | Performed by: FAMILY MEDICINE

## 2023-06-12 LAB — BACTERIA UR CULT: ABNORMAL

## 2023-06-13 ENCOUNTER — TELEPHONE (OUTPATIENT)
Dept: FAMILY MEDICINE | Facility: CLINIC | Age: 68
End: 2023-06-13
Payer: MEDICARE

## 2023-06-13 ENCOUNTER — PATIENT MESSAGE (OUTPATIENT)
Dept: FAMILY MEDICINE | Facility: CLINIC | Age: 68
End: 2023-06-13
Payer: MEDICARE

## 2023-06-13 DIAGNOSIS — N39.0 URINARY TRACT INFECTION WITHOUT HEMATURIA, SITE UNSPECIFIED: Primary | ICD-10-CM

## 2023-06-13 DIAGNOSIS — N39.0 RECURRENT UTI: ICD-10-CM

## 2023-06-13 RX ORDER — SULFAMETHOXAZOLE AND TRIMETHOPRIM 800; 160 MG/1; MG/1
1 TABLET ORAL 2 TIMES DAILY
Qty: 14 TABLET | Refills: 0 | Status: SHIPPED | OUTPATIENT
Start: 2023-06-13 | End: 2023-06-20

## 2023-06-13 NOTE — TELEPHONE ENCOUNTER
Advise pt urine culture again shows infection but needs different antibiotic. I sent prescription to local pharmacy. But, I recommend she see/consult with urology as she continues to have UTI. Thanks for call.    I have put the following orders and/or medications to this note.  Please advise pt and assist.    Orders Placed This Encounter   Procedures    Ambulatory referral/consult to Urology     Standing Status:   Future     Standing Expiration Date:   7/13/2024     Referral Priority:   Routine     Referral Type:   Consultation     Referral Reason:   Specialty Services Required     Requested Specialty:   Urology     Number of Visits Requested:   1       Medications Ordered This Encounter   Medications    sulfamethoxazole-trimethoprim 800-160mg (BACTRIM DS) 800-160 mg Tab     Sig: Take 1 tablet by mouth 2 (two) times daily. for 7 days     Dispense:  14 tablet     Refill:  0

## 2023-06-19 ENCOUNTER — DOCUMENT SCAN (OUTPATIENT)
Dept: HOME HEALTH SERVICES | Facility: HOSPITAL | Age: 68
End: 2023-06-19
Payer: MEDICARE

## 2023-06-20 ENCOUNTER — TELEPHONE (OUTPATIENT)
Dept: ADMINISTRATIVE | Facility: HOSPITAL | Age: 68
End: 2023-06-20
Payer: MEDICARE

## 2023-06-22 ENCOUNTER — OFFICE VISIT (OUTPATIENT)
Dept: UROLOGY | Facility: CLINIC | Age: 68
End: 2023-06-22
Payer: MEDICARE

## 2023-06-22 VITALS
RESPIRATION RATE: 18 BRPM | HEIGHT: 62 IN | DIASTOLIC BLOOD PRESSURE: 85 MMHG | HEART RATE: 87 BPM | BODY MASS INDEX: 32.64 KG/M2 | WEIGHT: 177.38 LBS | SYSTOLIC BLOOD PRESSURE: 160 MMHG

## 2023-06-22 DIAGNOSIS — N39.0 RECURRENT UTI: ICD-10-CM

## 2023-06-22 PROBLEM — U07.1 COVID-19 VIRUS INFECTION: Status: RESOLVED | Noted: 2021-05-27 | Resolved: 2023-06-22

## 2023-06-22 PROBLEM — S31.829A BUTTOCK WOUND, LEFT, INITIAL ENCOUNTER: Status: RESOLVED | Noted: 2023-04-18 | Resolved: 2023-06-22

## 2023-06-22 PROCEDURE — 87088 URINE BACTERIA CULTURE: CPT | Performed by: NURSE PRACTITIONER

## 2023-06-22 PROCEDURE — 99213 OFFICE O/P EST LOW 20 MIN: CPT | Mod: S$PBB,,, | Performed by: NURSE PRACTITIONER

## 2023-06-22 PROCEDURE — 99999 PR PBB SHADOW E&M-EST. PATIENT-LVL V: ICD-10-PCS | Mod: PBBFAC,,, | Performed by: NURSE PRACTITIONER

## 2023-06-22 PROCEDURE — 87186 SC STD MICRODIL/AGAR DIL: CPT | Performed by: NURSE PRACTITIONER

## 2023-06-22 PROCEDURE — 87077 CULTURE AEROBIC IDENTIFY: CPT | Performed by: NURSE PRACTITIONER

## 2023-06-22 PROCEDURE — 99999 PR PBB SHADOW E&M-EST. PATIENT-LVL V: CPT | Mod: PBBFAC,,, | Performed by: NURSE PRACTITIONER

## 2023-06-22 PROCEDURE — 87086 URINE CULTURE/COLONY COUNT: CPT | Performed by: NURSE PRACTITIONER

## 2023-06-22 PROCEDURE — 99215 OFFICE O/P EST HI 40 MIN: CPT | Mod: PBBFAC | Performed by: NURSE PRACTITIONER

## 2023-06-22 PROCEDURE — 99213 PR OFFICE/OUTPT VISIT, EST, LEVL III, 20-29 MIN: ICD-10-PCS | Mod: S$PBB,,, | Performed by: NURSE PRACTITIONER

## 2023-06-22 NOTE — PROGRESS NOTES
Chief Complaint:   Recurrent urinary tract infections    HPI:   Patient is a 68-year-old female that is presenting with recurrent urinary tract infections.  In reviewing EMR, patient had 3 positive urine cultures including MRSA.  Patient states that she is completed antibiotics and is currently asymptomatic.  Urine in clinic is negative and PVR is 14 mL.  Patient denies pelvic or flank pain.  Patient states she has a remote history of renal stones.  Denies gross hematuria.  Reports that she is occasionally incontinent and wears a pad that she is changed several times during the day.  Allergies:  Codeine    Medications:  has a current medication list which includes the following prescription(s): allopurinol, aspirin, blood sugar diagnostic, clopidogrel, ergocalciferol (vitamin d2), ferrous sulfate, furosemide, glipizide, hydralazine, levemir flexpen, januvia, losartan, metformin, metoprolol succinate, pen needle, diabetic, and rosuvastatin.    Review of Systems:  General: No fever, chills, fatigability, or weight loss.  Skin: No rashes, itching, or changes in color or texture of skin.  Chest: Denies RIDDLE, cyanosis, wheezing, cough, and sputum production.  Abdomen: Appetite fine. No weight loss. Denies diarrhea, abdominal pain, hematemesis, or blood in stool.  Musculoskeletal: No joint stiffness or swelling. Denies back pain.  : As above.  All other review of systems negative.    PMH:   has a past medical history of CAD (coronary artery disease), Diabetes mellitus, type 2, DM (diabetes mellitus), DM (diabetes mellitus) (2012), High level of uric acid in blood, Hyperlipidemia, Hyperplastic rectal polyp, Hypertension, Multinodular goiter, Multiple thyroid nodules (05/05/2016), Osteoarthritis, Osteoarthritis of back, Osteoporosis, unspecified, Postmenopausal, Proteinuria, Pustular psoriasis, Subclavian artery stenosis, and Vitamin D deficiency disease.    PSH:   has a past surgical history that includes left knee  surgery for bone graft and pin placement; heart catheterization with stents placed (2004 and 08/18/2009); Left heart catheterization (Left, 6/18/2021); Coronary stent placement (N/A, 6/18/2021); Coronary angiography (N/A, 6/18/2021); Cataract extraction w/  intraocular lens implant (Right, 4/13/16); Cataract extraction w/  intraocular lens implant (Left); and Colonoscopy (N/A, 8/30/2022).    FamHx: family history includes Cancer in her father; Diabetes in her mother; Hypertension in her mother and sister; No Known Problems in her daughter and son; Stroke in her mother.    SocHx:  reports that she quit smoking about 18 years ago. Her smoking use included cigarettes. She has a 33.00 pack-year smoking history. She has been exposed to tobacco smoke. She has never used smokeless tobacco. She reports current alcohol use. She reports that she does not use drugs.      Physical Exam:  Vitals:    06/22/23 1420   Resp: 18     General: A&Ox3, no apparent distress, no deformities  Neck: No masses, normal thyroid  Lungs: normal inspiration, no use of accessory muscles  Heart: normal pulse, no arrhythmias  Abdomen: Soft, NT, ND, no masses, no hernias, no hepatosplenomegaly  Lymphatic: Neck and groin nodes negative  Skin: The skin is warm and dry. No jaundice.  Labs/Studies:   See HPI    Impression/Plan:   Recurrent urinary tract infections  Patient was educated on behavior modifications needed to decrease risk of urinary tract infections.  Urine will be sent for culture and patient to return to clinic in 4 weeks for re-evaluation.

## 2023-06-24 LAB — BACTERIA UR CULT: ABNORMAL

## 2023-06-24 RX ORDER — NITROFURANTOIN 25; 75 MG/1; MG/1
100 CAPSULE ORAL 2 TIMES DAILY
Qty: 20 CAPSULE | Refills: 0 | Status: SHIPPED | OUTPATIENT
Start: 2023-06-24 | End: 2023-07-18

## 2023-06-25 ENCOUNTER — PATIENT MESSAGE (OUTPATIENT)
Dept: UROLOGY | Facility: CLINIC | Age: 68
End: 2023-06-25
Payer: MEDICARE

## 2023-06-26 ENCOUNTER — TELEPHONE (OUTPATIENT)
Dept: UROLOGY | Facility: CLINIC | Age: 68
End: 2023-06-26
Payer: MEDICARE

## 2023-06-26 DIAGNOSIS — N39.0 RECURRENT UTI: Primary | ICD-10-CM

## 2023-06-26 NOTE — TELEPHONE ENCOUNTER
Spoke with pt concerning results per Haley. Pt is Scheduled for follow up    ----- Message from Haley Barbour NP sent at 6/24/2023 12:22 PM CDT -----  Please contact patient and inform her that preliminary urine culture is positive for urinary tract infection Macrobid has been sent to her local pharmacy and we will contact her with final urine culture results.

## 2023-06-27 NOTE — TELEPHONE ENCOUNTER
No care due was identified.  [employer] Embedded Care Due Messages. Reference number: [id number].   6/26/2023 7:57:43 PM CDT

## 2023-06-27 NOTE — TELEPHONE ENCOUNTER
LV-5/23/23  [facility]-2018  Upcoming appt-9/26/23    Pt requesting- metFORMIN (GLUCOPHAGE) 500 MG tablet     Last fill-9/29/22

## 2023-06-28 RX ORDER — METFORMIN HYDROCHLORIDE 500 MG/1
TABLET ORAL
Qty: 360 TABLET | Refills: 1 | Status: SHIPPED | OUTPATIENT
Start: 2023-06-28 | End: 2024-03-13

## 2023-07-08 DIAGNOSIS — E11.21 DIABETIC NEPHROPATHY WITH PROTEINURIA: ICD-10-CM

## 2023-07-08 DIAGNOSIS — E79.0 ELEVATED URIC ACID IN BLOOD: ICD-10-CM

## 2023-07-08 NOTE — TELEPHONE ENCOUNTER
No care due was identified.  [facility] Embedded Care Due Messages. Reference number: [id number].   7/08/2023 11:46:27 AM CDT

## 2023-07-09 RX ORDER — GLIPIZIDE 10 MG/1
TABLET ORAL
Qty: 180 TABLET | Refills: 1 | Status: SHIPPED | OUTPATIENT
Start: 2023-07-09 | End: 2024-03-19

## 2023-07-09 RX ORDER — ALLOPURINOL 100 MG/1
TABLET ORAL
Qty: 90 TABLET | Refills: 3 | Status: SHIPPED | OUTPATIENT
Start: 2023-07-09

## 2023-07-09 NOTE — TELEPHONE ENCOUNTER
Refill Decision Note   Alysha Bobby  is requesting a refill authorization.  Brief Assessment and Rationale for Refill:  Approve     Medication Therapy Plan:         Comments:     Note composed:2:50 AM 07/09/2023

## 2023-07-13 ENCOUNTER — PATIENT MESSAGE (OUTPATIENT)
Dept: OPHTHALMOLOGY | Facility: CLINIC | Age: 68
End: 2023-07-13

## 2023-07-13 ENCOUNTER — OFFICE VISIT (OUTPATIENT)
Dept: OPHTHALMOLOGY | Facility: CLINIC | Age: 68
End: 2023-07-13
Payer: MEDICARE

## 2023-07-13 DIAGNOSIS — H52.7 REFRACTIVE ERRORS: ICD-10-CM

## 2023-07-13 DIAGNOSIS — E11.3299 BDR (BACKGROUND DIABETIC RETINOPATHY): Primary | ICD-10-CM

## 2023-07-13 DIAGNOSIS — E11.40 TYPE 2 DIABETES MELLITUS WITH DIABETIC NEUROPATHY, WITHOUT LONG-TERM CURRENT USE OF INSULIN: ICD-10-CM

## 2023-07-13 DIAGNOSIS — Z96.1 PSEUDOPHAKIA OF BOTH EYES: ICD-10-CM

## 2023-07-13 PROCEDURE — 92014 COMPRE OPH EXAM EST PT 1/>: CPT | Mod: S$PBB,,, | Performed by: OPTOMETRIST

## 2023-07-13 PROCEDURE — 92015 PR REFRACTION: ICD-10-PCS | Mod: ,,, | Performed by: OPTOMETRIST

## 2023-07-13 PROCEDURE — 99999 PR PBB SHADOW E&M-EST. PATIENT-LVL III: CPT | Mod: PBBFAC,,, | Performed by: OPTOMETRIST

## 2023-07-13 PROCEDURE — 99999 PR PBB SHADOW E&M-EST. PATIENT-LVL III: ICD-10-PCS | Mod: PBBFAC,,, | Performed by: OPTOMETRIST

## 2023-07-13 PROCEDURE — 92015 DETERMINE REFRACTIVE STATE: CPT | Mod: ,,, | Performed by: OPTOMETRIST

## 2023-07-13 PROCEDURE — 99213 OFFICE O/P EST LOW 20 MIN: CPT | Mod: PBBFAC | Performed by: OPTOMETRIST

## 2023-07-13 PROCEDURE — 92014 PR EYE EXAM, EST PATIENT,COMPREHESV: ICD-10-PCS | Mod: S$PBB,,, | Performed by: OPTOMETRIST

## 2023-07-13 NOTE — PROGRESS NOTES
HPI    Last visit with TRF on 02/10/2022.    Lab Results       Component                Value               Date                       HGBA1C                   10.6 (H)            04/18/2023              Patient states slight decrease with overall vision.  No ocular pain/discomfort.   Not using any otc drops  Wear SVL glasses  Last edited by Kathy Morales on 7/13/2023  2:47 PM.            Assessment /Plan     For exam results, see Encounter Report.    BDR (background diabetic retinopathy)    Type 2 diabetes mellitus with diabetic neuropathy, without long-term current use of insulin  -     Ambulatory referral/consult to Optometry    Pseudophakia of both eyes    Refractive errors      Mild BDR OU  OCT shows no CME    Stable IOL OU.    Dispense Final Rx for glasses.  RTC 1 year  Discussed above and answered questions.

## 2023-07-18 ENCOUNTER — OFFICE VISIT (OUTPATIENT)
Dept: UROLOGY | Facility: CLINIC | Age: 68
End: 2023-07-18
Payer: MEDICARE

## 2023-07-18 VITALS
HEART RATE: 87 BPM | BODY MASS INDEX: 34.4 KG/M2 | WEIGHT: 186.94 LBS | HEIGHT: 62 IN | DIASTOLIC BLOOD PRESSURE: 68 MMHG | SYSTOLIC BLOOD PRESSURE: 141 MMHG

## 2023-07-18 DIAGNOSIS — N39.0 RECURRENT URINARY TRACT INFECTION: Primary | ICD-10-CM

## 2023-07-18 PROBLEM — Z12.11 ENCOUNTER FOR COLORECTAL CANCER SCREENING: Status: RESOLVED | Noted: 2022-08-30 | Resolved: 2023-07-18

## 2023-07-18 PROBLEM — Z12.12 ENCOUNTER FOR COLORECTAL CANCER SCREENING: Status: RESOLVED | Noted: 2022-08-30 | Resolved: 2023-07-18

## 2023-07-18 PROBLEM — R93.89 ABNORMAL CXR: Status: RESOLVED | Noted: 2021-05-27 | Resolved: 2023-07-18

## 2023-07-18 LAB
BILIRUB SERPL-MCNC: NORMAL MG/DL
BLOOD URINE, POC: NORMAL
CLARITY, POC UA: CLEAR
COLOR, POC UA: YELLOW
GLUCOSE UR QL STRIP: NORMAL
KETONES UR QL STRIP: NORMAL
LEUKOCYTE ESTERASE URINE, POC: NORMAL
NITRITE, POC UA: NORMAL
PH, POC UA: 6
POC RESIDUAL URINE VOLUME: 5 ML (ref 0–100)
PROTEIN, POC: 100
SPECIFIC GRAVITY, POC UA: 1.03
UROBILINOGEN, POC UA: 0.2

## 2023-07-18 PROCEDURE — 51798 US URINE CAPACITY MEASURE: CPT | Mod: PBBFAC | Performed by: NURSE PRACTITIONER

## 2023-07-18 PROCEDURE — 87088 URINE BACTERIA CULTURE: CPT | Performed by: NURSE PRACTITIONER

## 2023-07-18 PROCEDURE — 81003 URINALYSIS AUTO W/O SCOPE: CPT | Mod: PBBFAC

## 2023-07-18 PROCEDURE — 81002 URINALYSIS NONAUTO W/O SCOPE: CPT | Mod: 59,PBBFAC | Performed by: NURSE PRACTITIONER

## 2023-07-18 PROCEDURE — 99999 PR PBB SHADOW E&M-EST. PATIENT-LVL III: ICD-10-PCS | Mod: PBBFAC,,, | Performed by: NURSE PRACTITIONER

## 2023-07-18 PROCEDURE — 51798 US URINE CAPACITY MEASURE: CPT | Mod: PBBFAC

## 2023-07-18 PROCEDURE — 99213 OFFICE O/P EST LOW 20 MIN: CPT | Mod: PBBFAC | Performed by: NURSE PRACTITIONER

## 2023-07-18 PROCEDURE — 99999 PR PBB SHADOW E&M-EST. PATIENT-LVL III: CPT | Mod: PBBFAC,,, | Performed by: NURSE PRACTITIONER

## 2023-07-18 PROCEDURE — 99214 PR OFFICE/OUTPT VISIT, EST, LEVL IV, 30-39 MIN: ICD-10-PCS | Mod: S$PBB,,, | Performed by: NURSE PRACTITIONER

## 2023-07-18 PROCEDURE — 87086 URINE CULTURE/COLONY COUNT: CPT | Performed by: NURSE PRACTITIONER

## 2023-07-18 PROCEDURE — 87186 SC STD MICRODIL/AGAR DIL: CPT | Performed by: NURSE PRACTITIONER

## 2023-07-18 PROCEDURE — 99214 OFFICE O/P EST MOD 30 MIN: CPT | Mod: S$PBB,,, | Performed by: NURSE PRACTITIONER

## 2023-07-18 PROCEDURE — 87077 CULTURE AEROBIC IDENTIFY: CPT | Performed by: NURSE PRACTITIONER

## 2023-07-18 NOTE — H&P (VIEW-ONLY)
Chief Complaint:   Recurrent urinary infections infections    HPI:   Patient is a 68-year-old female that is presenting as a follow-up to recurrent urinary tract infections.  Patient was recently treated for urine culture indicating Klebsiella.  Patient states that she is completely asymptomatic after completion of antibiotics, however, urine in clinic indicates nitrates and leukocytes all other parameters are negative.  PVR is 14 mL.  Denies pelvic or flank pain.  Denies gross hematuria.  06/22/.2023  Patient is a 68-year-old female that is presenting with recurrent urinary tract infections.  In reviewing EMR, patient had 3 positive urine cultures including MRSA.  Patient states that she is completed antibiotics and is currently asymptomatic. Urine in clinic is negative and PVR is 14 mL.  Patient denies pelvic or flank pain.  Patient states she has a remote history of renal stones.  Denies gross hematuria.  Reports that she is occasionally incontinent and wears a pad that she is changed several times during the day.  Allergies:  Codeine    Medications:  has a current medication list which includes the following prescription(s): allopurinol, aspirin, blood sugar diagnostic, clopidogrel, ergocalciferol (vitamin d2), ferrous sulfate, furosemide, glipizide, hydralazine, levemir flexpen, januvia, losartan, metformin, metoprolol succinate, pen needle, diabetic, and rosuvastatin.    Review of Systems:  General: No fever, chills, fatigability, or weight loss.  Skin: No rashes, itching, or changes in color or texture of skin.  Chest: Denies RIDDLE, cyanosis, wheezing, cough, and sputum production.  Abdomen: Appetite fine. No weight loss. Denies diarrhea, abdominal pain, hematemesis, or blood in stool.  Musculoskeletal: No joint stiffness or swelling. Denies back pain.  : As above.  All other review of systems negative.    PMH:   has a past medical history of CAD (coronary artery disease), Diabetes mellitus, type 2, DM  (diabetes mellitus), DM (diabetes mellitus) (2012), High level of uric acid in blood, Hyperlipidemia, Hyperplastic rectal polyp, Hypertension, Multinodular goiter, Multiple thyroid nodules (05/05/2016), Osteoarthritis, Osteoarthritis of back, Osteoporosis, unspecified, Postmenopausal, Proteinuria, Pustular psoriasis, Subclavian artery stenosis, and Vitamin D deficiency disease.    PSH:   has a past surgical history that includes left knee surgery for bone graft and pin placement; heart catheterization with stents placed (2004 and 08/18/2009); Left heart catheterization (Left, 6/18/2021); Coronary stent placement (N/A, 6/18/2021); Coronary angiography (N/A, 6/18/2021); Cataract extraction w/  intraocular lens implant (Right, 4/13/16); Cataract extraction w/  intraocular lens implant (Left); and Colonoscopy (N/A, 8/30/2022).    FamHx: family history includes Cancer in her father; Diabetes in her mother; Hypertension in her mother and sister; No Known Problems in her daughter and son; Stroke in her mother.    SocHx:  reports that she quit smoking about 18 years ago. Her smoking use included cigarettes. She has a 33.00 pack-year smoking history. She has been exposed to tobacco smoke. She has never used smokeless tobacco. She reports current alcohol use. She reports that she does not use drugs.      Physical Exam:  Vitals:    07/18/23 1602   BP: (!) 141/68   Pulse: 87     General:  Morbidly obese female A&Ox3, no apparent distress, no deformities  Neck: No masses, normal thyroid  Lungs: normal inspiration, no use of accessory muscles  Heart: normal pulse, no arrhythmias  Abdomen: Soft, NT, ND, no masses, no hernias, no hepatosplenomegaly  Lymphatic: Neck and groin nodes negative  Skin: The skin is warm and dry. No jaundice.    Labs/Studies:   See HPI    Impression/Plan:   Recurrent urinary tract infections  Patient has documented recurrent urinary tract infections.  Urine will be sent for culture.  Patient is  asymptomatic will wait for sensitivity to treat.  Patient will be treated for current urinary tract infection and then prophylactic nightly antibiotics will be prescribed.

## 2023-07-22 LAB — BACTERIA UR CULT: ABNORMAL

## 2023-07-24 ENCOUNTER — TELEPHONE (OUTPATIENT)
Dept: UROLOGY | Facility: CLINIC | Age: 68
End: 2023-07-24
Payer: MEDICARE

## 2023-07-24 ENCOUNTER — PATIENT MESSAGE (OUTPATIENT)
Dept: UROLOGY | Facility: CLINIC | Age: 68
End: 2023-07-24
Payer: MEDICARE

## 2023-07-24 PROBLEM — E11.10 DIABETIC KETOACIDOSIS WITHOUT COMA ASSOCIATED WITH TYPE 2 DIABETES MELLITUS: Status: RESOLVED | Noted: 2023-04-18 | Resolved: 2023-07-24

## 2023-07-24 RX ORDER — GRANULES FOR ORAL 3 G/1
POWDER ORAL
Qty: 3 G | Refills: 0 | Status: SHIPPED | OUTPATIENT
Start: 2023-07-24 | End: 2023-08-31

## 2023-07-24 NOTE — TELEPHONE ENCOUNTER
Left vm informing pt that Ms. Barbour wanted her to come in for a nurse visit on Thursday for cath u/a.  Appt scheduled.

## 2023-07-25 ENCOUNTER — PATIENT MESSAGE (OUTPATIENT)
Dept: UROLOGY | Facility: CLINIC | Age: 68
End: 2023-07-25
Payer: MEDICARE

## 2023-07-26 ENCOUNTER — TELEPHONE (OUTPATIENT)
Dept: UROLOGY | Facility: CLINIC | Age: 68
End: 2023-07-26
Payer: MEDICARE

## 2023-07-26 DIAGNOSIS — E11.9 TYPE 2 DIABETES MELLITUS WITHOUT COMPLICATION: ICD-10-CM

## 2023-07-26 NOTE — TELEPHONE ENCOUNTER
----- Message from Trista Cedeno sent at 7/26/2023  8:19 AM CDT -----  Contact: Alysha  .Type:  RX Refill Request    Who Called: Alysha  Refill or New Rx:New Rx  RX Name and Strength:fosfomycin (MONUROL) 3 gram Pack  How is the patient currently taking it? (ex. 1XDay):not sure  Is this a 30 day or 90 day RX:not sure  Preferred Pharmacy with phone number:[facility] [address] [place], [place] - [address] [address]  [address] [facility] [address]  Phone: [phone] Fax: [phone]  Local or Mail Order:local  Ordering Provider:Haley Barbour  Would the patient rather a call back or a response via [facility]? Call back or if no answer [email or web address] message  Best Call Back Number:[phone]  Additional Information: pt states the medication is making her feel really weird, it is making her confused (like forgetting things in the middle of her sentence), and she only took the first dose on yesterday afternoon and hasn't taken any since, pt states if an appt is needed please notify her, and she is nervous about taking another dose. Pt also states she doesn't know how often it should be taken, instructions are not clear. Instructions are only: As directed        
Prescribed inappropriate.  Patient was called.  
Adequate

## 2023-07-26 NOTE — TELEPHONE ENCOUNTER
----- Message from Sherron Fan sent at 4/25/2023  8:14 AM CDT -----  Pts daughter stated the pt has just come from the hospital and is needing a follow up appt. She is requesting a call back at [phone]. Josefina fu    
Advise pt - Levemir can be taking any time of the day but needs to be consistent with taking same time every day AND should take it regardless of glucose level. Who advised she not give if glucose less than 125?  
Pt daughter advised of how to give levemir. Verbalized understanding  
Pt daughter states pt is taking levemir but wants to know exactly when to give it to her. She does not give her in the morning if it is under 125. She is needing a suggestion on what time will be best for her to receive it.    Please advise  
aching

## 2023-08-01 ENCOUNTER — CLINICAL SUPPORT (OUTPATIENT)
Dept: UROLOGY | Facility: CLINIC | Age: 68
End: 2023-08-01
Payer: MEDICARE

## 2023-08-01 VITALS — BODY MASS INDEX: 34.23 KG/M2 | WEIGHT: 186 LBS | HEIGHT: 62 IN

## 2023-08-01 DIAGNOSIS — N30.00 ACUTE CYSTITIS WITHOUT HEMATURIA: Primary | ICD-10-CM

## 2023-08-01 PROCEDURE — 87077 CULTURE AEROBIC IDENTIFY: CPT | Performed by: NURSE PRACTITIONER

## 2023-08-01 PROCEDURE — 99999 PR PBB SHADOW E&M-EST. PATIENT-LVL III: ICD-10-PCS | Mod: PBBFAC,,,

## 2023-08-01 PROCEDURE — 87088 URINE BACTERIA CULTURE: CPT | Performed by: NURSE PRACTITIONER

## 2023-08-01 PROCEDURE — 99213 OFFICE O/P EST LOW 20 MIN: CPT | Mod: PBBFAC

## 2023-08-01 PROCEDURE — 87086 URINE CULTURE/COLONY COUNT: CPT | Performed by: NURSE PRACTITIONER

## 2023-08-01 PROCEDURE — 87186 SC STD MICRODIL/AGAR DIL: CPT | Performed by: NURSE PRACTITIONER

## 2023-08-01 PROCEDURE — 99999 PR PBB SHADOW E&M-EST. PATIENT-LVL III: CPT | Mod: PBBFAC,,,

## 2023-08-01 NOTE — PROGRESS NOTES
..Using sterile technique, patient was catheterized per orders of Haley Barbour.  Urine collected and sent to lab.

## 2023-08-03 ENCOUNTER — TELEPHONE (OUTPATIENT)
Dept: ADMINISTRATIVE | Facility: HOSPITAL | Age: 68
End: 2023-08-03
Payer: MEDICARE

## 2023-08-03 DIAGNOSIS — N30.00 ACUTE CYSTITIS WITHOUT HEMATURIA: Primary | ICD-10-CM

## 2023-08-03 LAB — BACTERIA UR CULT: ABNORMAL

## 2023-08-04 ENCOUNTER — TELEPHONE (OUTPATIENT)
Dept: RADIOLOGY | Facility: HOSPITAL | Age: 68
End: 2023-08-04
Payer: MEDICARE

## 2023-08-04 ENCOUNTER — LAB VISIT (OUTPATIENT)
Dept: LAB | Facility: HOSPITAL | Age: 68
End: 2023-08-04
Attending: NURSE PRACTITIONER
Payer: MEDICARE

## 2023-08-04 ENCOUNTER — TELEPHONE (OUTPATIENT)
Dept: UROLOGY | Facility: CLINIC | Age: 68
End: 2023-08-04
Payer: MEDICARE

## 2023-08-04 DIAGNOSIS — N30.00 ACUTE CYSTITIS WITHOUT HEMATURIA: ICD-10-CM

## 2023-08-04 LAB
ANION GAP SERPL CALC-SCNC: 13 MMOL/L (ref 8–16)
BUN SERPL-MCNC: 17 MG/DL (ref 8–23)
CALCIUM SERPL-MCNC: 10.1 MG/DL (ref 8.7–10.5)
CHLORIDE SERPL-SCNC: 105 MMOL/L (ref 95–110)
CO2 SERPL-SCNC: 24 MMOL/L (ref 23–29)
CREAT SERPL-MCNC: 0.8 MG/DL (ref 0.5–1.4)
EST. GFR  (NO RACE VARIABLE): >60 ML/MIN/1.73 M^2
GLUCOSE SERPL-MCNC: 164 MG/DL (ref 70–110)
POTASSIUM SERPL-SCNC: 4.6 MMOL/L (ref 3.5–5.1)
SODIUM SERPL-SCNC: 142 MMOL/L (ref 136–145)

## 2023-08-04 PROCEDURE — 80048 BASIC METABOLIC PNL TOTAL CA: CPT | Performed by: NURSE PRACTITIONER

## 2023-08-04 PROCEDURE — 36415 COLL VENOUS BLD VENIPUNCTURE: CPT | Mod: PO | Performed by: NURSE PRACTITIONER

## 2023-08-04 RX ORDER — ROSUVASTATIN CALCIUM 20 MG/1
TABLET, COATED ORAL
Qty: 90 TABLET | Refills: 0 | Status: SHIPPED | OUTPATIENT
Start: 2023-08-04 | End: 2024-01-31

## 2023-08-04 NOTE — TELEPHONE ENCOUNTER
Contacted pt & informed her that we were in the process of setting up her PICC line that she needed a stat BMP today; lab scheduled in [place];pt stated that she would have lab done today. I also faxed the order, demographics and clinicals to [facility] for home infusion services at [phone]. Jessica Gan in  was also contacted to schedule PICC line. They will review information, verify insurance and contact pt.

## 2023-08-07 ENCOUNTER — TELEPHONE (OUTPATIENT)
Dept: RADIOLOGY | Facility: HOSPITAL | Age: 68
End: 2023-08-07
Payer: MEDICARE

## 2023-08-08 DIAGNOSIS — I10 ESSENTIAL HYPERTENSION: ICD-10-CM

## 2023-08-08 RX ORDER — METOPROLOL SUCCINATE 50 MG/1
50 TABLET, EXTENDED RELEASE ORAL DAILY
Qty: 90 TABLET | Refills: 0 | Status: SHIPPED | OUTPATIENT
Start: 2023-08-08 | End: 2023-11-11 | Stop reason: SDUPTHER

## 2023-08-08 RX ORDER — LOSARTAN POTASSIUM 50 MG/1
50 TABLET ORAL DAILY
Qty: 90 TABLET | Refills: 0 | Status: SHIPPED | OUTPATIENT
Start: 2023-08-08 | End: 2023-11-11 | Stop reason: SDUPTHER

## 2023-08-10 ENCOUNTER — TELEPHONE (OUTPATIENT)
Dept: UROLOGY | Facility: CLINIC | Age: 68
End: 2023-08-10
Payer: MEDICARE

## 2023-08-10 ENCOUNTER — HOSPITAL ENCOUNTER (OUTPATIENT)
Dept: RADIOLOGY | Facility: HOSPITAL | Age: 68
Discharge: HOME | End: 2023-08-10
Attending: NURSE PRACTITIONER
Payer: MEDICARE

## 2023-08-10 DIAGNOSIS — N30.00 ACUTE CYSTITIS WITHOUT HEMATURIA: ICD-10-CM

## 2023-08-10 PROCEDURE — C1751 CATH, INF, PER/CENT/MIDLINE: HCPCS

## 2023-08-10 PROCEDURE — 36573 INSJ PICC RS&I 5 YR+: CPT | Mod: ,,, | Performed by: RADIOLOGY

## 2023-08-10 PROCEDURE — 36573 FL PICC LINE PLACEMENT W/O PORT, W/IMG > 5 Y/O: ICD-10-PCS | Mod: ,,, | Performed by: RADIOLOGY

## 2023-08-10 PROCEDURE — 36573 INSJ PICC RS&I 5 YR+: CPT

## 2023-08-10 NOTE — DISCHARGE SUMMARY
[facility] (Hospital)  Discharge Note  Short Stay    FL PICC Line Placement w/o Port w/ Img > 4 Y/O      OUTCOME: Patient tolerated treatment/procedure well without complication and is now ready for discharge.    DISPOSITION: Home or Self Care    FINAL DIAGNOSIS:  <principal problem not specified>    FOLLOWUP: In clinic    DISCHARGE INSTRUCTIONS:  No discharge procedures on file.     TIME SPENT ON DISCHARGE: 15 minutes    Pre Op Diagnosis: Acute cystitis without hematuria     Post Op Diagnosis: same     Procedure:  PICC line     Procedure performed by: Marlen LION, Ingris OWENS     Written Informed Consent Obtained: Yes     Specimen Removed:  no    Estimated Blood Loss:  minimal     Findings: no     The patient tolerated the procedure well and there were no complications.      Disposition:  F/U in clinic or with ordering physician    Discharge Instructions:  Keep PICC clean and dry.    Sterile technique was performed in the RUE, lidocaine was used as a local anesthetic.  A 4 Monegasque DLPP 32 cm was inserted into the basilic vein and into the SVC/Right atrium junction.  Pt tolerated the procedure well without immediate complications.  Please see radiologist report for details. F/u with PCP and/or ordering physician. PICC is ready to use

## 2023-08-10 NOTE — TELEPHONE ENCOUNTER
Patient is having her PICC line placed today and wanted to know when she would start her antibiotics; I called [facility] and spoke to Kimber.  Was told that their intake nurse had been off but that she would reach out to pt herself.  Pt notified and was told to expect a call from Kimber.

## 2023-08-15 ENCOUNTER — PATIENT MESSAGE (OUTPATIENT)
Dept: UROLOGY | Facility: CLINIC | Age: 68
End: 2023-08-15
Payer: MEDICARE

## 2023-08-16 ENCOUNTER — TELEPHONE (OUTPATIENT)
Dept: ADMINISTRATIVE | Facility: HOSPITAL | Age: 68
End: 2023-08-16
Payer: MEDICARE

## 2023-08-16 ENCOUNTER — PATIENT MESSAGE (OUTPATIENT)
Dept: ADMINISTRATIVE | Facility: HOSPITAL | Age: 68
End: 2023-08-16
Payer: MEDICARE

## 2023-08-19 ENCOUNTER — TELEPHONE (OUTPATIENT)
Dept: UROLOGY | Facility: CLINIC | Age: 68
End: 2023-08-19
Payer: MEDICARE

## 2023-08-19 NOTE — TELEPHONE ENCOUNTER
----- Message from Fina Christie MA sent at 8/18/2023 11:12 AM CDT -----  Contact: Jimbo with [facility]  Please see below, advise.  ----- Message -----  From: Marleny Schmitz  Sent: 8/18/2023  10:09 AM CDT  To: [facility] Staff    Type:  Pharmacy Calling to Clarify an RX    Name of Caller: Jimbo  Pharm[facility] Name: [facility]  Prescription Name: Meropenem   What do they need to clarify?: Authorization to remove patient  from IV antibiotic.  Best Call Back Number: [phone]  Additional Information: Please call Jimbo to assist.

## 2023-08-26 DIAGNOSIS — I21.4 NSTEMI (NON-ST ELEVATED MYOCARDIAL INFARCTION): ICD-10-CM

## 2023-08-26 DIAGNOSIS — Z98.61 POST PTCA: ICD-10-CM

## 2023-08-28 RX ORDER — CLOPIDOGREL BISULFATE 75 MG/1
75 TABLET ORAL DAILY
Qty: 90 TABLET | Refills: 0 | Status: SHIPPED | OUTPATIENT
Start: 2023-08-28 | End: 2023-12-19

## 2023-08-31 ENCOUNTER — OFFICE VISIT (OUTPATIENT)
Dept: UROLOGY | Facility: CLINIC | Age: 68
End: 2023-08-31
Payer: MEDICARE

## 2023-08-31 VITALS — DIASTOLIC BLOOD PRESSURE: 77 MMHG | HEART RATE: 84 BPM | SYSTOLIC BLOOD PRESSURE: 150 MMHG

## 2023-08-31 DIAGNOSIS — N39.0 RECURRENT UTI: Primary | ICD-10-CM

## 2023-08-31 PROBLEM — Z78.0 POSTMENOPAUSAL: Status: RESOLVED | Noted: 2022-01-30 | Resolved: 2023-08-31

## 2023-08-31 LAB
BILIRUB SERPL-MCNC: NEGATIVE MG/DL
BLOOD URINE, POC: NEGATIVE
CLARITY, POC UA: CLEAR
COLOR, POC UA: NORMAL
GLUCOSE UR QL STRIP: 500
KETONES UR QL STRIP: NEGATIVE
LEUKOCYTE ESTERASE URINE, POC: NEGATIVE
NITRITE, POC UA: NEGATIVE
PH, POC UA: 6
PROTEIN, POC: 100
SPECIFIC GRAVITY, POC UA: 1.03
UROBILINOGEN, POC UA: 0.2

## 2023-08-31 PROCEDURE — 99999PBSHW POCT URINE DIPSTICK WITHOUT MICROSCOPE: Mod: PBBFAC,,,

## 2023-08-31 PROCEDURE — 99214 PR OFFICE/OUTPT VISIT, EST, LEVL IV, 30-39 MIN: ICD-10-PCS | Mod: S$PBB,,, | Performed by: NURSE PRACTITIONER

## 2023-08-31 PROCEDURE — 99999PBSHW PR PBB SHADOW TECHNICAL ONLY FILED TO HB: Mod: PBBFAC,,,

## 2023-08-31 PROCEDURE — 99999 PR PBB SHADOW E&M-EST. PATIENT-LVL III: CPT | Mod: PBBFAC,,, | Performed by: NURSE PRACTITIONER

## 2023-08-31 PROCEDURE — 81003 URINALYSIS AUTO W/O SCOPE: CPT | Mod: PBBFAC

## 2023-08-31 PROCEDURE — 99999 PR PBB SHADOW E&M-EST. PATIENT-LVL III: ICD-10-PCS | Mod: PBBFAC,,, | Performed by: NURSE PRACTITIONER

## 2023-08-31 PROCEDURE — 99213 OFFICE O/P EST LOW 20 MIN: CPT | Mod: PBBFAC | Performed by: NURSE PRACTITIONER

## 2023-08-31 PROCEDURE — 99214 OFFICE O/P EST MOD 30 MIN: CPT | Mod: S$PBB,,, | Performed by: NURSE PRACTITIONER

## 2023-08-31 PROCEDURE — 99999PBSHW PR PBB SHADOW TECHNICAL ONLY FILED TO HB: ICD-10-PCS | Mod: PBBFAC,,,

## 2023-08-31 PROCEDURE — 81002 URINALYSIS NONAUTO W/O SCOPE: CPT | Mod: 59,PBBFAC | Performed by: NURSE PRACTITIONER

## 2023-08-31 RX ORDER — CEPHALEXIN 250 MG/1
250 CAPSULE ORAL NIGHTLY
Qty: 90 CAPSULE | Refills: 3 | Status: SHIPPED | OUTPATIENT
Start: 2023-08-31 | End: 2024-02-01

## 2023-08-31 NOTE — PROGRESS NOTES
Chief Complaint:   Recurrent urinary tract infections    HPI:   Patient is a delightful 68-year-old female that is presenting as a follow-up to recurrent UTIs, Klebsiella.  Was recently treated with Meropenum 1 gram every 8 hours for 7 days per PICC line .  Patient states that she is completely asymptomatic in urine in clinic is negative.  07/18/2023  Patient is a 68-year-old female that is presenting as a follow-up to recurrent urinary tract infections.  Patient was recently treated for urine culture indicating Klebsiella.  Patient states that she is completely asymptomatic after completion of antibiotics, however, urine in clinic indicates nitrates and leukocytes all other parameters are negative.  PVR is 14 mL.  Denies pelvic or flank pain.  Denies gross hematuria.  06/22/.2023  Patient is a 68-year-old female that is presenting with recurrent urinary tract infections.  In reviewing EMR, patient had 3 positive urine cultures including MRSA.  Patient states that she is completed antibiotics and is currently asymptomatic. Urine in clinic is negative and PVR is 14 mL.  Patient denies pelvic or flank pain.  Patient states she has a remote history of renal stones.  Denies gross hematuria.  Reports that she is occasionally incontinent and wears a pad that she is changed several times during the day.  Allergies:  Codeine    Medications:  has a current medication list which includes the following prescription(s): allopurinol, aspirin, blood sugar diagnostic, clopidogrel, ergocalciferol (vitamin d2), ferrous sulfate, fosfomycin, furosemide, glipizide, hydralazine, levemir flexpen, januvia, losartan, metformin, metoprolol succinate, pen needle, diabetic, and rosuvastatin.    Review of Systems:  General: No fever, chills, fatigability, or weight loss.  Skin: No rashes, itching, or changes in color or texture of skin.  Chest: Denies RIDDLE, cyanosis, wheezing, cough, and sputum production.  Abdomen: Appetite fine. No weight  loss. Denies diarrhea, abdominal pain, hematemesis, or blood in stool.  Musculoskeletal: No joint stiffness or swelling. Denies back pain.  : As above.  All other review of systems negative.    PMH:   has a past medical history of CAD (coronary artery disease), Diabetes mellitus, type 2, DM (diabetes mellitus), DM (diabetes mellitus) (2012), High level of uric acid in blood, Hyperlipidemia, Hyperplastic rectal polyp, Hypertension, Multinodular goiter, Multiple thyroid nodules (05/05/2016), Osteoarthritis, Osteoarthritis of back, Osteoporosis, unspecified, Postmenopausal, Proteinuria, Pustular psoriasis, Subclavian artery stenosis, and Vitamin D deficiency disease.    PSH:   has a past surgical history that includes left knee surgery for bone graft and pin placement; heart catheterization with stents placed (2004 and 08/18/2009); Left heart catheterization (Left, 6/18/2021); Coronary stent placement (N/A, 6/18/2021); Coronary angiography (N/A, 6/18/2021); Cataract extraction w/  intraocular lens implant (Right, 4/13/16); Cataract extraction w/  intraocular lens implant (Left); and Colonoscopy (N/A, 8/30/2022).    FamHx: family history includes Cancer in her father; Diabetes in her mother; Hypertension in her mother and sister; No Known Problems in her daughter and son; Stroke in her mother.    SocHx:  reports that she quit smoking about 19 years ago. Her smoking use included cigarettes. She started smoking about 52 years ago. She has a 33.0 pack-year smoking history. She has been exposed to tobacco smoke. She has never used smokeless tobacco. She reports current alcohol use. She reports that she does not use drugs.      Physical Exam:  General: A&Ox3, no apparent distress, no deformities  Neck: No masses, normal thyroid  Lungs: normal inspiration, no use of accessory muscles  Heart: normal pulse, no arrhythmias  Abdomen: Soft, NT, ND, no masses, no hernias, no hepatosplenomegaly  Lymphatic: Neck and groin nodes  negative  Skin: The skin is warm and dry. No jaundice.  Ext: No c/c/e.    Labs/Studies:   See HPI  Impression/Plan:   Recurrent urinary tract infections  Patient is asymptomatic and urine in clinic is negative.  Patient has had multiple positive urine cultures in last 6 months, therefore, will prophylactically treat with Hiprex.  Patient to return to clinic at the beginning of the year for re-evaluation.

## 2023-09-02 DIAGNOSIS — E11.40 TYPE 2 DIABETES MELLITUS WITH DIABETIC NEUROPATHY, WITHOUT LONG-TERM CURRENT USE OF INSULIN: ICD-10-CM

## 2023-09-02 NOTE — TELEPHONE ENCOUNTER
Care Due:                  Date            Visit Type   Department     Provider  --------------------------------------------------------------------------------                                EP -                              PRIMARY      [facility]  Last Visit: 05-      CARE ([facility])   MEDICINE       Margie Bird                              EP -                              PRIMARY      [facility]  Next Visit: 09-      CARE ([facility])   MEDICINE       Margie Bird                                                            Last  Test          Frequency    Reason                     Performed    Due Date  --------------------------------------------------------------------------------    HBA1C.......  6 months...  HEIDY glipiZIDE,        04-   10-                             insulin, metFORMIN.......    [employer] Embedded Care Due Messages. Reference number: [id number].   9/02/2023 8:11:15 AM CDT

## 2023-09-03 RX ORDER — INSULIN DETEMIR 100 [IU]/ML
INJECTION, SOLUTION SUBCUTANEOUS
Qty: 15 ML | Refills: 0 | Status: SHIPPED | OUTPATIENT
Start: 2023-09-03 | End: 2024-02-06

## 2023-09-03 NOTE — TELEPHONE ENCOUNTER
Provider Staff:  Action required for this patient     Please see care gap opportunities below in Care Due Message.    Thanks!  [facility]     Appointments      Date Provider   Last Visit   5/23/2023 Margie Bird MD   Next Visit   9/26/2023 Margie Bird MD     Refill Decision Note   Alysha Rosales  is requesting a refill authorization.  Brief Assessment and Rationale for Refill:  Approve     Medication Therapy Plan:         Comments:     Note composed:11:42 AM 09/03/2023

## 2023-09-26 ENCOUNTER — OFFICE VISIT (OUTPATIENT)
Dept: FAMILY MEDICINE | Facility: CLINIC | Age: 68
End: 2023-09-26
Payer: MEDICARE

## 2023-09-26 ENCOUNTER — LAB VISIT (OUTPATIENT)
Dept: LAB | Facility: HOSPITAL | Age: 68
End: 2023-09-26
Attending: FAMILY MEDICINE
Payer: MEDICARE

## 2023-09-26 VITALS
BODY MASS INDEX: 33.71 KG/M2 | WEIGHT: 183.19 LBS | TEMPERATURE: 98 F | RESPIRATION RATE: 18 BRPM | SYSTOLIC BLOOD PRESSURE: 134 MMHG | HEIGHT: 62 IN | DIASTOLIC BLOOD PRESSURE: 82 MMHG | HEART RATE: 100 BPM | OXYGEN SATURATION: 95 %

## 2023-09-26 DIAGNOSIS — E11.21 DIABETIC NEPHROPATHY WITH PROTEINURIA: ICD-10-CM

## 2023-09-26 DIAGNOSIS — N18.31 STAGE 3A CHRONIC KIDNEY DISEASE: ICD-10-CM

## 2023-09-26 DIAGNOSIS — E11.59 HYPERTENSION ASSOCIATED WITH DIABETES: ICD-10-CM

## 2023-09-26 DIAGNOSIS — E11.40 TYPE 2 DIABETES MELLITUS WITH DIABETIC NEUROPATHY, WITHOUT LONG-TERM CURRENT USE OF INSULIN: ICD-10-CM

## 2023-09-26 DIAGNOSIS — I70.0 AORTIC ATHEROSCLEROSIS: ICD-10-CM

## 2023-09-26 DIAGNOSIS — I15.2 HYPERTENSION ASSOCIATED WITH DIABETES: ICD-10-CM

## 2023-09-26 DIAGNOSIS — E78.2 COMBINED HYPERLIPIDEMIA ASSOCIATED WITH TYPE 2 DIABETES MELLITUS: ICD-10-CM

## 2023-09-26 DIAGNOSIS — D50.9 IRON DEFICIENCY ANEMIA, UNSPECIFIED IRON DEFICIENCY ANEMIA TYPE: ICD-10-CM

## 2023-09-26 DIAGNOSIS — E66.9 OBESITY (BMI 30.0-34.9): ICD-10-CM

## 2023-09-26 DIAGNOSIS — E11.69 COMBINED HYPERLIPIDEMIA ASSOCIATED WITH TYPE 2 DIABETES MELLITUS: ICD-10-CM

## 2023-09-26 DIAGNOSIS — I25.118 ATHEROSCLEROTIC HEART DISEASE OF NATIVE CORONARY ARTERY WITH OTHER FORMS OF ANGINA PECTORIS: ICD-10-CM

## 2023-09-26 PROCEDURE — 99214 PR OFFICE/OUTPT VISIT, EST, LEVL IV, 30-39 MIN: ICD-10-PCS | Mod: S$PBB,,, | Performed by: FAMILY MEDICINE

## 2023-09-26 PROCEDURE — 99999PBSHW FLU VACCINE - QUADRIVALENT - ADJUVANTED: Mod: PBBFAC,,,

## 2023-09-26 PROCEDURE — 80061 LIPID PANEL: CPT | Performed by: FAMILY MEDICINE

## 2023-09-26 PROCEDURE — 82728 ASSAY OF FERRITIN: CPT | Performed by: FAMILY MEDICINE

## 2023-09-26 PROCEDURE — G0008 ADMIN INFLUENZA VIRUS VAC: HCPCS | Mod: PBBFAC,PO

## 2023-09-26 PROCEDURE — 36415 COLL VENOUS BLD VENIPUNCTURE: CPT | Mod: PO | Performed by: FAMILY MEDICINE

## 2023-09-26 PROCEDURE — 84443 ASSAY THYROID STIM HORMONE: CPT | Performed by: FAMILY MEDICINE

## 2023-09-26 PROCEDURE — 99215 OFFICE O/P EST HI 40 MIN: CPT | Mod: PBBFAC,PO | Performed by: FAMILY MEDICINE

## 2023-09-26 PROCEDURE — 99214 OFFICE O/P EST MOD 30 MIN: CPT | Mod: S$PBB,,, | Performed by: FAMILY MEDICINE

## 2023-09-26 PROCEDURE — 99999 PR PBB SHADOW E&M-EST. PATIENT-LVL V: ICD-10-PCS | Mod: PBBFAC,,, | Performed by: FAMILY MEDICINE

## 2023-09-26 PROCEDURE — 85025 COMPLETE CBC W/AUTO DIFF WBC: CPT | Performed by: FAMILY MEDICINE

## 2023-09-26 PROCEDURE — 99999PBSHW FLU VACCINE - QUADRIVALENT - ADJUVANTED: ICD-10-PCS | Mod: PBBFAC,,,

## 2023-09-26 PROCEDURE — 80053 COMPREHEN METABOLIC PANEL: CPT | Performed by: FAMILY MEDICINE

## 2023-09-26 PROCEDURE — 82570 ASSAY OF URINE CREATININE: CPT | Performed by: FAMILY MEDICINE

## 2023-09-26 PROCEDURE — 99999 PR PBB SHADOW E&M-EST. PATIENT-LVL V: CPT | Mod: PBBFAC,,, | Performed by: FAMILY MEDICINE

## 2023-09-26 PROCEDURE — 83036 HEMOGLOBIN GLYCOSYLATED A1C: CPT | Performed by: FAMILY MEDICINE

## 2023-09-26 NOTE — PROGRESS NOTES
Alysha Rosales    Chief Complaint   Patient presents with    4 mth follow up: HTN, Diabetes & Anemia       History of Present Illness:   Ms. Rosales comes in today for hypertension, diabetes, and anemia follow up.  She states she is not fasting and has taken medication today.     She states she continues to work at the [employer] on Sunday, Monday, Wednesday and every other Saturday for 6 hours each time.  She states she tries to monitor her diet as her daughter helps her by using mobile food [employer].     She states she does not perform home blood pressure checks but performs home glucose checks 4 to 5 times per week (fasting or before meal) with levels ranging 135-145.     She states she has chronic, occasional pain at her knees. However, she states she feels better and better every day.     Otherwise, she denies having fever, chills, fatigue, appetite changes; shortness of breath, cough, wheezing; chest pain, palpitations, leg swelling; abdominal pain, nausea, vomiting, diarrhea, constipation; unusual urinary symptoms; polydipsia, polyphagia, polyuria, hot or cold intolerance; back pain; acute visual changes, numbness, headache; anxiety, depression, homicidal or suicidal thoughts.      She was treated with Meropenum 1 gram every 8 hours for 7 days per PICC line in August 2023 and at follow up visit with urologist CARMENCITA Barbour on August 31, 2023 she was completely asymptomatic and urine in clinic was negative and at which time she advised she take Keflex 250 mg nightly for UTI prophylaxis with 1-year follow up advised.      She saw Dr. William, cardiologist, for follow-up on August 10, 2022 for surveillance of combined hyperlipidemia associated with type 2 diabetes mellitus, essential hypertension, antiplatelet or antithrombotic long-term use, atherosclerotic heart disease of native coronary artery with other forms of angina pectoris and iron deficiency anemia, unspecified iron deficiency anemia type.  She did  not see ERIC Corbett for diabetes management on July 21, 2022 as scheduled. She saw Dr. Wadsworth, optometrist, on July 13, 2023 for BDR (background diabetic retinopathy), type 2 diabetes mellitus with diabetic neuropathy, without long-term current use of insulin, pseudophakia of both eyes, refractive errors.      She saw Dr. Saravia, orthopedist, on July 21, 2022 for bilateral knee arthritis, lumbar DDD.       She saw ERIC Hill with rheumatology on October 5, 2020 for surveillance of osteoporosis currently on Reclast and vitamin-D deficiency with 9-month follow-up with DEXA scan and Reclast therapy advised.      Labs:                WBC                      11.15               04/21/2023                 HGB                      10.7 (L)            04/21/2023                 HCT                      32.5 (L)            04/21/2023                 PLT                      269                 04/21/2023                 CHOL                     133                 01/25/2022                 TRIG                     384 (H)             01/25/2022                 HDL                      40                  01/25/2022                 ALT                      18                  04/21/2023                 AST                      14                  04/21/2023                 NA                       142                 08/04/2023                 K                        4.6                 08/04/2023                 CL                       105                 08/04/2023                 CREATININE               0.8                 08/04/2023                 BUN                      17                  08/04/2023                 CO2                      24                  08/04/2023                 TSH                      2.040               01/25/2022                 INR                      0.9                 06/11/2021                 HGBA1C                   10.6 (H)            04/18/2023          LDLCALC        "           16.2 (L)            01/25/2022                  Current Outpatient Medications   Medication Sig    allopurinoL (ZYLOPRIM) 100 MG tablet Take 1 tablet by mouth once daily    aspirin 81 MG Chew Take 81 mg by mouth once daily.    blood sugar diagnostic Strp To check BG 2 times daily, to use with insurance preferred meter - Freestyle Lite    cephALEXin (KEFLEX) 250 MG capsule Take 1 capsule (250 mg total) by mouth every evening.    clopidogreL (PLAVIX) 75 mg tablet Take 1 tablet (75 mg total) by mouth once daily.    ERGOCALCIFEROL, VITAMIN D2, (VITAMIN D ORAL) Take 2,000 Units by mouth once daily.    ferrous sulfate (FEOSOL) Tab tablet Take 1 tablet by mouth 3 (three) times daily.    furosemide (LASIX) 20 MG tablet Take 2 tablets (40 mg total) by mouth once daily. (Patient taking differently: Take 40 mg by mouth once daily. Takes 1 pill daily)    glipiZIDE (GLUCOTROL) 10 MG tablet TAKE 1 TABLET BY MOUTH TWICE DAILY BEFORE MEALS    hydrALAZINE (APRESOLINE) 50 MG tablet Take 1 tablet (50 mg total) by mouth 4 (four) times daily.    JANUVIA 100 mg Tab Take 1 tablet by mouth once daily    LEVEMIR FLEXPEN 100 unit/mL (3 mL) InPn pen INJECT 15 UNITS SUBCUTANEOUSLY ONCE DAILY    losartan (COZAAR) 50 MG tablet Take 1 tablet (50 mg total) by mouth once daily.    metFORMIN (GLUCOPHAGE) 500 MG tablet TAKE 1 TABLET BY MOUTH ONCE DAILY IN THE MORNING AND 1 ONCE DAILY AT NOON AND 2 ONCE DAILY IN THE EVENING WITH MEALS    metoprolol succinate (TOPROL-XL) 50 MG 24 hr tablet Take 1 tablet (50 mg total) by mouth once daily.    pen needle, diabetic (BD ULTRA-FINE SHORT PEN NEEDLE) 31 gauge x 5/16" Ndle 1 Units by Misc.(Non-Drug; Combo Route) route once daily.    rosuvastatin (CRESTOR) 20 MG tablet Take 1 tablet by mouth nightly       Review of Systems   Constitutional:  Negative for activity change, appetite change, chills, fatigue, fever and unexpected weight change.        Weight 82.8 kg (182 lb 8.7 oz) at May 23, 2023 visit. "   Eyes:  Negative for visual disturbance.        See history of present illness.   Respiratory:  Negative for cough, shortness of breath and wheezing.    Cardiovascular:  Negative for chest pain, palpitations and leg swelling.        See history of present illness.   Gastrointestinal:  Negative for abdominal pain, constipation, diarrhea, nausea and vomiting.   Endocrine: Negative for cold intolerance, heat intolerance, polydipsia, polyphagia and polyuria.        See history of present illness.   Genitourinary:  Negative for difficulty urinating and vaginal discharge.        See history of present illness.   Musculoskeletal:  Positive for arthralgias. Negative for back pain.        See history of present illness.   Neurological:  Negative for numbness and headaches.        Positive for memory issue.   Hematological:  Does not bruise/bleed easily.        See history of present illness.   Psychiatric/Behavioral:  Negative for dysphoric mood, sleep disturbance and suicidal ideas. The patient is not nervous/anxious.         Negative for homicidal ideas.       Objective:  Physical Exam  Vitals reviewed.   Constitutional:       General: She is not in acute distress.     Appearance: Normal appearance. She is well-developed. She is obese. She is not ill-appearing or diaphoretic.      Comments: Pleasant and obese.   Neck:      Thyroid: No thyromegaly.   Cardiovascular:      Rate and Rhythm: Normal rate and regular rhythm.      Pulses:           Dorsalis pedis pulses are 3+ on the right side and 3+ on the left side.        Posterior tibial pulses are 3+ on the right side and 3+ on the left side.      Heart sounds: Murmur heard.      Comments: Chronic and soft.  Pulmonary:      Effort: Pulmonary effort is normal. No respiratory distress.      Breath sounds: Normal breath sounds. No wheezing.   Abdominal:      General: Bowel sounds are normal. There is no distension.      Palpations: Abdomen is soft. There is no mass.       Tenderness: There is no abdominal tenderness. There is no guarding or rebound.   Musculoskeletal:         General: No swelling or tenderness. Normal range of motion.      Cervical back: Normal range of motion and neck supple. No tenderness.      Comments: She is ambulatory without problems.    Feet:      Right foot:      Protective Sensation: 5 sites tested.  5 sites sensed.      Skin integrity: No ulcer, skin breakdown or dry skin.      Left foot:      Protective Sensation: 5 sites tested.  5 sites sensed.      Skin integrity: No ulcer, skin breakdown or dry skin.   Lymphadenopathy:      Cervical: No cervical adenopathy.   Neurological:      General: No focal deficit present.      Mental Status: She is alert and oriented to person, place, and time.   Psychiatric:         Mood and Affect: Mood normal.         Behavior: Behavior normal.         Thought Content: Thought content normal.         Judgment: Judgment normal.       ASSESSMENT:  1. Hypertension associated with diabetes    2. Combined hyperlipidemia associated with type 2 diabetes mellitus    3. Type 2 diabetes mellitus with diabetic neuropathy, without long-term current use of insulin    4. Diabetic nephropathy with proteinuria    5. Aortic atherosclerosis    6. Atherosclerotic heart disease of native coronary artery with other forms of angina pectoris    7. Stage 3a chronic kidney disease    8. Iron deficiency anemia, unspecified iron deficiency anemia type    9. Obesity (BMI 30.0-34.9)        PLAN:  Alysha was seen today for 4 mth follow up: htn, diabetes & anemia.    Diagnoses and all orders for this visit:    Hypertension associated with diabetes  -     TSH; Future  -     Comprehensive Metabolic Panel; Future  -     Lipid Panel; Future    Combined hyperlipidemia associated with type 2 diabetes mellitus  -     Comprehensive Metabolic Panel; Future  -     Lipid Panel; Future    Type 2 diabetes mellitus with diabetic neuropathy, without long-term current  use of insulin    Diabetic nephropathy with proteinuria  -     Microalbumin/Creatinine Ratio, Urine  -     Hemoglobin A1C; Future    Aortic atherosclerosis    Atherosclerotic heart disease of native coronary artery with other forms of angina pectoris    Stage 3a chronic kidney disease  -     Comprehensive Metabolic Panel; Future    Iron deficiency anemia, unspecified iron deficiency anemia type  -     Ferritin; Future  -     CBC Auto Differential; Future    Obesity (BMI 30.0-34.9)    Other orders  -     Influenza - Quadrivalent (Adjuvanted)      Patient advised to call for results.  Continue current medications, follow low sodium, low cholesterol, low carb diet, daily walks.  Keep follow up with specialists.  Flu shot will be given today.  Follow up in about 6 months (around 3/26/2024) for physical.

## 2023-09-27 LAB
ALBUMIN SERPL BCP-MCNC: 3.9 G/DL (ref 3.5–5.2)
ALBUMIN/CREAT UR: 586.4 UG/MG (ref 0–30)
ALP SERPL-CCNC: 70 U/L (ref 55–135)
ALT SERPL W/O P-5'-P-CCNC: 13 U/L (ref 10–44)
ANION GAP SERPL CALC-SCNC: 13 MMOL/L (ref 8–16)
AST SERPL-CCNC: 18 U/L (ref 10–40)
BASOPHILS # BLD AUTO: 0.12 K/UL (ref 0–0.2)
BASOPHILS NFR BLD: 1.2 % (ref 0–1.9)
BILIRUB SERPL-MCNC: 0.2 MG/DL (ref 0.1–1)
BUN SERPL-MCNC: 23 MG/DL (ref 8–23)
CALCIUM SERPL-MCNC: 10.5 MG/DL (ref 8.7–10.5)
CHLORIDE SERPL-SCNC: 103 MMOL/L (ref 95–110)
CHOLEST SERPL-MCNC: 138 MG/DL (ref 120–199)
CHOLEST/HDLC SERPL: 3.2 {RATIO} (ref 2–5)
CO2 SERPL-SCNC: 25 MMOL/L (ref 23–29)
CREAT SERPL-MCNC: 0.9 MG/DL (ref 0.5–1.4)
CREAT UR-MCNC: 22 MG/DL (ref 15–325)
DIFFERENTIAL METHOD: ABNORMAL
EOSINOPHIL # BLD AUTO: 0.3 K/UL (ref 0–0.5)
EOSINOPHIL NFR BLD: 2.8 % (ref 0–8)
ERYTHROCYTE [DISTWIDTH] IN BLOOD BY AUTOMATED COUNT: 14.1 % (ref 11.5–14.5)
EST. GFR  (NO RACE VARIABLE): >60 ML/MIN/1.73 M^2
ESTIMATED AVG GLUCOSE: 140 MG/DL (ref 68–131)
FERRITIN SERPL-MCNC: 33 NG/ML (ref 20–300)
GLUCOSE SERPL-MCNC: 126 MG/DL (ref 70–110)
HBA1C MFR BLD: 6.5 % (ref 4–5.6)
HCT VFR BLD AUTO: 33.1 % (ref 37–48.5)
HDLC SERPL-MCNC: 43 MG/DL (ref 40–75)
HDLC SERPL: 31.2 % (ref 20–50)
HGB BLD-MCNC: 10.2 G/DL (ref 12–16)
IMM GRANULOCYTES # BLD AUTO: 0.04 K/UL (ref 0–0.04)
IMM GRANULOCYTES NFR BLD AUTO: 0.4 % (ref 0–0.5)
LDLC SERPL CALC-MCNC: 51.4 MG/DL (ref 63–159)
LYMPHOCYTES # BLD AUTO: 1.6 K/UL (ref 1–4.8)
LYMPHOCYTES NFR BLD: 15 % (ref 18–48)
MCH RBC QN AUTO: 30 PG (ref 27–31)
MCHC RBC AUTO-ENTMCNC: 30.8 G/DL (ref 32–36)
MCV RBC AUTO: 97 FL (ref 82–98)
MICROALBUMIN UR DL<=1MG/L-MCNC: 129 UG/ML
MONOCYTES # BLD AUTO: 0.8 K/UL (ref 0.3–1)
MONOCYTES NFR BLD: 7.2 % (ref 4–15)
NEUTROPHILS # BLD AUTO: 7.6 K/UL (ref 1.8–7.7)
NEUTROPHILS NFR BLD: 73.4 % (ref 38–73)
NONHDLC SERPL-MCNC: 95 MG/DL
NRBC BLD-RTO: 0 /100 WBC
PLATELET # BLD AUTO: 343 K/UL (ref 150–450)
PMV BLD AUTO: 11.4 FL (ref 9.2–12.9)
POTASSIUM SERPL-SCNC: 4.1 MMOL/L (ref 3.5–5.1)
PROT SERPL-MCNC: 7.7 G/DL (ref 6–8.4)
RBC # BLD AUTO: 3.4 M/UL (ref 4–5.4)
SODIUM SERPL-SCNC: 141 MMOL/L (ref 136–145)
TRIGL SERPL-MCNC: 218 MG/DL (ref 30–150)
TSH SERPL DL<=0.005 MIU/L-ACNC: 2.5 UIU/ML (ref 0.4–4)
WBC # BLD AUTO: 10.4 K/UL (ref 3.9–12.7)

## 2023-09-29 RX ORDER — HYDRALAZINE HYDROCHLORIDE 50 MG/1
TABLET, FILM COATED ORAL
Qty: 240 TABLET | Refills: 0 | Status: SHIPPED | OUTPATIENT
Start: 2023-09-29 | End: 2024-01-31

## 2023-09-29 RX ORDER — FUROSEMIDE 20 MG/1
40 TABLET ORAL DAILY
Qty: 180 TABLET | Refills: 3 | Status: SHIPPED | OUTPATIENT
Start: 2023-09-29 | End: 2024-09-23

## 2023-10-01 PROBLEM — E66.811 OBESITY (BMI 30.0-34.9): Status: ACTIVE | Noted: 2017-11-06

## 2023-11-10 DIAGNOSIS — I10 ESSENTIAL HYPERTENSION: ICD-10-CM

## 2023-11-11 RX ORDER — LOSARTAN POTASSIUM 50 MG/1
50 TABLET ORAL
Qty: 90 TABLET | Refills: 0 | Status: SHIPPED | OUTPATIENT
Start: 2023-11-11 | End: 2024-03-05

## 2023-11-11 RX ORDER — METOPROLOL SUCCINATE 50 MG/1
50 TABLET, EXTENDED RELEASE ORAL
Qty: 90 TABLET | Refills: 0 | Status: SHIPPED | OUTPATIENT
Start: 2023-11-11 | End: 2023-11-30

## 2023-11-14 ENCOUNTER — OFFICE VISIT (OUTPATIENT)
Dept: UROLOGY | Facility: CLINIC | Age: 68
End: 2023-11-14
Payer: MEDICARE

## 2023-11-14 VITALS
RESPIRATION RATE: 16 BRPM | BODY MASS INDEX: 33.68 KG/M2 | DIASTOLIC BLOOD PRESSURE: 82 MMHG | HEART RATE: 71 BPM | WEIGHT: 183 LBS | HEIGHT: 62 IN | TEMPERATURE: 99 F | SYSTOLIC BLOOD PRESSURE: 142 MMHG

## 2023-11-14 DIAGNOSIS — N32.81 OAB (OVERACTIVE BLADDER): Primary | ICD-10-CM

## 2023-11-14 DIAGNOSIS — N39.46 MIXED INCONTINENCE: ICD-10-CM

## 2023-11-14 LAB
BILIRUB UR QL STRIP: NEGATIVE
GLUCOSE UR QL STRIP: NEGATIVE
KETONES UR QL STRIP: NEGATIVE
LEUKOCYTE ESTERASE UR QL STRIP: NEGATIVE
PH, POC UA: 5.5
POC BLOOD, URINE: NEGATIVE
POC NITRATES, URINE: NEGATIVE
POC RESIDUAL URINE VOLUME: 27 ML (ref 0–100)
PROT UR QL STRIP: NEGATIVE
SP GR UR STRIP: 1.01 (ref 1–1.03)
UROBILINOGEN UR STRIP-ACNC: 0.2 (ref 0.1–1.1)

## 2023-11-14 PROCEDURE — 99214 OFFICE O/P EST MOD 30 MIN: CPT | Mod: S$PBB,,, | Performed by: NURSE PRACTITIONER

## 2023-11-14 PROCEDURE — 99999PBSHW POCT URINALYSIS, DIPSTICK, AUTOMATED, W/O SCOPE: Mod: PBBFAC,,,

## 2023-11-14 PROCEDURE — 99999 PR PBB SHADOW E&M-EST. PATIENT-LVL IV: CPT | Mod: PBBFAC,,, | Performed by: NURSE PRACTITIONER

## 2023-11-14 PROCEDURE — 99999PBSHW POCT BLADDER SCAN: ICD-10-PCS | Mod: PBBFAC,,,

## 2023-11-14 PROCEDURE — 99999PBSHW POCT BLADDER SCAN: Mod: PBBFAC,,,

## 2023-11-14 PROCEDURE — 81003 URINALYSIS AUTO W/O SCOPE: CPT | Mod: PBBFAC | Performed by: NURSE PRACTITIONER

## 2023-11-14 PROCEDURE — 99999 PR PBB SHADOW E&M-EST. PATIENT-LVL IV: ICD-10-PCS | Mod: PBBFAC,,, | Performed by: NURSE PRACTITIONER

## 2023-11-14 PROCEDURE — 99214 PR OFFICE/OUTPT VISIT, EST, LEVL IV, 30-39 MIN: ICD-10-PCS | Mod: S$PBB,,, | Performed by: NURSE PRACTITIONER

## 2023-11-14 PROCEDURE — 51798 US URINE CAPACITY MEASURE: CPT | Mod: PBBFAC | Performed by: NURSE PRACTITIONER

## 2023-11-14 PROCEDURE — 99214 OFFICE O/P EST MOD 30 MIN: CPT | Mod: PBBFAC | Performed by: NURSE PRACTITIONER

## 2023-11-14 RX ORDER — SOLIFENACIN SUCCINATE 10 MG/1
10 TABLET, FILM COATED ORAL DAILY
Qty: 30 TABLET | Refills: 11 | Status: SHIPPED | OUTPATIENT
Start: 2023-11-14 | End: 2024-02-15 | Stop reason: SDUPTHER

## 2023-11-14 NOTE — PROGRESS NOTES
Chief Complaint:   Overactive bladder    HPI:   Patient is a 68-year-old female that has been followed by this clinic for recurrent urinary tract infections.  Urine in clinic is negative.  PVR is 27 mL.  Patient states that over the last 6 months she is had urge incontinence and nocturia.  Patient is wearing a pad that she changes several times a day.  Denies gross hematuria.  States she drinks very little water throughout the day.  08/31/2023  Patient is a delightful 68-year-old female that is presenting as a follow-up to recurrent UTIs, Klebsiella.  Was recently treated with Meropenum 1 gram every 8 hours for 7 days per PICC line .  Patient states that she is completely asymptomatic in urine in clinic is negative.  07/18/2023  Patient is a 68-year-old female that is presenting as a follow-up to recurrent urinary tract infections.  Patient was recently treated for urine culture indicating Klebsiella.  Patient states that she is completely asymptomatic after completion of antibiotics, however, urine in clinic indicates nitrates and leukocytes all other parameters are negative.  PVR is 14 mL.  Denies pelvic or flank pain.  Denies gross hematuria.  06/22/.2023  Patient is a 68-year-old female that is presenting with recurrent urinary tract infections.  In reviewing EMR, patient had 3 positive urine cultures including MRSA.  Patient states that she is completed antibiotics and is currently asymptomatic. Urine in clinic is negative and PVR is 14 mL.  Patient denies pelvic or flank pain.  Patient states she has a remote history of renal stones.  Denies gross hematuria.  Reports that she is occasionally incontinent and wears a pad that she is changed several times during the day.  Allergies:  Codeine    Medications:  has a current medication list which includes the following prescription(s): allopurinol, aspirin, blood sugar diagnostic, cephalexin, clopidogrel, ergocalciferol (vitamin d2), ferrous sulfate, furosemide,  glipizide, hydralazine, januvia, levemir flexpen, losartan, metformin, metoprolol succinate, pen needle, diabetic, and rosuvastatin.    Review of Systems:  General: No fever, chills, fatigability, or weight loss.  Skin: No rashes, itching, or changes in color or texture of skin.  Chest: Denies RIDDLE, cyanosis, wheezing, cough, and sputum production.  Abdomen: Appetite fine. No weight loss. Denies diarrhea, abdominal pain, hematemesis, or blood in stool.  Musculoskeletal: No joint stiffness or swelling. Denies back pain.  : As above.  All other review of systems negative.    PMH:   has a past medical history of CAD (coronary artery disease), Diabetes mellitus, type 2, DM (diabetes mellitus), DM (diabetes mellitus) (2012), High level of uric acid in blood, Hyperlipidemia, Hyperplastic rectal polyp, Hypertension, Multinodular goiter, Multiple thyroid nodules (05/05/2016), Osteoarthritis, Osteoarthritis of back, Osteoporosis, unspecified, Postmenopausal, Proteinuria, Pustular psoriasis, Subclavian artery stenosis, and Vitamin D deficiency disease.    PSH:   has a past surgical history that includes left knee surgery for bone graft and pin placement; heart catheterization with stents placed (2004 and 08/18/2009); Left heart catheterization (Left, 6/18/2021); Coronary stent placement (N/A, 6/18/2021); Coronary angiography (N/A, 6/18/2021); Cataract extraction w/  intraocular lens implant (Right, 4/13/16); Cataract extraction w/  intraocular lens implant (Left); and Colonoscopy (N/A, 8/30/2022).    FamHx: family history includes Cancer in her father; Diabetes in her mother; Hypertension in her mother and sister; No Known Problems in her daughter and son; Stroke in her mother.    SocHx:  reports that she quit smoking about 19 years ago. Her smoking use included cigarettes. She started smoking about 52 years ago. She has a 33.0 pack-year smoking history. She has been exposed to tobacco smoke. She has never used smokeless  tobacco. She reports current alcohol use. She reports that she does not use drugs.      Physical Exam:  General: A&Ox3, no apparent distress, no deformities  Neck: No masses, normal thyroid  Lungs: normal inspiration, no use of accessory muscles  Heart: normal pulse, no arrhythmias  Abdomen: Soft, NT, ND, no masses, no hernias, no hepatosplenomegaly  Lymphatic: Neck and groin nodes negative  Skin: The skin is warm and dry. No jaundice.    Labs/Studies:   See HPI  Impression/Plan:   Overactive bladder  Patient was educated on behavior modifications needed to decrease overactive bladder symptoms.  Was prescribed VESIcare 10 mg to be taken once daily and return to clinic for re-evaluation in 6-8 weeks.

## 2023-11-30 ENCOUNTER — HOSPITAL ENCOUNTER (OUTPATIENT)
Dept: CARDIOLOGY | Facility: HOSPITAL | Age: 68
Discharge: HOME | End: 2023-11-30
Attending: INTERNAL MEDICINE
Payer: MEDICARE

## 2023-11-30 ENCOUNTER — OFFICE VISIT (OUTPATIENT)
Dept: CARDIOLOGY | Facility: CLINIC | Age: 68
End: 2023-11-30
Payer: MEDICARE

## 2023-11-30 ENCOUNTER — OFFICE VISIT (OUTPATIENT)
Dept: FAMILY MEDICINE | Facility: CLINIC | Age: 68
End: 2023-11-30
Payer: MEDICARE

## 2023-11-30 VITALS
SYSTOLIC BLOOD PRESSURE: 170 MMHG | HEART RATE: 74 BPM | HEIGHT: 62 IN | OXYGEN SATURATION: 96 % | DIASTOLIC BLOOD PRESSURE: 90 MMHG | WEIGHT: 183.63 LBS | BODY MASS INDEX: 33.79 KG/M2

## 2023-11-30 VITALS
BODY MASS INDEX: 33.95 KG/M2 | TEMPERATURE: 97 F | DIASTOLIC BLOOD PRESSURE: 88 MMHG | HEIGHT: 62 IN | WEIGHT: 184.5 LBS | OXYGEN SATURATION: 95 % | HEART RATE: 78 BPM | SYSTOLIC BLOOD PRESSURE: 172 MMHG | RESPIRATION RATE: 18 BRPM

## 2023-11-30 DIAGNOSIS — I25.118 ATHEROSCLEROTIC HEART DISEASE OF NATIVE CORONARY ARTERY WITH OTHER FORMS OF ANGINA PECTORIS: ICD-10-CM

## 2023-11-30 DIAGNOSIS — E11.59 HYPERTENSION ASSOCIATED WITH DIABETES: ICD-10-CM

## 2023-11-30 DIAGNOSIS — N18.31 STAGE 3A CHRONIC KIDNEY DISEASE: ICD-10-CM

## 2023-11-30 DIAGNOSIS — E79.0 ELEVATED URIC ACID IN BLOOD: ICD-10-CM

## 2023-11-30 DIAGNOSIS — D50.9 IRON DEFICIENCY ANEMIA, UNSPECIFIED IRON DEFICIENCY ANEMIA TYPE: ICD-10-CM

## 2023-11-30 DIAGNOSIS — E78.2 COMBINED HYPERLIPIDEMIA ASSOCIATED WITH TYPE 2 DIABETES MELLITUS: ICD-10-CM

## 2023-11-30 DIAGNOSIS — I70.0 AORTIC ATHEROSCLEROSIS: ICD-10-CM

## 2023-11-30 DIAGNOSIS — I15.2 HYPERTENSION ASSOCIATED WITH DIABETES: ICD-10-CM

## 2023-11-30 DIAGNOSIS — E66.01 SEVERE OBESITY (BMI 35.0-39.9) WITH COMORBIDITY: ICD-10-CM

## 2023-11-30 DIAGNOSIS — E11.40 TYPE 2 DIABETES MELLITUS WITH DIABETIC NEUROPATHY, WITHOUT LONG-TERM CURRENT USE OF INSULIN: ICD-10-CM

## 2023-11-30 DIAGNOSIS — E11.69 COMBINED HYPERLIPIDEMIA ASSOCIATED WITH TYPE 2 DIABETES MELLITUS: Primary | ICD-10-CM

## 2023-11-30 DIAGNOSIS — Z00.00 ENCOUNTER FOR PREVENTIVE HEALTH EXAMINATION: Primary | ICD-10-CM

## 2023-11-30 DIAGNOSIS — N32.81 OAB (OVERACTIVE BLADDER): ICD-10-CM

## 2023-11-30 DIAGNOSIS — N39.0 RECURRENT UTI: ICD-10-CM

## 2023-11-30 DIAGNOSIS — Z79.4 TYPE 2 DIABETES MELLITUS WITH OTHER SPECIFIED COMPLICATION, WITH LONG-TERM CURRENT USE OF INSULIN: ICD-10-CM

## 2023-11-30 DIAGNOSIS — E11.69 TYPE 2 DIABETES MELLITUS WITH OTHER SPECIFIED COMPLICATION, WITH LONG-TERM CURRENT USE OF INSULIN: ICD-10-CM

## 2023-11-30 DIAGNOSIS — E11.21 DIABETIC NEPHROPATHY WITH PROTEINURIA: ICD-10-CM

## 2023-11-30 DIAGNOSIS — Z95.5 S/P CORONARY ARTERY STENT PLACEMENT: ICD-10-CM

## 2023-11-30 DIAGNOSIS — M81.0 AGE-RELATED OSTEOPOROSIS WITHOUT CURRENT PATHOLOGICAL FRACTURE: Chronic | ICD-10-CM

## 2023-11-30 DIAGNOSIS — E78.2 COMBINED HYPERLIPIDEMIA ASSOCIATED WITH TYPE 2 DIABETES MELLITUS: Primary | ICD-10-CM

## 2023-11-30 DIAGNOSIS — Z79.02 ANTIPLATELET OR ANTITHROMBOTIC LONG-TERM USE: ICD-10-CM

## 2023-11-30 DIAGNOSIS — E78.5 HYPERLIPIDEMIA, UNSPECIFIED HYPERLIPIDEMIA TYPE: ICD-10-CM

## 2023-11-30 DIAGNOSIS — E11.69 COMBINED HYPERLIPIDEMIA ASSOCIATED WITH TYPE 2 DIABETES MELLITUS: ICD-10-CM

## 2023-11-30 DIAGNOSIS — E11.3299 BDR (BACKGROUND DIABETIC RETINOPATHY): ICD-10-CM

## 2023-11-30 PROBLEM — R79.89 ELEVATED TROPONIN: Status: RESOLVED | Noted: 2021-05-27 | Resolved: 2023-11-30

## 2023-11-30 PROBLEM — E66.811 OBESITY (BMI 30.0-34.9): Status: RESOLVED | Noted: 2017-11-06 | Resolved: 2023-11-30

## 2023-11-30 PROBLEM — E66.9 OBESITY (BMI 30.0-34.9): Status: RESOLVED | Noted: 2017-11-06 | Resolved: 2023-11-30

## 2023-11-30 PROCEDURE — 99999 PR PBB SHADOW E&M-EST. PATIENT-LVL V: CPT | Mod: PBBFAC,,, | Performed by: NURSE PRACTITIONER

## 2023-11-30 PROCEDURE — G0439 PR MEDICARE ANNUAL WELLNESS SUBSEQUENT VISIT: ICD-10-PCS | Mod: ,,, | Performed by: NURSE PRACTITIONER

## 2023-11-30 PROCEDURE — 99999 PR PBB SHADOW E&M-EST. PATIENT-LVL IV: ICD-10-PCS | Mod: PBBFAC,,, | Performed by: INTERNAL MEDICINE

## 2023-11-30 PROCEDURE — 99214 OFFICE O/P EST MOD 30 MIN: CPT | Mod: S$PBB,,, | Performed by: INTERNAL MEDICINE

## 2023-11-30 PROCEDURE — 93005 ELECTROCARDIOGRAM TRACING: CPT

## 2023-11-30 PROCEDURE — 93010 EKG 12-LEAD: ICD-10-PCS | Mod: ,,, | Performed by: INTERNAL MEDICINE

## 2023-11-30 PROCEDURE — 99214 OFFICE O/P EST MOD 30 MIN: CPT | Mod: PBBFAC,27 | Performed by: INTERNAL MEDICINE

## 2023-11-30 PROCEDURE — G0439 PPPS, SUBSEQ VISIT: HCPCS | Mod: ,,, | Performed by: NURSE PRACTITIONER

## 2023-11-30 PROCEDURE — 99214 PR OFFICE/OUTPT VISIT, EST, LEVL IV, 30-39 MIN: ICD-10-PCS | Mod: S$PBB,,, | Performed by: INTERNAL MEDICINE

## 2023-11-30 PROCEDURE — 93010 ELECTROCARDIOGRAM REPORT: CPT | Mod: ,,, | Performed by: INTERNAL MEDICINE

## 2023-11-30 PROCEDURE — 99215 OFFICE O/P EST HI 40 MIN: CPT | Mod: PBBFAC,PO | Performed by: NURSE PRACTITIONER

## 2023-11-30 PROCEDURE — 99999 PR PBB SHADOW E&M-EST. PATIENT-LVL V: ICD-10-PCS | Mod: PBBFAC,,, | Performed by: NURSE PRACTITIONER

## 2023-11-30 PROCEDURE — 99999 PR PBB SHADOW E&M-EST. PATIENT-LVL IV: CPT | Mod: PBBFAC,,, | Performed by: INTERNAL MEDICINE

## 2023-11-30 RX ORDER — CARVEDILOL 12.5 MG/1
12.5 TABLET ORAL 2 TIMES DAILY WITH MEALS
Qty: 60 TABLET | Refills: 11 | Status: SHIPPED | OUTPATIENT
Start: 2023-11-30 | End: 2024-02-01

## 2023-11-30 NOTE — PROGRESS NOTES
Subjective:   Patient ID:  Alysha Rosales is a 68 y.o. female who presents for follow up of No chief complaint on file.      November 30, 2023.    Comes in for an overdue follow-up.    She denies any chest pain.  Dyspnea.  Lower extremity swelling.    No palpitations syncope or presyncope   No bleeding.    She had PCI of her circ ISR in June of 2021.    Her blood pressure is uncontrolled lately.  She was sent by her primary care physician to have it addressed.    All on initial check it was lower however on my check was similar to what it was recorded at her PCP.        8.2022  Comes in for a follow up   She has been treated for iron deficiency anemia   Awaiting endoscopy   It has been more than 12 months since PCI to a dominant left circ   She denies bleeding   No cp , no flowers, no LE swelling , no palpitations, syncope or presyncope       12.30.2021   Follow-up  Pertinent negatives include no chest pain.   hx of left circ stent in 6/2021   Continues to feel well, no cp, no dyspnea with moderate exertion   No bleeding   Chronic knee pain     Interval hx: 6/29/2021   Patient referred last week for prior nstemi, dropped EF , found to have 99% isr insegment restenosis of the left circ s/p stenting   She states she is doing much better since the stent and noticed that she able to walk more before having dyspnea which she describes as limiting in the past   She denies any chest pain   Site looking good         NP Jason Bloom note hpi 6/2020   Ms. Rosales's current medical conditions include hypertension, hyperlipidemia, DM, and CAD. Former tobacco use,. Stopped in 2004.  Hx of MI in 2004 where she underwent coronary stenting x2 ( mid LAD and LCX?) Previously followed by Dr. Kaba at [facility] in [place], but wanting to switch to [facility].   Had FLOWERS in 2008. Underwent LHC at that time and had PCI of unknown vessel. Has total of 3 stents.   Has had 2 repeat LHC since then and both treated medically, didn't  "require PCI. Last cath at least 10 years.   Admitted last week to [facility] with hypoxia in the setting of COVID, NSTEMI and anemia that required transfusion. Presented with acute SOB and a feeling of "smothering".   Patient noted to have have a troponin peak at 6.3. Echo revealed Apical hypokinesis that was suspicious for takotsubo CMPY, LVF 40.%.   She was treated medically with ASA, Plavix, Statin, BB and ARB.   Denies any chest pain,  orthopnea, PND, dizziness, palpitations,  near syncope, syncope or edema .   +SOB and RIDDLE. No CNS Complaints to suggest TIA or CVA  Admits that she can do better with limiting her sodium intake.      Past Medical History:   Diagnosis Date    BDR (background diabetic retinopathy) 11/30/2023    CAD (coronary artery disease)     Followed by Dr. Stevo Kaba    Diabetes mellitus, type 2     DM (diabetes mellitus)     BS didn't check 06/05/2020    DM (diabetes mellitus) 2012    BS didn't check 02/10/2022    Elevated troponin 05/27/2021    High level of uric acid in blood     Hyperlipidemia     Hyperplastic rectal polyp     on 03/20/2009 colonoscopy    Hypertension     Multinodular goiter     noted on 1/20/2015 CTA carotid scan    Multiple thyroid nodules 05/05/2016    on Thyroid U/S    Osteoarthritis     Left knee    Osteoarthritis of back     Osteoporosis, unspecified     Postmenopausal     No history of abnormal pap smear    Proteinuria     Pustular psoriasis     hands and feet    Subclavian artery stenosis     Left; with the proximal segment at 60-70% per 10/02/2008 carotid CTA    Vitamin D deficiency disease        Past Surgical History:   Procedure Laterality Date    CATARACT EXTRACTION W/  INTRAOCULAR LENS IMPLANT Right 04/13/2016    [facility]    CATARACT EXTRACTION W/  INTRAOCULAR LENS IMPLANT Left     [facility]    COLONOSCOPY N/A 08/30/2022    Procedure: COLONOSCOPY;  Surgeon: Reanna Garber MD;  Location: [facility];  Service: Endoscopy;  Laterality: N/A;    CORONARY ANGIOGRAPHY N/A " 2021    stents x  4    CORONARY STENT PLACEMENT N/A 2021    Procedure: INSERTION, STENT, CORONARY ARTERY;  Surgeon: Desiree William MD;  Location: [facility] CATH LAB;  Service: Cardiology;  Laterality: N/A;    heart catheterization with stents placed   and 2009    LEFT HEART CATHETERIZATION Left 2021    Procedure: CATHETERIZATION, HEART, LEFT;  Surgeon: Desiree William MD;  Location: [facility] CATH LAB;  Service: Cardiology;  Laterality: Left;  Covid + week of    left knee surgery for bone graft and pin placement         Social History     Tobacco Use    Smoking status: Former     Current packs/day: 0.00     Average packs/day: 1 pack/day for 33.0 years (33.0 ttl pk-yrs)     Types: Cigarettes     Start date: 1971     Quit date: 2004     Years since quittin.3     Passive exposure: Past    Smokeless tobacco: Never   Substance Use Topics    Alcohol use: Yes     Alcohol/week: 0.0 standard drinks of alcohol     Comment: Rarely    Drug use: No       Family History   Problem Relation Age of Onset    Diabetes Mother     Hypertension Mother     Stroke Mother     Hypertension Sister     Cancer Father         Lung cancer (smoker)    No Known Problems Daughter     No Known Problems Son     Heart disease Neg Hx        Current Outpatient Medications   Medication Sig    allopurinoL (ZYLOPRIM) 100 MG tablet Take 1 tablet by mouth once daily    aspirin 81 MG Chew Take 81 mg by mouth once daily.    blood sugar diagnostic Strp To check BG 2 times daily, to use with insurance preferred meter - Freestyle Lite    cephALEXin (KEFLEX) 250 MG capsule Take 1 capsule (250 mg total) by mouth every evening.    clopidogreL (PLAVIX) 75 mg tablet Take 1 tablet (75 mg total) by mouth once daily.    ERGOCALCIFEROL, VITAMIN D2, (VITAMIN D ORAL) Take 2,000 Units by mouth once daily.    ferrous sulfate (FEOSOL) Tab tablet Take 1 tablet by mouth 3 (three) times daily.    furosemide (LASIX) 20 MG tablet Take 2 tablets  (40 mg total) by mouth once daily. Takes 1 pill daily    glipiZIDE (GLUCOTROL) 10 MG tablet TAKE 1 TABLET BY MOUTH TWICE DAILY BEFORE MEALS    hydrALAZINE (APRESOLINE) 50 MG tablet Take 1 tablet by mouth 4 times daily    JANUVIA 100 mg Tab Take 1 tablet by mouth once daily    LEVEMIR FLEXPEN 100 unit/mL (3 mL) InPn pen INJECT 15 UNITS SUBCUTANEOUSLY ONCE DAILY    losartan (COZAAR) 50 MG tablet Take 1 tablet by mouth once daily    metFORMIN (GLUCOPHAGE) 500 MG tablet TAKE 1 TABLET BY MOUTH ONCE DAILY IN THE MORNING AND 1 ONCE DAILY AT NOON AND 2 ONCE DAILY IN THE EVENING WITH MEALS    rosuvastatin (CRESTOR) 20 MG tablet Take 1 tablet by mouth nightly    solifenacin (VESICARE) 10 MG tablet Take 1 tablet (10 mg total) by mouth once daily.    carvediloL (COREG) 12.5 MG tablet Take 1 tablet (12.5 mg total) by mouth 2 (two) times daily with meals.     No current facility-administered medications for this visit.     Current Outpatient Medications on File Prior to Visit   Medication Sig    allopurinoL (ZYLOPRIM) 100 MG tablet Take 1 tablet by mouth once daily    aspirin 81 MG Chew Take 81 mg by mouth once daily.    blood sugar diagnostic Strp To check BG 2 times daily, to use with insurance preferred meter - Freestyle Lite    cephALEXin (KEFLEX) 250 MG capsule Take 1 capsule (250 mg total) by mouth every evening.    clopidogreL (PLAVIX) 75 mg tablet Take 1 tablet (75 mg total) by mouth once daily.    ERGOCALCIFEROL, VITAMIN D2, (VITAMIN D ORAL) Take 2,000 Units by mouth once daily.    ferrous sulfate (FEOSOL) Tab tablet Take 1 tablet by mouth 3 (three) times daily.    furosemide (LASIX) 20 MG tablet Take 2 tablets (40 mg total) by mouth once daily. Takes 1 pill daily    glipiZIDE (GLUCOTROL) 10 MG tablet TAKE 1 TABLET BY MOUTH TWICE DAILY BEFORE MEALS    hydrALAZINE (APRESOLINE) 50 MG tablet Take 1 tablet by mouth 4 times daily    JANUVIA 100 mg Tab Take 1 tablet by mouth once daily    LEVEMIR FLEXPEN 100 unit/mL (3 mL)  "InPn pen INJECT 15 UNITS SUBCUTANEOUSLY ONCE DAILY    losartan (COZAAR) 50 MG tablet Take 1 tablet by mouth once daily    metFORMIN (GLUCOPHAGE) 500 MG tablet TAKE 1 TABLET BY MOUTH ONCE DAILY IN THE MORNING AND 1 ONCE DAILY AT NOON AND 2 ONCE DAILY IN THE EVENING WITH MEALS    rosuvastatin (CRESTOR) 20 MG tablet Take 1 tablet by mouth nightly    solifenacin (VESICARE) 10 MG tablet Take 1 tablet (10 mg total) by mouth once daily.    [DISCONTINUED] metoprolol succinate (TOPROL-XL) 50 MG 24 hr tablet Take 1 tablet by mouth once daily    [DISCONTINUED] pen needle, diabetic (BD ULTRA-FINE SHORT PEN NEEDLE) 31 gauge x 5/16" Ndle 1 Units by Misc.(Non-Drug; Combo Route) route once daily.     No current facility-administered medications on file prior to visit.       Review of Systems   Cardiovascular:  Negative for chest pain, dyspnea on exertion, palpitations and syncope.   Genitourinary: Negative.    Neurological: Negative.        Objective:   Physical Exam  Vitals and nursing note reviewed.   Constitutional:       Appearance: Normal appearance.   Pulmonary:      Effort: Pulmonary effort is normal.   Neurological:      General: No focal deficit present.      Mental Status: She is alert and oriented to person, place, and time.   Psychiatric:         Mood and Affect: Mood normal.         Behavior: Behavior normal.       Vitals:    11/30/23 1427 11/30/23 1454   BP: 136/82 (!) 170/90   BP Location: Right arm    Patient Position: Sitting    BP Method: Small (Manual)    Pulse: 74    SpO2: 96%    Weight: 83.3 kg (183 lb 10.3 oz)    Height: 5' 2" (1.575 m)      Lab Results   Component Value Date    CHOL 138 09/26/2023    CHOL 133 01/25/2022    CHOL 120 10/22/2021     Lab Results   Component Value Date    HDL 43 09/26/2023    HDL 40 01/25/2022    HDL 41 10/22/2021     Lab Results   Component Value Date    LDLCALC 51.4 (L) 09/26/2023    LDLCALC 16.2 (L) 01/25/2022    LDLCALC 29.2 (L) 10/22/2021     Lab Results   Component Value " Date    TRIG 218 (H) 09/26/2023    TRIG 384 (H) 01/25/2022    TRIG 249 (H) 10/22/2021     Lab Results   Component Value Date    CHOLHDL 31.2 09/26/2023    CHOLHDL 30.1 01/25/2022    CHOLHDL 34.2 10/22/2021       Chemistry        Component Value Date/Time     09/26/2023 1455    K 4.1 09/26/2023 1455     09/26/2023 1455    CO2 25 09/26/2023 1455    BUN 23 09/26/2023 1455    CREATININE 0.9 09/26/2023 1455     (H) 09/26/2023 1455        Component Value Date/Time    CALCIUM 10.5 09/26/2023 1455    ALKPHOS 70 09/26/2023 1455    AST 18 09/26/2023 1455    ALT 13 09/26/2023 1455    BILITOT 0.2 09/26/2023 1455    ESTGFRAFRICA >60.0 07/28/2022 0915    EGFRNONAA >60.0 07/28/2022 0915          Lab Results   Component Value Date    TSH 2.495 09/26/2023     Lab Results   Component Value Date    INR 0.9 06/11/2021    INR 1.0 05/27/2021     Lab Results   Component Value Date    WBC 10.40 09/26/2023    HGB 10.2 (L) 09/26/2023    HCT 33.1 (L) 09/26/2023    MCV 97 09/26/2023     09/26/2023     BMP  Sodium   Date Value Ref Range Status   09/26/2023 141 136 - 145 mmol/L Final     Potassium   Date Value Ref Range Status   09/26/2023 4.1 3.5 - 5.1 mmol/L Final     Chloride   Date Value Ref Range Status   09/26/2023 103 95 - 110 mmol/L Final     CO2   Date Value Ref Range Status   09/26/2023 25 23 - 29 mmol/L Final     BUN   Date Value Ref Range Status   09/26/2023 23 8 - 23 mg/dL Final     Creatinine   Date Value Ref Range Status   09/26/2023 0.9 0.5 - 1.4 mg/dL Final     Calcium   Date Value Ref Range Status   09/26/2023 10.5 8.7 - 10.5 mg/dL Final     Anion Gap   Date Value Ref Range Status   09/26/2023 13 8 - 16 mmol/L Final     eGFR if    Date Value Ref Range Status   07/28/2022 >60.0 >60 mL/min/1.73 m^2 Final     eGFR if non    Date Value Ref Range Status   07/28/2022 >60.0 >60 mL/min/1.73 m^2 Final     Comment:     Calculation used to obtain the estimated glomerular  filtration  rate (eGFR) is the CKD-EPI equation.        CrCl cannot be calculated (Patient's most recent lab result is older than the maximum 7 days allowed.).    Echo Conclusion    The left ventricle is normal in size with mildly decreased systolic function.  The estimated ejection fraction is 40%.  Left ventricular diastolic dysfunction.  Normal right ventricular size with normal right ventricular systolic function.  Mild to moderate tricuspid regurgitation.  Normal central venous pressure (3 mmHg).  The estimated PA systolic pressure is 44 mmHg.  There is pulmonary hypertension.  Mild mitral regurgitation.  There are segmental left ventricular wall motion abnormalities. Apical hypokinesis.     significant Diagnostic Studies: Angiography: Technical Procedures Used:   Non dominant rca   Left main patent   Lad is 40% prox ISR, 50% distal lad , small d1,d2   Left cic is large with 99% in segment restenosis of mid left circ . Mod size om1, large om2 , lpda patent   S/p 2.75x12 mm stent , post dilated to 3.0 mm   rca is severely diffusely diseased small non dominant     Assessment:     1. Combined hyperlipidemia associated with type 2 diabetes mellitus    2. Hypertension associated with diabetes    3. Atherosclerotic heart disease of native coronary artery with other forms of angina pectoris    4. Antiplatelet or antithrombotic long-term use    5. S/P coronary artery stent placement    6. Severe obesity (BMI 35.0-39.9) with comorbidity    7. Hyperlipidemia, unspecified hyperlipidemia type    8. Stage 3a chronic kidney disease        Plan:   EKG normal today   S/P PCI to mid circ 6/2021  ON ASA, Plavix, Statin, BB and ARB  Blood pressure not at goal.  Will switch her from metoprolol to carvedilol.    Decrease salt intake.    Reviewed all tests and above medical conditions with patient in detail and formulated treatment plan.  Risk factor modification discussed.   Cardiac low salt diet discussed.  Maintaining healthy  weight and weight loss goals were discussed in clinic.    F/u in 6 months

## 2023-11-30 NOTE — PROGRESS NOTES
"  Alysha Rosales presented for a  Medicare AWV and comprehensive Health Risk Assessment today. The following components were reviewed and updated:    Medical history  Family History  Social history  Allergies and Current Medications  Health Risk Assessment  Health Maintenance  Care Team         ** See Completed Assessments for Annual Wellness Visit within the encounter summary.**         The following assessments were completed:  Living Situation  CAGE  Depression Screening  Timed Get Up and Go  Whisper Test  Cognitive Function Screening  Nutrition Screening  ADL Screening  PAQ Screening        Vitals:    11/30/23 1030 11/30/23 1057   BP: (!) 170/73 (!) 172/88   BP Location: Right arm Left arm   Patient Position: Sitting    Pulse:  78   Resp: 18    Temp: 96.7 °F (35.9 °C)    SpO2: 95%    Weight: 83.7 kg (184 lb 8.4 oz)    Height: 5' 2" (1.575 m)      Body mass index is 33.75 kg/m².  Physical Exam  Vitals and nursing note reviewed.   Constitutional:       Appearance: Normal appearance. She is well-developed.   HENT:      Head: Normocephalic and atraumatic.   Eyes:      Pupils: Pupils are equal, round, and reactive to light.   Neck:      Vascular: No carotid bruit.   Cardiovascular:      Rate and Rhythm: Normal rate and regular rhythm.      Pulses: Normal pulses.      Heart sounds: Murmur heard.      No gallop.   Pulmonary:      Effort: Pulmonary effort is normal.      Breath sounds: Normal breath sounds.   Abdominal:      General: Bowel sounds are normal. There is no distension.      Palpations: Abdomen is soft.      Tenderness: There is no abdominal tenderness.   Musculoskeletal:         General: No tenderness. Normal range of motion.   Skin:     General: Skin is warm and dry.   Neurological:      Mental Status: She is alert.      Motor: No abnormal muscle tone.   Psychiatric:         Speech: Speech normal.         Behavior: Behavior normal.         Thought Content: Thought content normal.         Judgment: " Judgment normal.             Current Outpatient Medications   Medication Instructions    allopurinoL (ZYLOPRIM) 100 MG tablet Take 1 tablet by mouth once daily    aspirin 81 mg, Oral, Daily    blood sugar diagnostic Strp To check BG 2 times daily, to use with insurance preferred meter - Freestyle Lite    cephALEXin (KEFLEX) 250 mg, Oral, Nightly    clopidogreL (PLAVIX) 75 mg, Oral, Daily    ERGOCALCIFEROL, VITAMIN D2, (VITAMIN D ORAL) 2,000 Units, Oral, Daily    ferrous sulfate (FEOSOL) Tab tablet 1 tablet, Oral, 3 times daily    furosemide (LASIX) 40 mg, Oral, Daily, Takes 1 pill daily    glipiZIDE (GLUCOTROL) 10 MG tablet TAKE 1 TABLET BY MOUTH TWICE DAILY BEFORE MEALS    hydrALAZINE (APRESOLINE) 50 MG tablet Take 1 tablet by mouth 4 times daily    JANUVIA 100 mg Tab Take 1 tablet by mouth once daily    LEVEMIR FLEXPEN 100 unit/mL (3 mL) InPn pen INJECT 15 UNITS SUBCUTANEOUSLY ONCE DAILY    losartan (COZAAR) 50 mg, Oral    metFORMIN (GLUCOPHAGE) 500 MG tablet TAKE 1 TABLET BY MOUTH ONCE DAILY IN THE MORNING AND 1 ONCE DAILY AT NOON AND 2 ONCE DAILY IN THE EVENING WITH MEALS    metoprolol succinate (TOPROL-XL) 50 mg, Oral    pen needle, diabetic (BD ULTRA-FINE SHORT PEN NEEDLE) 1 Units, Misc.(Non-Drug; Combo Route), Daily    rosuvastatin (CRESTOR) 20 MG tablet Take 1 tablet by mouth nightly    solifenacin (VESICARE) 10 mg, Oral, Daily         Diagnoses and health risks identified today and associated recommendations/orders:    1. Encounter for preventive health examination  Review for Opioid Screening: Patient does not have rx for Opioids.  Review for Substance Use Disorders: Patient does not use substance.      2. Atherosclerotic heart disease of native coronary artery with other forms of angina pectoris   Ambulatory referral/consult to Cardiology; Future  Chronic/ Monitored/Stable on meds  as directed. Hx of cardiac stents x 4  Scheduled to see card to day for elevated blood pressure- last visit 2022    F/U WITH  PCP FOR [facility] [place]    3. Hypertension associated with diabetes   Ambulatory referral/consult to Cardiology   BP elevated despite compliance all meds schedule to see card today     4. Type 2 diabetes mellitus with other specified complication, with long-term current use of insulin  Chronic/ Monitored/Stable on  meds  as directed. LABS IMPROVED  Followed by PCP    5. Type 2 diabetes mellitus with diabetic neuropathy, without long-term current use of insulin  Chronic and Ongoing. States worsen  to fingers  Please f/u with PCP     6. BDR (background diabetic retinopathy)  Stable/mitered b    7. Diabetic nephropathy with proteinuria  Chronic/ Monitored/Stable on MEDS  as directed.  Followed by pcp    8. Combined hyperlipidemia associated with type 2 diabetes mellitus  Chronic/ Monitored/Stable on MEDS  as directed. LABS IMPROVED  Followed by  PCP    9. Stage 3a chronic kidney disease  Chronic/ Monitored/Stable on MEDS  as directed.  Followed by PCP    10. Severe obesity (BMI 35.0-39.9) with comorbidity  Discussed and recommend  low fat/carb/chol diet. Cardio exercise as tolerated. Life style modifications.    11. Age-related osteoporosis without current pathological fracture  This problem is currently not controlled.   NO LONGER ON TX PLAN- DECLINE TO SEE OMAR LION AT THIS TIME  F/U WITH CPP    12. Iron deficiency anemia, unspecified iron deficiency anemia type  Chronic/ Monitored/Stable on MEDS  as directed.  Followed by PCP    13. Elevated uric acid in blood  Chronic/ Monitored/Stable on MEDS  as directed.  Followed by PCP    14. OAB (overactive bladder)  Chronic/ Monitored/Stable on MEDS  as directed.  Followed by UROLOGIST    15. Recurrent UTI  Chronic/ Monitored/Stable on MEDS  as directed.  Followed by UROLOGIST    Rea Alysha with a 5-10 year written screening schedule and personal prevention plan. Recommendations were developed using the USPSTF age appropriate recommendations. Education, counseling, and  referrals were provided as needed. After Visit Summary printed and given to patient which includes a list of additional screenings\tests needed.    Follow up in about 1 year (around 11/30/2024) for Follow up for Annual with PCP, Schedule with Cardiology.    Rosamaria Vizcarra NP

## 2023-11-30 NOTE — PATIENT INSTRUCTIONS
Counseling and Referral of Other Preventative  (Italic type indicates deductible and co-insurance are waived)    Patient Name: Alysha Rosales  Today's Date: 11/30/2023    Health Maintenance       Date Due Completion Date    RSV Vaccine (Age 60+ and Pregnant patients) (1 - 1-dose 60+ series) Never done ---    Shingles Vaccine (2 of 3) 05/25/2015 3/30/2015    TETANUS VACCINE 02/25/2019 2/25/2009    Mammogram 02/15/2023 2/15/2022    COVID-19 Vaccine (2 - 2023-24 season) 09/01/2023 8/13/2021    Hemoglobin A1c 03/26/2024 9/26/2023    Eye Exam 07/13/2024 7/13/2023    High Dose Statin 09/26/2024 9/26/2023    Diabetes Urine Screening 09/26/2024 9/26/2023    Foot Exam 09/26/2024 9/26/2023    Override on 8/17/2022: Done    Lipid Panel 09/26/2024 9/26/2023    DEXA Scan 11/29/2024 11/29/2021    Colorectal Cancer Screening 08/30/2029 8/30/2022        No orders of the defined types were placed in this encounter.    The following information is provided to all patients.  This information is to help you find resources for any of the problems found today that may be affecting your health:                Living healthy guide: www.[facility].[place].gov      Understanding Diabetes: www.diabetes.org      Eating healthy: www.cdc.gov/healthyweight      CDC home safety checklist: www.cdc.gov/steadi/patient.html      Agency on Aging: www.goea.[place].[facility]      Alcoholics anonymous (AA): www.aa.org      Physical Activity: www.sruthi.nih.gov/uv8acfg      Tobacco use: www.quitwithusla.org

## 2023-12-01 DIAGNOSIS — I70.0 AORTIC ATHEROSCLEROSIS: ICD-10-CM

## 2023-12-01 DIAGNOSIS — E11.59 HYPERTENSION ASSOCIATED WITH DIABETES: Primary | ICD-10-CM

## 2023-12-01 DIAGNOSIS — I15.2 HYPERTENSION ASSOCIATED WITH DIABETES: Primary | ICD-10-CM

## 2023-12-05 DIAGNOSIS — E11.21 DIABETIC NEPHROPATHY WITH PROTEINURIA: ICD-10-CM

## 2023-12-05 DIAGNOSIS — E11.40 TYPE 2 DIABETES MELLITUS WITH DIABETIC NEUROPATHY, WITHOUT LONG-TERM CURRENT USE OF INSULIN: ICD-10-CM

## 2023-12-05 RX ORDER — SITAGLIPTIN 100 MG/1
100 TABLET, FILM COATED ORAL DAILY
Qty: 90 TABLET | Refills: 1 | Status: SHIPPED | OUTPATIENT
Start: 2023-12-05

## 2023-12-05 RX ORDER — SITAGLIPTIN 100 MG/1
TABLET, FILM COATED ORAL
Qty: 90 TABLET | Refills: 0 | OUTPATIENT
Start: 2023-12-05

## 2023-12-05 NOTE — TELEPHONE ENCOUNTER
No care due was identified.  [facility] Embedded Care Due Messages. Reference number: [id number].   12/05/2023 11:05:21 AM CST

## 2023-12-05 NOTE — TELEPHONE ENCOUNTER
Refill Decision Note   Alysha Rosales  is requesting a refill authorization.  Brief Assessment and Rationale for Refill:  Quick Discontinue     Medication Therapy Plan:  Receipt confirmed by pharmacy (12/5/2023  4:25 PM CST)      Comments:     Note composed:4:26 PM 12/05/2023

## 2023-12-05 NOTE — TELEPHONE ENCOUNTER
No care due was identified.  [facility] Embedded Care Due Messages. Reference number: [id number].   12/05/2023 11:05:57 AM CST

## 2023-12-05 NOTE — TELEPHONE ENCOUNTER
Refill Decision Note   Alysha Bobby  is requesting a refill authorization.  Brief Assessment and Rationale for Refill:  Approve     Medication Therapy Plan:         Comments:     Note composed:4:25 PM 12/05/2023

## 2023-12-19 DIAGNOSIS — I21.4 NSTEMI (NON-ST ELEVATED MYOCARDIAL INFARCTION): ICD-10-CM

## 2023-12-19 DIAGNOSIS — Z98.61 POST PTCA: ICD-10-CM

## 2023-12-19 RX ORDER — CLOPIDOGREL BISULFATE 75 MG/1
75 TABLET ORAL
Qty: 90 TABLET | Refills: 0 | Status: SHIPPED | OUTPATIENT
Start: 2023-12-19 | End: 2024-03-19

## 2024-01-31 RX ORDER — ROSUVASTATIN CALCIUM 20 MG/1
TABLET, COATED ORAL
Qty: 90 TABLET | Refills: 0 | Status: SHIPPED | OUTPATIENT
Start: 2024-01-31

## 2024-01-31 RX ORDER — HYDRALAZINE HYDROCHLORIDE 50 MG/1
TABLET, FILM COATED ORAL
Qty: 240 TABLET | Refills: 0 | Status: SHIPPED | OUTPATIENT
Start: 2024-01-31 | End: 2024-03-28

## 2024-02-01 ENCOUNTER — OFFICE VISIT (OUTPATIENT)
Dept: UROLOGY | Facility: CLINIC | Age: 69
End: 2024-02-01
Payer: MEDICARE

## 2024-02-01 ENCOUNTER — OFFICE VISIT (OUTPATIENT)
Dept: CARDIOLOGY | Facility: CLINIC | Age: 69
End: 2024-02-01
Payer: MEDICARE

## 2024-02-01 VITALS
HEIGHT: 62 IN | BODY MASS INDEX: 33.63 KG/M2 | DIASTOLIC BLOOD PRESSURE: 86 MMHG | HEART RATE: 90 BPM | OXYGEN SATURATION: 98 % | SYSTOLIC BLOOD PRESSURE: 177 MMHG | WEIGHT: 182.75 LBS

## 2024-02-01 VITALS
WEIGHT: 182.13 LBS | DIASTOLIC BLOOD PRESSURE: 99 MMHG | SYSTOLIC BLOOD PRESSURE: 190 MMHG | RESPIRATION RATE: 18 BRPM | BODY MASS INDEX: 33.31 KG/M2 | HEART RATE: 99 BPM

## 2024-02-01 DIAGNOSIS — E11.59 HYPERTENSION ASSOCIATED WITH DIABETES: Primary | ICD-10-CM

## 2024-02-01 DIAGNOSIS — E78.5 HYPERLIPIDEMIA, UNSPECIFIED HYPERLIPIDEMIA TYPE: ICD-10-CM

## 2024-02-01 DIAGNOSIS — I70.0 AORTIC ATHEROSCLEROSIS: ICD-10-CM

## 2024-02-01 DIAGNOSIS — N30.00 ACUTE CYSTITIS WITHOUT HEMATURIA: Primary | ICD-10-CM

## 2024-02-01 DIAGNOSIS — I16.0 HYPERTENSIVE URGENCY: ICD-10-CM

## 2024-02-01 DIAGNOSIS — I15.2 HYPERTENSION ASSOCIATED WITH DIABETES: Primary | ICD-10-CM

## 2024-02-01 DIAGNOSIS — Z95.5 S/P CORONARY ARTERY STENT PLACEMENT: ICD-10-CM

## 2024-02-01 DIAGNOSIS — N39.0 RECURRENT UTI: ICD-10-CM

## 2024-02-01 DIAGNOSIS — I25.118 ATHEROSCLEROTIC HEART DISEASE OF NATIVE CORONARY ARTERY WITH OTHER FORMS OF ANGINA PECTORIS: ICD-10-CM

## 2024-02-01 DIAGNOSIS — E66.01 SEVERE OBESITY (BMI 35.0-39.9) WITH COMORBIDITY: ICD-10-CM

## 2024-02-01 PROCEDURE — 87088 URINE BACTERIA CULTURE: CPT | Performed by: NURSE PRACTITIONER

## 2024-02-01 PROCEDURE — 87186 SC STD MICRODIL/AGAR DIL: CPT | Performed by: NURSE PRACTITIONER

## 2024-02-01 PROCEDURE — 87086 URINE CULTURE/COLONY COUNT: CPT | Performed by: NURSE PRACTITIONER

## 2024-02-01 PROCEDURE — 99999 PR PBB SHADOW E&M-EST. PATIENT-LVL IV: CPT | Mod: PBBFAC,,, | Performed by: NURSE PRACTITIONER

## 2024-02-01 PROCEDURE — 99999 PR PBB SHADOW E&M-EST. PATIENT-LVL IV: CPT | Mod: PBBFAC,,, | Performed by: INTERNAL MEDICINE

## 2024-02-01 PROCEDURE — 99215 OFFICE O/P EST HI 40 MIN: CPT | Mod: S$PBB,,, | Performed by: INTERNAL MEDICINE

## 2024-02-01 PROCEDURE — 99214 OFFICE O/P EST MOD 30 MIN: CPT | Mod: PBBFAC,27 | Performed by: INTERNAL MEDICINE

## 2024-02-01 PROCEDURE — 99214 OFFICE O/P EST MOD 30 MIN: CPT | Mod: PBBFAC | Performed by: NURSE PRACTITIONER

## 2024-02-01 PROCEDURE — 99215 OFFICE O/P EST HI 40 MIN: CPT | Mod: S$PBB,,, | Performed by: NURSE PRACTITIONER

## 2024-02-01 PROCEDURE — 99999PBSHW PR PBB SHADOW TECHNICAL ONLY FILED TO HB: Mod: PBBFAC,,,

## 2024-02-01 PROCEDURE — 87077 CULTURE AEROBIC IDENTIFY: CPT | Performed by: NURSE PRACTITIONER

## 2024-02-01 RX ORDER — CLONIDINE HYDROCHLORIDE 0.3 MG/1
0.3 TABLET ORAL
Qty: 60 TABLET | Refills: 11 | Status: SHIPPED | OUTPATIENT
Start: 2024-02-01 | End: 2025-01-31

## 2024-02-01 RX ORDER — CARVEDILOL 25 MG/1
25 TABLET ORAL 2 TIMES DAILY WITH MEALS
Qty: 60 TABLET | Refills: 11 | Status: SHIPPED | OUTPATIENT
Start: 2024-02-01 | End: 2025-01-31

## 2024-02-01 RX ORDER — CLONIDINE HYDROCHLORIDE 0.3 MG/1
0.3 TABLET ORAL
Status: COMPLETED | OUTPATIENT
Start: 2024-02-01 | End: 2024-02-01

## 2024-02-01 RX ORDER — AMLODIPINE BESYLATE 5 MG/1
5 TABLET ORAL DAILY
Qty: 30 TABLET | Refills: 11 | Status: SHIPPED | OUTPATIENT
Start: 2024-02-01 | End: 2025-01-31

## 2024-02-01 RX ADMIN — CLONIDINE HYDROCHLORIDE 0.3 MG: 0.3 TABLET ORAL at 11:02

## 2024-02-01 NOTE — PROGRESS NOTES
Subjective:   Patient ID:  Alysha Rosales is a 69 y.o. female who presents for follow up of No chief complaint on file.    2.1.2024  Patient was earlier at her PCP office and was sent in for hypertension.    She has not been checking her blood pressure since I saw her last time.    Appears that there has been not been a major improvement her blood pressure despite medication adjustment last time switch her from metoprolol to carvedilol.    She denies however any chest pain, headaches, dyspnea, lower extremity swelling.    Upon arrival her blood pressure was 190s over 90s.  Gave her clonidine 0.3 mg in the clinic and it made mild improvement.      November 30, 2023.    Comes in for an overdue follow-up.    She denies any chest pain.  Dyspnea.  Lower extremity swelling.    No palpitations syncope or presyncope   No bleeding.    She had PCI of her circ ISR in June of 2021.    Her blood pressure is uncontrolled lately.  She was sent by her primary care physician to have it addressed.    All on initial check it was lower however on my check was similar to what it was recorded at her PCP.        8.2022  Comes in for a follow up   She has been treated for iron deficiency anemia   Awaiting endoscopy   It has been more than 12 months since PCI to a dominant left circ   She denies bleeding   No cp , no flowers, no LE swelling , no palpitations, syncope or presyncope       12.30.2021   Follow-up  Pertinent negatives include no chest pain.   hx of left circ stent in 6/2021   Continues to feel well, no cp, no dyspnea with moderate exertion   No bleeding   Chronic knee pain     Interval hx: 6/29/2021   Patient referred last week for prior nstemi, dropped EF , found to have 99% isr insegment restenosis of the left circ s/p stenting   She states she is doing much better since the stent and noticed that she able to walk more before having dyspnea which she describes as limiting in the past   She denies any chest pain   Site looking  "good         NP Jason Bloom note hpi 6/2020   Ms. Rosales's current medical conditions include hypertension, hyperlipidemia, DM, and CAD. Former tobacco use,. Stopped in 2004.  Hx of MI in 2004 where she underwent coronary stenting x2 ( mid LAD and LCX?) Previously followed by Dr. Kaba at [facility] in [place], but wanting to switch to [facility].   Had RIDDLE in 2008. Underwent LHC at that time and had PCI of unknown vessel. Has total of 3 stents.   Has had 2 repeat LHC since then and both treated medically, didn't require PCI. Last cath at least 10 years.   Admitted last week to [facility] with hypoxia in the setting of COVID, NSTEMI and anemia that required transfusion. Presented with acute SOB and a feeling of "smothering".   Patient noted to have have a troponin peak at 6.3. Echo revealed Apical hypokinesis that was suspicious for takotsubo CMPY, LVF 40.%.   She was treated medically with ASA, Plavix, Statin, BB and ARB.   Denies any chest pain,  orthopnea, PND, dizziness, palpitations,  near syncope, syncope or edema .   +SOB and RIDDLE. No CNS Complaints to suggest TIA or CVA  Admits that she can do better with limiting her sodium intake.      Past Medical History:   Diagnosis Date    BDR (background diabetic retinopathy) 11/30/2023    CAD (coronary artery disease)     Followed by Dr. Stevo Kaba    Diabetes mellitus, type 2     DM (diabetes mellitus)     BS didn't check 06/05/2020    DM (diabetes mellitus) 2012    BS didn't check 02/10/2022    Elevated troponin 05/27/2021    High level of uric acid in blood     Hyperlipidemia     Hyperplastic rectal polyp     on 03/20/2009 colonoscopy    Hypertension     Multinodular goiter     noted on 1/20/2015 CTA carotid scan    Multiple thyroid nodules 05/05/2016    on Thyroid U/S    Osteoarthritis     Left knee    Osteoarthritis of back     Osteoporosis, unspecified     Postmenopausal     No history of abnormal pap smear    Proteinuria     Pustular psoriasis     " hands and feet    Subclavian artery stenosis     Left; with the proximal segment at 60-70% per 10/02/2008 carotid CTA    Vitamin D deficiency disease        Past Surgical History:   Procedure Laterality Date    CATARACT EXTRACTION W/  INTRAOCULAR LENS IMPLANT Right 2016    [facility]    CATARACT EXTRACTION W/  INTRAOCULAR LENS IMPLANT Left     [facility]    COLONOSCOPY N/A 2022    Procedure: COLONOSCOPY;  Surgeon: Reanna Garber MD;  Location: [facility] ENDO;  Service: Endoscopy;  Laterality: N/A;    CORONARY ANGIOGRAPHY N/A 2021    stents x  4    CORONARY STENT PLACEMENT N/A 2021    Procedure: INSERTION, STENT, CORONARY ARTERY;  Surgeon: Desiree William MD;  Location: [facility] CATH LAB;  Service: Cardiology;  Laterality: N/A;    heart catheterization with stents placed   and 2009    LEFT HEART CATHETERIZATION Left 2021    Procedure: CATHETERIZATION, HEART, LEFT;  Surgeon: Desiree William MD;  Location: [facility] CATH LAB;  Service: Cardiology;  Laterality: Left;  Covid + week of    left knee surgery for bone graft and pin placement         Social History     Tobacco Use    Smoking status: Former     Current packs/day: 0.00     Average packs/day: 1 pack/day for 33.0 years (33.0 ttl pk-yrs)     Types: Cigarettes     Start date: 1971     Quit date: 2004     Years since quittin.5     Passive exposure: Past    Smokeless tobacco: Never   Substance Use Topics    Alcohol use: Yes     Alcohol/week: 0.0 standard drinks of alcohol     Comment: Rarely    Drug use: No       Family History   Problem Relation Age of Onset    Diabetes Mother     Hypertension Mother     Stroke Mother     Hypertension Sister     Cancer Father         Lung cancer (smoker)    No Known Problems Daughter     No Known Problems Son     Heart disease Neg Hx        Current Outpatient Medications   Medication Sig    allopurinoL (ZYLOPRIM) 100 MG tablet Take 1 tablet by mouth once daily    aspirin 81 MG Chew Take 81 mg by  mouth once daily.    blood sugar diagnostic Strp To check BG 2 times daily, to use with insurance preferred meter - Freestyle Lite    clopidogreL (PLAVIX) 75 mg tablet Take 1 tablet by mouth once daily    ERGOCALCIFEROL, VITAMIN D2, (VITAMIN D ORAL) Take 2,000 Units by mouth once daily.    ferrous sulfate (FEOSOL) Tab tablet Take 1 tablet by mouth 3 (three) times daily.    furosemide (LASIX) 20 MG tablet Take 2 tablets (40 mg total) by mouth once daily. Takes 1 pill daily    glipiZIDE (GLUCOTROL) 10 MG tablet TAKE 1 TABLET BY MOUTH TWICE DAILY BEFORE MEALS    hydrALAZINE (APRESOLINE) 50 MG tablet Take 1 tablet by mouth 4 times daily    JANUVIA 100 mg Tab Take 1 tablet (100 mg total) by mouth once daily.    LEVEMIR FLEXPEN 100 unit/mL (3 mL) InPn pen INJECT 15 UNITS SUBCUTANEOUSLY ONCE DAILY    losartan (COZAAR) 50 MG tablet Take 1 tablet by mouth once daily    metFORMIN (GLUCOPHAGE) 500 MG tablet TAKE 1 TABLET BY MOUTH ONCE DAILY IN THE MORNING AND 1 ONCE DAILY AT NOON AND 2 ONCE DAILY IN THE EVENING WITH MEALS    rosuvastatin (CRESTOR) 20 MG tablet Take 1 tablet by mouth nightly    solifenacin (VESICARE) 10 MG tablet Take 1 tablet (10 mg total) by mouth once daily.    amLODIPine (NORVASC) 5 MG tablet Take 1 tablet (5 mg total) by mouth once daily.    carvediloL (COREG) 25 MG tablet Take 1 tablet (25 mg total) by mouth 2 (two) times daily with meals.    cloNIDine (CATAPRES) 0.3 MG tablet Take 1 tablet (0.3 mg total) by mouth as needed (blood pressure more than 180/90).     No current facility-administered medications for this visit.     Current Outpatient Medications on File Prior to Visit   Medication Sig    allopurinoL (ZYLOPRIM) 100 MG tablet Take 1 tablet by mouth once daily    aspirin 81 MG Chew Take 81 mg by mouth once daily.    blood sugar diagnostic Strp To check BG 2 times daily, to use with insurance preferred meter - Freestyle Lite    clopidogreL (PLAVIX) 75 mg tablet Take 1 tablet by mouth once daily     ERGOCALCIFEROL, VITAMIN D2, (VITAMIN D ORAL) Take 2,000 Units by mouth once daily.    ferrous sulfate (FEOSOL) Tab tablet Take 1 tablet by mouth 3 (three) times daily.    furosemide (LASIX) 20 MG tablet Take 2 tablets (40 mg total) by mouth once daily. Takes 1 pill daily    glipiZIDE (GLUCOTROL) 10 MG tablet TAKE 1 TABLET BY MOUTH TWICE DAILY BEFORE MEALS    hydrALAZINE (APRESOLINE) 50 MG tablet Take 1 tablet by mouth 4 times daily    JANUVIA 100 mg Tab Take 1 tablet (100 mg total) by mouth once daily.    LEVEMIR FLEXPEN 100 unit/mL (3 mL) InPn pen INJECT 15 UNITS SUBCUTANEOUSLY ONCE DAILY    losartan (COZAAR) 50 MG tablet Take 1 tablet by mouth once daily    metFORMIN (GLUCOPHAGE) 500 MG tablet TAKE 1 TABLET BY MOUTH ONCE DAILY IN THE MORNING AND 1 ONCE DAILY AT NOON AND 2 ONCE DAILY IN THE EVENING WITH MEALS    rosuvastatin (CRESTOR) 20 MG tablet Take 1 tablet by mouth nightly    solifenacin (VESICARE) 10 MG tablet Take 1 tablet (10 mg total) by mouth once daily.    [DISCONTINUED] carvediloL (COREG) 12.5 MG tablet Take 1 tablet (12.5 mg total) by mouth 2 (two) times daily with meals.    [DISCONTINUED] cephALEXin (KEFLEX) 250 MG capsule Take 1 capsule (250 mg total) by mouth every evening.     No current facility-administered medications on file prior to visit.       Review of Systems   Cardiovascular:  Negative for chest pain, dyspnea on exertion, palpitations and syncope.   Genitourinary: Negative.    Neurological: Negative.        Objective:   Physical Exam  Vitals and nursing note reviewed.   Constitutional:       Appearance: Normal appearance.   Pulmonary:      Effort: Pulmonary effort is normal.   Neurological:      General: No focal deficit present.      Mental Status: She is alert and oriented to person, place, and time.   Psychiatric:         Mood and Affect: Mood normal.         Behavior: Behavior normal.       Vitals:    02/01/24 1141 02/01/24 1155   BP: (!) 195/93 (!) 177/86   Pulse: 90    SpO2: 98%   "  Weight: 82.9 kg (182 lb 12.2 oz)    Height: 5' 2" (1.575 m)      Lab Results   Component Value Date    CHOL 138 09/26/2023    CHOL 133 01/25/2022    CHOL 120 10/22/2021     Lab Results   Component Value Date    HDL 43 09/26/2023    HDL 40 01/25/2022    HDL 41 10/22/2021     Lab Results   Component Value Date    LDLCALC 51.4 (L) 09/26/2023    LDLCALC 16.2 (L) 01/25/2022    LDLCALC 29.2 (L) 10/22/2021     Lab Results   Component Value Date    TRIG 218 (H) 09/26/2023    TRIG 384 (H) 01/25/2022    TRIG 249 (H) 10/22/2021     Lab Results   Component Value Date    CHOLHDL 31.2 09/26/2023    CHOLHDL 30.1 01/25/2022    CHOLHDL 34.2 10/22/2021       Chemistry        Component Value Date/Time     09/26/2023 1455    K 4.1 09/26/2023 1455     09/26/2023 1455    CO2 25 09/26/2023 1455    BUN 23 09/26/2023 1455    CREATININE 0.9 09/26/2023 1455     (H) 09/26/2023 1455        Component Value Date/Time    CALCIUM 10.5 09/26/2023 1455    ALKPHOS 70 09/26/2023 1455    AST 18 09/26/2023 1455    ALT 13 09/26/2023 1455    BILITOT 0.2 09/26/2023 1455    ESTGFRAFRICA >60.0 07/28/2022 0915    EGFRNONAA >60.0 07/28/2022 0915          Lab Results   Component Value Date    TSH 2.495 09/26/2023     Lab Results   Component Value Date    INR 0.9 06/11/2021    INR 1.0 05/27/2021     Lab Results   Component Value Date    WBC 10.40 09/26/2023    HGB 10.2 (L) 09/26/2023    HCT 33.1 (L) 09/26/2023    MCV 97 09/26/2023     09/26/2023     BMP  Sodium   Date Value Ref Range Status   09/26/2023 141 136 - 145 mmol/L Final     Potassium   Date Value Ref Range Status   09/26/2023 4.1 3.5 - 5.1 mmol/L Final     Chloride   Date Value Ref Range Status   09/26/2023 103 95 - 110 mmol/L Final     CO2   Date Value Ref Range Status   09/26/2023 25 23 - 29 mmol/L Final     BUN   Date Value Ref Range Status   09/26/2023 23 8 - 23 mg/dL Final     Creatinine   Date Value Ref Range Status   09/26/2023 0.9 0.5 - 1.4 mg/dL Final     Calcium "   Date Value Ref Range Status   09/26/2023 10.5 8.7 - 10.5 mg/dL Final     Anion Gap   Date Value Ref Range Status   09/26/2023 13 8 - 16 mmol/L Final     eGFR if    Date Value Ref Range Status   07/28/2022 >60.0 >60 mL/min/1.73 m^2 Final     eGFR if non    Date Value Ref Range Status   07/28/2022 >60.0 >60 mL/min/1.73 m^2 Final     Comment:     Calculation used to obtain the estimated glomerular filtration  rate (eGFR) is the CKD-EPI equation.        CrCl cannot be calculated (Patient's most recent lab result is older than the maximum 7 days allowed.).    Echo Conclusion    The left ventricle is normal in size with mildly decreased systolic function.  The estimated ejection fraction is 40%.  Left ventricular diastolic dysfunction.  Normal right ventricular size with normal right ventricular systolic function.  Mild to moderate tricuspid regurgitation.  Normal central venous pressure (3 mmHg).  The estimated PA systolic pressure is 44 mmHg.  There is pulmonary hypertension.  Mild mitral regurgitation.  There are segmental left ventricular wall motion abnormalities. Apical hypokinesis.     significant Diagnostic Studies: Angiography: Technical Procedures Used:   Non dominant rca   Left main patent   Lad is 40% prox ISR, 50% distal lad , small d1,d2   Left cic is large with 99% in segment restenosis of mid left circ . Mod size om1, large om2 , lpda patent   S/p 2.75x12 mm stent , post dilated to 3.0 mm   rca is severely diffusely diseased small non dominant     Assessment:     1. Hypertension associated with diabetes    2. Severe obesity (BMI 35.0-39.9) with comorbidity    3. Atherosclerotic heart disease of native coronary artery with other forms of angina pectoris    4. Aortic atherosclerosis    5. S/P coronary artery stent placement    6. Hyperlipidemia, unspecified hyperlipidemia type    7. Hypertensive urgency        Plan:     S/P PCI to mid circ 6/2021  ON ASA, Plavix, Statin, BB  and ARB  We will increase carvedilol.  Add amlodipine.  Continue with hydralazine and Lasix and losartan.    Blood pressure on arrival 190s over 90s.  Given clonidine in the clinic and blood pressure 10 minutes rechecked with improvement.  Given clonidine p.r.n. to use at home as directed.  Decrease salt intake.    Reviewed all tests and above medical conditions with patient in detail and formulated treatment plan.  Risk factor modification discussed.   Cardiac low salt diet discussed.  Maintaining healthy weight and weight loss goals were discussed in clinic.    F/u in 3 months

## 2024-02-01 NOTE — PROGRESS NOTES
Chief Complaint:   OAB  Recurrent UTIs    HPI:   Patient is a 69-year-old female that is presenting as a follow-up to overactive bladder and recurrent urinary tract infections.  Reports that VESIcare 10 mg once daily has resolved her overactive bladder symptoms.  Nocturia is once nightly and daytime frequency has decreased to the point where she is now wearing a pad.  Urine in clinic indicates nitrates and leukocytes, all other parameters are negative.  Patient is asymptomatic for urinary tract infection.  PVR is 58 mL.  Is currently on nightly Keflex prophylactically.  11/14/2023  Patient is a 68-year-old female that has been followed by this clinic for recurrent urinary tract infections.  Urine in clinic is negative.  PVR is 27 mL.  Patient states that over the last 6 months she is had urge incontinence and nocturia.  Patient is wearing a pad that she changes several times a day.  Denies gross hematuria.  States she drinks very little water throughout the day.  08/31/2023  Patient is a delightful 68-year-old female that is presenting as a follow-up to recurrent UTIs, Klebsiella.  Was recently treated with Meropenum 1 gram every 8 hours for 7 days per PICC line .  Patient states that she is completely asymptomatic in urine in clinic is negative.  07/18/2023  Patient is a 68-year-old female that is presenting as a follow-up to recurrent urinary tract infections.  Patient was recently treated for urine culture indicating Klebsiella.  Patient states that she is completely asymptomatic after completion of antibiotics, however, urine in clinic indicates nitrates and leukocytes all other parameters are negative.  PVR is 14 mL.  Denies pelvic or flank pain.  Denies gross hematuria.  06/22/.2023  Patient is a 68-year-old female that is presenting with recurrent urinary tract infections.  In reviewing EMR, patient had 3 positive urine cultures including MRSA.  Patient states that she is completed antibiotics and is  currently asymptomatic. Urine in clinic is negative and PVR is 14 mL.  Patient denies pelvic or flank pain.  Patient states she has a remote history of renal stones.  Denies gross hematuria.  Reports that she is occasionally incontinent and wears a pad that she is changed several times during the day.  Allergies:  Codeine    Medications:  has a current medication list which includes the following prescription(s): allopurinol, aspirin, blood sugar diagnostic, carvedilol, cephalexin, clopidogrel, ergocalciferol (vitamin d2), ferrous sulfate, furosemide, glipizide, hydralazine, januvia, levemir flexpen, losartan, metformin, rosuvastatin, and solifenacin.    Review of Systems:  General: No fever, chills, fatigability, or weight loss.  Skin: No rashes, itching, or changes in color or texture of skin.  Chest: Denies RIDDLE, cyanosis, wheezing, cough, and sputum production.  Abdomen: Appetite fine. No weight loss. Denies diarrhea, abdominal pain, hematemesis, or blood in stool.  Musculoskeletal: No joint stiffness or swelling. Denies back pain.  : As above.  All other review of systems negative.    PMH:   has a past medical history of BDR (background diabetic retinopathy) (11/30/2023), CAD (coronary artery disease), Diabetes mellitus, type 2, DM (diabetes mellitus), DM (diabetes mellitus) (2012), Elevated troponin (05/27/2021), High level of uric acid in blood, Hyperlipidemia, Hyperplastic rectal polyp, Hypertension, Multinodular goiter, Multiple thyroid nodules (05/05/2016), Osteoarthritis, Osteoarthritis of back, Osteoporosis, unspecified, Postmenopausal, Proteinuria, Pustular psoriasis, Subclavian artery stenosis, and Vitamin D deficiency disease.    PSH:   has a past surgical history that includes left knee surgery for bone graft and pin placement; heart catheterization with stents placed (2004 and 08/18/2009); Left heart catheterization (Left, 06/18/2021); Coronary stent placement (N/A, 06/18/2021); Coronary  angiography (N/A, 06/18/2021); Cataract extraction w/  intraocular lens implant (Right, 04/13/2016); Cataract extraction w/  intraocular lens implant (Left); and Colonoscopy (N/A, 08/30/2022).    FamHx: family history includes Cancer in her father; Diabetes in her mother; Hypertension in her mother and sister; No Known Problems in her daughter and son; Stroke in her mother.    SocHx:  reports that she quit smoking about 19 years ago. Her smoking use included cigarettes. She started smoking about 52 years ago. She has a 33.0 pack-year smoking history. She has been exposed to tobacco smoke. She has never used smokeless tobacco. She reports current alcohol use. She reports that she does not use drugs.      Physical Exam:  General: A&Ox3, no apparent distress, no deformities  Neck: No masses, normal thyroid  Lungs: normal inspiration, no use of accessory muscles  Heart: normal pulse, no arrhythmias  Abdomen: Soft, NT, ND, no masses, no hernias, no hepatosplenomegaly  Lymphatic: Neck and groin nodes negative    Labs/Studies:   See HPI    Impression/Plan:   1. Recurrent urinary tract infections  Patient to discontinue Keflex.  Urine was sent for culture and I will wait for final sensitivities to treat.  Patient is asymptomatic.  Proceed with renal ultrasound  Return to clinic in 2 weeks to review renal ultrasound and reassess urine culture.    2. Uncontrolled hypertension  Patient has several elevated blood pressure readings at today's visit.  Contacted her cardiologist, she is to proceed to Cardiology Department after our visit for an additional evaluation.  Patient is asymptomatic secondary to hypertension.    3. Overactive bladder  Patient is a complete resolution to overactive bladder symptoms without adverse side effects, on current dose of VESIcare.

## 2024-02-04 LAB — BACTERIA UR CULT: ABNORMAL

## 2024-02-05 DIAGNOSIS — E11.40 TYPE 2 DIABETES MELLITUS WITH DIABETIC NEUROPATHY, WITHOUT LONG-TERM CURRENT USE OF INSULIN: ICD-10-CM

## 2024-02-05 RX ORDER — SULFAMETHOXAZOLE AND TRIMETHOPRIM 800; 160 MG/1; MG/1
1 TABLET ORAL 2 TIMES DAILY
Qty: 20 TABLET | Refills: 0 | Status: SHIPPED | OUTPATIENT
Start: 2024-02-05 | End: 2024-02-15

## 2024-02-05 NOTE — TELEPHONE ENCOUNTER
Care Due:                  Date            Visit Type   Department     Provider  --------------------------------------------------------------------------------                                EP -                              PRIMARY      [facility]  Last Visit: 09-      CARE ([facility])   MEDICINE       Margie Bird                              EP -                              PRIMARY      [facility]  Next Visit: 04-      CARE ([facility])   MEDICINE       Margie Bird                                                            Last  Test          Frequency    Reason                     Performed    Due Date  --------------------------------------------------------------------------------    HBA1C.......  6 months...  KRISTIN MOODY,          09- 03-                             glipiZIDE, metFORMIN.....    Uric Acid...  12 months..  allopurinoL..............  04- 04-    [facility] Embedded Care Due Messages. Reference number: [id number].   2/05/2024 5:01:58 PM CST

## 2024-02-06 ENCOUNTER — APPOINTMENT (OUTPATIENT)
Dept: RADIOLOGY | Facility: HOSPITAL | Age: 69
End: 2024-02-06
Attending: NURSE PRACTITIONER
Payer: MEDICARE

## 2024-02-06 ENCOUNTER — TELEPHONE (OUTPATIENT)
Dept: FAMILY MEDICINE | Facility: CLINIC | Age: 69
End: 2024-02-06

## 2024-02-06 ENCOUNTER — PATIENT MESSAGE (OUTPATIENT)
Dept: FAMILY MEDICINE | Facility: CLINIC | Age: 69
End: 2024-02-06
Payer: MEDICARE

## 2024-02-06 DIAGNOSIS — N39.0 RECURRENT UTI: ICD-10-CM

## 2024-02-06 PROCEDURE — 76770 US EXAM ABDO BACK WALL COMP: CPT | Mod: 26,,, | Performed by: RADIOLOGY

## 2024-02-06 PROCEDURE — 76770 US EXAM ABDO BACK WALL COMP: CPT | Mod: TC,PO

## 2024-02-06 RX ORDER — INSULIN DETEMIR 100 [IU]/ML
INJECTION, SOLUTION SUBCUTANEOUS
Qty: 15 ML | Refills: 0 | Status: SHIPPED | OUTPATIENT
Start: 2024-02-06 | End: 2024-02-14 | Stop reason: CLARIF

## 2024-02-06 NOTE — TELEPHONE ENCOUNTER
Provider Staff:  Action required for this patient     Please see care gap opportunities below in Care Due Message.    Thanks!  [facility]     Appointments      Date Provider   Last Visit   9/26/2023 Margie Bird MD   Next Visit   4/2/2024 Margie Bird MD      Refill Decision Note   Alysha Rosales  is requesting a refill authorization.  Brief Assessment and Rationale for Refill:  Approve     Medication Therapy Plan:         Comments:     Note composed:2:51 AM 02/06/2024

## 2024-02-13 ENCOUNTER — PATIENT MESSAGE (OUTPATIENT)
Dept: FAMILY MEDICINE | Facility: CLINIC | Age: 69
End: 2024-02-13
Payer: MEDICARE

## 2024-02-13 ENCOUNTER — TELEPHONE (OUTPATIENT)
Dept: FAMILY MEDICINE | Facility: CLINIC | Age: 69
End: 2024-02-13
Payer: MEDICARE

## 2024-02-13 DIAGNOSIS — E11.40 TYPE 2 DIABETES MELLITUS WITH DIABETIC NEUROPATHY, WITHOUT LONG-TERM CURRENT USE OF INSULIN: Primary | ICD-10-CM

## 2024-02-13 NOTE — TELEPHONE ENCOUNTER
Patient sent over the following my chart message.         I found correspondence ,from ins   they suggest      Basaglar, lantus, or toujeo,  tresiba,   hope this helps.

## 2024-02-14 RX ORDER — INSULIN GLARGINE 100 [IU]/ML
15 INJECTION, SOLUTION SUBCUTANEOUS DAILY
Qty: 15 ML | Refills: 1 | Status: SHIPPED | OUTPATIENT
Start: 2024-02-14 | End: 2025-02-13

## 2024-02-14 NOTE — TELEPHONE ENCOUNTER
I have put the following orders and/or medications to this note.  Please advise pt.    No orders of the defined types were placed in this encounter.      Medications Ordered This Encounter   Medications    insulin (BASAGLAR KWIKPEN U-100 INSULIN) glargine 100 units/mL SubQ pen     Sig: Inject 15 Units into the skin once daily.     Dispense:  15 mL     Refill:  1

## 2024-02-15 ENCOUNTER — OFFICE VISIT (OUTPATIENT)
Dept: UROLOGY | Facility: CLINIC | Age: 69
End: 2024-02-15
Payer: MEDICARE

## 2024-02-15 VITALS
DIASTOLIC BLOOD PRESSURE: 80 MMHG | SYSTOLIC BLOOD PRESSURE: 141 MMHG | TEMPERATURE: 99 F | HEART RATE: 85 BPM | HEIGHT: 62 IN | RESPIRATION RATE: 16 BRPM | BODY MASS INDEX: 33.53 KG/M2 | WEIGHT: 182.19 LBS

## 2024-02-15 DIAGNOSIS — N39.46 MIXED INCONTINENCE: ICD-10-CM

## 2024-02-15 DIAGNOSIS — N32.81 OAB (OVERACTIVE BLADDER): ICD-10-CM

## 2024-02-15 DIAGNOSIS — N30.00 ACUTE CYSTITIS WITHOUT HEMATURIA: Primary | ICD-10-CM

## 2024-02-15 LAB
BILIRUB UR QL STRIP: NEGATIVE
GLUCOSE UR QL STRIP: NEGATIVE
KETONES UR QL STRIP: NEGATIVE
LEUKOCYTE ESTERASE UR QL STRIP: POSITIVE
PH, POC UA: 5.5
POC BLOOD, URINE: NEGATIVE
POC NITRATES, URINE: NEGATIVE
POC RESIDUAL URINE VOLUME: 27 ML (ref 0–100)
PROT UR QL STRIP: NEGATIVE
SP GR UR STRIP: 1.02 (ref 1–1.03)
UROBILINOGEN UR STRIP-ACNC: 0.2 (ref 0.1–1.1)

## 2024-02-15 PROCEDURE — 99999PBSHW POCT BLADDER SCAN: Mod: PBBFAC,,,

## 2024-02-15 PROCEDURE — 99214 OFFICE O/P EST MOD 30 MIN: CPT | Mod: S$PBB,,, | Performed by: NURSE PRACTITIONER

## 2024-02-15 PROCEDURE — 99999PBSHW POCT URINALYSIS, DIPSTICK, AUTOMATED, W/O SCOPE: Mod: PBBFAC,,,

## 2024-02-15 PROCEDURE — 51798 US URINE CAPACITY MEASURE: CPT | Mod: PBBFAC | Performed by: NURSE PRACTITIONER

## 2024-02-15 PROCEDURE — 99214 OFFICE O/P EST MOD 30 MIN: CPT | Mod: PBBFAC,25 | Performed by: NURSE PRACTITIONER

## 2024-02-15 PROCEDURE — 99999 PR PBB SHADOW E&M-EST. PATIENT-LVL IV: CPT | Mod: PBBFAC,,, | Performed by: NURSE PRACTITIONER

## 2024-02-15 PROCEDURE — 81003 URINALYSIS AUTO W/O SCOPE: CPT | Mod: PBBFAC | Performed by: NURSE PRACTITIONER

## 2024-02-15 RX ORDER — SOLIFENACIN SUCCINATE 10 MG/1
10 TABLET, FILM COATED ORAL DAILY
Qty: 90 TABLET | Refills: 3 | Status: SHIPPED | OUTPATIENT
Start: 2024-02-15 | End: 2025-02-14

## 2024-02-15 NOTE — PROGRESS NOTES
Chief Complaint:   Recurrent UTIs    HPI:   Patient is presenting as a follow-up to recurrent urinary tract infections.  Was recently treated for positive urine culture and states she is completely asymptomatic secondary to completion of antibiotics.  Urine in clinic is negative.  PVR is 14 mL.  States that VESIcare has completely resolved her overactive bladder symptoms.  Recent renal ultrasound was normal.  02/01/2024  Patient is a 69-year-old female that is presenting as a follow-up to overactive bladder and recurrent urinary tract infections. Reports that VESIcare 10 mg once daily has resolved her overactive bladder symptoms.  Nocturia is once nightly and daytime frequency has decreased to the point where she is now wearing a pad.  Urine in clinic indicates nitrates and leukocytes, all other parameters are negative.  Patient is asymptomatic for urinary tract infection.  PVR is 58 mL.  Is currently on nightly Keflex prophylactically.  11/14/2023  Patient is a 68-year-old female that has been followed by this clinic for recurrent urinary tract infections.  Urine in clinic is negative.  PVR is 27 mL.  Patient states that over the last 6 months she is had urge incontinence and nocturia.  Patient is wearing a pad that she changes several times a day.  Denies gross hematuria.  States she drinks very little water throughout the day.  08/31/2023  Patient is a delightful 68-year-old female that is presenting as a follow-up to recurrent UTIs, Klebsiella.  Was recently treated with Meropenum 1 gram every 8 hours for 7 days per PICC line .  Patient states that she is completely asymptomatic in urine in clinic is negative.  07/18/2023  Patient is a 68-year-old female that is presenting as a follow-up to recurrent urinary tract infections.  Patient was recently treated for urine culture indicating Klebsiella.  Patient states that she is completely asymptomatic after completion of antibiotics, however, urine in clinic  indicates nitrates and leukocytes all other parameters are negative.  PVR is 14 mL.  Denies pelvic or flank pain.  Denies gross hematuria.  06/22/.2023  Patient is a 68-year-old female that is presenting with recurrent urinary tract infections.  In reviewing EMR, patient had 3 positive urine cultures including MRSA.  Patient states that she is completed antibiotics and is currently asymptomatic. Urine in clinic is negative and PVR is 14 mL.  Patient denies pelvic or flank pain.  Patient states she has a remote history of renal stones.  Denies gross hematuria.  Reports that she is occasionally incontinent and wears a pad that she is changed several times during the day.s:  has a current medication list which includes the following prescription(s): allopurinol, amlodipine, aspirin, blood sugar diagnostic, carvedilol, clonidine, clopidogrel, ergocalciferol (vitamin d2), ferrous sulfate, furosemide, glipizide, hydralazine, basaglar kwikpen u-100 insulin, januvia, losartan, metformin, rosuvastatin, solifenacin, and sulfamethoxazole-trimethoprim 800-160mg.    Review of Systems:  General: No fever, chills, fatigability, or weight loss.  Skin: No rashes, itching, or changes in color or texture of skin.  Chest: Denies RIDDLE, cyanosis, wheezing, cough, and sputum production.  Abdomen: Appetite fine. No weight loss. Denies diarrhea, abdominal pain, hematemesis, or blood in stool.  Musculoskeletal: No joint stiffness or swelling. Denies back pain.  : As above.  All other review of systems negative.    PMH:   has a past medical history of BDR (background diabetic retinopathy) (11/30/2023), CAD (coronary artery disease), Diabetes mellitus, type 2, DM (diabetes mellitus), DM (diabetes mellitus) (2012), Elevated troponin (05/27/2021), High level of uric acid in blood, Hyperlipidemia, Hyperplastic rectal polyp, Hypertension, Multinodular goiter, Multiple thyroid nodules (05/05/2016), Osteoarthritis, Osteoarthritis of back,  Osteoporosis, unspecified, Postmenopausal, Proteinuria, Pustular psoriasis, Subclavian artery stenosis, and Vitamin D deficiency disease.    PSH:   has a past surgical history that includes left knee surgery for bone graft and pin placement; heart catheterization with stents placed (2004 and 08/18/2009); Left heart catheterization (Left, 06/18/2021); Coronary stent placement (N/A, 06/18/2021); Coronary angiography (N/A, 06/18/2021); Cataract extraction w/  intraocular lens implant (Right, 04/13/2016); Cataract extraction w/  intraocular lens implant (Left); and Colonoscopy (N/A, 08/30/2022).    FamHx: family history includes Cancer in her father; Diabetes in her mother; Hypertension in her mother and sister; No Known Problems in her daughter and son; Stroke in her mother.    SocHx:  reports that she quit smoking about 19 years ago. Her smoking use included cigarettes. She started smoking about 52 years ago. She has a 33.0 pack-year smoking history. She has been exposed to tobacco smoke. She has never used smokeless tobacco. She reports current alcohol use. She reports that she does not use drugs.      Physical Exam:  General: A&Ox3, no apparent distress, no deformities  Neck: No masses, normal thyroid  Lungs: normal inspiration, no use of accessory muscles  Heart: normal pulse, no arrhythmias  Abdomen: Soft, NT, ND, no masses, no hernias, no hepatosplenomegaly  Lymphatic: Neck and groin nodes negative  Skin: The skin is warm and dry. No jaundice.    Labs/Studies:   02/06/2024  EXAMINATION:  US RETROPERITONEAL COMPLETE     CLINICAL HISTORY:  Urinary tract infection, site not specified     TECHNIQUE:  Ultrasound of the kidneys and urinary bladder was performed including color flow and Doppler evaluation of the kidneys.     COMPARISON:  None.     FINDINGS:  Right kidney: The right kidney measures 11.6 cm. No cortical thinning. No loss of corticomedullary distinction. No mass. No stone visualized.  No hydronephrosis.      Left kidney: The left kidney measures 10.8 cm. No cortical thinning. No loss of corticomedullary distinction. No mass. No stone visualized.  No hydronephrosis.     There is several non dependent echogenic foci with associated dirty shadowing present in the urinary bladder, suggestive of intraluminal gas.     Impression:  1. Normal sonographic appearance of the bilateral kidneys  2. Possible intraluminal gas within the urinary bladder.  Correlate clinically for any history of recent instrumentation.  Impression/Plan:   1. Overactive bladder  Patient to remain on current dose of VESIcare, refill was sent to her pharmacy.    2. Recurrent urinary tract infections  Will discontinue all prophylactic antibiotics.  Patient to return to clinic in 6 months for re-evaluation.  Is aware of the need to follow-up with our clinic, instead of primary care physician, urgent care or ED settings, with any symptoms of a urinary tract infection.

## 2024-03-01 DIAGNOSIS — I10 ESSENTIAL HYPERTENSION: ICD-10-CM

## 2024-03-04 PROBLEM — N39.0 RECURRENT UTI: Status: RESOLVED | Noted: 2023-11-30 | Resolved: 2024-03-04

## 2024-03-05 RX ORDER — LOSARTAN POTASSIUM 50 MG/1
50 TABLET ORAL
Qty: 90 TABLET | Refills: 1 | Status: SHIPPED | OUTPATIENT
Start: 2024-03-05

## 2024-03-13 RX ORDER — METFORMIN HYDROCHLORIDE 500 MG/1
TABLET ORAL
Qty: 360 TABLET | Refills: 0 | Status: SHIPPED | OUTPATIENT
Start: 2024-03-13 | End: 2024-06-08

## 2024-03-13 NOTE — TELEPHONE ENCOUNTER
No care due was identified.  [facility] Embedded Care Due Messages. Reference number: [id number].   3/12/2024 7:12:20 PM CDT

## 2024-03-13 NOTE — TELEPHONE ENCOUNTER
Refill Decision Note   Alysha Bobby  is requesting a refill authorization.  Brief Assessment and Rationale for Refill:  Approve     Medication Therapy Plan:         Comments:     Note composed:2:39 AM 03/13/2024

## 2024-03-19 DIAGNOSIS — E11.21 DIABETIC NEPHROPATHY WITH PROTEINURIA: ICD-10-CM

## 2024-03-19 DIAGNOSIS — I21.4 NSTEMI (NON-ST ELEVATED MYOCARDIAL INFARCTION): ICD-10-CM

## 2024-03-19 DIAGNOSIS — Z98.61 POST PTCA: ICD-10-CM

## 2024-03-19 RX ORDER — GLIPIZIDE 10 MG/1
10 TABLET ORAL
Qty: 180 TABLET | Refills: 0 | Status: SHIPPED | OUTPATIENT
Start: 2024-03-19 | End: 2024-06-16 | Stop reason: SDUPTHER

## 2024-03-19 RX ORDER — CLOPIDOGREL BISULFATE 75 MG/1
75 TABLET ORAL
Qty: 90 TABLET | Refills: 0 | Status: SHIPPED | OUTPATIENT
Start: 2024-03-19 | End: 2024-06-17

## 2024-03-19 NOTE — TELEPHONE ENCOUNTER
No care due was identified.  [facility] Embedded Care Due Messages. Reference number: [id number].   3/19/2024 6:17:00 AM CDT

## 2024-03-19 NOTE — TELEPHONE ENCOUNTER
Refill Decision Note   Alysha Rosales  is requesting a refill authorization.  Brief Assessment and Rationale for Refill:  Approve     Medication Therapy Plan:         Comments:     Note composed:5:54 PM 03/19/2024

## 2024-03-28 RX ORDER — HYDRALAZINE HYDROCHLORIDE 50 MG/1
TABLET, FILM COATED ORAL
Qty: 240 TABLET | Refills: 2 | Status: SHIPPED | OUTPATIENT
Start: 2024-03-28

## 2024-04-02 ENCOUNTER — LAB VISIT (OUTPATIENT)
Dept: LAB | Facility: HOSPITAL | Age: 69
End: 2024-04-02
Attending: FAMILY MEDICINE
Payer: MEDICARE

## 2024-04-02 ENCOUNTER — OFFICE VISIT (OUTPATIENT)
Dept: FAMILY MEDICINE | Facility: CLINIC | Age: 69
End: 2024-04-02
Attending: FAMILY MEDICINE
Payer: MEDICARE

## 2024-04-02 VITALS
DIASTOLIC BLOOD PRESSURE: 80 MMHG | SYSTOLIC BLOOD PRESSURE: 132 MMHG | HEIGHT: 62 IN | HEART RATE: 79 BPM | RESPIRATION RATE: 18 BRPM | WEIGHT: 178.38 LBS | TEMPERATURE: 97 F | BODY MASS INDEX: 32.82 KG/M2 | OXYGEN SATURATION: 98 %

## 2024-04-02 DIAGNOSIS — E11.21 DIABETIC NEPHROPATHY WITH PROTEINURIA: ICD-10-CM

## 2024-04-02 DIAGNOSIS — I15.2 HYPERTENSION ASSOCIATED WITH DIABETES: ICD-10-CM

## 2024-04-02 DIAGNOSIS — E79.0 ELEVATED URIC ACID IN BLOOD: ICD-10-CM

## 2024-04-02 DIAGNOSIS — E11.59 HYPERTENSION ASSOCIATED WITH DIABETES: ICD-10-CM

## 2024-04-02 DIAGNOSIS — E11.69 COMBINED HYPERLIPIDEMIA ASSOCIATED WITH TYPE 2 DIABETES MELLITUS: ICD-10-CM

## 2024-04-02 DIAGNOSIS — N18.31 STAGE 3A CHRONIC KIDNEY DISEASE: ICD-10-CM

## 2024-04-02 DIAGNOSIS — Z78.0 POSTMENOPAUSAL: ICD-10-CM

## 2024-04-02 DIAGNOSIS — D50.9 IRON DEFICIENCY ANEMIA, UNSPECIFIED IRON DEFICIENCY ANEMIA TYPE: ICD-10-CM

## 2024-04-02 DIAGNOSIS — M85.80 OSTEOPENIA, UNSPECIFIED LOCATION: ICD-10-CM

## 2024-04-02 DIAGNOSIS — I25.118 ATHEROSCLEROTIC HEART DISEASE OF NATIVE CORONARY ARTERY WITH OTHER FORMS OF ANGINA PECTORIS: ICD-10-CM

## 2024-04-02 DIAGNOSIS — E04.2 MULTINODULAR GOITER: ICD-10-CM

## 2024-04-02 DIAGNOSIS — E11.40 TYPE 2 DIABETES MELLITUS WITH DIABETIC NEUROPATHY, UNSPECIFIED WHETHER LONG TERM INSULIN USE: ICD-10-CM

## 2024-04-02 DIAGNOSIS — I70.0 AORTIC ATHEROSCLEROSIS: ICD-10-CM

## 2024-04-02 DIAGNOSIS — Z12.31 OTHER SCREENING MAMMOGRAM: ICD-10-CM

## 2024-04-02 DIAGNOSIS — E66.9 OBESITY (BMI 30.0-34.9): ICD-10-CM

## 2024-04-02 DIAGNOSIS — R10.13 EPIGASTRIC PAIN: ICD-10-CM

## 2024-04-02 DIAGNOSIS — E78.2 COMBINED HYPERLIPIDEMIA ASSOCIATED WITH TYPE 2 DIABETES MELLITUS: ICD-10-CM

## 2024-04-02 DIAGNOSIS — E11.3299 BDR (BACKGROUND DIABETIC RETINOPATHY): ICD-10-CM

## 2024-04-02 PROBLEM — E66.01 SEVERE OBESITY (BMI 35.0-39.9) WITH COMORBIDITY: Status: RESOLVED | Noted: 2020-06-14 | Resolved: 2024-04-02

## 2024-04-02 LAB
ALBUMIN SERPL BCP-MCNC: 3.9 G/DL (ref 3.5–5.2)
ALP SERPL-CCNC: 63 U/L (ref 55–135)
ALT SERPL W/O P-5'-P-CCNC: 12 U/L (ref 10–44)
ANION GAP SERPL CALC-SCNC: 12 MMOL/L (ref 8–16)
AST SERPL-CCNC: 14 U/L (ref 10–40)
BASOPHILS # BLD AUTO: 0.1 K/UL (ref 0–0.2)
BASOPHILS NFR BLD: 1 % (ref 0–1.9)
BILIRUB SERPL-MCNC: 0.2 MG/DL (ref 0.1–1)
BUN SERPL-MCNC: 34 MG/DL (ref 8–23)
CALCIUM SERPL-MCNC: 10.9 MG/DL (ref 8.7–10.5)
CHLORIDE SERPL-SCNC: 101 MMOL/L (ref 95–110)
CHOLEST SERPL-MCNC: 128 MG/DL (ref 120–199)
CHOLEST/HDLC SERPL: 3.6 {RATIO} (ref 2–5)
CO2 SERPL-SCNC: 27 MMOL/L (ref 23–29)
CREAT SERPL-MCNC: 1.1 MG/DL (ref 0.5–1.4)
DIFFERENTIAL METHOD BLD: ABNORMAL
EOSINOPHIL # BLD AUTO: 0.2 K/UL (ref 0–0.5)
EOSINOPHIL NFR BLD: 1.9 % (ref 0–8)
ERYTHROCYTE [DISTWIDTH] IN BLOOD BY AUTOMATED COUNT: 12.8 % (ref 11.5–14.5)
EST. GFR  (NO RACE VARIABLE): 54.4 ML/MIN/1.73 M^2
ESTIMATED AVG GLUCOSE: 169 MG/DL (ref 68–131)
GLUCOSE SERPL-MCNC: 216 MG/DL (ref 70–110)
HBA1C MFR BLD: 7.5 % (ref 4–5.6)
HCT VFR BLD AUTO: 33.6 % (ref 37–48.5)
HDLC SERPL-MCNC: 36 MG/DL (ref 40–75)
HDLC SERPL: 28.1 % (ref 20–50)
HGB BLD-MCNC: 11.3 G/DL (ref 12–16)
IMM GRANULOCYTES # BLD AUTO: 0.03 K/UL (ref 0–0.04)
IMM GRANULOCYTES NFR BLD AUTO: 0.3 % (ref 0–0.5)
LDLC SERPL CALC-MCNC: 46.4 MG/DL (ref 63–159)
LYMPHOCYTES # BLD AUTO: 1.3 K/UL (ref 1–4.8)
LYMPHOCYTES NFR BLD: 13.6 % (ref 18–48)
MCH RBC QN AUTO: 31.5 PG (ref 27–31)
MCHC RBC AUTO-ENTMCNC: 33.6 G/DL (ref 32–36)
MCV RBC AUTO: 94 FL (ref 82–98)
MONOCYTES # BLD AUTO: 0.7 K/UL (ref 0.3–1)
MONOCYTES NFR BLD: 7.4 % (ref 4–15)
NEUTROPHILS # BLD AUTO: 7.3 K/UL (ref 1.8–7.7)
NEUTROPHILS NFR BLD: 75.8 % (ref 38–73)
NONHDLC SERPL-MCNC: 92 MG/DL
NRBC BLD-RTO: 0 /100 WBC
PLATELET # BLD AUTO: 238 K/UL (ref 150–450)
PMV BLD AUTO: 11.8 FL (ref 9.2–12.9)
POTASSIUM SERPL-SCNC: 4.8 MMOL/L (ref 3.5–5.1)
PROT SERPL-MCNC: 7.5 G/DL (ref 6–8.4)
RBC # BLD AUTO: 3.59 M/UL (ref 4–5.4)
SODIUM SERPL-SCNC: 140 MMOL/L (ref 136–145)
TRIGL SERPL-MCNC: 228 MG/DL (ref 30–150)
URATE SERPL-MCNC: 6.5 MG/DL (ref 2.4–5.7)
WBC # BLD AUTO: 9.57 K/UL (ref 3.9–12.7)

## 2024-04-02 PROCEDURE — 80061 LIPID PANEL: CPT | Performed by: FAMILY MEDICINE

## 2024-04-02 PROCEDURE — 83036 HEMOGLOBIN GLYCOSYLATED A1C: CPT | Performed by: FAMILY MEDICINE

## 2024-04-02 PROCEDURE — 99999 PR PBB SHADOW E&M-EST. PATIENT-LVL V: CPT | Mod: PBBFAC,,, | Performed by: FAMILY MEDICINE

## 2024-04-02 PROCEDURE — 99215 OFFICE O/P EST HI 40 MIN: CPT | Mod: S$PBB,,, | Performed by: FAMILY MEDICINE

## 2024-04-02 PROCEDURE — 85025 COMPLETE CBC W/AUTO DIFF WBC: CPT | Performed by: FAMILY MEDICINE

## 2024-04-02 PROCEDURE — 80053 COMPREHEN METABOLIC PANEL: CPT | Performed by: FAMILY MEDICINE

## 2024-04-02 PROCEDURE — 84550 ASSAY OF BLOOD/URIC ACID: CPT | Performed by: FAMILY MEDICINE

## 2024-04-02 PROCEDURE — 82728 ASSAY OF FERRITIN: CPT | Performed by: FAMILY MEDICINE

## 2024-04-02 PROCEDURE — 36415 COLL VENOUS BLD VENIPUNCTURE: CPT | Mod: PO | Performed by: FAMILY MEDICINE

## 2024-04-02 PROCEDURE — 99215 OFFICE O/P EST HI 40 MIN: CPT | Mod: PBBFAC,PO | Performed by: FAMILY MEDICINE

## 2024-04-02 PROCEDURE — 84443 ASSAY THYROID STIM HORMONE: CPT | Performed by: FAMILY MEDICINE

## 2024-04-02 NOTE — PROGRESS NOTES
HISTORY OF PRESENT ILLNESS: Ms. Rosales comes in today fasting and with taking medication without acute problems for annual wellness examination.    END OF LIFE DECISION: She does not have a living will but does not desire life support.    Advance Care Planning     Date: 04/02/2024    Living Will  During this visit, I engaged the patient  in the voluntary advance care planning process.  The patient and I reviewed the role for advance directives and their purpose in directing future healthcare if the patient's unable to speak for him/herself.  At this point in time, the patient does have full decision-making capacity.  We discussed different extreme health states that she could experience, and reviewed what kind of medical care she would want in those situations.  The patient communicated that if she were comatose and had little chance of a meaningful recovery, she would not want machines/life-sustaining treatments used. In addition to the above preference, other important end-of-life issues for the patient include  the patient has not completed a living to reflect these preferences .  I spent a total of 5 minutes engaging the patient in this advance care planning discussion.          Current Outpatient Medications   Medication Sig    allopurinoL (ZYLOPRIM) 100 MG tablet Take 1 tablet by mouth once daily    amLODIPine (NORVASC) 5 MG tablet Take 1 tablet (5 mg total) by mouth once daily.    aspirin 81 MG Chew Take 81 mg by mouth once daily.    blood sugar diagnostic Strp To check BG 2 times daily, to use with insurance preferred meter - Freestyle Lite    carvediloL (COREG) 25 MG tablet Take 1 tablet (25 mg total) by mouth 2 (two) times daily with meals.    cloNIDine (CATAPRES) 0.3 MG tablet Take 1 tablet (0.3 mg total) by mouth as needed (blood pressure more than 180/90).    clopidogreL (PLAVIX) 75 mg tablet Take 1 tablet by mouth once daily    ERGOCALCIFEROL, VITAMIN D2, (VITAMIN D ORAL) Take 2,000 Units by mouth  once daily.    ferrous sulfate (FEOSOL) Tab tablet Take 1 tablet by mouth 3 (three) times daily.    furosemide (LASIX) 20 MG tablet Take 2 tablets (40 mg total) by mouth once daily. Takes 1 pill daily    glipiZIDE (GLUCOTROL) 10 MG tablet TAKE 1 TABLET BY MOUTH TWICE DAILY BEFORE MEAL(S)    hydrALAZINE (APRESOLINE) 50 MG tablet Take 1 tablet by mouth 4 times daily    insulin (BASAGLAR KWIKPEN U-100 INSULIN) glargine 100 units/mL SubQ pen Inject 15 Units into the skin once daily.    JANUVIA 100 mg Tab Take 1 tablet (100 mg total) by mouth once daily.    losartan (COZAAR) 50 MG tablet Take 1 tablet by mouth once daily    metFORMIN (GLUCOPHAGE) 500 MG tablet TAKE 1 TABLET BY MOUTH ONCE DAILY IN THE MORNING AND  1  TABLET  AT  NOON  AND  2  ONCE  DAILY  IN  THE  EVENING  WITH  MEALS    rosuvastatin (CRESTOR) 20 MG tablet Take 1 tablet by mouth nightly    solifenacin (VESICARE) 10 MG tablet Take 1 tablet (10 mg total) by mouth once daily.     SCREENINGS:   Cholesterol (with CMP): September 26, 2023.  FFS/Colonoscopy: August 30, 2022 - benign colon polyps, diverticulosis; repeat in 7 years.   Mammogram: February 15, 2022 - okay.  GYN Exam: July 11, 2017 - okay. Pap smears 2009 - 2013, 2016 - okay. Pap smear no longer needed.   Dexa Scan: August 7, 2017 - osteoporosis; repeat in 2 years. November 29, 2021 -  osteopenia.  Eye Exam: July 13, 2023 with Dr. JIGNESH Wadsworth. She wears glasses.   Dental Exam: She wears denture on top and bottom.   PPD: Negative in the past.    Immunizations: Tdap - February 25, 2009. Advised insurance-covered benefit only at local pharmacy.  Gardasil - N./A.  Zostavax - March 30, 2015.  Shingrix - Never. Advised insurance-covered benefit only at local pharmacy.  Pneumovax - May 23, 2023.   Prevnar-13 - June 4, 2020.  Seasonal Flu - September 26, 2023.    Hepatitis B vaccine - August 28, 2013, July 25, 2013, July 29, 2014.  RSV - Advised patient available at local pharmacy.  Covid-19 (Micki) vaccine  series - August 13, 2021.    ROS:  GENERAL: Denies fever, chills, fatigue or unusual weight change. Appetite normal. Weight 83.1 kg (183 lb 3.2 oz) at September 26, 2023 visit. Monitors diet. Reports not exercises but moves around 3 times per week at [facility].  SKIN: Denies rashes, itching, changes in mole, color or texture of skin or easy bruising.   HEAD: Denies headaches or recent head trauma.  EYES: Denies change in vision, pain, diplopia, redness or discharge. Wears glasses.  EARS: Denies ear pain, discharge, vertigo except decreased hearing but states hearing tests performed have been okay.  NOSE: Denies loss of smell, epistaxis or rhinitis.  MOUTH & THROAT: Denies hoarseness or change in voice. Denies excessive gum bleeding or mouth sores. Denies sore throat.  NODES: Denies swollen glands.  CHEST: Denies RIDDLE, wheezing, cough, or sputum production.  CARDIOVASCULAR: Denies chest pain, PND, orthopnea or reduced exercise tolerance. Denies palpitations. Saw cardiology Dr. William on February 1, 2024 for surveillance of CAD, NSTEMI, Post-PTCA, hypertension, hyperlipidemia, ROSEMARY, CKD3a, diabetes, obesity with 6-month follow up advised.  Reports performs blood pressure checks 2-3 times per week with levels ranging 130/70's.  ABDOMEN: Denies diarrhea, constipation, nausea, vomiting, abdominal pain, or blood in stool.  Reports stomach pain with rare vomiting after eating certain foods but takes Phazyme and Mylanta with help.  Reports thinks she has gallbladder issue.  URINARY: Denies flank pain, dysuria or hematuria. Saw Dr. Bryant, nephrologist, on September 19, 2017 for CKD stage 1, diabetes nephropathy, proteinuria surveillance with 9-month follow-up advised. Saw NP Haley Barbour with urology on February 15, 2024 for OAB, cystitis, mixed incontinence.  GENITOURINARY: Denies flank pain, dysuria, frequency or hematuria. Performs monthly breast self exams.  ENDOCRINE: Reports performs glucose checks 2 to 3 times per  "week with fasting levels ranging 200's. Thyroid ultrasound on February 8, 2022 - Multinodular thyroid gland again noted.  No nodules meeting criteria for biopsy or further follow-up per TI rads criteria. Repeat thyroid ultrasound every 1 - 2 years.  HEME/LYMPH: Denies bleeding problems.  PERIPHERAL VASCULAR:Denies claudication or cyanosis.  MUSCULOSKELETAL: Reports chronic, intermittent morning stiffness, pain in low back, left knee (but stable today). Saw Dr. Saravia, orthopedist, on July 21, 2022 for lumbar DDD, left knee OA with Hylan shot given. Denies edema. Saw ERIC Hill with rheumatology on October 5, 2020 for surveillance of osteoporosis currently on Reclast and vitamin-D deficiency with 9-month follow-up with DEXA scan and Reclast therapy advised and given again on November 29, 2021.   NEUROLOGIC: Denies history of seizures, tremors, paralysis, alteration of gait or coordination. Reports chronic numbness in hands, feet.  PSYCHIATRIC: Denies mood swings, depression, anxiety or homicidal or suicidal thoughts. Denies sleep problems.     PE:   Blood Pressure 132/80   Pulse 79   Temperature 97.3 °F (36.3 °C) (Tympanic)   Respiration 18   Height 5' 2" (1.575 m)   Weight 80.9 kg (178 lb 5.6 oz)   Oxygen Saturation 98%   Body Mass Index 32.62 kg/m²   APPEARANCE: Well nourished, well developed female, obese and pleasant, alert and oriented in no acute distress.   HEAD: Non tender. Full range of motion.  EYES: PERRL, conjunctiva pink, lids no edema.  EARS: External canal patent, no swelling or redness. TM's shiny and clear.  NOSE: Mucosa and turbinates pink, not swollen. No discharge. Non tender sinuses.  THROAT: No pharyngeal erythema or exudate. No stridor. She wears top dentures, bottom partials.  NECK: Supple, no mass, thyroid not enlarged.  NODES: No cervical, axillary lymph node enlargement.  CHEST: Normal respiratory effort. Lungs clear to auscultation.  CARDIOVASCULAR: Normal S1, S2. Chronic soft " "cardiac murmur noted. PMI not displaced. No carotid bruit. Pedal pulses palpable bilaterally. No edema. Feet look okay without ulcerations.  ABDOMEN: Bowel sounds present. Not distended. Soft. No tenderness, masses or organomegaly.  BREAST EXAM: Symmetrical, no external lesions, no discharge, no masses palpated.   PELVIC EXAM: Not examined per patient request.  RECTAL EXAM: Not examined per patient request.  MUSCULOSKELETAL: No joint deformities or stiffness except tender at right heel. She is ambulatory without problems.  SKIN: No rashes or suspicious lesions, normal color and turgor except "quiet" eczematous skin changes at bottom of feet (with much improved appearance compared to other visits with me).  NEUROLOGIC: Cranial Nerves: II-XII grossly intact. DTR's: Knees, Ankles 2+ and equal bilaterally. Gait & Posture: Normal gait and fine motion.  PSYCHIATRIC: Patient alert, oriented x 3. Mood/Affect normal without acute anxiety or depression noted. Judgment/insight good as she makes appropriate decisions on today's examination.    Protective Sensation (w/ 10 gram monofilament):  Right: Intact  Left: Intact    Visual Inspection:  Normal -  Bilateral    Pedal Pulses:   Right: Present  Left: Present    Posterior tibialis:   Right:Present  Left: Present     ASSESSMENT:    ICD-10-CM ICD-9-CM    1. Hypertension associated with diabetes  E11.59 250.80 TSH    I15.2 401.9 Comprehensive Metabolic Panel      Lipid Panel      CBC Auto Differential      2. Combined hyperlipidemia associated with type 2 diabetes mellitus  E11.69 250.80     E78.2 272.2       3. Atherosclerotic heart disease of native coronary artery with other forms of angina pectoris/stents x 4  I25.118 414.01      413.9       4. Aortic atherosclerosis  I70.0 440.0       5. Stage 3a chronic kidney disease  N18.31 585.3       6. Diabetic nephropathy with proteinuria  E11.21 250.40 Microalbumin/Creatinine Ratio, Urine     583.81 Hemoglobin A1C      7. BDR " (background diabetic retinopathy)  E11.3299 250.50 Ambulatory referral/consult to Optometry     362.01       8. Type 2 diabetes mellitus with diabetic neuropathy, unspecified whether long term insulin use  E11.40 250.60 Hemoglobin A1C     357.2       9. Multinodular goiter  E04.2 241.1 US Thyroid      10. Osteopenia, unspecified location  M85.80 733.90 DXA Bone Density Axial Skeleton 1 or more sites      11. Elevated uric acid in blood  E79.0 790.6 Uric Acid      12. Iron deficiency anemia, unspecified iron deficiency anemia type  D50.9 280.9 Ferritin      CBC Auto Differential      13. Epigastric pain  R10.13 789.06 US Abdomen Complete      14. Obesity (BMI 30.0-34.9)  E66.9 278.00       15. Postmenopausal  Z78.0 V49.81 DXA Bone Density Axial Skeleton 1 or more sites      16. Other screening mammogram  Z12.31 V76.12 Mammo Digital Screening Bilat w/ Shad        PLAN:  1. Age-appropriate counseling-appropriate low-sodium, low-cholesterol, low carbohydrate diet and exercise daily, monthly breast self exam, annual wellness examination.   2. Patient advised to call for results.  3. Continue current medications.  4. Annual eye and dental examinations encouraged.  5. Keep follow up with specialists.  6. Follow up in about 6 months (around 10/2/2024) for diabetes and hypertension follow up.     40 minutes of total time spent on the encounter, which includes face to face time and non-face to face time preparing to see the patient (eg, review of tests), Obtaining and/or reviewing separately obtained history, Documenting clinical information in the electronic or other health record, Independently interpreting results (not separately reported) and communicating results to the patient/family/caregiver, or Care coordination (not separately reported).

## 2024-04-02 NOTE — PROGRESS NOTES
Alysha Rosales    Chief Complaint   Patient presents with    6 mth physical '       History of Present Illness:   Ms. Rosales comes in today for diabetes and hypertension follow-up.  She is not fasting and has taken morning medications.  She states she exercises by walking at [email or web address].  She states she has not been monitoring diet.  She states she has not been performing home blood pressure checks or glucose checks she lost her glucometer.    She states she was evaluated at urgent care on February 24, 2023 for vaginal itching with glucose >380 (after eating at [employer]).  She states she was then [place] [facility] [place], [place] for hyperglycemia, hypertension and told to follow-up with PCP in 3 days.      She reports having vaginal itch without vaginal discharge.  She states she feels wet in the vaginal area as she occasionally has urinary incontinence.    She states she no longer has ulcer of her foot.    She complains of having memory problems.  She reports no family history of dementia.    She denies having fever, chills, fatigue, appetite changes; shortness of breath, cough, wheezing; chest pain, palpitations, leg swelling abdominal pain, nausea, vomiting, diarrhea, constipation; unusual urinary symptoms; polydipsia, polyphagia, polyuria, hot or cold intolerance; back pain; numbness, acute visual changes, headache; anxiety, depression, homicidal or suicidal thoughts.     She saw Dr. William, cardiologist, for follow-up on August 10, 2022 for surveillance of combined hyperlipidemia associated with type 2 diabetes mellitus, essential hypertension, antiplatelet or antithrombotic long-term use, atherosclerotic heart disease of native coronary artery with other forms of angina pectoris and iron deficiency anemia, unspecified iron deficiency anemia type.  She did not see ERIC Salinas for diabetes management on July 21, 2022 as scheduled. She saw Dr. Wadsworth, optometrist, on February 10, 2022 for  diabetic eye exam.      She saw Dr. Saravia, orthopedist, on July 21, 2022 for bilateral knee arthritis, lumbar DDD.       She saw ERIC Hill with rheumatology on October 5, 2020 for surveillance of osteoporosis currently on Reclast and vitamin-D deficiency with 9-month follow-up with DEXA scan and Reclast therapy advised.        Labs:                     WBC                      6.30                02/24/2023                 HGB                      12.9                02/24/2023                 HCT                      38.0                02/24/2023                 PLT                      230                 02/24/2023                 CHOL                     133                 01/25/2022                 TRIG                     384 (H)             01/25/2022                 HDL                      40                  01/25/2022                 ALT                      17                  02/24/2023                 AST                      15                  02/24/2023                 NA                       136                 02/24/2023                 K                        4.2                 02/24/2023                 CL                       98                  02/24/2023                 CREATININE               1.1                 02/24/2023                 BUN                      16                  02/24/2023                 CO2                      24                  02/24/2023                 TSH                      2.040               01/25/2022                 INR                      0.9                 06/11/2021                 HGBA1C                   10.3 (H)            07/28/2022             LDLCALC                  16.2 (L)            01/25/2022                Current Outpatient Medications   Medication Sig    allopurinoL (ZYLOPRIM) 100 MG tablet Take 1 tablet by mouth once daily    aspirin 81 mg Tab Take 1 tablet by mouth.    blood sugar diagnostic Strp 1 strip by Misc.(Non-Drug;  Combo Route) route 2 (two) times daily.    blood sugar diagnostic Strp To check BG 2 times daily, to use with insurance preferred meter - Freestyle Lite    blood-glucose meter kit To check BG 2 times daily, to use with insurance preferred meter -  Freestyle Lite    blood-glucose meter Misc Use FREESTYLE meter for dx: diabetes    clopidogreL (PLAVIX) 75 mg tablet Take 1 tablet by mouth once daily    ERGOCALCIFEROL, VITAMIN D2, (VITAMIN D ORAL) Take 1 tablet by mouth 2 (two) times daily. 2000 units daily    ferrous sulfate (FEOSOL) Tab tablet Take 1 tablet by mouth 3 (three) times daily.    furosemide (LASIX) 20 MG tablet Take 2 tablets (40 mg total) by mouth once daily.    glipiZIDE (GLUCOTROL) 10 MG tablet TAKE 1 TABLET BY MOUTH TWICE DAILY BEFORE MEAL(S)    hydrALAZINE (APRESOLINE) 50 MG tablet Take 1 tablet (50 mg total) by mouth 4 (four) times daily.    JANUVIA 100 mg Tab Take 1 tablet (100 mg total) by mouth once daily.    lancets Misc To check BG 2 times daily, to use with insurance preferred meter - Freestyle Lite    losartan (COZAAR) 50 MG tablet Take 1 tablet by mouth once daily    metFORMIN (GLUCOPHAGE) 500 MG tablet TAKE 1 TABLET BY MOUTH ONCE DAILY IN THE MORNING AND 1 ONCE DAILY AT NOON AND 2 ONCE DAILY IN THE EVENING WITH MEALS    metoprolol succinate (TOPROL-XL) 50 MG 24 hr tablet Take 1 tablet by mouth once daily    rosuvastatin (CRESTOR) 20 MG tablet TAKE 1 TABLET BY MOUTH NIGHTLY.       Review of Systems   Constitutional:  Negative for activity change, appetite change, chills, fatigue, fever and unexpected weight change.        Weight 92.9 kg (204 lb 12.9 oz) at January 25, 2022 visit.   Eyes:  Negative for visual disturbance.        See history of present illness.   Respiratory:  Negative for cough, shortness of breath and wheezing.    Cardiovascular:  Negative for chest pain, palpitations and leg swelling.        See history of present illness.   Gastrointestinal:  Negative for  abdominal pain, constipation, diarrhea, nausea and vomiting.   Endocrine: Negative for cold intolerance, heat intolerance, polydipsia, polyphagia and polyuria.        See history of present illness.   Genitourinary:  Negative for difficulty urinating and vaginal discharge.        See history of present illness.   Musculoskeletal:  Positive for arthralgias. Negative for back pain.        See history of present illness.   Neurological:  Negative for numbness and headaches.        Positive for memory issue.   Hematological:  Does not bruise/bleed easily.        See history of present illness.   Psychiatric/Behavioral:  Negative for dysphoric mood, sleep disturbance and suicidal ideas. The patient is not nervous/anxious.         Negative for homicidal ideas.     Objective:  Physical Exam  Vitals reviewed.   Constitutional:       General: She is not in acute distress.     Appearance: Normal appearance. She is well-developed. She is obese. She is not ill-appearing or diaphoretic.      Comments: Pleasant and obese.   Neck:      Thyroid: No thyromegaly.   Cardiovascular:      Rate and Rhythm: Normal rate and regular rhythm.      Pulses:           Dorsalis pedis pulses are 3+ on the right side and 3+ on the left side.        Posterior tibial pulses are 3+ on the right side and 3+ on the left side.      Heart sounds: Murmur heard.      Comments: Chronic and soft.  Pulmonary:      Effort: Pulmonary effort is normal. No respiratory distress.      Breath sounds: Normal breath sounds. No wheezing.   Abdominal:      General: Bowel sounds are normal. There is no distension.      Palpations: Abdomen is soft. There is no mass.      Tenderness: There is no abdominal tenderness. There is no guarding or rebound.   Musculoskeletal:         General: Swelling present. No tenderness. Normal range of motion.      Cervical back: Normal range of motion and neck supple. No tenderness.      Comments: She is ambulatory without problems.  Subtle  swelling at right ankle with full range of motion noted.   Feet:      Right foot:      Protective Sensation: 5 sites tested.  5 sites sensed.      Skin integrity: No ulcer, skin breakdown or dry skin.      Left foot:      Protective Sensation: 5 sites tested.  5 sites sensed.      Skin integrity: No ulcer, skin breakdown or dry skin.   Lymphadenopathy:      Cervical: No cervical adenopathy.   Neurological:      General: No focal deficit present.      Mental Status: She is alert and oriented to person, place, and time.   Psychiatric:         Mood and Affect: Mood normal.         Behavior: Behavior normal.         Thought Content: Thought content normal.         Judgment: Judgment normal.     ASSESSMENT:  No diagnosis found.      PLAN:  There are no diagnoses linked to this encounter.  Patient advised to call for results.  Continue current medications, follow low sodium, low cholesterol, low carb diet, daily walks.  Add Diflucan 150 mg x 1 for subjective yeast; medication precautions discussed with patient.  Keep follow up with specialists.  No follow-ups on file.

## 2024-04-03 LAB
FERRITIN SERPL-MCNC: 59 NG/ML (ref 20–300)
TSH SERPL DL<=0.005 MIU/L-ACNC: 1.91 UIU/ML (ref 0.4–4)

## 2024-04-11 ENCOUNTER — HOSPITAL ENCOUNTER (OUTPATIENT)
Dept: RADIOLOGY | Facility: HOSPITAL | Age: 69
Discharge: HOME | End: 2024-04-11
Attending: FAMILY MEDICINE
Payer: MEDICARE

## 2024-04-11 DIAGNOSIS — E04.2 MULTINODULAR GOITER: ICD-10-CM

## 2024-04-11 DIAGNOSIS — R10.13 EPIGASTRIC PAIN: ICD-10-CM

## 2024-04-11 PROCEDURE — 76700 US EXAM ABDOM COMPLETE: CPT | Mod: 26,,, | Performed by: RADIOLOGY

## 2024-04-11 PROCEDURE — 76536 US EXAM OF HEAD AND NECK: CPT | Mod: TC

## 2024-04-11 PROCEDURE — 76700 US EXAM ABDOM COMPLETE: CPT | Mod: TC

## 2024-04-11 PROCEDURE — 76536 US EXAM OF HEAD AND NECK: CPT | Mod: 26,,, | Performed by: RADIOLOGY

## 2024-05-30 ENCOUNTER — OFFICE VISIT (OUTPATIENT)
Dept: CARDIOLOGY | Facility: CLINIC | Age: 69
End: 2024-05-30
Payer: MEDICARE

## 2024-05-30 VITALS
BODY MASS INDEX: 32.66 KG/M2 | OXYGEN SATURATION: 96 % | DIASTOLIC BLOOD PRESSURE: 70 MMHG | HEART RATE: 86 BPM | SYSTOLIC BLOOD PRESSURE: 128 MMHG | WEIGHT: 178.56 LBS

## 2024-05-30 DIAGNOSIS — I70.0 AORTIC ATHEROSCLEROSIS: ICD-10-CM

## 2024-05-30 DIAGNOSIS — E78.5 HYPERLIPIDEMIA, UNSPECIFIED HYPERLIPIDEMIA TYPE: ICD-10-CM

## 2024-05-30 DIAGNOSIS — E66.01 SEVERE OBESITY (BMI 35.0-39.9) WITH COMORBIDITY: Primary | ICD-10-CM

## 2024-05-30 DIAGNOSIS — I10 ESSENTIAL HYPERTENSION: ICD-10-CM

## 2024-05-30 DIAGNOSIS — E11.69 COMBINED HYPERLIPIDEMIA ASSOCIATED WITH TYPE 2 DIABETES MELLITUS: ICD-10-CM

## 2024-05-30 DIAGNOSIS — E78.2 COMBINED HYPERLIPIDEMIA ASSOCIATED WITH TYPE 2 DIABETES MELLITUS: ICD-10-CM

## 2024-05-30 DIAGNOSIS — I25.118 ATHEROSCLEROTIC HEART DISEASE OF NATIVE CORONARY ARTERY WITH OTHER FORMS OF ANGINA PECTORIS: ICD-10-CM

## 2024-05-30 DIAGNOSIS — Z95.5 S/P CORONARY ARTERY STENT PLACEMENT: ICD-10-CM

## 2024-05-30 PROCEDURE — 99999 PR PBB SHADOW E&M-EST. PATIENT-LVL III: CPT | Mod: PBBFAC,,, | Performed by: INTERNAL MEDICINE

## 2024-05-30 PROCEDURE — 99213 OFFICE O/P EST LOW 20 MIN: CPT | Mod: PBBFAC | Performed by: INTERNAL MEDICINE

## 2024-05-30 PROCEDURE — 99214 OFFICE O/P EST MOD 30 MIN: CPT | Mod: S$PBB,,, | Performed by: INTERNAL MEDICINE

## 2024-05-30 NOTE — PROGRESS NOTES
Subjective:   Patient ID:  Alysha Rosales is a 69 y.o. female who presents for follow up of No chief complaint on file.  5.30.2024  Active and still works part time where she would need to walk long distances   No slurred speech , focal weakness or numbness, amaurosis, dizziness   Denies chest pain, dyspnea on exertion, palpitations syncope or presyncope   No lower extremity swelling.    Reviewed BP log from home.  Blood pressure controlled after adjustment last visit.    2.1.2024  Patient was earlier at her PCP office and was sent in for hypertension.    She has not been checking her blood pressure since I saw her last time.    Appears that there has been not been a major improvement her blood pressure despite medication adjustment last time switch her from metoprolol to carvedilol.    She denies however any chest pain, headaches, dyspnea, lower extremity swelling.    Upon arrival her blood pressure was 190s over 90s.  Gave her clonidine 0.3 mg in the clinic and it made mild improvement.      November 30, 2023.    Comes in for an overdue follow-up.    She denies any chest pain.  Dyspnea.  Lower extremity swelling.    No palpitations syncope or presyncope   No bleeding.    She had PCI of her circ ISR in June of 2021.    Her blood pressure is uncontrolled lately.  She was sent by her primary care physician to have it addressed.    All on initial check it was lower however on my check was similar to what it was recorded at her PCP.        8.2022  Comes in for a follow up   She has been treated for iron deficiency anemia   Awaiting endoscopy   It has been more than 12 months since PCI to a dominant left circ   She denies bleeding   No cp , no flowers, no LE swelling , no palpitations, syncope or presyncope       12.30.2021   Follow-up  Pertinent negatives include no chest pain.   hx of left circ stent in 6/2021   Continues to feel well, no cp, no dyspnea with moderate exertion   No bleeding   Chronic knee pain  "    Interval hx: 6/29/2021   Patient referred last week for prior nstemi, dropped EF , found to have 99% isr insegment restenosis of the left circ s/p stenting   She states she is doing much better since the stent and noticed that she able to walk more before having dyspnea which she describes as limiting in the past   She denies any chest pain   Site looking good         NP Jason Bloom note hpi 6/2020   Ms. Rosales's current medical conditions include hypertension, hyperlipidemia, DM, and CAD. Former tobacco use,. Stopped in 2004.  Hx of MI in 2004 where she underwent coronary stenting x2 ( mid LAD and LCX?) Previously followed by Dr. Kaba at [facility] in [place], but wanting to switch to [facility].   Had RIDDLE in 2008. Underwent LHC at that time and had PCI of unknown vessel. Has total of 3 stents.   Has had 2 repeat LHC since then and both treated medically, didn't require PCI. Last cath at least 10 years.   Admitted last week to [facility] with hypoxia in the setting of COVID, NSTEMI and anemia that required transfusion. Presented with acute SOB and a feeling of "smothering".   Patient noted to have have a troponin peak at 6.3. Echo revealed Apical hypokinesis that was suspicious for takotsubo CMPY, LVF 40.%.   She was treated medically with ASA, Plavix, Statin, BB and ARB.   Denies any chest pain,  orthopnea, PND, dizziness, palpitations,  near syncope, syncope or edema .   +SOB and RIDDLE. No CNS Complaints to suggest TIA or CVA  Admits that she can do better with limiting her sodium intake.      Past Medical History:   Diagnosis Date    BDR (background diabetic retinopathy) 11/30/2023    CAD (coronary artery disease)     Followed by Dr. Stevo Kaba    Diabetes mellitus, type 2     DM (diabetes mellitus)     BS didn't check 06/05/2020    DM (diabetes mellitus) 2012    BS didn't check 02/10/2022    Elevated troponin 05/27/2021    High level of uric acid in blood     Hyperlipidemia     Hyperplastic rectal " polyp     on 2009 colonoscopy    Hypertension     Multinodular goiter     noted on 2015 CTA carotid scan    Multiple thyroid nodules 2016    on Thyroid U/S    Osteoarthritis     Left knee    Osteoarthritis of back     Osteoporosis, unspecified     Postmenopausal     No history of abnormal pap smear    Proteinuria     Pustular psoriasis     hands and feet    Subclavian artery stenosis     Left; with the proximal segment at 60-70% per 10/02/2008 carotid CTA    Vitamin D deficiency disease        Past Surgical History:   Procedure Laterality Date    CATARACT EXTRACTION W/  INTRAOCULAR LENS IMPLANT Right 2016    [facility]    CATARACT EXTRACTION W/  INTRAOCULAR LENS IMPLANT Left     [facility]    COLONOSCOPY N/A 2022    Procedure: COLONOSCOPY;  Surgeon: Reanna Garber MD;  Location: [facility] ENDO;  Service: Endoscopy;  Laterality: N/A;    CORONARY ANGIOGRAPHY N/A 2021    stents x  4    CORONARY STENT PLACEMENT N/A 2021    Procedure: INSERTION, STENT, CORONARY ARTERY;  Surgeon: Desiree William MD;  Location: [facility] CATH LAB;  Service: Cardiology;  Laterality: N/A;    heart catheterization with stents placed   and 2009    LEFT HEART CATHETERIZATION Left 2021    Procedure: CATHETERIZATION, HEART, LEFT;  Surgeon: Desiree William MD;  Location: [facility] CATH LAB;  Service: Cardiology;  Laterality: Left;  Covid + week of    left knee surgery for bone graft and pin placement         Social History     Tobacco Use    Smoking status: Former     Current packs/day: 0.00     Average packs/day: 1 pack/day for 33.0 years (33.0 ttl pk-yrs)     Types: Cigarettes     Start date: 1971     Quit date: 2004     Years since quittin.8     Passive exposure: Past    Smokeless tobacco: Never   Substance Use Topics    Alcohol use: Yes     Alcohol/week: 0.0 standard drinks of alcohol     Comment: Rarely    Drug use: No       Family History   Problem Relation Name Age of Onset    Diabetes  Mother      Hypertension Mother      Stroke Mother      Hypertension Sister      Cancer Father          Lung cancer (smoker)    No Known Problems Daughter      No Known Problems Son      Heart disease Neg Hx         Current Outpatient Medications   Medication Sig    allopurinoL (ZYLOPRIM) 100 MG tablet Take 1 tablet by mouth once daily    amLODIPine (NORVASC) 5 MG tablet Take 1 tablet (5 mg total) by mouth once daily.    aspirin 81 MG Chew Take 81 mg by mouth once daily.    blood sugar diagnostic Strp To check BG 2 times daily, to use with insurance preferred meter - Freestyle Lite    carvediloL (COREG) 25 MG tablet Take 1 tablet (25 mg total) by mouth 2 (two) times daily with meals.    cloNIDine (CATAPRES) 0.3 MG tablet Take 1 tablet (0.3 mg total) by mouth as needed (blood pressure more than 180/90).    clopidogreL (PLAVIX) 75 mg tablet Take 1 tablet by mouth once daily    ERGOCALCIFEROL, VITAMIN D2, (VITAMIN D ORAL) Take 2,000 Units by mouth once daily.    ferrous sulfate (FEOSOL) Tab tablet Take 1 tablet by mouth 3 (three) times daily.    furosemide (LASIX) 20 MG tablet Take 2 tablets (40 mg total) by mouth once daily. Takes 1 pill daily    glipiZIDE (GLUCOTROL) 10 MG tablet TAKE 1 TABLET BY MOUTH TWICE DAILY BEFORE MEAL(S)    hydrALAZINE (APRESOLINE) 50 MG tablet Take 1 tablet by mouth 4 times daily    insulin (BASAGLAR KWIKPEN U-100 INSULIN) glargine 100 units/mL SubQ pen Inject 15 Units into the skin once daily.    JANUVIA 100 mg Tab Take 1 tablet (100 mg total) by mouth once daily.    losartan (COZAAR) 50 MG tablet Take 1 tablet by mouth once daily    metFORMIN (GLUCOPHAGE) 500 MG tablet TAKE 1 TABLET BY MOUTH ONCE DAILY IN THE MORNING AND  1  TABLET  AT  NOON  AND  2  ONCE  DAILY  IN  THE  EVENING  WITH  MEALS    rosuvastatin (CRESTOR) 20 MG tablet Take 1 tablet by mouth nightly    solifenacin (VESICARE) 10 MG tablet Take 1 tablet (10 mg total) by mouth once daily.     No current facility-administered  medications for this visit.     Current Outpatient Medications on File Prior to Visit   Medication Sig    allopurinoL (ZYLOPRIM) 100 MG tablet Take 1 tablet by mouth once daily    amLODIPine (NORVASC) 5 MG tablet Take 1 tablet (5 mg total) by mouth once daily.    aspirin 81 MG Chew Take 81 mg by mouth once daily.    blood sugar diagnostic Strp To check BG 2 times daily, to use with insurance preferred meter - Freestyle Lite    carvediloL (COREG) 25 MG tablet Take 1 tablet (25 mg total) by mouth 2 (two) times daily with meals.    cloNIDine (CATAPRES) 0.3 MG tablet Take 1 tablet (0.3 mg total) by mouth as needed (blood pressure more than 180/90).    clopidogreL (PLAVIX) 75 mg tablet Take 1 tablet by mouth once daily    ERGOCALCIFEROL, VITAMIN D2, (VITAMIN D ORAL) Take 2,000 Units by mouth once daily.    ferrous sulfate (FEOSOL) Tab tablet Take 1 tablet by mouth 3 (three) times daily.    furosemide (LASIX) 20 MG tablet Take 2 tablets (40 mg total) by mouth once daily. Takes 1 pill daily    glipiZIDE (GLUCOTROL) 10 MG tablet TAKE 1 TABLET BY MOUTH TWICE DAILY BEFORE MEAL(S)    hydrALAZINE (APRESOLINE) 50 MG tablet Take 1 tablet by mouth 4 times daily    insulin (BASAGLAR KWIKPEN U-100 INSULIN) glargine 100 units/mL SubQ pen Inject 15 Units into the skin once daily.    JANUVIA 100 mg Tab Take 1 tablet (100 mg total) by mouth once daily.    losartan (COZAAR) 50 MG tablet Take 1 tablet by mouth once daily    metFORMIN (GLUCOPHAGE) 500 MG tablet TAKE 1 TABLET BY MOUTH ONCE DAILY IN THE MORNING AND  1  TABLET  AT  NOON  AND  2  ONCE  DAILY  IN  THE  EVENING  WITH  MEALS    rosuvastatin (CRESTOR) 20 MG tablet Take 1 tablet by mouth nightly    solifenacin (VESICARE) 10 MG tablet Take 1 tablet (10 mg total) by mouth once daily.     No current facility-administered medications on file prior to visit.       Review of Systems   Cardiovascular:  Negative for chest pain, dyspnea on exertion, palpitations and syncope.    Genitourinary: Negative.    Neurological: Negative.        Objective:   Physical Exam  Vitals and nursing note reviewed.   Constitutional:       Appearance: Normal appearance.   Pulmonary:      Effort: Pulmonary effort is normal.   Neurological:      General: No focal deficit present.      Mental Status: She is alert and oriented to person, place, and time.   Psychiatric:         Mood and Affect: Mood normal.         Behavior: Behavior normal.       Vitals:    05/30/24 0822   BP: 128/70   BP Location: Right arm   Patient Position: Sitting   BP Method: Medium (Automatic)   Pulse: 86   SpO2: 96%   Weight: 81 kg (178 lb 9.2 oz)     Lab Results   Component Value Date    CHOL 128 04/02/2024    CHOL 138 09/26/2023    CHOL 133 01/25/2022     Lab Results   Component Value Date    HDL 36 (L) 04/02/2024    HDL 43 09/26/2023    HDL 40 01/25/2022     Lab Results   Component Value Date    LDLCALC 46.4 (L) 04/02/2024    LDLCALC 51.4 (L) 09/26/2023    LDLCALC 16.2 (L) 01/25/2022     Lab Results   Component Value Date    TRIG 228 (H) 04/02/2024    TRIG 218 (H) 09/26/2023    TRIG 384 (H) 01/25/2022     Lab Results   Component Value Date    CHOLHDL 28.1 04/02/2024    CHOLHDL 31.2 09/26/2023    CHOLHDL 30.1 01/25/2022       Chemistry        Component Value Date/Time     04/02/2024 0925    K 4.8 04/02/2024 0925     04/02/2024 0925    CO2 27 04/02/2024 0925    BUN 34 (H) 04/02/2024 0925    CREATININE 1.1 04/02/2024 0925     (H) 04/02/2024 0925        Component Value Date/Time    CALCIUM 10.9 (H) 04/02/2024 0925    ALKPHOS 63 04/02/2024 0925    AST 14 04/02/2024 0925    ALT 12 04/02/2024 0925    BILITOT 0.2 04/02/2024 0925    ESTGFRAFRICA >60.0 07/28/2022 0915    EGFRNONAA >60.0 07/28/2022 0915          Lab Results   Component Value Date    TSH 1.913 04/02/2024     Lab Results   Component Value Date    INR 0.9 06/11/2021    INR 1.0 05/27/2021     Lab Results   Component Value Date    WBC 9.57 04/02/2024    HGB 11.3  (L) 04/02/2024    HCT 33.6 (L) 04/02/2024    MCV 94 04/02/2024     04/02/2024     BMP  Sodium   Date Value Ref Range Status   04/02/2024 140 136 - 145 mmol/L Final     Potassium   Date Value Ref Range Status   04/02/2024 4.8 3.5 - 5.1 mmol/L Final     Chloride   Date Value Ref Range Status   04/02/2024 101 95 - 110 mmol/L Final     CO2   Date Value Ref Range Status   04/02/2024 27 23 - 29 mmol/L Final     BUN   Date Value Ref Range Status   04/02/2024 34 (H) 8 - 23 mg/dL Final     Creatinine   Date Value Ref Range Status   04/02/2024 1.1 0.5 - 1.4 mg/dL Final     Calcium   Date Value Ref Range Status   04/02/2024 10.9 (H) 8.7 - 10.5 mg/dL Final     Anion Gap   Date Value Ref Range Status   04/02/2024 12 8 - 16 mmol/L Final     eGFR if    Date Value Ref Range Status   07/28/2022 >60.0 >60 mL/min/1.73 m^2 Final     eGFR if non    Date Value Ref Range Status   07/28/2022 >60.0 >60 mL/min/1.73 m^2 Final     Comment:     Calculation used to obtain the estimated glomerular filtration  rate (eGFR) is the CKD-EPI equation.        CrCl cannot be calculated (Patient's most recent lab result is older than the maximum 7 days allowed.).    Echo Conclusion    The left ventricle is normal in size with mildly decreased systolic function.  The estimated ejection fraction is 40%.  Left ventricular diastolic dysfunction.  Normal right ventricular size with normal right ventricular systolic function.  Mild to moderate tricuspid regurgitation.  Normal central venous pressure (3 mmHg).  The estimated PA systolic pressure is 44 mmHg.  There is pulmonary hypertension.  Mild mitral regurgitation.  There are segmental left ventricular wall motion abnormalities. Apical hypokinesis.     significant Diagnostic Studies: Angiography: Technical Procedures Used:   Non dominant rca   Left main patent   Lad is 40% prox ISR, 50% distal lad , small d1,d2   Left cic is large with 99% in segment restenosis of mid  left circ . Mod size om1, large om2 , lpda patent   S/p 2.75x12 mm stent , post dilated to 3.0 mm   rca is severely diffusely diseased small non dominant     Assessment:     1. Severe obesity (BMI 35.0-39.9) with comorbidity    2. Atherosclerotic heart disease of native coronary artery with other forms of angina pectoris    3. Aortic atherosclerosis    4. Hyperlipidemia, unspecified hyperlipidemia type    5. S/P coronary artery stent placement    6. Combined hyperlipidemia associated with type 2 diabetes mellitus    7. Essential hypertension          Plan:     S/P PCI to mid circ 6/2021  ON ASA, Plavix, Statin, BB and ARB, we will stop aspirin continue with Plavix monotherapy.    Continue with carvedilol, amlodipine, hydralazine, Lasix and losartan.    Blood pressure controlled.    We will get a carotid ultrasound screening.  History of coronary stent.    Given clonidine p.r.n. to use at home as directed.  Not used since last visit  Decrease salt intake.    Reviewed all tests and above medical conditions with patient in detail and formulated treatment plan.  Risk factor modification discussed.   Cardiac low salt diet discussed.  Maintaining healthy weight and weight loss goals were discussed in clinic.    F/u in 6 months

## 2024-06-08 RX ORDER — METFORMIN HYDROCHLORIDE 500 MG/1
TABLET ORAL
Qty: 360 TABLET | Refills: 1 | Status: SHIPPED | OUTPATIENT
Start: 2024-06-08

## 2024-06-08 NOTE — TELEPHONE ENCOUNTER
No care due was identified.  [facility] Embedded Care Due Messages. Reference number: [id number].   6/08/2024 6:41:45 AM CDT

## 2024-06-09 NOTE — TELEPHONE ENCOUNTER
Refill Decision Note   Alysha Bobby  is requesting a refill authorization.  Brief Assessment and Rationale for Refill:  Approve     Medication Therapy Plan:        Comments:     Note composed:10:32 PM 06/08/2024

## 2024-06-15 DIAGNOSIS — Z98.61 POST PTCA: ICD-10-CM

## 2024-06-15 DIAGNOSIS — I21.4 NSTEMI (NON-ST ELEVATED MYOCARDIAL INFARCTION): ICD-10-CM

## 2024-06-15 DIAGNOSIS — E11.21 DIABETIC NEPHROPATHY WITH PROTEINURIA: ICD-10-CM

## 2024-06-15 NOTE — TELEPHONE ENCOUNTER
Refill Routing Note   Medication(s) are not appropriate for processing by [facility] for the following reason(s):        Required labs abnormal    [facility] action(s):  Defer               Appointments  past 12m or future 3m with PCP    Date Provider   Last Visit   4/2/2024 Margie Bird MD   Next Visit   Visit date not found Margie Bird MD   ED visits in past 90 days: 0        Note composed:6:14 PM 06/15/2024

## 2024-06-15 NOTE — TELEPHONE ENCOUNTER
No care due was identified.  [facility] Embedded Care Due Messages. Reference number: [id number].   6/15/2024 6:41:59 AM CDT

## 2024-06-16 RX ORDER — GLIPIZIDE 10 MG/1
10 TABLET ORAL
Qty: 180 TABLET | Refills: 0 | Status: SHIPPED | OUTPATIENT
Start: 2024-06-16

## 2024-06-17 RX ORDER — CLOPIDOGREL BISULFATE 75 MG/1
75 TABLET ORAL
Qty: 90 TABLET | Refills: 3 | Status: SHIPPED | OUTPATIENT
Start: 2024-06-17

## 2024-06-19 DIAGNOSIS — E11.21 DIABETIC NEPHROPATHY WITH PROTEINURIA: ICD-10-CM

## 2024-06-19 DIAGNOSIS — E11.40 TYPE 2 DIABETES MELLITUS WITH DIABETIC NEUROPATHY, WITHOUT LONG-TERM CURRENT USE OF INSULIN: ICD-10-CM

## 2024-06-19 RX ORDER — SITAGLIPTIN 100 MG/1
100 TABLET, FILM COATED ORAL
Qty: 90 TABLET | Refills: 1 | Status: SHIPPED | OUTPATIENT
Start: 2024-06-19

## 2024-06-19 NOTE — TELEPHONE ENCOUNTER
No care due was identified.  [facility] Embedded Care Due Messages. Reference number: [id number].   6/19/2024 6:41:52 AM CDT

## 2024-06-19 NOTE — TELEPHONE ENCOUNTER
Refill Decision Note   Alysha Rosales  is requesting a refill authorization.  Brief Assessment and Rationale for Refill:  Approve     Medication Therapy Plan:         Comments:     Note composed:10:03 AM 06/19/2024

## 2024-06-25 RX ORDER — ROSUVASTATIN CALCIUM 20 MG/1
TABLET, COATED ORAL
Qty: 90 TABLET | Refills: 3 | Status: SHIPPED | OUTPATIENT
Start: 2024-06-25

## 2024-06-28 ENCOUNTER — HOSPITAL ENCOUNTER (OUTPATIENT)
Dept: CARDIOLOGY | Facility: HOSPITAL | Age: 69
Discharge: HOME | End: 2024-06-28
Attending: INTERNAL MEDICINE
Payer: MEDICARE

## 2024-06-28 VITALS
DIASTOLIC BLOOD PRESSURE: 64 MMHG | WEIGHT: 178 LBS | BODY MASS INDEX: 32.76 KG/M2 | SYSTOLIC BLOOD PRESSURE: 139 MMHG | HEIGHT: 62 IN

## 2024-06-28 DIAGNOSIS — Z95.5 S/P CORONARY ARTERY STENT PLACEMENT: ICD-10-CM

## 2024-06-28 DIAGNOSIS — I25.118 ATHEROSCLEROTIC HEART DISEASE OF NATIVE CORONARY ARTERY WITH OTHER FORMS OF ANGINA PECTORIS: ICD-10-CM

## 2024-06-28 LAB
LEFT ARM DIASTOLIC BLOOD PRESSURE: 70 MMHG
LEFT ARM SYSTOLIC BLOOD PRESSURE: 135 MMHG
LEFT CBA DIAS: 24 CM/S
LEFT CBA SYS: 150 CM/S
LEFT CCA DIST DIAS: 19 CM/S
LEFT CCA DIST SYS: 100 CM/S
LEFT CCA MID DIAS: 18 CM/S
LEFT CCA MID SYS: 130 CM/S
LEFT CCA PROX DIAS: 18 CM/S
LEFT CCA PROX SYS: 123 CM/S
LEFT ECA DIAS: 20 CM/S
LEFT ECA SYS: 207 CM/S
LEFT ICA DIST DIAS: 28 CM/S
LEFT ICA DIST SYS: 115 CM/S
LEFT ICA MID DIAS: 24 CM/S
LEFT ICA MID SYS: 131 CM/S
LEFT ICA PROX DIAS: 21 CM/S
LEFT ICA PROX SYS: 135 CM/S
LEFT VERTEBRAL DIAS: 24 CM/S
LEFT VERTEBRAL SYS: 128 CM/S
OHS CV CAROTID RIGHT ICA EDV HIGHEST: 27
OHS CV CAROTID ULTRASOUND LEFT ICA/CCA RATIO: 1.35
OHS CV CAROTID ULTRASOUND RIGHT ICA/CCA RATIO: 2.37
OHS CV PV CAROTID LEFT HIGHEST CCA: 130
OHS CV PV CAROTID LEFT HIGHEST ICA: 135
OHS CV PV CAROTID RIGHT HIGHEST CCA: 90
OHS CV PV CAROTID RIGHT HIGHEST ICA: 187
OHS CV US CAROTID LEFT HIGHEST EDV: 28
RIGHT ARM DIASTOLIC BLOOD PRESSURE: 64 MMHG
RIGHT ARM SYSTOLIC BLOOD PRESSURE: 139 MMHG
RIGHT CBA DIAS: 10 CM/S
RIGHT CBA SYS: 96 CM/S
RIGHT CCA DIST DIAS: 9 CM/S
RIGHT CCA DIST SYS: 79 CM/S
RIGHT CCA MID DIAS: 10 CM/S
RIGHT CCA MID SYS: 90 CM/S
RIGHT CCA PROX DIAS: 7 CM/S
RIGHT CCA PROX SYS: 70 CM/S
RIGHT ECA DIAS: 25 CM/S
RIGHT ECA SYS: 237 CM/S
RIGHT ICA DIST DIAS: 27 CM/S
RIGHT ICA DIST SYS: 134 CM/S
RIGHT ICA MID DIAS: 21 CM/S
RIGHT ICA MID SYS: 124 CM/S
RIGHT ICA PROX DIAS: 16 CM/S
RIGHT ICA PROX SYS: 187 CM/S
RIGHT VERTEBRAL DIAS: 15 CM/S
RIGHT VERTEBRAL SYS: 46 CM/S

## 2024-06-28 PROCEDURE — 93880 EXTRACRANIAL BILAT STUDY: CPT | Mod: 26,,, | Performed by: INTERNAL MEDICINE

## 2024-06-28 PROCEDURE — 93880 EXTRACRANIAL BILAT STUDY: CPT

## 2024-07-10 ENCOUNTER — TELEPHONE (OUTPATIENT)
Dept: UROLOGY | Facility: CLINIC | Age: 69
End: 2024-07-10
Payer: MEDICARE

## 2024-07-10 NOTE — TELEPHONE ENCOUNTER
Called pt and informed her that Ms. Barbour would not be in clinic on 8/15/24 and that her appt was being changed for her to see another MD.  Pt verbalized understanding and appt was changed.

## 2024-08-15 ENCOUNTER — OFFICE VISIT (OUTPATIENT)
Dept: UROLOGY | Facility: CLINIC | Age: 69
End: 2024-08-15
Payer: MEDICARE

## 2024-08-15 VITALS
SYSTOLIC BLOOD PRESSURE: 136 MMHG | WEIGHT: 175 LBS | HEIGHT: 62 IN | HEART RATE: 72 BPM | DIASTOLIC BLOOD PRESSURE: 64 MMHG | RESPIRATION RATE: 14 BRPM | BODY MASS INDEX: 32.2 KG/M2

## 2024-08-15 DIAGNOSIS — N32.81 OAB (OVERACTIVE BLADDER): Primary | ICD-10-CM

## 2024-08-15 PROCEDURE — 99214 OFFICE O/P EST MOD 30 MIN: CPT | Mod: PBBFAC | Performed by: UROLOGY

## 2024-08-15 PROCEDURE — 99213 OFFICE O/P EST LOW 20 MIN: CPT | Mod: S$PBB,,, | Performed by: UROLOGY

## 2024-08-15 PROCEDURE — 99999 PR PBB SHADOW E&M-EST. PATIENT-LVL IV: CPT | Mod: PBBFAC,,, | Performed by: UROLOGY

## 2024-08-15 NOTE — PROGRESS NOTES
Chief Complaint:   Recurrent UTIs    HPI:   08/15/2024 - returns today for follow-up, no new issues in the interim, VESIcare still working very well for her OAB symptoms, no UTIs since her last visit, no gross hematuria or dysuria    Patient is presenting as a follow-up to recurrent urinary tract infections.  Was recently treated for positive urine culture and states she is completely asymptomatic secondary to completion of antibiotics.  Urine in clinic is negative.  PVR is 14 mL.  States that VESIcare has completely resolved her overactive bladder symptoms.  Recent renal ultrasound was normal.  02/01/2024  Patient is a 69-year-old female that is presenting as a follow-up to overactive bladder and recurrent urinary tract infections. Reports that VESIcare 10 mg once daily has resolved her overactive bladder symptoms.  Nocturia is once nightly and daytime frequency has decreased to the point where she is now wearing a pad.  Urine in clinic indicates nitrates and leukocytes, all other parameters are negative.  Patient is asymptomatic for urinary tract infection.  PVR is 58 mL.  Is currently on nightly Keflex prophylactically.  11/14/2023  Patient is a 68-year-old female that has been followed by this clinic for recurrent urinary tract infections.  Urine in clinic is negative.  PVR is 27 mL.  Patient states that over the last 6 months she is had urge incontinence and nocturia.  Patient is wearing a pad that she changes several times a day.  Denies gross hematuria.  States she drinks very little water throughout the day.  08/31/2023  Patient is a delightful 68-year-old female that is presenting as a follow-up to recurrent UTIs, Klebsiella.  Was recently treated with Meropenum 1 gram every 8 hours for 7 days per PICC line .  Patient states that she is completely asymptomatic in urine in clinic is negative.  07/18/2023  Patient is a 68-year-old female that is presenting as a follow-up to recurrent urinary tract infections.   Patient was recently treated for urine culture indicating Klebsiella.  Patient states that she is completely asymptomatic after completion of antibiotics, however, urine in clinic indicates nitrates and leukocytes all other parameters are negative.  PVR is 14 mL.  Denies pelvic or flank pain.  Denies gross hematuria.  06/22/.2023  Patient is a 68-year-old female that is presenting with recurrent urinary tract infections.  In reviewing EMR, patient had 3 positive urine cultures including MRSA.  Patient states that she is completed antibiotics and is currently asymptomatic. Urine in clinic is negative and PVR is 14 mL.  Patient denies pelvic or flank pain.  Patient states she has a remote history of renal stones.  Denies gross hematuria.  Reports that she is occasionally incontinent and wears a pad that she is changed several times during the day.s:  has a current medication list which includes the following prescription(s): allopurinol, amlodipine, blood sugar diagnostic, carvedilol, clonidine, clopidogrel, ergocalciferol (vitamin d2), ferrous sulfate, furosemide, glipizide, hydralazine, basaglar kwikpen u-100 insulin, januvia, losartan, metformin, rosuvastatin, and solifenacin.    Review of Systems:  General: No fever, chills  Skin: No rashes  Chest:  Denies cough and sputum production  Heart: Denies chest pain  Resp: Denies dyspnea  Abdomen: Denies diarrhea, abdominal pain, hematemesis, or blood in stool.  Musculoskeletal: No joint stiffness or swelling. Denies back pain.  : see HPI  Neuro: no dizziness or weakness      PMH:   has a past medical history of BDR (background diabetic retinopathy) (11/30/2023), CAD (coronary artery disease), Diabetes mellitus, type 2, DM (diabetes mellitus), DM (diabetes mellitus) (2012), Elevated troponin (05/27/2021), High level of uric acid in blood, Hyperlipidemia, Hyperplastic rectal polyp, Hypertension, Multinodular goiter, Multiple thyroid nodules (05/05/2016),  Osteoarthritis, Osteoarthritis of back, Osteoporosis, unspecified, Postmenopausal, Proteinuria, Pustular psoriasis, Subclavian artery stenosis, and Vitamin D deficiency disease.    PSH:   has a past surgical history that includes left knee surgery for bone graft and pin placement; heart catheterization with stents placed (2004 and 08/18/2009); Left heart catheterization (Left, 06/18/2021); Coronary stent placement (N/A, 06/18/2021); Coronary angiography (N/A, 06/18/2021); Cataract extraction w/  intraocular lens implant (Right, 04/13/2016); Cataract extraction w/  intraocular lens implant (Left); and Colonoscopy (N/A, 08/30/2022).    FamHx: family history includes Cancer in her father; Diabetes in her mother; Hypertension in her mother and sister; No Known Problems in her daughter and son; Stroke in her mother.    SocHx:  reports that she quit smoking about 20 years ago. Her smoking use included cigarettes. She started smoking about 53 years ago. She has a 33 pack-year smoking history. She has been exposed to tobacco smoke. She has never used smokeless tobacco. She reports current alcohol use. She reports that she does not use drugs.      Physical Exam:  General: A&Ox3, no apparent distress, no deformities  Neck: No masses, normal thyroid  Lungs: normal inspiration, no use of accessory muscles  Heart: normal pulse, no arrhythmias  Abdomen: Soft, NT, ND, no masses, no hernias, no hepatosplenomegaly  Lymphatic: Neck and groin nodes negative  Skin: The skin is warm and dry. No jaundice.    Labs/Studies:   02/06/2024  EXAMINATION:  US RETROPERITONEAL COMPLETE     CLINICAL HISTORY:  Urinary tract infection, site not specified     TECHNIQUE:  Ultrasound of the kidneys and urinary bladder was performed including color flow and Doppler evaluation of the kidneys.     COMPARISON:  None.     FINDINGS:  Right kidney: The right kidney measures 11.6 cm. No cortical thinning. No loss of corticomedullary distinction. No mass. No  stone visualized.  No hydronephrosis.     Left kidney: The left kidney measures 10.8 cm. No cortical thinning. No loss of corticomedullary distinction. No mass. No stone visualized.  No hydronephrosis.     There is several non dependent echogenic foci with associated dirty shadowing present in the urinary bladder, suggestive of intraluminal gas.     Impression:  1. Normal sonographic appearance of the bilateral kidneys  2. Possible intraluminal gas within the urinary bladder.  Correlate clinically for any history of recent instrumentation.    Impression/Plan:   1. Overactive bladder - continue vesicare, f/u 1 yr with NP Rushing    2. Recurrent urinary tract infections - call with recurrent symptoms    Uday Raygoza MD

## 2024-08-22 ENCOUNTER — PATIENT OUTREACH (OUTPATIENT)
Dept: ADMINISTRATIVE | Facility: HOSPITAL | Age: 69
End: 2024-08-22
Payer: MEDICARE

## 2024-08-22 NOTE — PROGRESS NOTES
VBHM Score: 2     Eye Exam  Mammogram    Tetanus Vaccine  Shingles/Zoster Vaccine  RSV Vaccine                  Health Maintenance Topic(s) Outreach Outcomes & Actions Taken:    Eye Exam - Outreach Outcomes & Actions Taken  : eye exam scheduled with  on 10/4/24    Breast Cancer Screening - Outreach Outcomes & Actions Taken  : mammogram scheduled on 10/4/24, [place] office       Additional Notes:  Patient has next primary care appointment scheduled on 10/3/24.           Care Management, Digital Medicine, and/or Education Referrals    OPCM Risk Score: 33.4         Next Steps - Referral Actions: no referrals        Additional Notes:  SDOH reviewed 11/2023, patient voiced no needs today

## 2024-08-23 DIAGNOSIS — E79.0 ELEVATED URIC ACID IN BLOOD: ICD-10-CM

## 2024-08-23 NOTE — TELEPHONE ENCOUNTER
Care Due:                  Date            Visit Type   Department     Provider  --------------------------------------------------------------------------------                                EP -                              PRIMARY      [facility]  Last Visit: 04-      CARE ([facility])   MEDICINE       Margie Bird                              EP -                              PRIMARY      [facility]  Next Visit: 04-      CARE ([facility])   MEDICINE       Margie Bird                                                            Last  Test          Frequency    Reason                     Performed    Due Date  --------------------------------------------------------------------------------    HBA1C.......  6 months...  HEIDY glipiZIDE,        04- 09-                             insulin, metFORMIN.......    [employer] Embedded Care Due Messages. Reference number: [id number].   8/23/2024 5:24:55 PM CDT

## 2024-08-24 RX ORDER — ALLOPURINOL 100 MG/1
TABLET ORAL
Qty: 90 TABLET | Refills: 2 | Status: SHIPPED | OUTPATIENT
Start: 2024-08-24

## 2024-08-24 NOTE — TELEPHONE ENCOUNTER
Provider Staff:  Action required for this patient    Requires labs      Please see care gap opportunities below in Care Due Message.    Thanks!  [facility]     Appointments      Date Provider   Last Visit   4/2/2024 Margie Bird MD   Next Visit   Visit date not found Margie Bird MD     Refill Decision Note   Alysha Rosales  is requesting a refill authorization.  Brief Assessment and Rationale for Refill:  Approve     Medication Therapy Plan:        Comments:     Note composed:12:23 PM 08/24/2024

## 2024-08-30 DIAGNOSIS — I10 ESSENTIAL HYPERTENSION: ICD-10-CM

## 2024-08-31 RX ORDER — LOSARTAN POTASSIUM 50 MG/1
50 TABLET ORAL
Qty: 90 TABLET | Refills: 1 | Status: SHIPPED | OUTPATIENT
Start: 2024-08-31

## 2024-09-11 DIAGNOSIS — E11.21 DIABETIC NEPHROPATHY WITH PROTEINURIA: ICD-10-CM

## 2024-09-11 RX ORDER — GLIPIZIDE 10 MG/1
10 TABLET ORAL
Qty: 180 TABLET | Refills: 0 | Status: SHIPPED | OUTPATIENT
Start: 2024-09-11

## 2024-09-11 NOTE — TELEPHONE ENCOUNTER
No care due was identified.  [facility] Embedded Care Due Messages. Reference number: [id number].   9/11/2024 6:42:05 AM CDT

## 2024-09-11 NOTE — TELEPHONE ENCOUNTER
Refill Decision Note   Alysha Rosales  is requesting a refill authorization.  Brief Assessment and Rationale for Refill:  Approve     Medication Therapy Plan:  eGFR reviewed. Current dosage within recommendatons. Renal dose adjustment not required at this time.      Pharmacist review requested: Yes   Comments:     Note composed:12:08 PM 09/11/2024

## 2024-09-11 NOTE — TELEPHONE ENCOUNTER
Refill Routing Note   Medication(s) are not appropriate for processing by [facility] for the following reason(s):        Required labs abnormal    [facility] action(s):  Defer           Pharmacist review requested: Yes     Appointments  past 12m or future 3m with PCP    Date Provider   Last Visit   4/2/2024 Margie Bird MD   Next Visit   Visit date not found Margie Bird MD   ED visits in past 90 days: 0        Note composed:8:31 AM 09/11/2024

## 2024-09-12 ENCOUNTER — TELEPHONE (OUTPATIENT)
Dept: FAMILY MEDICINE | Facility: CLINIC | Age: 69
End: 2024-09-12
Payer: MEDICARE

## 2024-09-27 ENCOUNTER — PATIENT OUTREACH (OUTPATIENT)
Dept: ADMINISTRATIVE | Facility: HOSPITAL | Age: 69
End: 2024-09-27
Payer: MEDICARE

## 2024-10-02 DIAGNOSIS — E11.9 TYPE 2 DIABETES MELLITUS WITHOUT COMPLICATION: ICD-10-CM

## 2024-10-03 ENCOUNTER — LAB VISIT (OUTPATIENT)
Dept: LAB | Facility: HOSPITAL | Age: 69
End: 2024-10-03
Attending: FAMILY MEDICINE
Payer: MEDICARE

## 2024-10-03 ENCOUNTER — OFFICE VISIT (OUTPATIENT)
Dept: PODIATRY | Facility: CLINIC | Age: 69
End: 2024-10-03
Payer: MEDICARE

## 2024-10-03 ENCOUNTER — OFFICE VISIT (OUTPATIENT)
Dept: FAMILY MEDICINE | Facility: CLINIC | Age: 69
End: 2024-10-03
Attending: FAMILY MEDICINE
Payer: MEDICARE

## 2024-10-03 VITALS
SYSTOLIC BLOOD PRESSURE: 130 MMHG | WEIGHT: 177.94 LBS | HEART RATE: 86 BPM | RESPIRATION RATE: 18 BRPM | OXYGEN SATURATION: 97 % | HEIGHT: 62 IN | TEMPERATURE: 97 F | DIASTOLIC BLOOD PRESSURE: 62 MMHG | BODY MASS INDEX: 32.74 KG/M2

## 2024-10-03 DIAGNOSIS — E11.69 COMBINED HYPERLIPIDEMIA ASSOCIATED WITH TYPE 2 DIABETES MELLITUS: ICD-10-CM

## 2024-10-03 DIAGNOSIS — E11.59 HYPERTENSION ASSOCIATED WITH DIABETES: Primary | ICD-10-CM

## 2024-10-03 DIAGNOSIS — E11.59 HYPERTENSION ASSOCIATED WITH DIABETES: ICD-10-CM

## 2024-10-03 DIAGNOSIS — E11.40 TYPE 2 DIABETES MELLITUS WITH DIABETIC NEUROPATHY, WITHOUT LONG-TERM CURRENT USE OF INSULIN: ICD-10-CM

## 2024-10-03 DIAGNOSIS — I15.2 HYPERTENSION ASSOCIATED WITH DIABETES: Primary | ICD-10-CM

## 2024-10-03 DIAGNOSIS — L60.3 ONYCHODYSTROPHY: ICD-10-CM

## 2024-10-03 DIAGNOSIS — N18.31 STAGE 3A CHRONIC KIDNEY DISEASE: ICD-10-CM

## 2024-10-03 DIAGNOSIS — E11.8 TYPE 2 DIABETES WITH COMPLICATION: Primary | ICD-10-CM

## 2024-10-03 DIAGNOSIS — I70.0 AORTIC ATHEROSCLEROSIS: ICD-10-CM

## 2024-10-03 DIAGNOSIS — Z23 NEED FOR VACCINATION: ICD-10-CM

## 2024-10-03 DIAGNOSIS — I25.118 ATHEROSCLEROTIC HEART DISEASE OF NATIVE CORONARY ARTERY WITH OTHER FORMS OF ANGINA PECTORIS: ICD-10-CM

## 2024-10-03 DIAGNOSIS — I15.2 HYPERTENSION ASSOCIATED WITH DIABETES: ICD-10-CM

## 2024-10-03 DIAGNOSIS — E66.811 OBESITY (BMI 30.0-34.9): ICD-10-CM

## 2024-10-03 DIAGNOSIS — Z79.01 ANTICOAGULATED: ICD-10-CM

## 2024-10-03 DIAGNOSIS — E78.2 COMBINED HYPERLIPIDEMIA ASSOCIATED WITH TYPE 2 DIABETES MELLITUS: ICD-10-CM

## 2024-10-03 LAB
ALBUMIN SERPL BCP-MCNC: 3.6 G/DL (ref 3.5–5.2)
ALP SERPL-CCNC: 73 U/L (ref 55–135)
ALT SERPL W/O P-5'-P-CCNC: 9 U/L (ref 10–44)
ANION GAP SERPL CALC-SCNC: 10 MMOL/L (ref 8–16)
AST SERPL-CCNC: 10 U/L (ref 10–40)
BILIRUB SERPL-MCNC: 0.2 MG/DL (ref 0.1–1)
BUN SERPL-MCNC: 25 MG/DL (ref 8–23)
CALCIUM SERPL-MCNC: 10.5 MG/DL (ref 8.7–10.5)
CHLORIDE SERPL-SCNC: 102 MMOL/L (ref 95–110)
CO2 SERPL-SCNC: 25 MMOL/L (ref 23–29)
CREAT SERPL-MCNC: 1.1 MG/DL (ref 0.5–1.4)
EST. GFR  (NO RACE VARIABLE): 54.4 ML/MIN/1.73 M^2
ESTIMATED AVG GLUCOSE: 174 MG/DL (ref 68–131)
GLUCOSE SERPL-MCNC: 284 MG/DL (ref 70–110)
HBA1C MFR BLD: 7.7 % (ref 4–5.6)
POTASSIUM SERPL-SCNC: 5 MMOL/L (ref 3.5–5.1)
PROT SERPL-MCNC: 7.3 G/DL (ref 6–8.4)
SODIUM SERPL-SCNC: 137 MMOL/L (ref 136–145)

## 2024-10-03 PROCEDURE — 90653 IIV ADJUVANT VACCINE IM: CPT | Mod: PBBFAC,PO

## 2024-10-03 PROCEDURE — 80053 COMPREHEN METABOLIC PANEL: CPT | Performed by: FAMILY MEDICINE

## 2024-10-03 PROCEDURE — 99999 PR PBB SHADOW E&M-EST. PATIENT-LVL III: CPT | Mod: PBBFAC,,, | Performed by: PODIATRIST

## 2024-10-03 PROCEDURE — 99213 OFFICE O/P EST LOW 20 MIN: CPT | Mod: PBBFAC | Performed by: PODIATRIST

## 2024-10-03 PROCEDURE — 83036 HEMOGLOBIN GLYCOSYLATED A1C: CPT | Performed by: FAMILY MEDICINE

## 2024-10-03 PROCEDURE — 11721 DEBRIDE NAIL 6 OR MORE: CPT | Mod: Q9,PBBFAC | Performed by: PODIATRIST

## 2024-10-03 PROCEDURE — 99215 OFFICE O/P EST HI 40 MIN: CPT | Mod: PBBFAC,25,27,PO | Performed by: FAMILY MEDICINE

## 2024-10-03 PROCEDURE — 99999PBSHW FLU VACCINE - ADJUVANTED: Mod: PBBFAC,,,

## 2024-10-03 PROCEDURE — 36415 COLL VENOUS BLD VENIPUNCTURE: CPT | Mod: PO | Performed by: FAMILY MEDICINE

## 2024-10-03 PROCEDURE — 99999 PR PBB SHADOW E&M-EST. PATIENT-LVL V: CPT | Mod: PBBFAC,,, | Performed by: FAMILY MEDICINE

## 2024-10-03 PROCEDURE — 99214 OFFICE O/P EST MOD 30 MIN: CPT | Mod: 25,S$PBB,, | Performed by: FAMILY MEDICINE

## 2024-10-03 PROCEDURE — G0008 ADMIN INFLUENZA VIRUS VAC: HCPCS | Mod: PBBFAC,PO

## 2024-10-03 NOTE — PROGRESS NOTES
Subjective:       Patient ID: Alysha Rosales is a 69 y.o. female.    Chief Complaint: Routine Foot Care (Patient is a diabetic and complains of thick, curling toenails. She denies pain at present but states there is discomfort with certain shoes. She was last seen on 10.3.24 by Margie Bird. She continues on Plavix.)      HPI: Patient presents to the office today with the chief complaint of elongated, thickened and dystrophic nail plates to the B/L foot. This patient is a Diabetic Type II, complicated with Peripheral Angiopathy and Peripheral Neuropathy. Patient does follow with Primary Care and/or Endocrinology for management of Diabetes Mellitus. This patient's PMD is Margie Bird MD. This patient last saw his/her primary care provider on 10/03/2024.     Hemoglobin A1C   Date Value Ref Range Status   04/02/2024 7.5 (H) 4.0 - 5.6 % Final     Comment:     ADA Screening Guidelines:  5.7-6.4%  Consistent with prediabetes  >or=6.5%  Consistent with diabetes    High levels of fetal hemoglobin interfere with the HbA1C  assay. Heterozygous hemoglobin variants (HbS, HgC, etc)do  not significantly interfere with this assay.   However, presence of multiple variants may affect accuracy.     09/26/2023 6.5 (H) 4.0 - 5.6 % Final     Comment:     ADA Screening Guidelines:  5.7-6.4%  Consistent with prediabetes  >or=6.5%  Consistent with diabetes    High levels of fetal hemoglobin interfere with the HbA1C  assay. Heterozygous hemoglobin variants (HbS, HgC, etc)do  not significantly interfere with this assay.   However, presence of multiple variants may affect accuracy.     04/18/2023 10.6 (H) 4.0 - 5.6 % Final     Comment:     ADA Screening Guidelines:  5.7-6.4%  Consistent with prediabetes  >or=6.5%  Consistent with diabetes    High levels of fetal hemoglobin interfere with the HbA1C  assay. Heterozygous hemoglobin variants (HbS, HgC, etc)do  not significantly interfere with this assay.   However, presence of  multiple variants may affect accuracy.     .     Review of patient's allergies indicates:   Allergen Reactions    Codeine      Other reaction(s): Chills       Past Medical History:   Diagnosis Date    BDR (background diabetic retinopathy) 2023    CAD (coronary artery disease)     Followed by Dr. Stevo Kaba    Diabetes mellitus, type 2     DM (diabetes mellitus)     BS didn't check 2020    DM (diabetes mellitus)     BS didn't check 02/10/2022    Elevated troponin 2021    High level of uric acid in blood     Hyperlipidemia     Hyperplastic rectal polyp     on 2009 colonoscopy    Hypertension     Multinodular goiter     noted on 2015 CTA carotid scan    Multiple thyroid nodules 2016    on Thyroid U/S    Osteoarthritis     Left knee    Osteoarthritis of back     Osteoporosis, unspecified     Postmenopausal     No history of abnormal pap smear    Proteinuria     Pustular psoriasis     hands and feet    Subclavian artery stenosis     Left; with the proximal segment at 60-70% per 10/02/2008 carotid CTA    Vitamin D deficiency disease        Family History   Problem Relation Name Age of Onset    Diabetes Mother      Hypertension Mother      Stroke Mother      Hypertension Sister      Cancer Father          Lung cancer (smoker)    No Known Problems Daughter      No Known Problems Son      Heart disease Neg Hx         Social History     Socioeconomic History    Marital status:     Number of children: 2   Occupational History    Occupation: Retired   Tobacco Use    Smoking status: Former     Current packs/day: 0.00     Average packs/day: 1 pack/day for 33.0 years (33.0 ttl pk-yrs)     Types: Cigarettes     Start date: 1971     Quit date: 2004     Years since quittin.2     Passive exposure: Past    Smokeless tobacco: Never   Substance and Sexual Activity    Alcohol use: Yes     Alcohol/week: 0.0 standard drinks of alcohol     Comment: Rarely    Drug use: No     Sexual activity: Yes     Partners: Male     Birth control/protection: Post-menopausal   Social History Narrative    She wears seatbelt. She works at [employer] 3 times per week (Monday, Wednesday, ). She states her   in 2020 at the age of 68 due to lung cancer.     Social Drivers of Health     Financial Resource Strain: Low Risk  (10/2/2024)    Overall Financial Resource Strain (CARDIA)     Difficulty of Paying Living Expenses: Not hard at all   Food Insecurity: No Food Insecurity (10/2/2024)    Hunger Vital Sign     Worried About Running Out of Food in the Last Year: Never true     Ran Out of Food in the Last Year: Never true   Transportation Needs: No Transportation Needs (2023)    PRAPARE - Transportation     Lack of Transportation (Medical): No     Lack of Transportation (Non-Medical): No   Physical Activity: Inactive (10/3/2024)    Exercise Vital Sign     Days of Exercise per Week: 3 days     Minutes of Exercise per Session: 0 min   Stress: No Stress Concern Present (10/2/2024)    [employer] [place] [employer] - Occupational Stress Questionnaire     Feeling of Stress : Not at all   Housing Stability: Unknown (10/2/2024)    Housing Stability Vital Sign     Unable to Pay for Housing in the Last Year: No       Past Surgical History:   Procedure Laterality Date    CATARACT EXTRACTION W/  INTRAOCULAR LENS IMPLANT Right 2016    [facility]    CATARACT EXTRACTION W/  INTRAOCULAR LENS IMPLANT Left     [facility]    COLONOSCOPY N/A 2022    Procedure: COLONOSCOPY;  Surgeon: Reanna Garber MD;  Location: [facility] ENDO;  Service: Endoscopy;  Laterality: N/A;    CORONARY ANGIOGRAPHY N/A 2021    stents x  4    CORONARY STENT PLACEMENT N/A 2021    Procedure: INSERTION, STENT, CORONARY ARTERY;  Surgeon: Desiree William MD;  Location: [facility] CATH LAB;  Service: Cardiology;  Laterality: N/A;    heart catheterization with stents placed   and 2009    LEFT HEART  CATHETERIZATION Left 06/18/2021    Procedure: CATHETERIZATION, HEART, LEFT;  Surgeon: Desiree William MD;  Location: [facility] CATH LAB;  Service: Cardiology;  Laterality: Left;  Covid + week of    left knee surgery for bone graft and pin placement         Review of Systems       Objective:   There were no vitals taken for this visit.    Physical Exam  LOWER EXTREMITY PHYSICAL EXAMINATION    VASCULAR:  The right dorsalis pedis pulse 2/4 and the right posterior tibial pulse 2/4.  The left dorsalis pedis pulse 2/4 and posterior tibial pulse on the left is 2/4.  Capillary refill is intact.  Pedal hair growth intact    NEUROLOGY: Protective sensation is not intact to the left and right plantar surfaces of the foot and digits, as the patient has no sensation/detection at greater than 4 distinct points of contact with 5.07 [place] monofilament. Sensation to light touch is intact on the left and right foot. Proprioception is intact, bilateral. Sensation to pin prick is reduced to absent. Vibratory sensation is diminished.    DERMATOLOGY:  Skin is supple, moist, intact.  There is no signs of callusing, ulcerations, other lesions identified to the dorsal or plantar aspect of the right or left foot.  The R1, 2, 5 and  L 3,4, 5 are thickened, discolored dystrophic.  There is subungual debris.  Nail plates have area of dark discoloration.  The remaining nails 3-4 on the right foot and the left foot are elongated but of normal color, thickness, and texture.   There is no signs of ingrowing into the medial or lateral borders.  There is no evidence of wounds or skin breakdown.  No edema or erythema.  No obvious lacerations or fissuring.  Interdigital spaces are clean, dry, intact.  No rashes or scars appreciated.    ORTHOPEDIC: Manual Muscle Testing is 5/5 in all planes on the left and right, without pains, with and without resistance. Gait pattern is non-antalgic.    Assessment:     1. Type 2 diabetes with complication     2. Anticoagulated    3. Onychodystrophy        Plan:     Type 2 diabetes with complication    Anticoagulated    Onychodystrophy        Thorough discussion is had with the patient this afternoon, concerning the diagnosis, its etiology, and the treatment algorithm at present.  Greater than 50% of this visit spent on counseling and coordination of care. Greater than 15 minutes of a 20 minute appointment spent on education about the diabetic foot, neuropathy, and prevention of limb loss.  Shoe inspection. Diabetic Foot Education. Patient reminded of the importance of good nutrition and blood sugar control to help prevent podiatric complications of diabetes. Patient instructed on proper foot hygeine. We discussed wearing proper and supportive shoe gear, daily foot inspections, never walking barefooted or sock footed, never putting sharp instruments to feet which can cause major complications associated with infection, ulcers, lacerations.      Dystrophic nail plates, as outlined above (R#1,2,5  ; L#3,4,5 ), are sharply debrided with double action nail nipper, and/or with the assistance of a mechanical rotary kay, with removal of all offending nail and nail border(s), for reduction of pains. Nails are reduced in terms of length, width and girth with removal of subungual debris to facilitate pain free weight bearing and ambulation. The elongated nails as outlined in the objective portion of this note, were trimmed to appropriate length, with a double action nail nipper, for alleviation/reduction of pains as well. Follow up in approx. 3-4 months.        Future Appointments   Date Time Provider Department Center   10/4/2024  2:20 PM [facility] MAMMO1 [facility] MAMMO [facility]   10/4/2024  3:15 PM Jm Wadsworth OD ON OPHTHAL [facility]   12/17/2024 10:40 AM Desiree William MD ON CARDIO [facility]   1/30/2025  1:00 PM Lauryn Joe DPM ONLC POD [facility]   4/3/2025  9:00 AM Margie Bird MD [facility]  [facility]   8/18/2025  8:40 AM Haley Barbour NP ON UROLOGY  [facility]

## 2024-10-03 NOTE — PROGRESS NOTES
Alysha Rosales    Chief Complaint   Patient presents with    Hypertension    Diabetes    Follow-up       History of Present Illness:   Ms. Rosales comes in today for 6-month hypertension and diabetes follow-up.  She states she is fasting and has taken medications today. She states she takes losartan 50 mg daily, Coreg 25 mg twice daily, hydralazine 50 mg 4 times daily, Lasix 40 mg daily, amlodipine 5 mg daily, Catapres 0.3 mg daily prn for blood pressure control; metformin 500 mg morning, 500 mg noon, 1000 mg evening, Januvia 100 mg daily, Basaglar 15 units daily, glipizide 10 mg twice daily for diabetes; Crestor 20 mg nightly for high cholesterol; Plavix 75 mg daily for CAD.  She states she performs home blood pressure checks 3 times per week with levels ranging 130/60's and performs home glucose checks 3 times per week with levels ranging 180-200's.  She states she walks 3 times a week while working 10-hour shifts at the [employer].  She states she monitors her diet but has no appetite.    She reports no acute problems today. She denies having fever, chills, fatigue, appetite changes; shortness of breath, cough, wheezing; chest pain, palpitations, leg swelling; abdominal pain, nausea, vomiting, diarrhea, constipation; unusual urinary symptoms; polydipsia, polyphagia, polyuria, hot or cold intolerance; back pain (today); acute visual changes, numbness, headache; anxiety, depression, homicidal or suicidal thoughts.      She states she will see Dr. Wadsworth, optometrist, tomorrow for eye exam.    She saw Dr. William, cardiologist, on May 30, 2024 for severe obesity (BMI 35.0-39.9) with comorbidity, atherosclerotic heart disease of native coronary artery with other forms of angina pectoris, aortic atherosclerosis, hyperlipidemia, unspecified hyperlipidemia type, S/P coronary artery stent placement, combined hyperlipidemia associated with type 2 diabetes mellitus, essential hypertension.  She saw Dr. Raygoza,  urologist, on August 15, 2024 for OAB (overactive bladder).      Labs:                   WBC                      9.57                04/02/2024                 HGB                      11.3 (L)            04/02/2024                 HCT                      33.6 (L)            04/02/2024                 PLT                      238                 04/02/2024                 CHOL                     128                 04/02/2024                 TRIG                     228 (H)             04/02/2024                 HDL                      36 (L)              04/02/2024                 ALT                      12                  04/02/2024                 AST                      14                  04/02/2024                 NA                       140                 04/02/2024                 K                        4.8                 04/02/2024                 CL                       101                 04/02/2024                 CREATININE               1.1                 04/02/2024                 BUN                      34 (H)              04/02/2024                 CO2                      27                  04/02/2024                 TSH                      1.913               04/02/2024                 INR                      0.9                 06/11/2021                 HGBA1C                   7.5 (H)             04/02/2024              LDLCALC                  46.4 (L)            04/02/2024                 eGFR  54.4 Abnormal   04/02/2024      Current Outpatient Medications   Medication Sig    allopurinoL (ZYLOPRIM) 100 MG tablet Take 1 tablet by mouth once daily    amLODIPine (NORVASC) 5 MG tablet Take 1 tablet (5 mg total) by mouth once daily.    blood sugar diagnostic Strp To check BG 2 times daily, to use with insurance preferred meter - Freestyle Lite    carvediloL (COREG) 25 MG tablet Take 1 tablet (25 mg total) by mouth 2 (two) times daily with meals.    cloNIDine (CATAPRES) 0.3 MG  tablet Take 1 tablet (0.3 mg total) by mouth as needed (blood pressure more than 180/90).    clopidogreL (PLAVIX) 75 mg tablet Take 1 tablet by mouth once daily    ERGOCALCIFEROL, VITAMIN D2, (VITAMIN D ORAL) Take 2,000 Units by mouth once daily.    ferrous sulfate (FEOSOL) Tab tablet Take 1 tablet by mouth 3 (three) times daily.    furosemide (LASIX) 20 MG tablet Take 2 tablets (40 mg total) by mouth once daily. Takes 1 pill daily    glipiZIDE (GLUCOTROL) 10 MG tablet TAKE 1 TABLET BY MOUTH TWICE DAILY BEFORE MEAL(S)    hydrALAZINE (APRESOLINE) 50 MG tablet Take 1 tablet by mouth 4 times daily    insulin (BASAGLAR KWIKPEN U-100 INSULIN) glargine 100 units/mL SubQ pen Inject 15 Units into the skin once daily.    JANUVIA 100 mg Tab Take 1 tablet by mouth once daily    losartan (COZAAR) 50 MG tablet Take 1 tablet by mouth once daily    metFORMIN (GLUCOPHAGE) 500 MG tablet TAKE 1 TABLET BY MOUTH IN THE MORNING AND  1  TABLET  AT  NOON  AND  2 TABLETS IN THE EVENING WITH MEALS    rosuvastatin (CRESTOR) 20 MG tablet Take 1 tablet by mouth nightly    solifenacin (VESICARE) 10 MG tablet Take 1 tablet (10 mg total) by mouth once daily.     Review of Systems   Constitutional:  Negative for activity change, appetite change, chills, fatigue, fever and unexpected weight change.        Weight 80.9 kg (178 lb 5.6 oz) at April 2, 2024 visit.   Eyes:  Negative for visual disturbance.        See history of present illness.   Respiratory:  Negative for cough, shortness of breath and wheezing.    Cardiovascular:  Negative for chest pain, palpitations and leg swelling.        See history of present illness.   Gastrointestinal:  Negative for abdominal pain, constipation, diarrhea, nausea and vomiting.   Endocrine: Negative for cold intolerance, heat intolerance, polydipsia, polyphagia and polyuria.        See history of present illness.   Genitourinary:  Negative for difficulty urinating and vaginal discharge.        See history of  present illness.   Musculoskeletal:  Positive for arthralgias. Negative for back pain.        See history of present illness.   Neurological:  Negative for numbness and headaches.        Positive for memory issue.   Hematological:  Does not bruise/bleed easily.        See history of present illness.   Psychiatric/Behavioral:  Negative for dysphoric mood, sleep disturbance and suicidal ideas. The patient is not nervous/anxious.         Negative for homicidal ideas.       Objective:  Physical Exam  Vitals reviewed.   Constitutional:       General: She is not in acute distress.     Appearance: Normal appearance. She is well-developed. She is obese. She is not ill-appearing or diaphoretic.      Comments: Pleasant and obese.   Neck:      Thyroid: No thyromegaly.   Cardiovascular:      Rate and Rhythm: Normal rate and regular rhythm.      Pulses:           Dorsalis pedis pulses are 3+ on the right side and 3+ on the left side.        Posterior tibial pulses are 3+ on the right side and 3+ on the left side.      Heart sounds: Murmur heard.      Comments: Chronic and soft.  Pulmonary:      Effort: Pulmonary effort is normal. No respiratory distress.      Breath sounds: Normal breath sounds. No wheezing.   Abdominal:      General: Bowel sounds are normal. There is no distension.      Palpations: Abdomen is soft. There is no mass.      Tenderness: There is no abdominal tenderness. There is no guarding or rebound.   Musculoskeletal:         General: No swelling or tenderness. Normal range of motion.      Cervical back: Normal range of motion and neck supple. No tenderness.      Comments: She is ambulatory without problems.    Feet:      Right foot:      Protective Sensation: 5 sites tested.  5 sites sensed.      Skin integrity: No ulcer, skin breakdown or dry skin.      Left foot:      Protective Sensation: 5 sites tested.  5 sites sensed.      Skin integrity: No ulcer, skin breakdown or dry skin.   Lymphadenopathy:       Cervical: No cervical adenopathy.   Neurological:      General: No focal deficit present.      Mental Status: She is alert and oriented to person, place, and time.   Psychiatric:         Mood and Affect: Mood normal.         Behavior: Behavior normal.         Thought Content: Thought content normal.         Judgment: Judgment normal.       ASSESSMENT:  1. Hypertension associated with diabetes    2. Type 2 diabetes mellitus with diabetic neuropathy, without long-term current use of insulin    3. Combined hyperlipidemia associated with type 2 diabetes mellitus    4. Atherosclerotic heart disease of native coronary artery with other forms of angina pectoris/stents x 4    5. Aortic atherosclerosis    6. Stage 3a chronic kidney disease    7. Obesity (BMI 30.0-34.9)    8. Need for vaccination        PLAN:  Alysha was seen today for hypertension, diabetes and follow-up.    Diagnoses and all orders for this visit:    Hypertension associated with diabetes  -     Comprehensive Metabolic Panel; Future    Type 2 diabetes mellitus with diabetic neuropathy, without long-term current use of insulin  -     Hemoglobin A1C; Future  -     Comprehensive Metabolic Panel; Future    Combined hyperlipidemia associated with type 2 diabetes mellitus    Atherosclerotic heart disease of native coronary artery with other forms of angina pectoris/stents x 4    Aortic atherosclerosis    Stage 3a chronic kidney disease - stable eGFR (45 - 59).    Obesity (BMI 30.0-34.9)    Need for vaccination  -     Influenza - Trivalent (Adjuvanted)      Patient advised to call for results.  Continue current medications, follow low sodium, low cholesterol, low carb diet, daily walks.  Keep follow up with specialists.  Follow up if symptoms worsen or fail to improve.

## 2024-10-04 ENCOUNTER — HOSPITAL ENCOUNTER (OUTPATIENT)
Dept: RADIOLOGY | Facility: HOSPITAL | Age: 69
Discharge: HOME | End: 2024-10-04
Attending: FAMILY MEDICINE
Payer: MEDICARE

## 2024-10-04 ENCOUNTER — OFFICE VISIT (OUTPATIENT)
Dept: OPHTHALMOLOGY | Facility: CLINIC | Age: 69
End: 2024-10-04
Payer: MEDICARE

## 2024-10-04 ENCOUNTER — PATIENT MESSAGE (OUTPATIENT)
Dept: OPHTHALMOLOGY | Facility: CLINIC | Age: 69
End: 2024-10-04

## 2024-10-04 DIAGNOSIS — Z96.1 PSEUDOPHAKIA OF BOTH EYES: ICD-10-CM

## 2024-10-04 DIAGNOSIS — H52.7 REFRACTIVE ERRORS: ICD-10-CM

## 2024-10-04 DIAGNOSIS — Z12.31 OTHER SCREENING MAMMOGRAM: ICD-10-CM

## 2024-10-04 DIAGNOSIS — E11.3299 BDR (BACKGROUND DIABETIC RETINOPATHY): Primary | ICD-10-CM

## 2024-10-04 PROCEDURE — 77063 BREAST TOMOSYNTHESIS BI: CPT | Mod: 26,,, | Performed by: RADIOLOGY

## 2024-10-04 PROCEDURE — 77067 SCR MAMMO BI INCL CAD: CPT | Mod: 26,,, | Performed by: RADIOLOGY

## 2024-10-04 PROCEDURE — 99999 PR PBB SHADOW E&M-EST. PATIENT-LVL III: CPT | Mod: PBBFAC,,, | Performed by: OPTOMETRIST

## 2024-10-04 PROCEDURE — 77067 SCR MAMMO BI INCL CAD: CPT | Mod: TC

## 2024-10-04 PROCEDURE — 99213 OFFICE O/P EST LOW 20 MIN: CPT | Mod: PBBFAC | Performed by: OPTOMETRIST

## 2024-10-04 NOTE — PROGRESS NOTES
SUBJECTIVE  Alysha Rosales is 69 y.o. female  Corrected distance visual acuity was 20/25 in the right eye and 20/20 in the left eye. Uncorrected near visual acuity was J1 in the right eye and J2 in the left eye. Comments: Checked distance vision with glasses.   Chief Complaint   Patient presents with    Diabetic Eye Exam     Lab Results       Component                Value               Date                       HGBA1C                   7.7 (H)             10/03/2024                    HPI     Diabetic Eye Exam     Additional comments: Lab Results       Component                Value               Date                       HGBA1C                   7.7 (H)             10/03/2024                     Comments    Patient states no visual complaints.  No ocular pain/discomfort and not using any otc drops.  Wear SVL glasses.     1. DM since before 2009 w/ BDR  2. PC IOL OS 07/29/20 (+19.0 SN60WF) (aim for -1.50)  3. PCIOL OD 4/13/16 +20.5WF(set for near -2.00) CPG           Last edited by Kathy Morales on 10/4/2024  3:04 PM.         Assessment /Plan :  1. BDR (background diabetic retinopathy)  Unchanged from last year, elevated A1c  Diabetic Retinopathy  Strict BG control, f/u w/ PCP, and annual DFE  Stressed importance of DM control to preserve vision     2. Pseudophakia of both eyes  Well-centered, stable IOL OU. Monitor annually.     3. Refractive errors  Dispense Final Rx for glasses.  RTC 1 year  Discussed above and answered questions.Dispense Final Rx for glasses.  RTC 1 year  Discussed above and answered questions.

## 2024-12-10 RX ORDER — FUROSEMIDE 20 MG/1
40 TABLET ORAL
Qty: 180 TABLET | Refills: 3 | Status: SHIPPED | OUTPATIENT
Start: 2024-12-10

## 2024-12-10 RX ORDER — HYDRALAZINE HYDROCHLORIDE 50 MG/1
TABLET, FILM COATED ORAL
Qty: 240 TABLET | Refills: 3 | Status: SHIPPED | OUTPATIENT
Start: 2024-12-10

## 2024-12-11 DIAGNOSIS — E11.21 DIABETIC NEPHROPATHY WITH PROTEINURIA: ICD-10-CM

## 2024-12-11 RX ORDER — GLIPIZIDE 10 MG/1
10 TABLET ORAL
Qty: 180 TABLET | Refills: 1 | Status: SHIPPED | OUTPATIENT
Start: 2024-12-11

## 2024-12-11 NOTE — TELEPHONE ENCOUNTER
Refill Decision Note   Alysha Martínezlivan  is requesting a refill authorization.  Brief Assessment and Rationale for Refill:  Approve     Medication Therapy Plan: eGFR reviewed, no change to therapy      Alert overridden per protocol: Yes   Pharmacist review requested: Yes   Comments:     Note composed:3:22 PM 12/11/2024

## 2024-12-11 NOTE — TELEPHONE ENCOUNTER
Refill Routing Note   Medication(s) are not appropriate for processing by [facility] for the following reason(s):        Required labs abnormal:     [facility] action(s):  Defer         Pharmacist review requested: Yes     Appointments  past 12m or future 3m with PCP    Date Provider   Last Visit   10/3/2024 Margie Bird MD   Next Visit   4/3/2025 Margie Bird MD   ED visits in past 90 days: 0        Note composed:10:06 AM 12/11/2024

## 2024-12-11 NOTE — TELEPHONE ENCOUNTER
No care due was identified.  [facility] Embedded Care Due Messages. Reference number: [id number].   12/11/2024 6:41:01 AM CST

## 2024-12-17 ENCOUNTER — OFFICE VISIT (OUTPATIENT)
Dept: CARDIOLOGY | Facility: CLINIC | Age: 69
End: 2024-12-17
Payer: MEDICARE

## 2024-12-17 VITALS
BODY MASS INDEX: 33.35 KG/M2 | WEIGHT: 182.31 LBS | HEART RATE: 90 BPM | OXYGEN SATURATION: 98 % | SYSTOLIC BLOOD PRESSURE: 138 MMHG | DIASTOLIC BLOOD PRESSURE: 78 MMHG

## 2024-12-17 DIAGNOSIS — Z95.5 S/P CORONARY ARTERY STENT PLACEMENT: ICD-10-CM

## 2024-12-17 DIAGNOSIS — R60.0 LOCALIZED EDEMA: ICD-10-CM

## 2024-12-17 DIAGNOSIS — Z98.61 POST PTCA: ICD-10-CM

## 2024-12-17 DIAGNOSIS — E78.5 HYPERLIPIDEMIA, UNSPECIFIED HYPERLIPIDEMIA TYPE: ICD-10-CM

## 2024-12-17 DIAGNOSIS — Z79.4 TYPE 2 DIABETES MELLITUS WITH OTHER SPECIFIED COMPLICATION, WITH LONG-TERM CURRENT USE OF INSULIN: ICD-10-CM

## 2024-12-17 DIAGNOSIS — Z91.148 POOR COMPLIANCE WITH MEDICATION: Primary | ICD-10-CM

## 2024-12-17 DIAGNOSIS — I70.0 AORTIC ATHEROSCLEROSIS: ICD-10-CM

## 2024-12-17 DIAGNOSIS — M79.89 RIGHT LEG SWELLING: ICD-10-CM

## 2024-12-17 DIAGNOSIS — E11.69 TYPE 2 DIABETES MELLITUS WITH OTHER SPECIFIED COMPLICATION, WITH LONG-TERM CURRENT USE OF INSULIN: ICD-10-CM

## 2024-12-17 DIAGNOSIS — I10 HYPERTENSION GOAL BP (BLOOD PRESSURE) < 140/80: ICD-10-CM

## 2024-12-17 DIAGNOSIS — I10 ESSENTIAL HYPERTENSION: ICD-10-CM

## 2024-12-17 PROCEDURE — 99214 OFFICE O/P EST MOD 30 MIN: CPT | Mod: PBBFAC | Performed by: INTERNAL MEDICINE

## 2024-12-17 PROCEDURE — 99214 OFFICE O/P EST MOD 30 MIN: CPT | Mod: S$PBB,,, | Performed by: INTERNAL MEDICINE

## 2024-12-17 PROCEDURE — 99999 PR PBB SHADOW E&M-EST. PATIENT-LVL IV: CPT | Mod: PBBFAC,,, | Performed by: INTERNAL MEDICINE

## 2024-12-17 NOTE — PROGRESS NOTES
Subjective:   Patient ID:  Alysha Rosales is a 69 y.o. female who presents for follow up of Follow-up    12.17.2024  Comes in for a six-month visit.    She states some of his blood pressure higher when she is not taking medications.  She states that she is not always taking her medications.    Denies any chest pain, palpitations, syncope or presyncope.  Carotid after last visit showed less than 50% stenosis bilaterally  Did not use any clonidine p.r.n.  She says however that for the past month she has noticed sudden swelling of her right lower extremity which is apparent today on exam.        5.30.2024  Active and still works part time where she would need to walk long distances   No slurred speech , focal weakness or numbness, amaurosis, dizziness   Denies chest pain, dyspnea on exertion, palpitations syncope or presyncope   No lower extremity swelling.    Reviewed BP log from home.  Blood pressure controlled after adjustment last visit.    2.1.2024  Patient was earlier at her PCP office and was sent in for hypertension.    She has not been checking her blood pressure since I saw her last time.    Appears that there has been not been a major improvement her blood pressure despite medication adjustment last time switch her from metoprolol to carvedilol.    She denies however any chest pain, headaches, dyspnea, lower extremity swelling.    Upon arrival her blood pressure was 190s over 90s.  Gave her clonidine 0.3 mg in the clinic and it made mild improvement.      November 30, 2023.    Comes in for an overdue follow-up.    She denies any chest pain.  Dyspnea.  Lower extremity swelling.    No palpitations syncope or presyncope   No bleeding.    She had PCI of her circ ISR in June of 2021.    Her blood pressure is uncontrolled lately.  She was sent by her primary care physician to have it addressed.    All on initial check it was lower however on my check was similar to what it was recorded at her PCP.   "      8.2022  Comes in for a follow up   She has been treated for iron deficiency anemia   Awaiting endoscopy   It has been more than 12 months since PCI to a dominant left circ   She denies bleeding   No cp , no flowers, no LE swelling , no palpitations, syncope or presyncope       12.30.2021   Follow-up  Pertinent negatives include no chest pain.   hx of left circ stent in 6/2021   Continues to feel well, no cp, no dyspnea with moderate exertion   No bleeding   Chronic knee pain     Interval hx: 6/29/2021   Patient referred last week for prior nstemi, dropped EF , found to have 99% isr insegment restenosis of the left circ s/p stenting   She states she is doing much better since the stent and noticed that she able to walk more before having dyspnea which she describes as limiting in the past   She denies any chest pain   Site looking good         NP Jason Bloom note hpi 6/2020   Ms. Rosales's current medical conditions include hypertension, hyperlipidemia, DM, and CAD. Former tobacco use,. Stopped in 2004.  Hx of MI in 2004 where she underwent coronary stenting x2 ( mid LAD and LCX?) Previously followed by Dr. Kaba at [facility] in [place], but wanting to switch to [facility].   Had FLOWERS in 2008. Underwent LHC at that time and had PCI of unknown vessel. Has total of 3 stents.   Has had 2 repeat LHC since then and both treated medically, didn't require PCI. Last cath at least 10 years.   [place] [facility] [place] in the setting of COVID, NSTEMI and anemia that required transfusion. Presented with acute SOB and a feeling of "smothering".   Patient noted to have have a troponin peak at 6.3. Echo revealed Apical hypokinesis that was suspicious for takotsubo CMPY, LVF 40.%.   She was treated medically with ASA, Plavix, Statin, BB and ARB.   Denies any chest pain,  orthopnea, PND, dizziness, palpitations,  near syncope, syncope or edema .   +SOB and FLOWERS. No CNS Complaints to suggest TIA or CVA  Admits " that she can do better with limiting her sodium intake.      Past Medical History:   Diagnosis Date    BDR (background diabetic retinopathy) 11/30/2023    CAD (coronary artery disease)     Followed by Dr. Stevo Kaba    Diabetes mellitus, type 2     DM (diabetes mellitus)     BS didn't check 06/05/2020    DM (diabetes mellitus) 2012    BS didn't check 02/10/2022    Elevated troponin 05/27/2021    High level of uric acid in blood     Hyperlipidemia     Hyperplastic rectal polyp     on 03/20/2009 colonoscopy    Hypertension     Multinodular goiter     noted on 1/20/2015 CTA carotid scan    Multiple thyroid nodules 05/05/2016    on Thyroid U/S    Osteoarthritis     Left knee    Osteoarthritis of back     Osteoporosis, unspecified     Postmenopausal     No history of abnormal pap smear    Proteinuria     Pustular psoriasis     hands and feet    Subclavian artery stenosis     Left; with the proximal segment at 60-70% per 10/02/2008 carotid CTA    Vitamin D deficiency disease        Past Surgical History:   Procedure Laterality Date    CATARACT EXTRACTION W/  INTRAOCULAR LENS IMPLANT Right 04/13/2016    [facility]    CATARACT EXTRACTION W/  INTRAOCULAR LENS IMPLANT Left     [facility]    COLONOSCOPY N/A 08/30/2022    Procedure: COLONOSCOPY;  Surgeon: Reanna Garber MD;  Location: [facility] ENDO;  Service: Endoscopy;  Laterality: N/A;    CORONARY ANGIOGRAPHY N/A 06/18/2021    stents x  4    CORONARY STENT PLACEMENT N/A 06/18/2021    Procedure: INSERTION, STENT, CORONARY ARTERY;  Surgeon: Desiree William MD;  Location: [facility] CATH LAB;  Service: Cardiology;  Laterality: N/A;    heart catheterization with stents placed  2004 and 08/18/2009    LEFT HEART CATHETERIZATION Left 06/18/2021    Procedure: CATHETERIZATION, HEART, LEFT;  Surgeon: Desiree William MD;  Location: [facility] CATH LAB;  Service: Cardiology;  Laterality: Left;  Covid + week of    left knee surgery for bone graft and pin placement         Social History     Tobacco Use     Smoking status: Former     Current packs/day: 0.00     Average packs/day: 1 pack/day for 33.0 years (33.0 ttl pk-yrs)     Types: Cigarettes     Start date: 1971     Quit date: 2004     Years since quittin.4     Passive exposure: Past    Smokeless tobacco: Never   Substance Use Topics    Alcohol use: Yes     Alcohol/week: 0.0 standard drinks of alcohol     Comment: Rarely    Drug use: No       Family History   Problem Relation Name Age of Onset    Diabetes Mother      Hypertension Mother      Stroke Mother      Hypertension Sister      Cancer Father          Lung cancer (smoker)    No Known Problems Daughter      No Known Problems Son      Heart disease Neg Hx         Current Outpatient Medications   Medication Sig    allopurinoL (ZYLOPRIM) 100 MG tablet Take 1 tablet by mouth once daily    amLODIPine (NORVASC) 5 MG tablet Take 1 tablet (5 mg total) by mouth once daily.    blood sugar diagnostic Strp To check BG 2 times daily, to use with insurance preferred meter - Freestyle Lite    carvediloL (COREG) 25 MG tablet Take 1 tablet (25 mg total) by mouth 2 (two) times daily with meals.    cloNIDine (CATAPRES) 0.3 MG tablet Take 1 tablet (0.3 mg total) by mouth as needed (blood pressure more than 180/90).    clopidogreL (PLAVIX) 75 mg tablet Take 1 tablet by mouth once daily    ERGOCALCIFEROL, VITAMIN D2, (VITAMIN D ORAL) Take 2,000 Units by mouth once daily.    ferrous sulfate (FEOSOL) Tab tablet Take 1 tablet by mouth 3 (three) times daily.    furosemide (LASIX) 20 MG tablet Take 2 tablets by mouth once daily    glipiZIDE (GLUCOTROL) 10 MG tablet TAKE 1 TABLET BY MOUTH TWICE DAILY BEFORE MEAL(S)    hydrALAZINE (APRESOLINE) 50 MG tablet Take 1 tablet by mouth 4 times daily    insulin (BASAGLAR KWIKPEN U-100 INSULIN) glargine 100 units/mL SubQ pen Inject 15 Units into the skin once daily.    JANUVIA 100 mg Tab Take 1 tablet by mouth once daily    losartan (COZAAR) 50 MG tablet Take 1 tablet by mouth  once daily    metFORMIN (GLUCOPHAGE) 500 MG tablet TAKE 1 TABLET BY MOUTH IN THE MORNING AND  1  TABLET  AT  NOON  AND  2 TABLETS IN THE EVENING WITH MEALS    rosuvastatin (CRESTOR) 20 MG tablet Take 1 tablet by mouth nightly    solifenacin (VESICARE) 10 MG tablet Take 1 tablet (10 mg total) by mouth once daily.     No current facility-administered medications for this visit.           Review of Systems   Cardiovascular:  Negative for chest pain, dyspnea on exertion, palpitations and syncope.   Genitourinary: Negative.    Neurological: Negative.        Objective:   Physical Exam  Vitals and nursing note reviewed.   Constitutional:       Appearance: Normal appearance.   Pulmonary:      Effort: Pulmonary effort is normal.   Neurological:      General: No focal deficit present.      Mental Status: She is alert and oriented to person, place, and time.   Psychiatric:         Mood and Affect: Mood normal.         Behavior: Behavior normal.       Vitals:    12/17/24 1036   BP: 138/78   BP Location: Left arm   Patient Position: Sitting   Pulse: 90   SpO2: 98%   Weight: 82.7 kg (182 lb 5.1 oz)     Lab Results   Component Value Date    CHOL 128 04/02/2024    CHOL 138 09/26/2023    CHOL 133 01/25/2022     Lab Results   Component Value Date    HDL 36 (L) 04/02/2024    HDL 43 09/26/2023    HDL 40 01/25/2022     Lab Results   Component Value Date    LDLCALC 46.4 (L) 04/02/2024    LDLCALC 51.4 (L) 09/26/2023    LDLCALC 16.2 (L) 01/25/2022     Lab Results   Component Value Date    TRIG 228 (H) 04/02/2024    TRIG 218 (H) 09/26/2023    TRIG 384 (H) 01/25/2022     Lab Results   Component Value Date    CHOLHDL 28.1 04/02/2024    CHOLHDL 31.2 09/26/2023    CHOLHDL 30.1 01/25/2022       Chemistry        Component Value Date/Time     10/03/2024 1113    K 5.0 10/03/2024 1113     10/03/2024 1113    CO2 25 10/03/2024 1113    BUN 25 (H) 10/03/2024 1113    CREATININE 1.1 10/03/2024 1113     (H) 10/03/2024 1113         Component Value Date/Time    CALCIUM 10.5 10/03/2024 1113    ALKPHOS 73 10/03/2024 1113    AST 10 10/03/2024 1113    ALT 9 (L) 10/03/2024 1113    BILITOT 0.2 10/03/2024 1113    ESTGFRAFRICA >60.0 07/28/2022 0915    EGFRNONAA >60.0 07/28/2022 0915          Lab Results   Component Value Date    TSH 1.913 04/02/2024     Lab Results   Component Value Date    INR 0.9 06/11/2021    INR 1.0 05/27/2021     Lab Results   Component Value Date    WBC 9.57 04/02/2024    HGB 11.3 (L) 04/02/2024    HCT 33.6 (L) 04/02/2024    MCV 94 04/02/2024     04/02/2024     BMP  Sodium   Date Value Ref Range Status   10/03/2024 137 136 - 145 mmol/L Final     Potassium   Date Value Ref Range Status   10/03/2024 5.0 3.5 - 5.1 mmol/L Final     Chloride   Date Value Ref Range Status   10/03/2024 102 95 - 110 mmol/L Final     CO2   Date Value Ref Range Status   10/03/2024 25 23 - 29 mmol/L Final     BUN   Date Value Ref Range Status   10/03/2024 25 (H) 8 - 23 mg/dL Final     Creatinine   Date Value Ref Range Status   10/03/2024 1.1 0.5 - 1.4 mg/dL Final     Calcium   Date Value Ref Range Status   10/03/2024 10.5 8.7 - 10.5 mg/dL Final     Anion Gap   Date Value Ref Range Status   10/03/2024 10 8 - 16 mmol/L Final     eGFR if    Date Value Ref Range Status   07/28/2022 >60.0 >60 mL/min/1.73 m^2 Final     eGFR if non    Date Value Ref Range Status   07/28/2022 >60.0 >60 mL/min/1.73 m^2 Final     Comment:     Calculation used to obtain the estimated glomerular filtration  rate (eGFR) is the CKD-EPI equation.        CrCl cannot be calculated (Patient's most recent lab result is older than the maximum 7 days allowed.).    Echo Conclusion    The left ventricle is normal in size with mildly decreased systolic function.  The estimated ejection fraction is 40%.  Left ventricular diastolic dysfunction.  Normal right ventricular size with normal right ventricular systolic function.  Mild to moderate tricuspid  regurgitation.  Normal central venous pressure (3 mmHg).  The estimated PA systolic pressure is 44 mmHg.  There is pulmonary hypertension.  Mild mitral regurgitation.  There are segmental left ventricular wall motion abnormalities. Apical hypokinesis.     significant Diagnostic Studies: Angiography: Technical Procedures Used:   Non dominant rca   Left main patent   Lad is 40% prox ISR, 50% distal lad , small d1,d2   Left cic is large with 99% in segment restenosis of mid left circ . Mod size om1, large om2 , lpda patent   S/p 2.75x12 mm stent , post dilated to 3.0 mm   rca is severely diffusely diseased small non dominant       6.2024    Interpretation Summary  Show Result Comparison     There is 40-49% right Internal Carotid Stenosis.    There is 40-49% left Internal Carotid Stenosis.     Assessment:   Poor compliance with medication    Right leg swelling  -     CV Ultrasound doppler venous legs bilat; Future    Localized edema  -     CV Ultrasound doppler venous legs bilat; Future    Hypertension goal BP (blood pressure) < 140/80    Essential hypertension    S/P coronary artery stent placement    Post PTCA    Aortic atherosclerosis    Hyperlipidemia, unspecified hyperlipidemia type    Type 2 diabetes mellitus with other specified complication, with long-term current use of insulin              Plan:     S/P PCI to mid circ 6/2021  ON ASA, Plavix, Statin, BB and ARB, Plavix   Continue with carvedilol, amlodipine, hydralazine, Lasix and losartan.    Blood pressure controlled.    Carotid ultrasound less than 50% bilaterally  Given clonidine p.r.n. to use at home as directed.  Not used ever  Decrease salt intake.    Reviewed all tests and above medical conditions with patient in detail and formulated treatment plan.  Risk factor modification discussed.   Cardiac low salt diet discussed.  Maintaining healthy weight and weight loss goals were discussed in clinic.  We will get a venous ultrasound given new onset of right  lower extremity swelling.  A1c 7.7 discussed low carb diet, medication compliance  F/u in 6 months

## 2024-12-18 DIAGNOSIS — E11.40 TYPE 2 DIABETES MELLITUS WITH DIABETIC NEUROPATHY, WITHOUT LONG-TERM CURRENT USE OF INSULIN: ICD-10-CM

## 2024-12-18 DIAGNOSIS — E11.21 DIABETIC NEPHROPATHY WITH PROTEINURIA: ICD-10-CM

## 2024-12-18 RX ORDER — SITAGLIPTIN 100 MG/1
100 TABLET, FILM COATED ORAL
Qty: 90 TABLET | Refills: 1 | Status: SHIPPED | OUTPATIENT
Start: 2024-12-18

## 2024-12-18 NOTE — TELEPHONE ENCOUNTER
Refill Decision Note   Alysha Bobby  is requesting a refill authorization.  Brief Assessment and Rationale for Refill:  Approve     Medication Therapy Plan:         Comments:     Note composed:8:42 AM 12/18/2024

## 2024-12-18 NOTE — TELEPHONE ENCOUNTER
No care due was identified.  [facility] Embedded Care Due Messages. Reference number: [id number].   12/18/2024 6:41:41 AM CST

## 2024-12-26 ENCOUNTER — HOSPITAL ENCOUNTER (OUTPATIENT)
Dept: CARDIOLOGY | Facility: HOSPITAL | Age: 69
Discharge: HOME | End: 2024-12-26
Attending: INTERNAL MEDICINE
Payer: MEDICARE

## 2024-12-26 VITALS
WEIGHT: 182 LBS | DIASTOLIC BLOOD PRESSURE: 78 MMHG | BODY MASS INDEX: 33.49 KG/M2 | HEIGHT: 62 IN | SYSTOLIC BLOOD PRESSURE: 138 MMHG

## 2024-12-26 DIAGNOSIS — M79.89 RIGHT LEG SWELLING: ICD-10-CM

## 2024-12-26 DIAGNOSIS — R60.0 LOCALIZED EDEMA: ICD-10-CM

## 2024-12-26 PROCEDURE — 93970 EXTREMITY STUDY: CPT

## 2024-12-26 PROCEDURE — 93970 EXTREMITY STUDY: CPT | Mod: 26,,, | Performed by: INTERNAL MEDICINE

## 2025-02-09 DIAGNOSIS — I15.2 HYPERTENSION ASSOCIATED WITH DIABETES: ICD-10-CM

## 2025-02-09 DIAGNOSIS — E11.59 HYPERTENSION ASSOCIATED WITH DIABETES: ICD-10-CM

## 2025-02-11 RX ORDER — CARVEDILOL 25 MG/1
25 TABLET ORAL 2 TIMES DAILY WITH MEALS
Qty: 60 TABLET | Refills: 11 | Status: SHIPPED | OUTPATIENT
Start: 2025-02-11

## 2025-02-22 DIAGNOSIS — I10 ESSENTIAL HYPERTENSION: ICD-10-CM

## 2025-02-22 DIAGNOSIS — Z00.00 ENCOUNTER FOR MEDICARE ANNUAL WELLNESS EXAM: ICD-10-CM

## 2025-02-25 ENCOUNTER — PATIENT OUTREACH (OUTPATIENT)
Dept: ADMINISTRATIVE | Facility: HOSPITAL | Age: 70
End: 2025-02-25
Payer: MEDICARE

## 2025-02-25 DIAGNOSIS — E11.69 TYPE 2 DIABETES MELLITUS WITH OTHER SPECIFIED COMPLICATION, WITH LONG-TERM CURRENT USE OF INSULIN: Primary | ICD-10-CM

## 2025-02-25 DIAGNOSIS — Z79.4 TYPE 2 DIABETES MELLITUS WITH OTHER SPECIFIED COMPLICATION, WITH LONG-TERM CURRENT USE OF INSULIN: Primary | ICD-10-CM

## 2025-02-25 RX ORDER — LOSARTAN POTASSIUM 50 MG/1
50 TABLET ORAL
Qty: 90 TABLET | Refills: 3 | Status: SHIPPED | OUTPATIENT
Start: 2025-02-25

## 2025-02-25 NOTE — PROGRESS NOTES
VBHM Score: 1     Osteoporosis Screening    Tetanus Vaccine  Shingles/Zoster Vaccine  RSV Vaccine          Health Maintenance Topic(s) Outreach Outcomes & Actions Taken:    Osteoporosis Screening - Outreach Outcomes & Actions Taken  : patient states she will schedule appointment online    Lab(s) - Outreach Outcomes & Actions Taken  : diabetic labs scheduled on 4/3/25, same day as PCP appt, patient will be fasting

## 2025-03-08 DIAGNOSIS — I15.2 HYPERTENSION ASSOCIATED WITH DIABETES: ICD-10-CM

## 2025-03-08 DIAGNOSIS — E11.59 HYPERTENSION ASSOCIATED WITH DIABETES: ICD-10-CM

## 2025-03-10 RX ORDER — AMLODIPINE BESYLATE 5 MG/1
5 TABLET ORAL
Qty: 30 TABLET | Refills: 11 | Status: SHIPPED | OUTPATIENT
Start: 2025-03-10

## 2025-04-01 ENCOUNTER — PATIENT OUTREACH (OUTPATIENT)
Dept: ADMINISTRATIVE | Facility: HOSPITAL | Age: 70
End: 2025-04-01
Payer: MEDICARE

## 2025-04-01 NOTE — PROGRESS NOTES
DM LABS: per chart review pt is OVERDUE for Ha1c/LIPID/MICROALBUMIN already linked to lab appt 4.3.25.

## 2025-04-03 ENCOUNTER — OFFICE VISIT (OUTPATIENT)
Dept: FAMILY MEDICINE | Facility: CLINIC | Age: 70
End: 2025-04-03
Attending: FAMILY MEDICINE
Payer: MEDICARE

## 2025-04-03 VITALS
BODY MASS INDEX: 32.3 KG/M2 | RESPIRATION RATE: 18 BRPM | OXYGEN SATURATION: 97 % | TEMPERATURE: 97 F | DIASTOLIC BLOOD PRESSURE: 78 MMHG | HEART RATE: 85 BPM | SYSTOLIC BLOOD PRESSURE: 136 MMHG | WEIGHT: 175.5 LBS | HEIGHT: 62 IN

## 2025-04-03 DIAGNOSIS — E04.2 MULTINODULAR GOITER: ICD-10-CM

## 2025-04-03 DIAGNOSIS — E11.69 TYPE 2 DIABETES MELLITUS WITH OTHER SPECIFIED COMPLICATION, WITH LONG-TERM CURRENT USE OF INSULIN: ICD-10-CM

## 2025-04-03 DIAGNOSIS — N18.31 STAGE 3A CHRONIC KIDNEY DISEASE: ICD-10-CM

## 2025-04-03 DIAGNOSIS — I25.118 ATHEROSCLEROTIC HEART DISEASE OF NATIVE CORONARY ARTERY WITH OTHER FORMS OF ANGINA PECTORIS: ICD-10-CM

## 2025-04-03 DIAGNOSIS — Z12.31 OTHER SCREENING MAMMOGRAM: ICD-10-CM

## 2025-04-03 DIAGNOSIS — M85.80 OSTEOPENIA, UNSPECIFIED LOCATION: ICD-10-CM

## 2025-04-03 DIAGNOSIS — E11.59 HYPERTENSION ASSOCIATED WITH DIABETES: ICD-10-CM

## 2025-04-03 DIAGNOSIS — E79.0 ELEVATED URIC ACID IN BLOOD: ICD-10-CM

## 2025-04-03 DIAGNOSIS — I70.0 AORTIC ATHEROSCLEROSIS: ICD-10-CM

## 2025-04-03 DIAGNOSIS — Z78.0 POSTMENOPAUSAL: ICD-10-CM

## 2025-04-03 DIAGNOSIS — Z79.4 TYPE 2 DIABETES MELLITUS WITH OTHER SPECIFIED COMPLICATION, WITH LONG-TERM CURRENT USE OF INSULIN: ICD-10-CM

## 2025-04-03 DIAGNOSIS — I15.2 HYPERTENSION ASSOCIATED WITH DIABETES: ICD-10-CM

## 2025-04-03 DIAGNOSIS — E66.811 OBESITY (BMI 30.0-34.9): ICD-10-CM

## 2025-04-03 DIAGNOSIS — N32.81 OAB (OVERACTIVE BLADDER): ICD-10-CM

## 2025-04-03 PROCEDURE — G2211 COMPLEX E/M VISIT ADD ON: HCPCS | Mod: S$PBB,,, | Performed by: FAMILY MEDICINE

## 2025-04-03 PROCEDURE — 99999 PR PBB SHADOW E&M-EST. PATIENT-LVL V: CPT | Mod: PBBFAC,,, | Performed by: FAMILY MEDICINE

## 2025-04-03 PROCEDURE — 99215 OFFICE O/P EST HI 40 MIN: CPT | Mod: S$PBB,,, | Performed by: FAMILY MEDICINE

## 2025-04-03 PROCEDURE — 99215 OFFICE O/P EST HI 40 MIN: CPT | Mod: PBBFAC,PO | Performed by: FAMILY MEDICINE

## 2025-04-03 NOTE — PATIENT INSTRUCTIONS
Please call Dr. Bird for your results.    Please get the following shots at your local pharmacy: Covid, Tetanus, Shingles, RSV.    Thanks.

## 2025-04-03 NOTE — PROGRESS NOTES
HISTORY OF PRESENT ILLNESS: Ms. Rosales comes in today fasting and without taking medication without acute problems for annual wellness examination.    END OF LIFE DECISION: She does not have a living will but does not desire life support.    Advance Care Planning     Date: 04/03/2025    Living Will  During this visit, I engaged the patient  in the voluntary advance care planning process.  The patient and I reviewed the role for advance directives and their purpose in directing future healthcare if the patient's unable to speak for him/herself.  At this point in time, the patient does have full decision-making capacity.  We discussed different extreme health states that she could experience, and reviewed what kind of medical care she would want in those situations.  The patient communicated that if she were comatose and had little chance of a meaningful recovery, she would not want machines/life-sustaining treatments used. In addition to the above preference, other important end-of-life issues for the patient include  the patient has not completed a living to reflect these preferences .  I spent a total of 5 minutes engaging the patient in this advance care planning discussion.          Current Outpatient Medications   Medication Sig    allopurinoL (ZYLOPRIM) 100 MG tablet Take 1 tablet by mouth once daily    amLODIPine (NORVASC) 5 MG tablet Take 1 tablet by mouth once daily    blood sugar diagnostic Strp To check BG 2 times daily, to use with insurance preferred meter - Freestyle Lite    carvediloL (COREG) 25 MG tablet TAKE 1 TABLET BY MOUTH TWICE DAILY WITH MEALS    cloNIDine (CATAPRES) 0.3 MG tablet Take 1 tablet (0.3 mg total) by mouth as needed (blood pressure more than 180/90).    clopidogreL (PLAVIX) 75 mg tablet Take 1 tablet by mouth once daily    ERGOCALCIFEROL, VITAMIN D2, (VITAMIN D ORAL) Take 2,000 Units by mouth once daily.    ferrous sulfate (FEOSOL) Tab tablet Take 1 tablet by mouth 3 (three) times  daily.    furosemide (LASIX) 20 MG tablet Take 2 tablets by mouth once daily    glipiZIDE (GLUCOTROL) 10 MG tablet TAKE 1 TABLET BY MOUTH TWICE DAILY BEFORE MEAL(S)    hydrALAZINE (APRESOLINE) 50 MG tablet Take 1 tablet by mouth 4 times daily    insulin (BASAGLAR KWIKPEN U-100 INSULIN) glargine 100 units/mL SubQ pen Inject 15 Units into the skin once daily.    JANUVIA 100 mg Tab Take 1 tablet by mouth once daily    losartan (COZAAR) 50 MG tablet Take 1 tablet by mouth once daily    metFORMIN (GLUCOPHAGE) 500 MG tablet TAKE 1 TABLET BY MOUTH IN THE MORNING AND  1  TABLET  AT  NOON  AND  2 TABLETS IN THE EVENING WITH MEALS    rosuvastatin (CRESTOR) 20 MG tablet Take 1 tablet by mouth nightly    solifenacin (VESICARE) 10 MG tablet Take 1 tablet (10 mg total) by mouth once daily.       SCREENINGS:   Cholesterol (with CMP): April 2, 2024.  FFS/Colonoscopy: August 30, 2022 - benign colon polyps, diverticulosis; repeat in 7 years.   Mammogram: October 4, 2024 - okay.  GYN Exam: April 2, 2024 - okay. Pap smears 2009 - 2013, 2016 - okay. Pap smear no longer needed.   Dexa Scan: August 7, 2017 - osteoporosis; repeat in 2 years. November 29, 2021 -  osteopenia.  Eye Exam: October 4, 2024 with Dr. JIGNESH Wadsworth. She wears glasses.   Dental Exam: She wears denture on top and bottom.   PPD: Negative in the past.    Immunizations: Tdap - February 25, 2009. Advised insurance-covered benefit only at local pharmacy.  Gardasil - N./A.  Zostavax - March 30, 2015.  Shingrix - Never. Advised insurance-covered benefit only at local pharmacy.  Pneumovax - May 23, 2023.   Prevnar-13 - June 4, 2020.  Seasonal Flu - September 26, 2023.    Hepatitis B vaccine - August 28, 2013, July 25, 2013, July 29, 2014.  RSV - Advised patient available at local pharmacy.  Covid-19 (Micki) vaccine series - August 13, 2021.    ROS:  GENERAL: Denies fever, chills, fatigue or unusual weight change. Appetite normal.Wt 80.7 kg (177 lb 14.6 oz) at October 3, 2024  visit. Monitors diet. Reports not exercises but moves around 3 times per week at [facility].  SKIN: Denies rashes, itching, changes in mole, color or texture of skin or easy bruising.   HEAD: Denies headaches or recent head trauma.  EYES: Denies change in vision, pain, diplopia, redness or discharge. Wears glasses.  EARS: Denies ear pain, discharge, vertigo except decreased hearing but states hearing tests performed have been okay.  NOSE: Denies loss of smell, epistaxis or rhinitis.  MOUTH & THROAT: Denies hoarseness or change in voice. Denies excessive gum bleeding or mouth sores. Denies sore throat.  NODES: Denies swollen glands.  CHEST: Denies RIDDLE, wheezing, cough, or sputum production.  CARDIOVASCULAR: Denies chest pain, PND, orthopnea or reduced exercise tolerance. Denies palpitations. Saw cardiology Dr. William on December 7, 2024 for surveillance of CAD, NSTEMI, Post-PTCA, hypertension, hyperlipidemia, ROSEMARY, CKD3a, diabetes, obesity with 6-month follow up advised and scheduled for June 17, 2025.  Reports not performs blood pressure checks.  ABDOMEN: Denies diarrhea, constipation, nausea, vomiting, abdominal pain, or blood in stool.  Reports rare abdominal pain with nausea and vomiting with eating intolerant foods.  April 11, 2024 abdominal ultrasound showed fatty liver.    URINARY: Denies flank pain, dysuria or hematuria. Saw Dr. Bryant, nephrologist, on September 19, 2017 for CKD stage 1, diabetes nephropathy, proteinuria surveillance with 9-month follow-up advised. Saw Dr. Raygoza, urologist, on August 15, 2024 for OAB, cystitis, mixed incontinence with 1-year follow up advised.  GENITOURINARY: Denies flank pain, dysuria, frequency or hematuria. Performs monthly breast self exams.  ENDOCRINE: Reports performs glucose checks every morning fasting with levels ranging 150-170. Thyroid ultrasound on April 11, 2024 which showed no significant change in appearance of multiple small thyroid nodules and without  "findings to meet TIRADS criteria for biopsy.  HEME/LYMPH: Denies bleeding problems.  PERIPHERAL VASCULAR:Denies claudication or cyanosis.  MUSCULOSKELETAL: Reports chronic, intermittent morning stiffness, pain in low back, left knee (but stable today). Reports intermittent, occasional swelling at right leg but resolves with overnight elevation. Saw Dr. Saravia, orthopedist, on July 21, 2022 for lumbar DDD, left knee OA with Hylan shot given. Saw ERIC Hill with rheumatology on October 5, 2020 for surveillance of osteoporosis currently on Reclast and vitamin-D deficiency with 9-month follow-up with DEXA scan and Reclast therapy advised and given again on November 29, 2021.    NEUROLOGIC: Denies history of seizures, tremors, paralysis, alteration of gait or coordination. Reports chronic numbness in hands, feet but stable today.  PSYCHIATRIC: Denies mood swings, depression, anxiety or homicidal or suicidal thoughts. Denies sleep problems.     PE:   /78   Pulse 85   Temp 96.8 °F (36 °C) (Tympanic)   Resp 18   Ht 5' 2" (1.575 m)   Wt 79.6 kg (175 lb 8.1 oz)   SpO2 97%   BMI 32.10 kg/m²   APPEARANCE: Well nourished, well developed female, obese and pleasant, alert and oriented in no acute distress.   HEAD: Non tender. Full range of motion.  EYES: PERRL, conjunctiva pink, lids no edema.  EARS: External canal patent, no swelling or redness. TM's shiny and clear.  NOSE: Mucosa and turbinates pink, not swollen. No discharge. Non tender sinuses.  THROAT: No pharyngeal erythema or exudate. No stridor. She wears top dentures, bottom partials.  NECK: Supple, no mass, thyroid not enlarged.  NODES: No cervical, axillary lymph node enlargement.  CHEST: Normal respiratory effort. Lungs clear to auscultation.  CARDIOVASCULAR: Normal S1, S2. Chronic soft cardiac murmur noted. PMI not displaced. No carotid bruit. Pedal pulses palpable bilaterally. No edema. Feet look okay without ulcerations.  ABDOMEN: Bowel sounds present. " "Not distended. Soft. No tenderness, masses or organomegaly.  BREAST EXAM: Symmetrical, no external lesions, no discharge, no masses palpated.   PELVIC EXAM: Not examined per patient request.  RECTAL EXAM: Not examined per patient request.  MUSCULOSKELETAL: No joint deformities or stiffness except tender at right heel. She is ambulatory without problems.  SKIN: No rashes or suspicious lesions, normal color and turgor except chronic"quiet" eczematous skin changes at bottom of feet (with much improved appearance compared to other visits with me).  NEUROLOGIC: Cranial Nerves: II-XII grossly intact. DTR's: Knees, Ankles 2+ and equal bilaterally. Gait & Posture: Normal gait and fine motion.  PSYCHIATRIC: Patient alert, oriented x 3. Mood/Affect normal without acute anxiety or depression noted. Judgment/insight good as she makes appropriate decisions on today's examination.    Protective Sensation (w/ 10 gram monofilament):  Right: Intact  Left: Intact    Visual Inspection:  Normal -  Bilateral    Pedal Pulses:   Right: Present  Left: Present    Posterior tibialis:   Right:Present  Left: Present     ASSESSMENT:    ICD-10-CM ICD-9-CM    1. Type 2 diabetes mellitus with other specified complication, with long-term current use of insulin - stable on medication E11.69 250.80 Microalbumin/Creatinine Ratio, Urine    Z79.4 V58.67 Hemoglobin A1C      2. Hypertension associated with diabetes - stable on medication E11.59 250.80 TSH    I15.2 401.9 Comprehensive Metabolic Panel      Lipid Panel      CBC Auto Differential      3. Stage 3a chronic kidney disease - stable eGFR of > 60 on 12/7/24 and 54.4 on 10/3/24 N18.31 585.3 Microalbumin/Creatinine Ratio, Urine      4. Atherosclerotic heart disease of native coronary artery with other forms of angina pectoris - stable and managed by cardiology I25.118 414.01      413.9       5. Aortic atherosclerosis - stable and managed by cardiology I70.0 440.0       6. Multinodular goiter - stable " E04.2 241.1       7. Elevated uric acid in blood - on medication E79.0 790.6 Uric Acid      8. Osteopenia, unspecified location - stable on medication M85.80 733.90 DXA Bone Density Axial Skeleton 1 or more sites      9. OAB (overactive bladder) - stable on medication and managed by urology N32.81 596.51       10. Obesity (BMI 30.0-34.9) - managed with diet E66.811 278.00       11. Postmenopausal  Z78.0 V49.81 DXA Bone Density Axial Skeleton 1 or more sites      12. Other screening mammogram  Z12.31 V76.12 Mammo Digital Screening Bilat w/ Shad (XPD)          PLAN:  1. Age-appropriate counseling-appropriate low-sodium, low-cholesterol, low carbohydrate diet and exercise daily, monthly breast self exam, annual wellness examination.   2. Patient advised to call for results.  3. Continue current medications.  4. Annual eye and dental examinations encouraged.  5. Keep follow up with specialists.  6. Follow up in about 26 weeks (around 10/2/2025) for hypertension and diabetes follow up.     40 minutes of total time spent on the encounter, which includes face to face time and non-face to face time preparing to see the patient (eg, review of tests), Obtaining and/or reviewing separately obtained history, Documenting clinical information in the electronic or other health record, Independently interpreting results (not separately reported) and communicating results to the patient/family/caregiver, or Care coordination (not separately reported).

## 2025-04-23 PROBLEM — N32.81 OAB (OVERACTIVE BLADDER): Status: ACTIVE | Noted: 2025-04-23

## 2025-04-23 PROBLEM — M85.80 OSTEOPENIA: Status: ACTIVE | Noted: 2025-04-23

## 2025-05-07 RX ORDER — SOLIFENACIN SUCCINATE 10 MG/1
10 TABLET, FILM COATED ORAL
Qty: 90 TABLET | Refills: 0 | Status: SHIPPED | OUTPATIENT
Start: 2025-05-07

## 2025-05-23 ENCOUNTER — PATIENT OUTREACH (OUTPATIENT)
Dept: ADMINISTRATIVE | Facility: HOSPITAL | Age: 70
End: 2025-05-23
Payer: MEDICARE

## 2025-06-03 DIAGNOSIS — E11.40 TYPE 2 DIABETES MELLITUS WITH DIABETIC NEUROPATHY, WITHOUT LONG-TERM CURRENT USE OF INSULIN: ICD-10-CM

## 2025-06-03 RX ORDER — INSULIN GLARGINE 100 [IU]/ML
15 INJECTION, SOLUTION SUBCUTANEOUS DAILY
Qty: 15 ML | Refills: 0 | Status: SHIPPED | OUTPATIENT
Start: 2025-06-03 | End: 2025-09-11

## 2025-06-15 DIAGNOSIS — I21.4 NSTEMI (NON-ST ELEVATED MYOCARDIAL INFARCTION): ICD-10-CM

## 2025-06-15 DIAGNOSIS — Z98.61 POST PTCA: ICD-10-CM

## 2025-06-16 RX ORDER — ROSUVASTATIN CALCIUM 20 MG/1
20 TABLET, COATED ORAL NIGHTLY
Qty: 90 TABLET | Refills: 1 | Status: SHIPPED | OUTPATIENT
Start: 2025-06-16

## 2025-06-16 RX ORDER — CLOPIDOGREL BISULFATE 75 MG/1
75 TABLET ORAL
Qty: 90 TABLET | Refills: 1 | Status: SHIPPED | OUTPATIENT
Start: 2025-06-16

## 2025-06-17 ENCOUNTER — OFFICE VISIT (OUTPATIENT)
Dept: CARDIOLOGY | Facility: CLINIC | Age: 70
End: 2025-06-17
Payer: MEDICARE

## 2025-06-17 VITALS
OXYGEN SATURATION: 95 % | HEART RATE: 78 BPM | HEIGHT: 62 IN | SYSTOLIC BLOOD PRESSURE: 139 MMHG | BODY MASS INDEX: 30.81 KG/M2 | RESPIRATION RATE: 16 BRPM | DIASTOLIC BLOOD PRESSURE: 70 MMHG | WEIGHT: 167.44 LBS

## 2025-06-17 DIAGNOSIS — Z91.148 POOR COMPLIANCE WITH MEDICATION: ICD-10-CM

## 2025-06-17 DIAGNOSIS — M79.89 RIGHT LEG SWELLING: ICD-10-CM

## 2025-06-17 DIAGNOSIS — E11.69 TYPE 2 DIABETES MELLITUS WITH OTHER SPECIFIED COMPLICATION, WITH LONG-TERM CURRENT USE OF INSULIN: ICD-10-CM

## 2025-06-17 DIAGNOSIS — E78.5 HYPERLIPIDEMIA, UNSPECIFIED HYPERLIPIDEMIA TYPE: ICD-10-CM

## 2025-06-17 DIAGNOSIS — I70.0 AORTIC ATHEROSCLEROSIS: ICD-10-CM

## 2025-06-17 DIAGNOSIS — I10 HYPERTENSION GOAL BP (BLOOD PRESSURE) < 140/80: ICD-10-CM

## 2025-06-17 DIAGNOSIS — Z98.61 POST PTCA: ICD-10-CM

## 2025-06-17 DIAGNOSIS — I10 ESSENTIAL HYPERTENSION: Primary | ICD-10-CM

## 2025-06-17 DIAGNOSIS — Z79.4 TYPE 2 DIABETES MELLITUS WITH OTHER SPECIFIED COMPLICATION, WITH LONG-TERM CURRENT USE OF INSULIN: ICD-10-CM

## 2025-06-17 DIAGNOSIS — Z95.5 S/P CORONARY ARTERY STENT PLACEMENT: ICD-10-CM

## 2025-06-17 PROCEDURE — 99999 PR PBB SHADOW E&M-EST. PATIENT-LVL IV: CPT | Mod: PBBFAC,,, | Performed by: INTERNAL MEDICINE

## 2025-06-17 PROCEDURE — 99214 OFFICE O/P EST MOD 30 MIN: CPT | Mod: PBBFAC | Performed by: INTERNAL MEDICINE

## 2025-06-17 NOTE — PROGRESS NOTES
Subjective:   Patient ID:  06/17/2025      Alysha Rosales is a 70 y.o. female who presents for evaluation of No chief complaint on file.      History of Present Illness    CHIEF COMPLAINT:  Patient presents today for follow up of BP management.    CARDIOVASCULAR:  She had a stent placed in the circumflex in June 2021, which was a repair of a previous stent from 2004. She denies experiencing any current chest pain, difficulty breathing, or syncope. She reports home , which she checks weekly after taking medications.    MEDICATION COMPLIANCE:  She acknowledges difficulty with medication adherence and reports not consistently taking prescribed medications as directed.    LOWER EXTREMITY CONCERNS:  She continues to experience RLE swelling. Previous blood clot study was negative. She denies any history of right knee or hip surgery.    WEIGHT:  She reports a 15-pound weight loss since December associated with decreased appetite.         HPI    Past Medical History:   Diagnosis Date    BDR (background diabetic retinopathy) 11/30/2023    CAD (coronary artery disease)     Followed by Dr. Stevo Kaba    Diabetes mellitus, type 2     DM (diabetes mellitus)     BS didn't check 06/05/2020    DM (diabetes mellitus) 2012    BS didn't check 02/10/2022    Elevated troponin 05/27/2021    High level of uric acid in blood     Hyperlipidemia     Hyperplastic rectal polyp     on 03/20/2009 colonoscopy    Hypertension     Multinodular goiter     noted on 1/20/2015 CTA carotid scan    Multiple thyroid nodules 05/05/2016    on Thyroid U/S    Osteoarthritis     Left knee    Osteoarthritis of back     Osteoporosis, unspecified     Postmenopausal     No history of abnormal pap smear    Proteinuria     Pustular psoriasis     hands and feet    Subclavian artery stenosis     Left; with the proximal segment at 60-70% per 10/02/2008 carotid CTA    Vitamin D deficiency disease        Past Surgical History:   Procedure Laterality Date     CATARACT EXTRACTION W/  INTRAOCULAR LENS [place]    [facility] [place]ARACT EXTRACTION W/  INTRAOCULAR LENS IMPLANT Left     [facility]    COLONOSCOPY N/A 08/30/2022    Procedure: COLONOSCOPY;  Surgeon: Reanna Garber MD;  Location: [facility] ([place] ENDO;  Service: Endoscopy;  Laterality: N/A;    CORONARY ANGIOGRAPHY N/A 06/18/2021    stents x  4    CORONARY STENT PLACEMENT N/A 06/18/2021    Procedure: INSERTION, STENT, CORONARY ARTERY;  Surgeon: Desiree William MD;  Location: [facility] CATH LAB;  Service: Cardiology;  Laterality: N/A;    heart catheterization with stents placed  2004 and 08/18/2009    LEFT HEART CATHETERIZATION Left 06/18/2021    Procedure: CATHETERIZATION, HEART, LEFT;  Surgeon: Desiree William MD;  Location: [facility] CATH LAB;  Service: Cardiology;  Laterality: Left;  Covid + week of    left knee surgery for bone graft and pin placement         Social History[1]    Family History   Problem Relation Name Age of Onset    Diabetes Mother      Hypertension Mother      Stroke Mother      Hypertension Sister      Cancer Father          Lung cancer (smoker)    No Known Problems Daughter      No Known Problems Son      Heart disease Neg Hx           Review of Systems   Cardiovascular:  Negative for chest pain, dyspnea on exertion, palpitations and syncope.   Neurological:  Negative for focal weakness.       Current Outpatient Medications on File Prior to Visit   Medication Sig    allopurinoL (ZYLOPRIM) 100 MG tablet Take 1 tablet by mouth once daily    amLODIPine (NORVASC) 5 MG tablet Take 1 tablet by mouth once daily    blood sugar diagnostic Strp To check BG 2 times daily, to use with insurance preferred meter - Freestyle Lite    carvediloL (COREG) 25 MG tablet TAKE 1 TABLET BY MOUTH TWICE DAILY WITH MEALS    clopidogreL (PLAVIX) 75 mg tablet Take 1 tablet by mouth once daily    ERGOCALCIFEROL, VITAMIN D2, (VITAMIN D ORAL) Take 2,000 Units by mouth once daily.    ferrous sulfate (FEOSOL) Tab tablet Take 1  tablet by mouth 3 (three) times daily.    furosemide (LASIX) 20 MG tablet Take 2 tablets by mouth once daily    glipiZIDE (GLUCOTROL) 10 MG tablet TAKE 1 TABLET BY MOUTH TWICE DAILY BEFORE MEAL(S)    hydrALAZINE (APRESOLINE) 50 MG tablet Take 1 tablet by mouth 4 times daily    insulin glargine U-100, Lantus, (BASAGLAR KWIKPEN U-100 INSULIN) 100 unit/mL (3 mL) InPn pen Inject 15 Units into the skin once daily.    JANUVIA 100 mg Tab Take 1 tablet by mouth once daily    losartan (COZAAR) 50 MG tablet Take 1 tablet by mouth once daily    metFORMIN (GLUCOPHAGE) 500 MG tablet TAKE 1 TABLET BY MOUTH IN THE MORNING AND  1  TABLET  AT  NOON  AND  2 TABLETS IN THE EVENING WITH MEALS    rosuvastatin (CRESTOR) 20 MG tablet Take 1 tablet by mouth nightly    solifenacin (VESICARE) 10 MG tablet Take 1 tablet by mouth once daily    cloNIDine (CATAPRES) 0.3 MG tablet Take 1 tablet (0.3 mg total) by mouth as needed (blood pressure more than 180/90).     No current facility-administered medications on file prior to visit.       Objective:   Objective:  Wt Readings from Last 3 Encounters:   06/17/25 76 kg (167 lb 7 oz)   04/03/25 79.6 kg (175 lb 8.1 oz)   12/26/24 82.6 kg (182 lb)     Temp Readings from Last 3 Encounters:   04/03/25 96.8 °F (36 °C) (Tympanic)   10/03/24 97.2 °F (36.2 °C) (Tympanic)   04/02/24 97.3 °F (36.3 °C) (Tympanic)     BP Readings from Last 3 Encounters:   06/17/25 139/70   04/03/25 136/78   12/26/24 138/78     Pulse Readings from Last 3 Encounters:   06/17/25 78   04/03/25 85   12/17/24 90       Physical Exam  Vitals reviewed.   Constitutional:       Appearance: She is well-developed.   Neck:      Vascular: No carotid bruit.   Cardiovascular:      Rate and Rhythm: Normal rate and regular rhythm.      Pulses: Intact distal pulses.      Heart sounds: Normal heart sounds. No murmur heard.  Pulmonary:      Breath sounds: Normal breath sounds.   Neurological:      Mental Status: She is oriented to person, place, and  time.           Lab Results   Component Value Date    CHOL 223 (H) 04/03/2025    CHOL 128 04/02/2024    CHOL 138 09/26/2023     Lab Results   Component Value Date    LDLCALC 130.0 04/03/2025    LDLCALC 46.4 (L) 04/02/2024    LDLCALC 51.4 (L) 09/26/2023         Chemistry        Component Value Date/Time     04/03/2025 1029     10/03/2024 1113    K 4.9 04/03/2025 1029    K 5.0 10/03/2024 1113     04/03/2025 1029     10/03/2024 1113    CO2 25 04/03/2025 1029    CO2 25 10/03/2024 1113    BUN 28 (H) 04/03/2025 1029    CREATININE 1.0 04/03/2025 1029     (H) 04/03/2025 1029     (H) 10/03/2024 1113        Component Value Date/Time    CALCIUM 10.3 04/03/2025 1029    CALCIUM 10.5 10/03/2024 1113    ALKPHOS 80 04/03/2025 1029    ALKPHOS 73 10/03/2024 1113    AST 13 04/03/2025 1029    AST 10 10/03/2024 1113    ALT 11 04/03/2025 1029    ALT 9 (L) 10/03/2024 1113    BILITOT 0.2 04/03/2025 1029    BILITOT 0.2 10/03/2024 1113    ESTGFRAFRICA >60.0 07/28/2022 0915    EGFRNONAA >60.0 07/28/2022 0915          Lab Results   Component Value Date    TSH 1.726 04/03/2025       Lab Results   Component Value Date    WBC 8.84 04/03/2025    HGB 11.8 (L) 04/03/2025    HCT 38.8 04/03/2025    MCV 94 04/03/2025     04/03/2025       @LABRCNTIP(BNP,BNPTRIAGEBLO)@       Imaging:  ======    No results found for this or any previous visit.    No results found for this or any previous visit.    Results for orders placed during the hospital encounter of 05/06/21    X-Ray Chest PA And Lateral    Narrative  EXAMINATION:  XR CHEST PA AND LATERAL    CLINICAL HISTORY:  Abnormal findings on diagnostic imaging of other specified body structures    TECHNIQUE:  PA and lateral views of the chest were performed.    COMPARISON:  03/27/2021    FINDINGS:  There are some persistent patchy interstitial opacities along with some scarring changes scattered throughout the mid to lower lung zones bilaterally.  The overall  appearance is similar to the prior study.  Given the history of a negative COVID test this may be representative of chronic interstitial lung changes.  The heart is not enlarged. There is calcification of the aortic knob.    Impression  As above      Electronically signed by: Khalif Osorio DO  Date:    05/06/2021  Time:    09:43      No results found for this or any previous visit.      No valid procedures specified.        No results found for this or any previous visit.      Results for orders placed during the hospital encounter of 05/27/21    Echo    Interpretation Summary  · The left ventricle is normal in size with mildly decreased systolic function.  · The estimated ejection fraction is 40%.  · Left ventricular diastolic dysfunction.  · Normal right ventricular size with normal right ventricular systolic function.  · Mild to moderate tricuspid regurgitation.  · Normal central venous pressure (3 mmHg).  · The estimated PA systolic pressure is 44 mmHg.  · There is pulmonary hypertension.  · Mild mitral regurgitation.  · There are segmental left ventricular wall motion abnormalities. Apical hypokinesis.      Results for orders placed during the hospital encounter of 06/18/21    Cardiac catheterization    Conclusion  · The pre-procedure left ventricular end diastolic pressure was 10.  · The Mid Cx lesion was 99% stenosed with 0% stenosis post-intervention, the lesion was an insegment stent proximal edge restenosis  · A stent was successfully placed.  · The Prox RCA lesion was 90% stenosed.  · The Dist LAD lesion was 50% stenosed.  · The estimated blood loss was none.  · There was two vessel coronary artery disease.    The procedure log was documented by Documenter: Natalie Zapata RN and verified by Desiree William MD.    Date: 6/18/2021  Time: 4:10 PM      Lab Results   Component Value Date    HGBA1C 8.3 (H) 04/03/2025         The ASCVD Risk score (Aric RHODES, et al., 2019) failed to calculate for the  following reasons:    Risk score cannot be calculated because patient has a medical history suggesting prior/existing ASCVD    Diagnostic Results:  ECG: Reviewed    Interpretation Summary  Show Result Comparison       There is 40-49% right Internal Carotid Stenosis.    There is 40-49% left Internal Carotid Stenosis.    Interpretation Summary  Show Result Comparison       There is no evidence of a right lower extremity DVT.    There is no evidence of a left lower extremity DVT.     Essential hypertension    Post PTCA    S/P coronary artery stent placement    Right leg swelling    Poor compliance with medication    Hypertension goal BP (blood pressure) < 140/80    Hyperlipidemia, unspecified hyperlipidemia type    Aortic atherosclerosis    Type 2 diabetes mellitus with other specified complication, with long-term current use of insulin        Assessment & Plan    I10 Essential hypertension  Z98.61 Post PTCA  Z95.5 S/P coronary artery stent placement  M79.89 Right leg swelling  Z91.148 Poor compliance with medication  E78.5 Hyperlipidemia, unspecified hyperlipidemia type  I70.0 Aortic atherosclerosis  E11.69, Z79.4 Type 2 diabetes mellitus with other specified complication, with long-term current use of insulin    Blood pressure in 130s/70s range, with today's initial elevated reading likely due to medication non-compliance.  Leg swelling likely due to venous insufficiency.  Elevated LDL of 130 indicative of medication non-adherence.  Recent A1C of 8.3, improved from past readings but still elevated.  Recent uric acid elevation noted.  Discussed relationship between uncontrolled diabetes and unexplained weight loss.    I10 ESSENTIAL HYPERTENSION:  - Continued carvedilol twice daily, amlodipine 5 mg, losartan 50 mg, and hydralazine 50 mg for blood pressure.  - Continued clonidine as needed if blood pressure goes above 180/90.  - Reduce salt intake.    Z95.5 S/P CORONARY ARTERY STENT PLACEMENT:  - Continued Plavix  (clopidogrel) without aspirin.    M79.89 RIGHT LEG SWELLING:  - Elevate legs when possible to manage venous insufficiency, wear compression socks for leg swelling.    E78.5 HYPERLIPIDEMIA, UNSPECIFIED HYPERLIPIDEMIA TYPE:  - Continued Crestor for high cholesterol.    E11.69, Z79.4 TYPE 2 DIABETES MELLITUS WITH OTHER SPECIFIED COMPLICATION, WITH LONG-TERM CURRENT USE OF INSULIN:  - Reduce intake of carbohydrates, sweets, sugars, and starches to improve diabetes control.  - Continued Januvia, insulin, and metformin for diabetes.  - Limit consumption of seafood and red meat to manage uric acid levels.  - Continued allopurinol 100 mg for high uric acid.  - Check with primary care physician Dr. Bird regarding potential increase in allopurinol dosage.  - Verify with Dr. Bird when last colonoscopy was performed.  - Follow up in 6 months.               Reviewed all tests and above medical conditions with patient in detail and formulated treatment plan.  Healthy weight goals were discussed in clinic    Follow up in  6 months         [1]   Social History  Tobacco Use    Smoking status: Former     Current packs/day: 0.00     Average packs/day: 1 pack/day for 33.0 years (33.0 ttl pk-yrs)     Types: Cigarettes     Start date: 1971     Quit date: 2004     Years since quittin.9     Passive exposure: Past    Smokeless tobacco: Never   Substance Use Topics    Alcohol use: Yes     Alcohol/week: 0.0 standard drinks of alcohol     Comment: Rarely    Drug use: No

## 2025-07-15 DIAGNOSIS — M25.562 CHRONIC PAIN OF BOTH KNEES: Primary | ICD-10-CM

## 2025-07-15 DIAGNOSIS — G89.29 CHRONIC PAIN OF BOTH KNEES: Primary | ICD-10-CM

## 2025-07-15 DIAGNOSIS — M25.561 CHRONIC PAIN OF BOTH KNEES: Primary | ICD-10-CM

## 2025-07-17 ENCOUNTER — LAB VISIT (OUTPATIENT)
Dept: LAB | Facility: HOSPITAL | Age: 70
End: 2025-07-17
Attending: NURSE PRACTITIONER
Payer: MEDICARE

## 2025-07-17 DIAGNOSIS — N39.0 RECURRENT UTI: Primary | ICD-10-CM

## 2025-07-17 DIAGNOSIS — E11.9 TYPE 2 DIABETES MELLITUS WITHOUT COMPLICATION: ICD-10-CM

## 2025-07-17 DIAGNOSIS — N39.0 RECURRENT UTI: ICD-10-CM

## 2025-07-17 LAB
ALBUMIN/CREAT UR: 1208.7 UG/MG
BACTERIA #/AREA URNS AUTO: ABNORMAL /HPF
BILIRUB UR QL STRIP.AUTO: NEGATIVE
CLARITY UR: ABNORMAL
COLOR UR AUTO: YELLOW
CREAT UR-MCNC: 46 MG/DL (ref 15–325)
GLUCOSE UR QL STRIP: ABNORMAL
HGB UR QL STRIP: NEGATIVE
HYALINE CASTS UR QL AUTO: 0 /LPF (ref 0–1)
KETONES UR QL STRIP: NEGATIVE
LEUKOCYTE ESTERASE UR QL STRIP: ABNORMAL
MICROALBUMIN UR-MCNC: 556 UG/ML (ref ?–5000)
MICROSCOPIC COMMENT: ABNORMAL
NITRITE UR QL STRIP: NEGATIVE
PH UR STRIP: 6 [PH]
PROT UR QL STRIP: ABNORMAL
RBC #/AREA URNS AUTO: 2 /HPF (ref 0–4)
SP GR UR STRIP: 1.01
SQUAMOUS #/AREA URNS AUTO: 2 /HPF
UROBILINOGEN UR STRIP-ACNC: NEGATIVE EU/DL
WBC #/AREA URNS AUTO: 90 /HPF (ref 0–5)
WBC CLUMPS UR QL AUTO: ABNORMAL
YEAST UR QL AUTO: ABNORMAL /HPF

## 2025-07-17 PROCEDURE — 82043 UR ALBUMIN QUANTITATIVE: CPT

## 2025-07-17 PROCEDURE — 81003 URINALYSIS AUTO W/O SCOPE: CPT

## 2025-07-18 RX ORDER — CEFDINIR 300 MG/1
300 CAPSULE ORAL 2 TIMES DAILY
Qty: 20 CAPSULE | Refills: 0 | Status: SHIPPED | OUTPATIENT
Start: 2025-07-18 | End: 2025-07-28

## 2025-07-29 ENCOUNTER — OFFICE VISIT (OUTPATIENT)
Dept: UROLOGY | Facility: CLINIC | Age: 70
End: 2025-07-29
Payer: MEDICARE

## 2025-07-29 VITALS
HEIGHT: 62 IN | DIASTOLIC BLOOD PRESSURE: 76 MMHG | SYSTOLIC BLOOD PRESSURE: 154 MMHG | WEIGHT: 167.75 LBS | RESPIRATION RATE: 14 BRPM | TEMPERATURE: 98 F | BODY MASS INDEX: 30.87 KG/M2 | HEART RATE: 91 BPM

## 2025-07-29 DIAGNOSIS — Z87.440 HISTORY OF RECURRENT UTIS: Primary | ICD-10-CM

## 2025-07-29 DIAGNOSIS — N32.81 OAB (OVERACTIVE BLADDER): ICD-10-CM

## 2025-07-29 DIAGNOSIS — N39.46 MIXED INCONTINENCE: ICD-10-CM

## 2025-07-29 LAB
BILIRUBIN, UA POC OHS: NEGATIVE
BLOOD, UA POC OHS: NEGATIVE
CLARITY, UA POC OHS: CLEAR
COLOR, UA POC OHS: ABNORMAL
GLUCOSE, UA POC OHS: NEGATIVE
KETONES, UA POC OHS: NEGATIVE
LEUKOCYTES, UA POC OHS: NEGATIVE
NITRITE, UA POC OHS: NEGATIVE
PH, UA POC OHS: 5.5
POC RESIDUAL URINE VOLUME: 0 ML (ref 0–100)
PROTEIN, UA POC OHS: NEGATIVE
SPECIFIC GRAVITY, UA POC OHS: 1.01
UROBILINOGEN, UA POC OHS: 0.2

## 2025-07-29 PROCEDURE — 99999PBSHW POCT URINALYSIS(INSTRUMENT): Mod: PBBFAC,,,

## 2025-07-29 PROCEDURE — 99214 OFFICE O/P EST MOD 30 MIN: CPT | Mod: PBBFAC | Performed by: NURSE PRACTITIONER

## 2025-07-29 PROCEDURE — 99999PBSHW POCT BLADDER SCAN: Mod: PBBFAC,,,

## 2025-07-29 PROCEDURE — 87086 URINE CULTURE/COLONY COUNT: CPT | Performed by: NURSE PRACTITIONER

## 2025-07-29 PROCEDURE — 81003 URINALYSIS AUTO W/O SCOPE: CPT | Mod: PBBFAC | Performed by: NURSE PRACTITIONER

## 2025-07-29 PROCEDURE — 99214 OFFICE O/P EST MOD 30 MIN: CPT | Mod: S$PBB,,, | Performed by: NURSE PRACTITIONER

## 2025-07-29 PROCEDURE — 51798 US URINE CAPACITY MEASURE: CPT | Mod: PBBFAC | Performed by: NURSE PRACTITIONER

## 2025-07-29 PROCEDURE — 99999 PR PBB SHADOW E&M-EST. PATIENT-LVL IV: CPT | Mod: PBBFAC,,, | Performed by: NURSE PRACTITIONER

## 2025-07-29 RX ORDER — SOLIFENACIN SUCCINATE 10 MG/1
10 TABLET, FILM COATED ORAL DAILY
Qty: 90 TABLET | Refills: 3 | Status: SHIPPED | OUTPATIENT
Start: 2025-07-29 | End: 2026-07-29

## 2025-07-29 NOTE — PROGRESS NOTES
Chief Complaint:   Overactive bladder with urge incontinence    HPI:   Patient is presenting as a follow-up to VESIcare.  States that VESIcare 10 mg once daily has completely resolved her overactive bladder symptoms.  Denies adverse side effects.  PVR was 14 mL.  Denies gross hematuria.  Urine in clinic indicates glucose and trace leukocytes, all other parameters are negative.  Idalmis LION  08/15/2024 - returns today for follow-up, no new issues in the interim, VESIcare still working very well for her OAB symptoms, no UTIs since her last visit, no gross hematuria or dysuria     Patient is presenting as a follow-up to recurrent urinary tract infections.  Was recently treated for positive urine culture and states she is completely asymptomatic secondary to completion of antibiotics.  Urine in clinic is negative.  PVR is 14 mL.  States that VESIcare has completely resolved her overactive bladder symptoms.  Recent renal ultrasound was normal.  02/01/2024  Patient is a 69-year-old female that is presenting as a follow-up to overactive bladder and recurrent urinary tract infections. Reports that VESIcare 10 mg once daily has resolved her overactive bladder symptoms.  Nocturia is once nightly and daytime frequency has decreased to the point where she is now wearing a pad.  Urine in clinic indicates nitrates and leukocytes, all other parameters are negative.  Patient is asymptomatic for urinary tract infection.  PVR is 58 mL.  Is currently on nightly Keflex prophylactically.  11/14/2023  Patient is a 68-year-old female that has been followed by this clinic for recurrent urinary tract infections.  Urine in clinic is negative.  PVR is 27 mL.  Patient states that over the last 6 months she is had urge incontinence and nocturia.  Patient is wearing a pad that she changes several times a day.  Denies gross hematuria.  States she drinks very little water throughout the day.  08/31/2023  Patient is a delightful 68-year-old female  that is presenting as a follow-up to recurrent UTIs, Klebsiella.  Was recently treated with Meropenum 1 gram every 8 hours for 7 days per PICC line .  Patient states that she is completely asymptomatic in urine in clinic is negative.  07/18/2023  Patient is a 68-year-old female that is presenting as a follow-up to recurrent urinary tract infections.  Patient was recently treated for urine culture indicating Klebsiella.  Patient states that she is completely asymptomatic after completion of antibiotics, however, urine in clinic indicates nitrates and leukocytes all other parameters are negative.  PVR is 14 mL.  Denies pelvic or flank pain.  Denies gross hematuria.  06/22/.2023  Patient is a 68-year-old female that is presenting with recurrent urinary tract infections.  In reviewing EMR, patient had 3 positive urine cultures including MRSA.  Patient states that she is completed antibiotics and is currently asymptomatic. Urine in clinic is negative and PVR is 14 mL.  Patient denies pelvic or flank pain.  Patient states she has a remote history of renal stones.  Denies gross hematuria.  Reports that she is occasionally incontinent and wears a pad that she is changed several times during the day.s:  has a current medication list which includes the following prescription(s): allopurinol, amlodipine, blood sugar diagnostic, carvedilol, clonidine, clopidogrel, ergocalciferol (vitamin d2), ferrous sulfate, furosemide, glipizide, hydralazine, basaglar kwikpen u-100 insulin, januvia, losartan, metformin, rosuvastatin, and solifenacin.  Allergies:  Codeine    Medications:  has a current medication list which includes the following prescription(s): allopurinol, amlodipine, blood sugar diagnostic, carvedilol, clonidine, clopidogrel, ergocalciferol (vitamin d2), ferrous sulfate, furosemide, glipizide, hydralazine, basaglar kwikpen u-100 insulin, januvia, losartan, metformin, rosuvastatin, and solifenacin.    Review of  Systems:  General: No fever, chills, fatigability, or weight loss.  Skin: No rashes, itching, or changes in color or texture of skin.  Chest: Denies RIDDLE, cyanosis, wheezing, cough, and sputum production.  Abdomen: Appetite fine. No weight loss. Denies diarrhea, abdominal pain, hematemesis, or blood in stool.  Musculoskeletal: No joint stiffness or swelling. Denies back pain.  : As above.  All other review of systems negative.    PMH:   has a past medical history of BDR (background diabetic retinopathy) (11/30/2023), CAD (coronary artery disease), Diabetes mellitus, type 2, DM (diabetes mellitus), DM (diabetes mellitus) (2012), Elevated troponin (05/27/2021), High level of uric acid in blood, Hyperlipidemia, Hyperplastic rectal polyp, Hypertension, Multinodular goiter, Multiple thyroid nodules (05/05/2016), Osteoarthritis, Osteoarthritis of back, Osteoporosis, unspecified, Postmenopausal, Proteinuria, Pustular psoriasis, Subclavian artery stenosis, and Vitamin D deficiency disease.    PSH:   has a past surgical history that includes left knee surgery for bone graft and pin placement; heart catheterization with stents placed (2004 and 08/18/2009); Left heart catheterization (Left, 06/18/2021); Coronary stent placement (N/A, 06/18/2021); Coronary angiography (N/A, 06/18/2021); Cataract extraction w/  intraocular lens implant (Right, 04/13/2016); Cataract extraction w/  intraocular lens implant (Left); and Colonoscopy (N/A, 08/30/2022).    FamHx: family history includes Cancer in her father; Diabetes in her mother; Hypertension in her mother and sister; No Known Problems in her daughter and son; Stroke in her mother.    SocHx:  reports that she quit smoking about 21 years ago. Her smoking use included cigarettes. She started smoking about 54 years ago. She has a 33 pack-year smoking history. She has been exposed to tobacco smoke. She has never used smokeless tobacco. She reports current alcohol use. She reports that  she does not use drugs.      Physical Exam:  General: A&Ox3, no apparent distress, no deformities  Neck: No masses, normal thyroid  Lungs: normal inspiration, no use of accessory muscles  Heart: normal pulse, no arrhythmias  Abdomen: Soft, NT, ND, no masses, no hernias, no hepatosplenomegaly  Lymphatic: Neck and groin nodes negative  Skin: The skin is warm and dry. No jaundice.  Ext: No c/c/e.    Labs/Studies:   See HPI  Impression/Plan:   Overactive bladder with urge incontinence  Patient has a complete resolution to all overactive bladder symptoms without adverse side effects.  Remain on VESIcare 10 mg once daily, refills sent to her pharmacy.  Return to clinic in 6 months for re-evaluation.  Urine was sent for culture.

## 2025-08-01 ENCOUNTER — RESULTS FOLLOW-UP (OUTPATIENT)
Dept: UROLOGY | Facility: CLINIC | Age: 70
End: 2025-08-01
Payer: MEDICARE

## 2025-08-01 ENCOUNTER — TELEPHONE (OUTPATIENT)
Dept: UROLOGY | Facility: CLINIC | Age: 70
End: 2025-08-01
Payer: MEDICARE

## 2025-08-01 LAB — BACTERIA UR CULT: NO GROWTH

## 2025-08-01 NOTE — TELEPHONE ENCOUNTER
Spoke with pt ----- Message from Haley Barbour NP sent at 8/1/2025  8:44 AM CDT -----  Please call patient inform her that urine culture is negative for urinary tract infection.  ----- Message -----  From: Nicole Garcia LPN  Sent: 7/29/2025   4:25 PM CDT  To: Haley Barbour NP

## 2025-08-14 ENCOUNTER — PATIENT MESSAGE (OUTPATIENT)
Dept: UROLOGY | Facility: CLINIC | Age: 70
End: 2025-08-14
Payer: MEDICARE

## 2025-08-14 RX ORDER — CEFDINIR 300 MG/1
300 CAPSULE ORAL 2 TIMES DAILY
Qty: 20 CAPSULE | Refills: 0 | Status: SHIPPED | OUTPATIENT
Start: 2025-08-14 | End: 2025-08-24

## 2025-08-29 DIAGNOSIS — E79.0 ELEVATED URIC ACID IN BLOOD: ICD-10-CM

## 2025-08-29 RX ORDER — ALLOPURINOL 100 MG/1
100 TABLET ORAL
Qty: 90 TABLET | Refills: 2 | Status: SHIPPED | OUTPATIENT
Start: 2025-08-29

## (undated) DEVICE — CATH BLLN FG APEX MR 2.00X15MM

## (undated) DEVICE — ANGIOTOUCH KIT

## (undated) DEVICE — Device

## (undated) DEVICE — BAND TR COMP DEVICE REG 24CM

## (undated) DEVICE — PACK CATH LAB CUSTOM BR

## (undated) DEVICE — KIT MANIFOLD LOW PRESS TUBING

## (undated) DEVICE — KIT SITE-RITE NDL GUIDE 21G

## (undated) DEVICE — CATH JL3.5 5FR

## (undated) DEVICE — KIT GLIDESHEATH SLEND 6FR 10CM

## (undated) DEVICE — CATH NC QUANTUM APEX MR 3X8

## (undated) DEVICE — SEE MEDLINE ITEM 157187

## (undated) DEVICE — KIT SYR REUSABLE

## (undated) DEVICE — WIRE BMW 014X190

## (undated) DEVICE — GUIDE LAUNCHER 5FR EBU 3.0

## (undated) DEVICE — GUIDEWIRE EMERALD .035IN 260CM

## (undated) DEVICE — GUIDE LAUNCHER 5FR EBU 3.5

## (undated) DEVICE — GUIDE LAUNCHER 5FR AL 1.0

## (undated) DEVICE — CONTRAST OMNIPAQUE 240 150ML

## (undated) DEVICE — CATH OPTITORQUE TIGER 5F 100CM

## (undated) DEVICE — KIT INTRODUCER STIFFEN MICRO